# Patient Record
Sex: MALE | Race: WHITE | NOT HISPANIC OR LATINO | Employment: PART TIME | URBAN - METROPOLITAN AREA
[De-identification: names, ages, dates, MRNs, and addresses within clinical notes are randomized per-mention and may not be internally consistent; named-entity substitution may affect disease eponyms.]

---

## 2017-01-09 ENCOUNTER — GENERIC CONVERSION - ENCOUNTER (OUTPATIENT)
Dept: OTHER | Facility: OTHER | Age: 57
End: 2017-01-09

## 2017-01-20 ENCOUNTER — GENERIC CONVERSION - ENCOUNTER (OUTPATIENT)
Dept: OTHER | Facility: OTHER | Age: 57
End: 2017-01-20

## 2017-02-08 ENCOUNTER — ALLSCRIPTS OFFICE VISIT (OUTPATIENT)
Dept: OTHER | Facility: OTHER | Age: 57
End: 2017-02-08

## 2017-02-08 DIAGNOSIS — R91.8 OTHER NONSPECIFIC ABNORMAL FINDING OF LUNG FIELD: ICD-10-CM

## 2017-02-08 DIAGNOSIS — M54.50 LOW BACK PAIN: ICD-10-CM

## 2017-02-08 DIAGNOSIS — R05.9 COUGH: ICD-10-CM

## 2017-02-08 DIAGNOSIS — J06.9 ACUTE UPPER RESPIRATORY INFECTION: ICD-10-CM

## 2017-02-08 DIAGNOSIS — M54.32 SCIATICA OF LEFT SIDE: ICD-10-CM

## 2017-02-22 ENCOUNTER — GENERIC CONVERSION - ENCOUNTER (OUTPATIENT)
Dept: OTHER | Facility: OTHER | Age: 57
End: 2017-02-22

## 2017-02-28 ENCOUNTER — ALLSCRIPTS OFFICE VISIT (OUTPATIENT)
Dept: OTHER | Facility: OTHER | Age: 57
End: 2017-02-28

## 2017-03-01 ENCOUNTER — HOSPITAL ENCOUNTER (OUTPATIENT)
Dept: RADIOLOGY | Facility: MEDICAL CENTER | Age: 57
Discharge: HOME | End: 2017-03-01
Payer: COMMERCIAL

## 2017-03-01 ENCOUNTER — TRANSCRIBE ORDERS (OUTPATIENT)
Dept: ADMINISTRATIVE | Facility: HOSPITAL | Age: 57
End: 2017-03-01

## 2017-03-01 DIAGNOSIS — R05.9 COUGH: ICD-10-CM

## 2017-03-01 DIAGNOSIS — J06.9 ACUTE UPPER RESPIRATORY INFECTION: ICD-10-CM

## 2017-03-01 PROCEDURE — 71020 HB CHEST X-RAY 2VW FRONTAL&LATL: CPT

## 2017-03-02 ENCOUNTER — GENERIC CONVERSION - ENCOUNTER (OUTPATIENT)
Dept: OTHER | Facility: OTHER | Age: 57
End: 2017-03-02

## 2017-03-17 ENCOUNTER — HOSPITAL ENCOUNTER (OUTPATIENT)
Dept: RADIOLOGY | Facility: MEDICAL CENTER | Age: 57
Discharge: HOME | End: 2017-03-17
Payer: COMMERCIAL

## 2017-03-17 DIAGNOSIS — R91.8 OTHER NONSPECIFIC ABNORMAL FINDING OF LUNG FIELD: ICD-10-CM

## 2017-03-17 DIAGNOSIS — J06.9 ACUTE UPPER RESPIRATORY INFECTION: ICD-10-CM

## 2017-03-17 PROCEDURE — 71020 HB CHEST X-RAY 2VW FRONTAL&LATL: CPT

## 2017-03-22 ENCOUNTER — GENERIC CONVERSION - ENCOUNTER (OUTPATIENT)
Dept: OTHER | Facility: OTHER | Age: 57
End: 2017-03-22

## 2017-04-05 ENCOUNTER — GENERIC CONVERSION - ENCOUNTER (OUTPATIENT)
Dept: OTHER | Facility: OTHER | Age: 57
End: 2017-04-05

## 2017-05-30 ENCOUNTER — GENERIC CONVERSION - ENCOUNTER (OUTPATIENT)
Dept: OTHER | Facility: OTHER | Age: 57
End: 2017-05-30

## 2017-06-26 ENCOUNTER — GENERIC CONVERSION - ENCOUNTER (OUTPATIENT)
Dept: OTHER | Facility: OTHER | Age: 57
End: 2017-06-26

## 2017-07-26 ENCOUNTER — GENERIC CONVERSION - ENCOUNTER (OUTPATIENT)
Dept: OTHER | Facility: OTHER | Age: 57
End: 2017-07-26

## 2017-08-04 ENCOUNTER — GENERIC CONVERSION - ENCOUNTER (OUTPATIENT)
Dept: OTHER | Facility: OTHER | Age: 57
End: 2017-08-04

## 2017-10-02 ENCOUNTER — GENERIC CONVERSION - ENCOUNTER (OUTPATIENT)
Dept: OTHER | Facility: OTHER | Age: 57
End: 2017-10-02

## 2017-11-06 ENCOUNTER — GENERIC CONVERSION - ENCOUNTER (OUTPATIENT)
Dept: OTHER | Facility: OTHER | Age: 57
End: 2017-11-06

## 2017-12-11 ENCOUNTER — ALLSCRIPTS OFFICE VISIT (OUTPATIENT)
Dept: OTHER | Facility: OTHER | Age: 57
End: 2017-12-11

## 2017-12-11 DIAGNOSIS — E66.9 OBESITY: ICD-10-CM

## 2017-12-11 DIAGNOSIS — E78.5 HYPERLIPIDEMIA: ICD-10-CM

## 2017-12-11 DIAGNOSIS — R51.9 HEADACHE: ICD-10-CM

## 2017-12-11 DIAGNOSIS — N40.0 ENLARGED PROSTATE WITHOUT LOWER URINARY TRACT SYMPTOMS (LUTS): ICD-10-CM

## 2017-12-11 DIAGNOSIS — R53.83 OTHER FATIGUE: ICD-10-CM

## 2017-12-11 DIAGNOSIS — R42 DIZZINESS AND GIDDINESS: ICD-10-CM

## 2017-12-12 NOTE — PROGRESS NOTES
Assessment    1  Left-sided headache (784 0) (R51)   2  Fatigue (780 79) (R53 83)   3  AVM (arteriovenous malformation) brain (747 81) (Q28 2)   4  Lightheadedness (780 4) (R42)   5  Obesity (278 00) (E66 9)   6  Anxiety (300 00) (F41 9)    Plan  Anxiety, AVM (arteriovenous malformation) brain, Fatigue, Headache, Left-sidedheadache, Lightheadedness    · Follow-up PRN Evaluation and Treatment  Follow-up  Status: Complete  Done:87Pci3449  Fatigue, Headache, Left-sided headache, Lightheadedness, Obesity    · (1) ANTHONY SCREEN W/REFLEX TO TITER/PATTERN; Status:Active; Requestedfor:28Hxd5085;    · (1) CBC/PLT/DIFF; Status:Active; Requested for:43Npn8484;    · (1) COMPREHENSIVE METABOLIC PANEL; Status:Active; Requested for:11Dec2017;    · (1) RHEUMATOID FACTOR SCREEN; Status:Active; Requested for:11Dec2017;    · (1) SED RATE; Status:Active; Requested for:79Mie9827;    · (1) T4, FREE; Status:Active; Requested for:11Dec2017;    · (1) TSH; Status:Active; Requested for:63Xre4297;   Hyperlipidemia    · (1) LIPID PANEL, FASTING; Status:Active - Retrospective By Protocol Authorization; Requested for:98Hhw9312;     Discussion/Summary    Check labs r/o temporal arteritis  Rec  ENT consult for crackling right ear with opening jaw  F-up with neurosurgery for AVM  Fatigue may be due to obesity, check labs  Check labs for hyperlipidemia as he is obes  Lightheadedness and anxiety are stable  HA left sided area may be from stress/anxiety or ocular migraine  Call if any problems  The patient was counseled regarding  The treatment plan was reviewed with the patient/guardian  The patient/guardian understands and agrees with the treatment plan      Chief Complaint  Pt complains of a headache and vision changes of the R eye last night  History of Present Illness  HPI: Pt complains of a headache and vision changes of the R eye last night  Als some right ear clicking when opening jaw  No other complaints  HA left temporal area resolved  Hx of AVM on left posterior head area  Has had some fatigue and lightheadedness and does have stress in life and hx of anxiety and has not had to use Xanax as much  Review of Systems   Constitutional: no fever or chills, feels well, no tiredness, no recent weight loss or weight gain  ENT: as noted in HPI  Cardiovascular: no complaints of slow or fast heart rate, no chest pain, no palpitations, no leg claudication or lower extremity edema  Respiratory: no complaints of shortness of breath, no wheezing or cough, no dyspnea on exertion, no orthopnea or PND  Gastrointestinal: no complaints of abdominal pain, no constipation, no nausea or vomiting, no diarrhea or bloody stools  Genitourinary: no complaints of dysuria or incontinence, no hesitancy, no nocturia, no genital lesion, no inadequacy of penile erection  Musculoskeletal: no complaints of arthralgia, no myalgia, no joint swelling or stiffness, no limb pain or swelling  Integumentary: no complaints of skin rash or lesion, no itching or dry skin, no skin wounds  Neurological: as noted in HPI  Other Symptoms: anxiety stable, stressed  Active Problems  1  Acute low back pain (724 2) (M54 5)   2  Acute sinusitis (461 9) (J01 90)   3  Acute URI (465 9) (J06 9)   4  Anxiety (300 00) (F41 9)   5  AVM (arteriovenous malformation) brain (747 81) (Q28 2)   6  Backache (724 5) (M54 9)   7  Brain aneurysm (437 3) (I67 1)   8  Breast mass (611 72) (N63 0)   9  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)   10  Chest pain (786 50) (R07 9)   11  Colitis (558 9) (K52 9)   12  Constipation (564 00) (K59 00)   13  Costochondritis (733 6) (M94 0)   14  Cough (786 2) (R05)   15  Erectile dysfunction of non-organic origin (302 72) (F52 21)   16  Esophageal reflux (530 81) (K21 9)   17  Foot pain, left (729 5) (M79 672)   18  Hepatic steatosis (571 8) (K76 0)   19  Hydronephrosis (591) (N13 30)   20  Irritable bowel syndrome (564 1) (Q26 9)   21   Lateral epicondylitis of left elbow (726 32) (M77 12)   22  Left inguinal hernia (550 90) (K40 90)   23  Lung nodule (793 11) (R91 1)   24  Mass of breast, left (611 72) (N63 20)   25  Midepigastric pain (789 06) (R10 13)   26  Nephrolithiasis (592 0) (N20 0)   27  Obesity (278 00) (E66 9)   28  Organic impotence (607 84) (N52 9)   29  Other chronic pain (338 29) (G89 29)   30  Periumbilical hernia (133 6) (K42 9)   31  Pulmonary infiltrate in left lung on CXR (793 19) (R91 8)   32  Sciatica, left side (724 3) (M54 32)   33  Skin rash (782 1) (R21)   34  Strain of thoracic region (847 1) (S29 019A)   35  Tinnitus of both ears (388 30) (H93 13)   36  Umbilical hernia (724 2) (K42 9)   37  Umbilical hernia (761 5) (K42 9)    Past Medical History  1  History of Acute Sinus Inflammation (461 9)   2  History of Cough (786 2) (R05)   3  History of Fracture of nasal bone, sequela   4  History of Fracture of wrist, unspecified laterality, sequela (905 2) (S62 109S)   5  History of Herniated disc (722 2)   6  History of acute bronchitis (V12 69) (Z87 09)   7  History of headache (V13 89) (Z87 898)   8  History of headache (V13 89) (Z87 898)   9  History of influenza (V12 09) (Z87 09)   10  History of renal calculi (V13 01) (A28 219)    Family History  Mother    1  Family history of Stroke Syndrome (V17 1)  Father    2  Family history of Cancer   3  Family history of Diabetes Mellitus (V18 0)   4  Family history of Heart Disease (V17 49)   5  Family history of Hypertension (V17 49)    Social History   · Completed college   · Currently sexually active   · Denied: History of Drug use   · Lives with spouse   ·    · Never A Smoker   · Occupation   · Antiques   · One child   · Rarely consumes alcohol (V49 89) (Z78 9)    Surgical History    1  History of Hernia Repair   2  History of Kidney Surgery    Current Meds   1  ALPRAZolam 0 25 MG Oral Tablet; TAKE 1 TABLET Daily PRN anxiety no driving - take 30 minutes prior to MRI;  Therapy: 33CSJ5497 to (Evaluate:27Nov2017); Last Rx:20Nov2017 Ordered   2  Ibuprofen TABS; TAKE TABS AS NEEDED; Therapy: (Recorded:37Brj7772) to Recorded    Allergies  1  No Known Drug Allergies  2  Seasonal    Vitals   Recorded: 07JSH6324 97:18XT   Systolic 004   Diastolic 86   Height 5 ft 8 5 in   Weight 232 lb 8 oz   BMI Calculated 34 84   BSA Calculated 2 19       Physical Exam   Constitutional  General appearance: Abnormal  -- obesity, fatigued  Eyes  Conjunctiva and lids: No swelling, erythema, or discharge  Pupils and irises: Equal, round and reactive to light  Ears, Nose, Mouth, and Throat  External inspection of ears and nose: Normal    Otoscopic examination: Abnormal  -- right ear canal with small hair, No TMJ signs but does hear clicking and crackling with jaw opening  Nasal mucosa, septum, and turbinates: Normal without edema or erythema  Oropharynx: Normal with no erythema, edema, exudate or lesions  Pulmonary  Respiratory effort: No increased work of breathing or signs of respiratory distress  Auscultation of lungs: Clear to auscultation, equal breath sounds bilaterally, no wheezes, no rales, no rhonci  Cardiovascular  Palpation of heart: Normal PMI, no thrills  Auscultation of heart: Normal rate and rhythm, normal S1 and S2, without murmurs  Examination of extremities for edema and/or varicosities: Normal    Carotid pulses: Normal    Lymphatic  Palpation of lymph nodes in neck: No lymphadenopathy  Musculoskeletal  Gait and station: Normal    Digits and nails: Normal without clubbing or cyanosis  Inspection/palpation of joints, bones, and muscles: Normal    Skin  Skin and subcutaneous tissue: Normal without rashes or lesions  Neurologic  Cranial nerves: Cranial nerves 2-12 intact  Reflexes: 2+ and symmetric  Sensation: No sensory loss  Psychiatric  Orientation to person, place and time: Normal    Mood and affect: Abnormal  -- anxiety stable          Future Appointments    Date/Time Provider Specialty Site   01/22/2018 09:15 AM Tono Vo MD Urology [address]       Signatures   Electronically signed by : Tony Carballo DO; Dec 11 2017  4:24PM EST                       (Author)

## 2018-01-03 ENCOUNTER — GENERIC CONVERSION - ENCOUNTER (OUTPATIENT)
Dept: OTHER | Facility: OTHER | Age: 58
End: 2018-01-03

## 2018-01-09 NOTE — RESULT NOTES
Message   stool for ova and parasites wnl  Verified Results  (1) OVA AND PARASITES EXAM [id number] 08:53AM Hansel Henderson     Test Name Result Flag Reference   O&P CONC  EXAM      No ova, cysts, or parasites seen     One negative specimen does not rule out the possibility of a  parasitic infection     Performed at:  [address]  [id number]  : Luz Maria Polo MD, Phone:  [phone]

## 2018-01-10 NOTE — RESULT NOTES
Message   cxray wnl  No pneumonia  or infiltrate  Verified Results  * XR CHEST PA & LATERAL [id number] 03:08PM [facility] Order Number: [id number]     Test Name Result Flag Reference   XR CHEST PA & LATERAL (Report)     CHEST      INDICATION: Follow-up pneumonia  COMPARISON: March 1, 2017  VIEWS: Frontal and lateral projections     IMAGES: 2     FINDINGS:        Cardiomediastinal silhouette appears unremarkable  The lungs are clear  Questionable left lung base infiltrate, noted on prior study, no longer present  No pneumothorax or pleural effusion  Visualized osseous structures appear within normal limits for the patient's age  IMPRESSION:     Normal examination         Workstation performed: [id number]     Signed by:   Fred Phillips MD   3/22/17

## 2018-01-11 NOTE — RESULT NOTES
Message   There is a questionable nodule in the anterior upper lung zone seen only on the lateral view of cxray  I would recommend  CT scan of lungs for a vague nodularity in the upper substernal area  Otherwise, no active pulmonary disease  Verified Results  * XR CHEST PA & LATERAL [id number] 03:09PM Ping Henderson    Order Number: [id number]     Test Name Result Flag Reference   XR CHEST PA & LATERAL (Report)     CHEST - DUAL ENERGY     INDICATION: Mid to lower sternal pain for a couple of months  COMPARISON: 2/24/2012     VIEWS: PA (including soft tissue/bone algorithms) and lateral projections; 4 images     FINDINGS:        Cardiomediastinal silhouette appears unremarkable  Although possibly artifactual secondary to confluent shadows, there is vague nodularity in the upper substernal region near the level of the aortic arch and anterior to the trachea  This is seen only on the lateral view  The lungs are otherwise clear  No pleural effusions  Visualized osseous structures appear within normal limits for the patient's age  IMPRESSION:     Questionable nodule in the anterior upper lung zone seen only on the lateral view  I would recommend short interval follow-up chest films in 3-4 weeks to reevaluate this finding  If this persists, CT imaging may be considered  Otherwise, no active pulmonary disease         ##sigslh##sigslh         ##fuslh01##fuslh01          Workstation performed: [id number]     Signed by:   Rhiannon Robbins DO   3/12/16

## 2018-01-12 NOTE — RESULT NOTES
Message   Suspicion of developing infiltrate involving the lateral left lung base  Continued follow-up to confirm resolution      Recheck cxray in 2 weeks for resolution of this possible infiltrate  Take abx as directed and call if worse or not better  Verified Results  * XR CHEST PA & LATERAL [id number] 11:23AM Spencer Quezada Order Number: [id number]     Test Name Result Flag Reference   XR CHEST PA & LATERAL (Report)     CHEST     INDICATION: 59-year-old male, cough   COMPARISON: 3/11/2016 chest x-ray     VIEWS: Frontal and lateral projections; 2 images     FINDINGS:   Interval development of strand-like opacity lateral left lung base suspicious for developing infiltrate     The lungs are otherwise clear  No pleural effusions  The cardiomediastinal silhouette is unremarkable  Bony thorax unremarkable  IMPRESSION:   Suspicion of developing infiltrate involving the lateral left lung base   Continued follow-up to confirm resolution       ##sigslh##sigslh       Workstation performed: [id number]     Signed by:   Oswaldo Thompson MD   3/2/17

## 2018-01-13 NOTE — PROGRESS NOTES
Assessment    1  Acute low back pain (724 2) (M54 5)   2  Sciatica, left (724 3) (M54 32)    Plan  Acute low back pain, Sciatica, left    · PredniSONE 10 MG Oral Tablet; TAKE 6 TABLETS TODAY, THEN DECREASE BY 1  TABLET EACH DAY UNTIL GONE   · TraMADol HCl - 50 MG Oral Tablet; 1 Po QD prn pain with food    Discussion/Summary    Rest and drink fluids as directed  Start prednisone as directed tomorrow  Hx of herniated discs in low back, saw Dr [facility] in past for this  Consider xray of low back if not better  Depo Medrol 80 mg injection today, Jarrell campbell or [employer] prn  Call if not better  Tramadol prn severe pain, no driving  The patient was counseled regarding  Possible side effects of new medications were reviewed with the patient/guardian today  The treatment plan was reviewed with the patient/guardian  The patient/guardian understands and agrees with the treatment plan      Chief Complaint  PT is here today for back pain  Narcisa Putty it started on Monday  Says he lifts boxes at work, and its in the lower left side and radiates down into his left leg  History of Present Illness  HPI: PT is here today for back pain  Narcisa Putty it started on Monday  Says he lifts boxes at work, and its in the lower left side and radiates down into his left leg  No rectal bleeding, no abdominal pain  No urinary complaints  Review of Systems    Constitutional: no fever or chills, feels well, no tiredness, no recent weight loss or weight gain  ENT: no complaints of earache, no loss of hearing, no nosebleeds or nasal discharge, no sore throat or hoarseness  Cardiovascular: no complaints of slow or fast heart rate, no chest pain, no palpitations, no leg claudication or lower extremity edema  Respiratory: no complaints of shortness of breath, no wheezing or cough, no dyspnea on exertion, no orthopnea or PND     Gastrointestinal: no complaints of abdominal pain, no constipation, no nausea or vomiting, no diarrhea or bloody stools  Genitourinary: no complaints of dysuria or incontinence, no hesitancy, no nocturia, no genital lesion, no inadequacy of penile erection  Musculoskeletal: as noted in HPI  Integumentary: no complaints of skin rash or lesion, no itching or dry skin, no skin wounds  Neurological: as noted in HPI  Active Problems    1  Acute URI (465 9) (J06 9)   2  Anxiety (300 00) (F41 9)   3  Backache (724 5) (M54 9)   4  Erectile dysfunction of non-organic origin (302 72) (F52 21)   5  Esophageal reflux (530 81) (K21 9)   6  Headache (784 0) (R51)   7  Hydronephrosis (591) (N13 30)   8  Irritable bowel syndrome (564 1) (K58 9)   9  Nephrolithiasis (592 0) (N20 0)   10  Obesity (278 00) (E66 9)   11  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)   12  Other chronic pain (338 29) (G89 29)   13  Strain of thoracic region (847 1) (K16 582F)    Past Medical History    1  History of Acute Sinus Inflammation (461 9)   2  History of Cough (786 2) (R05)   3  History of acute bronchitis (V12 69) (Z87 09)   4  History of influenza (V12 09) (Z87 09)    Family History    1  Family history of Stroke Syndrome (V17 1)    2  Family history of Cancer   3  Family history of Diabetes Mellitus (V18 0)   4  Family history of Heart Disease (V17 49)   5  Family history of Hypertension (V17 49)    Social History    · Never A Smoker    Current Meds   1  ALPRAZolam 1 MG Oral Tablet; Take 1 tablet daily; Therapy: 64Iok4840 to (Alden De Leon)  Requested for: 01KAB7832; Last   Rx:04Mar2013 Ordered   2  Ibuprofen 800 MG Oral Tablet; Therapy: 80TVM3071 to (Brandin Leonarde Kettle)  Requested for: 25Apr2011 Ordered    Vitals   Recorded: 37TUJ2245 62:39UI   Systolic 777   Diastolic 90   Weight 113 lb 4 00 oz     Physical Exam    Constitutional   General appearance: No acute distress, well appearing and well nourished  Eyes   Conjunctiva and lids: No swelling, erythema, or discharge  Pupils and irises: Equal, round and reactive to light  Ears, Nose, Mouth, and Throat   External inspection of ears and nose: Normal     Otoscopic examination: Tympanic membrance translucent with normal light reflex  Canals patent without erythema  Nasal mucosa, septum, and turbinates: Normal without edema or erythema  Oropharynx: Normal with no erythema, edema, exudate or lesions  Pulmonary   Respiratory effort: No increased work of breathing or signs of respiratory distress  Auscultation of lungs: Clear to auscultation, equal breath sounds bilaterally, no wheezes, no rales, no rhonci  Cardiovascular   Palpation of heart: Normal PMI, no thrills  Auscultation of heart: Normal rate and rhythm, normal S1 and S2, without murmurs  Examination of extremities for edema and/or varicosities: Normal     Carotid pulses: Normal     Lymphatic   Palpation of lymph nodes in neck: No lymphadenopathy  Musculoskeletal   Gait and station: Normal     Digits and nails: Normal without clubbing or cyanosis  Inspection/palpation of joints, bones, and muscles: Abnormal   left low back pain with left radiculopathy  Skin   Skin and subcutaneous tissue: Normal without rashes or lesions  Neurologic   Cranial nerves: Cranial nerves 2-12 intact  Reflexes: 2+ and symmetric  Sensation: No sensory loss      Psychiatric   Orientation to person, place and time: Normal     Mood and affect: Normal          Signatures   Electronically signed by : Isatu Grayson DO; Jan 20 2016  2:06PM EST                       (Author)

## 2018-01-14 VITALS
WEIGHT: 239.25 LBS | SYSTOLIC BLOOD PRESSURE: 142 MMHG | TEMPERATURE: 98 F | DIASTOLIC BLOOD PRESSURE: 84 MMHG | HEIGHT: 69 IN | BODY MASS INDEX: 35.44 KG/M2

## 2018-01-14 VITALS
HEIGHT: 69 IN | TEMPERATURE: 98.3 F | BODY MASS INDEX: 34.21 KG/M2 | WEIGHT: 231 LBS | DIASTOLIC BLOOD PRESSURE: 82 MMHG | SYSTOLIC BLOOD PRESSURE: 128 MMHG

## 2018-01-15 NOTE — RESULT NOTES
Message   1  No lung nodule to correspond to the x-ray finding  2   Geographic hepatic steatosis  Lose weight and low fat diet encouraged to help with fatty liver  The CT scan overall was wnl in chest      Verified Results  * CT CHEST WO CONTRAST 21Mar2016 07:44PM Erika Morning Order Number: [id number]     Test Name Result Flag Reference   CT CHEST WO CONTRAST (Report)     CT CHEST WITHOUT IV CONTRAST     INDICATION: Evaluate for possible nodule on prior chest x-ray     COMPARISON: X-ray from 3/11/2016     TECHNIQUE: Low-dose CT examination of the chest was performed without intravenous contrast  Axial, sagittal and coronal reformatted images were submitted for interpretation  Coronal thick section MIP (maximal intensity projection) images were also    created  This examination, like all CT scans performed in the [facility], was performed utilizing techniques to minimize radiation dose exposure, including the use of iterative reconstruction and automated exposure control  FINDINGS:     LUNGS: Lungs are clear  There is no tracheal or endobronchial lesion  PLEURA: Unremarkable  HEART/GREAT VESSELS: Unremarkable for patient's age  MEDIASTINUM AND LIDA: Unremarkable  CHEST WALL AND LOWER NECK: Unremarkable  VISUALIZED STRUCTURES IN THE UPPER ABDOMEN: There is geographic hepatic steatosis  OSSEOUS STRUCTURES: No acute fracture  No destructive osseous lesion  IMPRESSION:     1  No lung nodule to correspond to the x-ray finding  2  Geographic hepatic steatosis         Workstation performed: [id number]     Signed by:   Devi Martins MD   3/22/16

## 2018-01-16 NOTE — RESULT NOTES
Message   US breast unremarkable  breast US unremarkable  Verified Results  US BREAST RIGHT LIMITED [id number] 09:48AM Ksenia Bryant     Test Name Result Flag Reference   US BREAST RIGHT LIMITED (Report)     Reason for exam: clinical finding  Mammo Diagnostic Bilateral W CAD: February 8, 2016 - Check In #:    [de-identified]   Bilateral CC and MLO view(s) were taken  Technologist: RT Arsenio(R)(M)   Retroareolar breast densities with a flame-shaped symmetric    bilaterally are consistent with bilateral gynecomastia  There is   a sharply circumscribed nodular density in the upper inner right   breast, and a sharply circumscribed nodular density is noted in    the lower inner left breast, both intermediate to low-density  Otherwise no suspicious mass and specifically no mammographic    evidence of mass in the region of palpable abnormality in the    left breast      Targeted right breast ultrasound at the site of sharply    circumscribed nodular density reveals a 4 mm hypoechoic nodule    within the skin at the 2 o'clock position 6 m from the nipple    consistent with a skin mole  Targeted left breast ultrasound reveals hypoechoic nodule within    the skin measuring 5 mm at the 8 o'clock position 11 cm from the    nipple consistent with a skin mole corresponding to the sharply    circumscribed nodular density as well as an oval hyperechoic    sharply circumscribed 16 mm mass in the shallow subcutaneous    tissues at the 8 o'clock position 8 cm from the nipple consistent   with lipoma corresponding to the palpable finding  Impression: Benign findings  US Breast Right Limited: February 8, 2016 - Check In #: [de-identified]   Technologist: RT Adrian (R)SHO     US Breast Left Limited: February 8, 2016 - Check In #: [de-identified]   Technologist: RT Adrian (R)SHO     ASSESSMENT: BiRad:2 - Benign (Overall)     Recommendation:   Clinical management of both breasts     Analyzed by CAD Transcription Location: [facility] 98: [id number]     Risk Value(s):   Myriad Table: 1 5%     MAMMO DIAGNOSTIC BILATERAL W CAD [id number] 09:20AM Hansel Henderson    Order Number: [id number]     Test Name Result Flag Reference   MAMMO DIAGNOSTIC BILATERAL W CAD (Report)     Reason for exam: clinical finding  Mammo Diagnostic Bilateral W CAD: February 8, 2016 - Check In #:    [de-identified]   Bilateral CC and MLO view(s) were taken  Technologist: Tran Noriega RT(R)(M)   Retroareolar breast densities with a flame-shaped symmetric    bilaterally are consistent with bilateral gynecomastia  There is   a sharply circumscribed nodular density in the upper inner right   breast, and a sharply circumscribed nodular density is noted in    the lower inner left breast, both intermediate to low-density  Otherwise no suspicious mass and specifically no mammographic    evidence of mass in the region of palpable abnormality in the    left breast      Targeted right breast ultrasound at the site of sharply    circumscribed nodular density reveals a 4 mm hypoechoic nodule    within the skin at the 2 o'clock position 6 m from the nipple    consistent with a skin mole  Targeted left breast ultrasound reveals hypoechoic nodule within    the skin measuring 5 mm at the 8 o'clock position 11 cm from the    nipple consistent with a skin mole corresponding to the sharply    circumscribed nodular density as well as an oval hyperechoic    sharply circumscribed 16 mm mass in the shallow subcutaneous    tissues at the 8 o'clock position 8 cm from the nipple consistent   with lipoma corresponding to the palpable finding  Impression: Benign findings       US Breast Right Limited: February 8, 2016 - Check In #: [de-identified]   Technologist: RT Usama (R)SHO     US Breast Left Limited: February 8, 2016 - Check In #: [de-identified]   Technologist: RT Usama (R), SHO     ASSESSMENT: BiRad:2 - Benign (Overall) Recommendation:   Clinical management of both breasts  Analyzed by CAD     Transcription Location: [address]   Signing Station: [id number]     Risk Value(s):   Myriad Table: 1 5%     US BREAST LEFT LIMITED [id number] 08:56AM Eustis Ada     Test Name Result Flag Reference   US BREAST LEFT LIMITED (Report)     Reason for exam: clinical finding  Mammo Diagnostic Bilateral W CAD: February 8, 2016 - Check In #:    [de-identified]   Bilateral CC and MLO view(s) were taken  Technologist: RT Jerman(R)(M)   Retroareolar breast densities with a flame-shaped symmetric    bilaterally are consistent with bilateral gynecomastia  There is   a sharply circumscribed nodular density in the upper inner right   breast, and a sharply circumscribed nodular density is noted in    the lower inner left breast, both intermediate to low-density  Otherwise no suspicious mass and specifically no mammographic    evidence of mass in the region of palpable abnormality in the    left breast      Targeted right breast ultrasound at the site of sharply    circumscribed nodular density reveals a 4 mm hypoechoic nodule    within the skin at the 2 o'clock position 6 m from the nipple    consistent with a skin mole  Targeted left breast ultrasound reveals hypoechoic nodule within    the skin measuring 5 mm at the 8 o'clock position 11 cm from the    nipple consistent with a skin mole corresponding to the sharply    circumscribed nodular density as well as an oval hyperechoic    sharply circumscribed 16 mm mass in the shallow subcutaneous    tissues at the 8 o'clock position 8 cm from the nipple consistent   with lipoma corresponding to the palpable finding  Impression: Benign findings       US Breast Right Limited: February 8, 2016 - Check In #: [de-identified]   Technologist: RT Mariaelena (R), SHO     US Breast Left Limited: February 8, 2016 - Check In #: [de-identified]   Technologist: RT Mariaelena (R), SHO     ASSESSMENT: BiRad:2 - Benign (Overall)     Recommendation:   Clinical management of both breasts  Analyzed by CAD     Transcription Location: [address]   Signing Station: [id number]     Risk Value(s):   Myriad Table: 1 5%       Plan  Breast mass    · US BREAST RIGHT COMPLETE; Status:Active;  Requested for:22Rqk2035;

## 2018-01-18 NOTE — RESULT NOTES
Message   stool culture and c diff toxin wnl  Verified Results  (1) C  DIFFICILE TOXIN BY PCR 03Jun2016 08:53AM Amor Osorio    Order Number: [id number]     Test Name Result Flag Reference   C  DIFFICILE TOXIN BY PCR   NEGATIVE for C difficle toxin by PCR  NEGATIVE for C difficle toxin by PCR      (1) STOOL CULTURE [id number] 08:53AM Amor Osorio     Test Name Result Flag Reference   CLINICAL REPORT (Report)     Test:        Stool culture  Specimen Source:  Rectum  Specimen Type:   Stool  Specimen Date:   6/3/2016 8:53 AM  Result Date:    6/5/2016 11:49 AM  Result Status:   Final result  Resulting Lab:   [facility] [address] [place] [address]            Tel: [phone]      CULTURE                                       ------------------                                   No Salmonella, Shigella or Campylobacter isolated      Shiga Toxin 1 NOT detected, Shiga Toxin 2 NOT detected

## 2018-01-22 ENCOUNTER — ALLSCRIPTS OFFICE VISIT (OUTPATIENT)
Dept: OTHER | Facility: OTHER | Age: 58
End: 2018-01-22

## 2018-01-22 LAB
BILIRUB UR QL STRIP: NEGATIVE
CLARITY UR: NORMAL
COLOR UR: YELLOW
GLUCOSE (HISTORICAL): NEGATIVE
HGB UR QL STRIP.AUTO: NORMAL
KETONES UR STRIP-MCNC: NEGATIVE MG/DL
LEUKOCYTE ESTERASE UR QL STRIP: NEGATIVE
NITRITE UR QL STRIP: NEGATIVE
PH UR STRIP.AUTO: 6 [PH]
PROT UR STRIP-MCNC: NEGATIVE MG/DL
SP GR UR STRIP.AUTO: 1.02
UROBILINOGEN UR QL STRIP.AUTO: 0.2

## 2018-01-23 VITALS
WEIGHT: 232.5 LBS | SYSTOLIC BLOOD PRESSURE: 138 MMHG | HEIGHT: 69 IN | BODY MASS INDEX: 34.44 KG/M2 | DIASTOLIC BLOOD PRESSURE: 86 MMHG

## 2018-01-23 NOTE — MISCELLANEOUS
Message   Recorded as Task   Date: 01/03/2018 03:26 PM, Created By: Jose Carter   Task Name: Miscellaneous   Assigned To: Jose Jason 457J,IVUJ   Regarding Patient: Michael Irving, Status: Active   CommentEartha [place] - 03 Jan 2018 3:26 PM     TASK CREATED  Caller: Self; [phone] (Home)  Pt requesting yrly lab order emailed to him  DowellKrystyna - 03 Jan 2018 3:55 PM     TASK EDITED  Alfred WELLINGTON WHAT ORDERS HE WOULD LIKE FOR HIS PT'S  NEXT OV, AND MAIL OUT LAB SLIPS  Active Problems    1  Acute low back pain (724 2) (M54 5)   2  Acute sinusitis (461 9) (J01 90)   3  Acute URI (465 9) (J06 9)   4  Anxiety (300 00) (F41 9)   5  AVM (arteriovenous malformation) brain (747 81) (Q28 2)   6  Backache (724 5) (M54 9)   7  Brain aneurysm (437 3) (I67 1)   8  Breast mass (611 72) (N63 0)   9  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)   10  Chest pain (786 50) (R07 9)   11  Colitis (558 9) (K52 9)   12  Constipation (564 00) (K59 00)   13  Costochondritis (733 6) (M94 0)   14  Cough (786 2) (R05)   15  Erectile dysfunction of non-organic origin (302 72) (F52 21)   16  Esophageal reflux (530 81) (K21 9)   17  Fatigue (780 79) (R53 83)   18  Foot pain, left (729 5) (M79 672)   19  Headache (784 0) (R51)   20  Hepatic steatosis (571 8) (K76 0)   21  Hydronephrosis (591) (N13 30)   22  Hyperlipidemia (272 4) (E78 5)   23  Irritable bowel syndrome (564 1) (K58 9)   24  Lateral epicondylitis of left elbow (726 32) (M77 12)   25  Left inguinal hernia (550 90) (K40 90)   26  Left-sided headache (784 0) (R51)   27  Lightheadedness (780 4) (R42)   28  Lung nodule (793 11) (R91 1)   29  Mass of breast, left (611 72) (N63 20)   30  Midepigastric pain (789 06) (R10 13)   31  Nephrolithiasis (592 0) (N20 0)   32  Obesity (278 00) (E66 9)   33  Organic impotence (607 84) (N52 9)   34  Other chronic pain (338 29) (G89 29)   35  Periumbilical hernia (261 4) (K42 9)   36  Pulmonary infiltrate in left lung on CXR (793 19) (R91 8)   37  Sciatica, left side (724 3) (M54 32)   38  Skin rash (782 1) (R21)   39  Strain of thoracic region (847 1) (S29 019A)   40  Tinnitus of both ears (388 30) (H93 13)   41  Umbilical hernia (711 3) (K42 9)   42  Umbilical hernia (255 6) (K42 9)    Current Meds   1  ALPRAZolam 0 25 MG Oral Tablet; TAKE 1 TABLET Daily PRN anxiety no driving - take   30 minutes prior to MRI; Therapy: 41DFL5567 to (Evaluate:27Nov2017); Last Rx:20Nov2017 Ordered   2  Ibuprofen TABS; TAKE TABS AS NEEDED; Therapy: (Recorded:50Aic9400) to Recorded    Allergies    1  No Known Drug Allergies    2   Seasonal    Signatures   Electronically signed by : Yuliana Wilde, ; [place]  3 2018  3:55PM EST                       (Author)

## 2018-01-23 NOTE — MISCELLANEOUS
Assessment   1  Skin rash (782 1) (R21)1   2  Colitis (558 9) (K52 9)1   3  AVM (arteriovenous malformation) brain (747 81) (Q28 2)1   4  Headache (784 0) (R51)1   5  Nephrolithiasis (592 0) (N20 0)1   6  Left inguinal hernia (550 90) (K40 90)1   7  Periumbilical hernia (289 4) (K42 9)1      1 Amended By: Rena Bautista; Jun 01 2016 10:31 AM EST    Plan  AVM (arteriovenous malformation) brain, Colitis, Headache, Left inguinal hernia,  Nephrolithiasis, Periumbilical hernia, Skin rash    · Follow-up visit in 3 months Evaluation and Treatment  Follow-up  Status: Hold For -  Scheduling  Requested for: 29QIF10770   Ordered; For: AVM (arteriovenous malformation) brain, Colitis, Headache, Left inguinal   hernia, Nephrolithiasis, Periumbilical hernia, Skin rash;  Ordered By: Scott Horanlcampos  Performed:   Due: 15RGT94752   Skin rash    · Start: Mometasone Furoate 0 1 % External Cream; APPLY SPARINGLY TO AFFECTED  AREA(S) ONCE DAILY1   Rx By: Rena Bautista; Dispense: 0 Days ; #:1 X 45 GM Tube; Refill: 0;For: Skin rash;   REGINO = N;1 1,2  Verified Transmission to [facility] #[id number] 1,2  Last Updated By: [employer]; 6/1/2016 10:34:35 AM2      1 Amended By: Rena Bautista; Jun 01 2016 10:22 AM EST   2 Amended By: Rena Bautista; Jun 01 2016 11:10 AM EST    Discussion/Summary  Discussion Summary:   Pt  is s/p hospital and here for ALBINO  He was diagnosed with 2  AVM brain, get f-up with Dr Soto Deluna 1  Neurosurgery 2  and get 2nd opinion at [facility] and and also finish abx as directed for colitis  Rec  f-up with colorectal surgery as directed for hx of 1  recent 2  colitis  BP stable  1  He lost 10 pounds while in hospital for 5 days  It was found pt  has b/l nephrolithiasis and also a periumbilical hernia and left inguinal hernia that need to be followed  Call if any rectal bleeding or change in neuro status  His headaches continue to improve as well as his ptosis  Call if any problems   His discharge summary was reviewed and was given copies to take with him to [facility] for 2nd opinion re AVM in brain left parietal area of brain  F-up with urologist as directed for nephrolithiasis also  2    Counseling Documentation With Imm: The  patient1  was counseled regarding1         1 Amended By: Donald Sifuentes; Jun 01 2016 10:27 AM EST   2 Amended By: Donald Sifuentes; Jun 01 2016 11:14 AM EST    Chief Complaint  Chief Complaint Free Text Note Form: PT is here today for a ALBINO  States that he is feeling better, does have some pain/sensitivity on his left eye  1    Chief Complaint Chronic Condition [facility]: Patient is here today for follow up of chronic conditions described in HPI  2        1 Amended By: Susan Nguyen; Jun 01 2016 10:01 AM EST   2 Amended By: Donald Sifuentes; Jun 01 2016 10:08 AM EST    History of Present Illness  TCM Communication [facility]: The patient is being contacted for follow-up after hospitalization  Hospital records were reviewed  He was hospitalized at [facility]  The date of admission: 05/27/2016, date of discharge: 05/31/2016  Diagnosis: headache  He was discharged to home  Medications reviewed and updated today  He scheduled a follow up appointment  Symptoms: headache  The patient is currently experiencing symptoms  also complaining of abdominal issues Counseling was provided to the patient  Topics counseled included importance of compliance with treatment  Communication performed and completed by Alyssa Carty   HPI: PT is here today for a ALBINO  States that he is feeling better, does have some pain/sensitivity on his left eye  Dx with colitis and is taking abx as directed  Pt  lost 10 pounds  Pt  was also told to f-up with Neurosurgery for possible AVM brain  He will be seeing Dr Narinder Monique and also Pen for 2nd opinion   Has a rash left trunk area and used hydrocortisone cream 1        1 Amended By: Donald Sifuentes; Jun 01 2016 10:09 AM EST    Review of Systems  Complete-Male: Constitutional:1  No fever or chills, feels well, no tiredness, no recent weight gain or weight loss1   Eyes:1  No complaints of eye pain, no red eyes, no discharge from eyes, no itchy eyes1   ENT:1  no complaints of earache, no hearing loss, no nosebleeds, no nasal discharge, no sore throat, no hoarseness1   Cardiovascular: 1  No complaints of slow heart rate, no fast heart rate, no chest pain, no palpitations, no leg claudication, no lower extremity1   Respiratory:1  No complaints of shortness of breath, no wheezing, no cough, no SOB on exertion, no orthopnea or PND1   Gastrointestinal:1                 1,2 as noted in HPI2   Genitourinary:1                 1,2 as noted in HPI2   Musculoskeletal:1  No complaints of arthralgia, no myalgias, no joint swelling or stiffness, no limb pain or swelling1   Integumentary:1  No complaints of skin rash or skin lesions, no itching, no skin wound, no dry skin1   Neurological:1                       1,2 as noted in HPI2   Psychiatric:1  Is not suicidal, no sleep disturbances, no anxiety or depression, no change in personality, no emotional problems1   Endocrine:1  No complaints of proptosis, no hot flashes, no muscle weakness, no erectile dysfunction, no deepening of the voice, no feelings of weakness1   Hematologic/Lymphatic:1  No complaints of swollen glands, no swollen glands in the neck, does not bleed easily, no easy bruising1         1 Amended By: Sylvie Brown; Jun 01 2016 10:09 AM EST   2 Amended By: Sylvie Brown; Jun 01 2016 11:10 AM EST    Active Problems   1  Acute low back pain (724 2) (M54 5)  2  Acute sinusitis (461 9) (J01 90)  3  Acute URI (465 9) (J06 9)  4  Anxiety (300 00) (F41 9)  5  Backache (724 5) (M54 9)  6  Breast mass (611 72) (N63)  7  Chest pain (786 50) (R07 9)  8  Costochondritis (733 6) (M94 0)  9  Cough (786 2) (R05)  10  Erectile dysfunction of non-organic origin (302 72) (F52 21)  11   Esophageal reflux (530 81) (K21 9)  12  Headache (784 0) (R51)  13  Hepatic steatosis (571 8) (K76 0)  14  Hydronephrosis (591) (N13 30)  15  Irritable bowel syndrome (564 1) (K58 9)  16  Lateral epicondylitis of left elbow (726 32) (M77 12)  17  Lung nodule (793 11) (R91 1)  18  Mass of breast, left (611 72) (N63)  19  Midepigastric pain (789 06) (R10 13)  20  Nephrolithiasis (592 0) (N20 0)  21  Obesity (278 00) (E66 9)  22  Organic impotence (607 84) (N52 9)  23  Other chronic pain (338 29) (G89 29)  24  Sciatica, left (724 3) (M54 32)  25  Strain of thoracic region (847 1) (S29 019A)    Past Medical History   1  History of Acute Sinus Inflammation (461 9)  2  History of Cough (786 2) (R05)  3  History of acute bronchitis (V12 69) (Z87 09)  4  History of influenza (V12 09) (Z87 09)    Family History  Mother   1  Family history of Stroke Syndrome (V17 1)  Father   2  Family history of Cancer  3  Family history of Diabetes Mellitus (V18 0)  4  Family history of Heart Disease (V17 49)  5  Family history of Hypertension (V17 49)    Social History    · Never A Smoker    Current Meds  1  ALPRAZolam 1 MG Oral Tablet; Take 1 tablet daily; Therapy: 03Ovh5174 to (Manny Cheng)  Requested for: 15IRZ8041; Last   Rx:04Mar2013 Ordered  2  Azithromycin 500 MG Oral Tablet; TAKE 1 TABLET DAILY UNTIL FINISHED; Therapy: 08XEY1792 to (Jeffrey Soliz)  Requested for: 23ZGF8617; Last   Rx:77Sld9210 Ordered  3  Fluticasone Propionate 50 MCG/ACT Nasal Suspension; USE 2 SPRAYS IN EACH   NOSTRIL DAILY; Therapy: 49DVQ8611 to (Evaluate:64Wva8720)  Requested for: 20JJR4494; Last   Rx:42Lqr3412 Ordered  4  Ibuprofen 800 MG Oral Tablet; Therapy: 39BSA0192 to (Last [place] Marcial)  Requested for: 25Apr2011 Ordered  5  Lidocaine 5 % External Patch; APPLY 1 PATCH TO THE AFFECTED AREA AND LEAVE IN   PLACE FOR 12 HOURS, THEN REMOVE AND LEAVE OFF FOR 12 HOURS;    Therapy: 78JYQ8659 to (Evaluate:20Apr2016)  Requested for: 78IFN6705; Last   Rx:06Apr2016 Ordered  6  Meloxicam 7 5 MG Oral Tablet; TAKE 1 TABLET BY MOUTH EVERY DAY AS NEEDED FOR   PAIN;   Therapy: 38CBA6561 to (Last Rx:11Mar2016)  Requested for: 53QEK4469 Ordered  7  Oxycodone-Acetaminophen 5-325 MG Oral Tablet; TAKE 1 TABLET Daily PRN for severe   pain; Therapy: 78MEJ1913 to (Evaluate:13Apr2016); Last Rx:06Apr2016 Ordered  8  PredniSONE 10 MG Oral Tablet; TAKE 6 TABLETS TODAY, THEN DECREASE BY 1   TABLET EACH DAY UNTIL GONE;   Therapy: 88JPB3890 to (Last Rx:18Sqb9319)  Requested for: 02GCM8764 Ordered  9  TraMADol HCl - 50 MG Oral Tablet; 1 Po QD prn pain with food; Therapy: 00NME8611 to (Evaluate:93Vww1315); Last Rx:20Jan2016 Ordered    Vitals  Signs [Data Includes: Current Encounter]   Recorded: [id number] 32:97CS    Systolic: 937 1    Diastolic: 80 1    Weight: 227 lb 6 08 oz 1      1 Amended By: Divina Moreno; Jun 01 2016 10:22 AM EST    Physical Exam    Constitutional1    General appearance: No acute distress, well appearing and well nourished  1    Eyes1    Conjunctiva and lids: No swelling, erythema, or discharge  1    Pupils and irises: Equal, round and reactive to light  1    Ears, Nose, Mouth, and Throat1    External inspection of ears and nose: Normal 1    Otoscopic examination: Tympanic membrance translucent with normal light reflex  Canals patent without erythema  1    Nasal mucosa, septum, and turbinates: Normal without edema or erythema  1    Oropharynx: Normal with no erythema, edema, exudate or lesions  1    Pulmonary1    Respiratory effort: No increased work of breathing or signs of respiratory distress  1    Auscultation of lungs: Clear to auscultation, equal breath sounds bilaterally, no wheezes, no rales, no rhonci  1    Cardiovascular1    Palpation of heart: Normal PMI, no thrills  1    Auscultation of heart: Normal rate and rhythm, normal S1 and S2, without murmurs  1    Examination of extremities for edema and/or varicosities: Normal 1    Carotid pulses: Normal 1    Abdomen1 Abdomen: Non-tender, no masses  1    Liver and spleen: No hepatomegaly or splenomegaly  1    Lymphatic1    Palpation of lymph nodes in neck: No lymphadenopathy  1    Musculoskeletal1    Gait and station: Normal 1    Digits and nails: Normal without clubbing or cyanosis  1    Inspection/palpation of joints, bones, and muscles: Normal 1    Skin1            1,2    Skin and subcutaneous tissue: Abnormal  2  Left trunk generalized rash2   Neurologic1    Cranial nerves: Cranial nerves 2-12 intact  1    Reflexes: 2+ and symmetric  1    Sensation: No sensory loss  1    Psychiatric1    Orientation to person, place and time: Normal 1    Mood and affect: Normal 1          1 Amended By: Luis Antonio Ramirez; Jun 01 2016 10:27 AM EST   2 Amended By: Luis Antonio Ramirez; Jun 01 2016 11:11 AM EST    Signatures   Electronically signed by : Wilian Caballero, ; Jun 1 2016  8:41AM EST                       (Author)    Electronically signed by : Nevaeh Stringer DO; Jun 1 2016  9:48AM EST                       (Author)    Electronically signed by : Nevaeh Stringer DO; Jun 1 2016 11:15AM EST                       (Author)

## 2018-10-24 DIAGNOSIS — F41.9 ANXIETY: Primary | ICD-10-CM

## 2018-10-24 RX ORDER — ALPRAZOLAM 0.25 MG/1
0.25 TABLET ORAL DAILY PRN
Qty: 30 TABLET | Refills: 0 | Status: SHIPPED | OUTPATIENT
Start: 2018-10-24 | End: 2020-04-03 | Stop reason: SDUPTHER

## 2018-10-24 RX ORDER — ALPRAZOLAM 0.25 MG/1
TABLET ORAL DAILY
COMMUNITY
Start: 2017-11-20 | End: 2018-10-24 | Stop reason: SDUPTHER

## 2018-10-24 NOTE — TELEPHONE ENCOUNTER
Notify patient just refilled Xanax, pt  Is due for General PE and med check if not done in last year   Schedule general PE

## 2018-10-24 NOTE — TELEPHONE ENCOUNTER
Pt called in stating that he just got a phone call that he has an appointment for tomorrow to put his new crown in his mouth  Diogo Cooper would like to know if you can please prescribe him xanax for tomorrow's appt?   To [facility]   CB# [phone]

## 2018-11-20 ENCOUNTER — OFFICE VISIT (OUTPATIENT)
Dept: FAMILY MEDICINE CLINIC | Facility: CLINIC | Age: 58
End: 2018-11-20
Payer: COMMERCIAL

## 2018-11-20 VITALS
WEIGHT: 236.2 LBS | DIASTOLIC BLOOD PRESSURE: 84 MMHG | HEIGHT: 68 IN | BODY MASS INDEX: 35.8 KG/M2 | SYSTOLIC BLOOD PRESSURE: 136 MMHG

## 2018-11-20 DIAGNOSIS — Z13.220 SCREENING FOR HYPERLIPIDEMIA: ICD-10-CM

## 2018-11-20 DIAGNOSIS — J06.9 UPPER RESPIRATORY TRACT INFECTION, UNSPECIFIED TYPE: ICD-10-CM

## 2018-11-20 DIAGNOSIS — F41.9 ANXIETY: ICD-10-CM

## 2018-11-20 DIAGNOSIS — R05.9 COUGH: ICD-10-CM

## 2018-11-20 DIAGNOSIS — R06.83 SNORING: ICD-10-CM

## 2018-11-20 DIAGNOSIS — E66.9 OBESITY (BMI 30-39.9): ICD-10-CM

## 2018-11-20 DIAGNOSIS — Z11.59 NEED FOR HEPATITIS C SCREENING TEST: Primary | ICD-10-CM

## 2018-11-20 PROCEDURE — 3008F BODY MASS INDEX DOCD: CPT | Performed by: FAMILY MEDICINE

## 2018-11-20 PROCEDURE — 1036F TOBACCO NON-USER: CPT | Performed by: FAMILY MEDICINE

## 2018-11-20 PROCEDURE — 99214 OFFICE O/P EST MOD 30 MIN: CPT | Performed by: FAMILY MEDICINE

## 2018-11-20 RX ORDER — AZITHROMYCIN 250 MG/1
TABLET, FILM COATED ORAL
Qty: 6 TABLET | Refills: 0 | Status: SHIPPED | OUTPATIENT
Start: 2018-11-20 | End: 2018-11-25

## 2018-11-20 RX ORDER — IBUPROFEN 800 MG/1
800 TABLET ORAL EVERY 6 HOURS PRN
Refills: 0 | COMMUNITY
Start: 2018-11-01 | End: 2020-07-10 | Stop reason: HOSPADM

## 2018-11-20 RX ORDER — TRAMADOL HYDROCHLORIDE 50 MG/1
TABLET ORAL
Refills: 0 | COMMUNITY
Start: 2018-10-05 | End: 2018-12-22 | Stop reason: ALTCHOICE

## 2018-11-20 NOTE — PROGRESS NOTES
Assessment/Plan:  Chief Complaint   Patient presents with    Follow-up    Anxiety    Cold Like Symptoms     symptoms started about a week ago  Tried sudafed and mucinex   Cough     yellow - brown mucus     Patient Instructions   Start abx and call if worse  Anxiety stable  Lose weight, check labs in 3 months with General PE  Pt  Snores and needs sleep study  At times he has a sudden urge to expand airway, it can happen at least once a month  Monitor BP at home and call if BP's are over 140/90  Dimetapp DM cold and cough prn cough  F-up with urology for PSA and prostate exams  Family hx of prostate cancer in father  m        No problem-specific Assessment & Plan notes found for this encounter  Diagnoses and all orders for this visit:    Need for hepatitis C screening test  -     Hepatitis C antibody; Future    Obesity (BMI 30-39 9)    Upper respiratory tract infection, unspecified type    Cough  -     CBC and differential; Future  -     azithromycin (ZITHROMAX) 250 mg tablet; Take 2 tablets today then 1 tablet daily x 4 days    Anxiety  -     Comprehensive metabolic panel; Future  -     CBC and differential; Future    Snoring    Screening for hyperlipidemia  -     Comprehensive metabolic panel; Future  -     Lipid Panel with Direct LDL reflex; Future          Subjective:      Patient ID: Gelacio White is a 60 y o  male  Follow-up   Anxiety   Cold Like Symptoms (symptoms started about a week ago  Tried sudafed and mucinex )  Cough (yellow - brown mucus)    Could not have a root canal and had tooth surgically extracted  Wife hears pt  Snoring and needs to lose weight  The following portions of the patient's history were reviewed and updated as appropriate: allergies, current medications, past family history, past medical history, past social history, past surgical history and problem list     Review of Systems   Constitutional: Negative  HENT: Positive for nosebleeds and postnasal drip  Eyes: Negative  Respiratory: Positive for cough  Cardiovascular: Negative  Gastrointestinal: Negative  Endocrine: Negative  Genitourinary: Negative  Musculoskeletal: Negative  Skin: Negative  Allergic/Immunologic: Negative  Neurological: Negative  Hematological: Negative  Psychiatric/Behavioral:        Anxiety stable         Objective:      /84   Ht 5' 8" (1 727 m)   Wt 107 kg (236 lb 3 2 oz)   BMI 35 91 kg/m²          Physical Exam   Constitutional: He is oriented to person, place, and time  He appears well-developed and well-nourished  HENT:   Head: Normocephalic and atraumatic  Right Ear: External ear normal    Left Ear: External ear normal    Nasal congestion, pnd   Eyes: Pupils are equal, round, and reactive to light  Conjunctivae and EOM are normal    Neck: Normal range of motion  Neck supple  Cardiovascular: Normal rate, regular rhythm, normal heart sounds and intact distal pulses  Pulmonary/Chest: Effort normal and breath sounds normal    Cough     Musculoskeletal: Normal range of motion  Neurological: He is alert and oriented to person, place, and time  He has normal reflexes  Skin: Skin is warm and dry  Psychiatric: He has a normal mood and affect   His behavior is normal

## 2018-11-20 NOTE — PATIENT INSTRUCTIONS
Start abx and call if worse  Anxiety stable  Lose weight, check labs in 3 months with General PE  Pt  Snores and needs sleep study  At times he has a sudden urge to expand airway, it can happen at least once a month  Monitor BP at home and call if BP's are over 140/90  Dimetapp DM cold and cough prn cough  F-up with urology for PSA and prostate exams  Family hx of prostate cancer in father   m

## 2018-12-12 ENCOUNTER — OFFICE VISIT (OUTPATIENT)
Dept: UROLOGY | Facility: MEDICAL CENTER | Age: 58
End: 2018-12-12
Payer: COMMERCIAL

## 2018-12-12 ENCOUNTER — HOSPITAL ENCOUNTER (INPATIENT)
Facility: HOSPITAL | Age: 58
LOS: 1 days | Discharge: HOME | DRG: 694 | End: 2018-12-13
Attending: UROLOGY | Admitting: UROLOGY
Payer: COMMERCIAL

## 2018-12-12 ENCOUNTER — TELEPHONE (OUTPATIENT)
Dept: UROLOGY | Facility: MEDICAL CENTER | Age: 58
End: 2018-12-12

## 2018-12-12 ENCOUNTER — ANESTHESIA EVENT (INPATIENT)
Dept: PERIOP | Facility: HOSPITAL | Age: 58
DRG: 694 | End: 2018-12-12
Payer: COMMERCIAL

## 2018-12-12 VITALS
HEART RATE: 63 BPM | DIASTOLIC BLOOD PRESSURE: 80 MMHG | WEIGHT: 236 LBS | HEIGHT: 68 IN | BODY MASS INDEX: 35.77 KG/M2 | TEMPERATURE: 97.9 F | SYSTOLIC BLOOD PRESSURE: 140 MMHG

## 2018-12-12 DIAGNOSIS — N20.1 RIGHT URETERAL STONE: Primary | ICD-10-CM

## 2018-12-12 LAB
ANION GAP SERPL CALCULATED.3IONS-SCNC: 11 MMOL/L (ref 4–13)
BASOPHILS # BLD AUTO: 0.04 THOUSANDS/ΜL (ref 0–0.1)
BASOPHILS NFR BLD AUTO: 0 % (ref 0–1)
BUN SERPL-MCNC: 22 MG/DL (ref 5–25)
CALCIUM SERPL-MCNC: 8.7 MG/DL (ref 8.3–10.1)
CHLORIDE SERPL-SCNC: 104 MMOL/L (ref 100–108)
CO2 SERPL-SCNC: 24 MMOL/L (ref 21–32)
CREAT SERPL-MCNC: 1.72 MG/DL (ref 0.6–1.3)
EOSINOPHIL # BLD AUTO: 0.06 THOUSAND/ΜL (ref 0–0.61)
EOSINOPHIL NFR BLD AUTO: 1 % (ref 0–6)
ERYTHROCYTE [DISTWIDTH] IN BLOOD BY AUTOMATED COUNT: 13.2 % (ref 11.6–15.1)
GFR SERPL CREATININE-BSD FRML MDRD: 43 ML/MIN/1.73SQ M
GLUCOSE SERPL-MCNC: 93 MG/DL (ref 65–140)
HCT VFR BLD AUTO: 42.7 % (ref 36.5–49.3)
HGB BLD-MCNC: 14.3 G/DL (ref 12–17)
IMM GRANULOCYTES # BLD AUTO: 0.05 THOUSAND/UL (ref 0–0.2)
IMM GRANULOCYTES NFR BLD AUTO: 0 % (ref 0–2)
LYMPHOCYTES # BLD AUTO: 1.46 THOUSANDS/ΜL (ref 0.6–4.47)
LYMPHOCYTES NFR BLD AUTO: 12 % (ref 14–44)
MCH RBC QN AUTO: 30.8 PG (ref 26.8–34.3)
MCHC RBC AUTO-ENTMCNC: 33.5 G/DL (ref 31.4–37.4)
MCV RBC AUTO: 92 FL (ref 82–98)
MONOCYTES # BLD AUTO: 1.24 THOUSAND/ΜL (ref 0.17–1.22)
MONOCYTES NFR BLD AUTO: 10 % (ref 4–12)
NEUTROPHILS # BLD AUTO: 9.85 THOUSANDS/ΜL (ref 1.85–7.62)
NEUTS SEG NFR BLD AUTO: 77 % (ref 43–75)
NRBC BLD AUTO-RTO: 0 /100 WBCS
PLATELET # BLD AUTO: 259 THOUSANDS/UL (ref 149–390)
PMV BLD AUTO: 9.1 FL (ref 8.9–12.7)
POTASSIUM SERPL-SCNC: 4 MMOL/L (ref 3.5–5.3)
RBC # BLD AUTO: 4.64 MILLION/UL (ref 3.88–5.62)
SODIUM SERPL-SCNC: 139 MMOL/L (ref 136–145)
WBC # BLD AUTO: 12.7 THOUSAND/UL (ref 4.31–10.16)

## 2018-12-12 PROCEDURE — 96372 THER/PROPH/DIAG INJ SC/IM: CPT | Performed by: UROLOGY

## 2018-12-12 PROCEDURE — 1111F DSCHRG MED/CURRENT MED MERGE: CPT | Performed by: UROLOGY

## 2018-12-12 PROCEDURE — 81003 URINALYSIS AUTO W/O SCOPE: CPT | Performed by: UROLOGY

## 2018-12-12 PROCEDURE — 99215 OFFICE O/P EST HI 40 MIN: CPT | Performed by: UROLOGY

## 2018-12-12 PROCEDURE — 80048 BASIC METABOLIC PNL TOTAL CA: CPT | Performed by: NURSE PRACTITIONER

## 2018-12-12 PROCEDURE — 85025 COMPLETE CBC W/AUTO DIFF WBC: CPT | Performed by: NURSE PRACTITIONER

## 2018-12-12 PROCEDURE — 85610 PROTHROMBIN TIME: CPT | Performed by: NURSE PRACTITIONER

## 2018-12-12 PROCEDURE — 85730 THROMBOPLASTIN TIME PARTIAL: CPT | Performed by: NURSE PRACTITIONER

## 2018-12-12 RX ORDER — HEPARIN SODIUM 5000 [USP'U]/ML
5000 INJECTION, SOLUTION INTRAVENOUS; SUBCUTANEOUS EVERY 8 HOURS SCHEDULED
Status: DISCONTINUED | OUTPATIENT
Start: 2018-12-12 | End: 2018-12-13 | Stop reason: HOSPADM

## 2018-12-12 RX ORDER — FENTANYL CITRATE 50 UG/ML
100 INJECTION, SOLUTION INTRAMUSCULAR; INTRAVENOUS EVERY 2 HOUR PRN
Status: DISCONTINUED | OUTPATIENT
Start: 2018-12-12 | End: 2018-12-13 | Stop reason: HOSPADM

## 2018-12-12 RX ORDER — CEFAZOLIN SODIUM 2 G/50ML
2000 SOLUTION INTRAVENOUS EVERY 8 HOURS
Status: DISCONTINUED | OUTPATIENT
Start: 2018-12-12 | End: 2018-12-13

## 2018-12-12 RX ORDER — KETOROLAC TROMETHAMINE 30 MG/ML
30 INJECTION, SOLUTION INTRAMUSCULAR; INTRAVENOUS ONCE
Status: DISCONTINUED | OUTPATIENT
Start: 2018-12-12 | End: 2018-12-12 | Stop reason: HOSPADM

## 2018-12-12 RX ORDER — ONDANSETRON 2 MG/ML
4 INJECTION INTRAMUSCULAR; INTRAVENOUS EVERY 6 HOURS PRN
Status: DISCONTINUED | OUTPATIENT
Start: 2018-12-12 | End: 2018-12-13 | Stop reason: HOSPADM

## 2018-12-12 RX ORDER — DOCUSATE SODIUM 100 MG/1
100 CAPSULE, LIQUID FILLED ORAL 2 TIMES DAILY
Status: DISCONTINUED | OUTPATIENT
Start: 2018-12-12 | End: 2018-12-13 | Stop reason: HOSPADM

## 2018-12-12 RX ORDER — ACETAMINOPHEN 325 MG/1
650 TABLET ORAL EVERY 4 HOURS PRN
Status: DISCONTINUED | OUTPATIENT
Start: 2018-12-12 | End: 2018-12-13 | Stop reason: HOSPADM

## 2018-12-12 RX ORDER — KETOROLAC TROMETHAMINE 30 MG/ML
15 INJECTION, SOLUTION INTRAMUSCULAR; INTRAVENOUS EVERY 6 HOURS PRN
Status: DISCONTINUED | OUTPATIENT
Start: 2018-12-12 | End: 2018-12-12

## 2018-12-12 RX ORDER — SODIUM CHLORIDE 9 MG/ML
125 INJECTION, SOLUTION INTRAVENOUS CONTINUOUS
Status: DISCONTINUED | OUTPATIENT
Start: 2018-12-12 | End: 2018-12-13 | Stop reason: HOSPADM

## 2018-12-12 RX ORDER — HYDROMORPHONE HCL/PF 1 MG/ML
0.5 SYRINGE (ML) INJECTION EVERY 2 HOUR PRN
Status: DISCONTINUED | OUTPATIENT
Start: 2018-12-12 | End: 2018-12-13 | Stop reason: HOSPADM

## 2018-12-12 RX ADMIN — CEFAZOLIN SODIUM 2000 MG: 2 SOLUTION INTRAVENOUS at 14:06

## 2018-12-12 RX ADMIN — CEFAZOLIN SODIUM 2000 MG: 2 SOLUTION INTRAVENOUS at 21:10

## 2018-12-12 RX ADMIN — ACETAMINOPHEN 650 MG: 325 TABLET, FILM COATED ORAL at 15:56

## 2018-12-12 RX ADMIN — ACETAMINOPHEN 650 MG: 325 TABLET, FILM COATED ORAL at 21:05

## 2018-12-12 RX ADMIN — KETOROLAC TROMETHAMINE 30 MG: 30 INJECTION, SOLUTION INTRAMUSCULAR; INTRAVENOUS at 11:45

## 2018-12-12 RX ADMIN — DOCUSATE SODIUM 100 MG: 100 CAPSULE, LIQUID FILLED ORAL at 14:12

## 2018-12-12 RX ADMIN — HEPARIN SODIUM 5000 UNITS: 5000 INJECTION INTRAVENOUS; SUBCUTANEOUS at 14:12

## 2018-12-12 RX ADMIN — HEPARIN SODIUM 5000 UNITS: 5000 INJECTION INTRAVENOUS; SUBCUTANEOUS at 21:10

## 2018-12-12 RX ADMIN — SODIUM CHLORIDE 125 ML/HR: 0.9 INJECTION, SOLUTION INTRAVENOUS at 14:05

## 2018-12-12 NOTE — PROGRESS NOTES
Assessment/Plan:    Right ureteral stone  The patient will be admitted for pain management and endoscopic stone removal   The case was reviewed with Dr Jackie Thomas  Diagnoses and all orders for this visit:    Right ureteral stone          Subjective:      Patient ID: Sandra Winkler is a 60 y o  male  HPI  Right renal colic:  The patient was seen in the emergency room at [facility] last night with a 4-5 mm stone crossing the iliac vessels on the right side with hydronephrosis  Additional punctate stones were noted bilaterally  MePt presented to office today  icated for pain and sent home  Pain recurred and he went to [facility] since it was closer to ome  They gave him Tylenol  Overnight, the pain has migrated distally and is now in the groin and occasionally radiating into the testis  The patient has had some chills, but is currently afebrile  Urinalysis last night was not infected  Pain began yesterday and progressed  This is his worst attack ever  Pt has had stones in the past, most recently in spring 2017  That one was removed ureteroscopically  Pt given ketorolac 30 mg  IM at 1050  The following portions of the patient's history were reviewed and updated as appropriate: allergies, current medications, past family history, past medical history, past social history, past surgical history and problem list     Review of Systems   Constitutional: Negative for activity change and fatigue  Respiratory: Negative for shortness of breath and wheezing  Cardiovascular: Negative for chest pain  Gastrointestinal: Negative for abdominal pain  Genitourinary: Negative for difficulty urinating, dysuria, frequency, hematuria and urgency  Musculoskeletal: Negative for back pain and gait problem  Skin: Negative  Allergic/Immunologic: Negative  Neurological: Negative  Psychiatric/Behavioral: Negative            Objective:      /80 (BP Location: Left arm, Patient Position: Sitting, Cuff Size: Standard)   Pulse 63   Temp 97 9 °F (36 6 °C)   Ht 5' 8" (1 727 m)   Wt 107 kg (236 lb)   BMI 35 88 kg/m²          Physical Exam   Constitutional: He is oriented to person, place, and time  He appears well-developed and well-nourished  HENT:   Head: Normocephalic and atraumatic  Eyes: EOM are normal    Neck: Normal range of motion  Neck supple  Cardiovascular: Normal rate, regular rhythm and normal heart sounds  Pulmonary/Chest: Effort normal and breath sounds normal    Abdominal: Soft  There is no tenderness  Moderate right flank pain and tenderness  Genitourinary:   Genitourinary Comments:      Musculoskeletal: Normal range of motion  Neurological: He is alert and oriented to person, place, and time  Skin: Skin is warm and dry  Psychiatric: He has a normal mood and affect   His behavior is normal  Judgment and thought content normal

## 2018-12-12 NOTE — LETTER
December 12, 2018     ane Level, [address] [place] [address]    Patient: Gwendolyn Clark   YOB: 1960   Date of Visit: 12/12/2018       Dear Dr Shelly Aviles: Thank you for referring Gwendolyn Clark to me for evaluation  Below are my notes for this consultation  If you have questions, please do not hesitate to call me  I look forward to following your patient along with you  Sincerely,        Michele Jaime MD        CC: No Recipients  Michele Jaime MD  12/12/2018 11:36 AM  Sign at close encounter  Assessment/Plan:    Right ureteral stone  The patient will be admitted for pain management and endoscopic stone removal   The case was reviewed with Dr Portia Lima  Diagnoses and all orders for this visit:    Right ureteral stone          Subjective:      Patient ID: Gwendolyn Clark is a 60 y o  male  HPI  Right renal colic:  The patient was seen in the emergency room at [facility] last night with a 4-5 mm stone crossing the iliac vessels on the right side with hydronephrosis  Additional punctate stones were noted bilaterally  MePt presented to office today  icated for pain and sent home  Pain recurred and he went to [facility] since it was closer to ome  They gave him Tylenol  Overnight, the pain has migrated distally and is now in the groin and occasionally radiating into the testis  The patient has had some chills, but is currently afebrile  Urinalysis last night was not infected  Pain began yesterday and progressed  This is his worst attack ever  Pt has had stones in the past, most recently in spring 2017  That one was removed ureteroscopically  Pt given ketorolac 30 mg  IM at 1050        The following portions of the patient's history were reviewed and updated as appropriate: allergies, current medications, past family history, past medical history, past social history, past surgical history and problem list     Review of Systems   Constitutional: Negative for activity change and fatigue  Respiratory: Negative for shortness of breath and wheezing  Cardiovascular: Negative for chest pain  Gastrointestinal: Negative for abdominal pain  Genitourinary: Negative for difficulty urinating, dysuria, frequency, hematuria and urgency  Musculoskeletal: Negative for back pain and gait problem  Skin: Negative  Allergic/Immunologic: Negative  Neurological: Negative  Psychiatric/Behavioral: Negative  Objective:      /80 (BP Location: Left arm, Patient Position: Sitting, Cuff Size: Standard)   Pulse 63   Temp 97 9 °F (36 6 °C)   Ht 5' 8" (1 727 m)   Wt 107 kg (236 lb)   BMI 35 88 kg/m²           Physical Exam   Constitutional: He is oriented to person, place, and time  He appears well-developed and well-nourished  HENT:   Head: Normocephalic and atraumatic  Eyes: EOM are normal    Neck: Normal range of motion  Neck supple  Cardiovascular: Normal rate, regular rhythm and normal heart sounds  Pulmonary/Chest: Effort normal and breath sounds normal    Abdominal: Soft  There is no tenderness  Moderate right flank pain and tenderness  Genitourinary:   Genitourinary Comments:      Musculoskeletal: Normal range of motion  Neurological: He is alert and oriented to person, place, and time  Skin: Skin is warm and dry  Psychiatric: He has a normal mood and affect   His behavior is normal  Judgment and thought content normal

## 2018-12-12 NOTE — LETTER
December 12, 2018     Diane Brown, [address]    Patient: Beryle Lands   YOB: 1960   Date of Visit: 12/12/2018       Dear Dr Pete Mccauley: Thank you for referring Beryle Lands to me for evaluation  Below are my notes for this consultation  If you have questions, please do not hesitate to call me  I look forward to following your patient along with you  Sincerely,        Mohsen Charles MD        CC: No Recipients  Mohsen Charles MD  12/12/2018 11:36 AM  Sign at close encounter  Assessment/Plan:    Right ureteral stone  The patient will be admitted for pain management and endoscopic stone removal   The case was reviewed with Dr Asad Cristina  Diagnoses and all orders for this visit:    Right ureteral stone          Subjective:      Patient ID: Beryle Lands is a 60 y o  male  HPI  Right renal colic:  The patient was seen in the emergency room at [facility] last night with a 4-5 mm stone crossing the iliac vessels on the right side with hydronephrosis  Additional punctate stones were noted bilaterally  MePt presented to office today  icated for pain and sent home  Pain recurred and he went to [facility] since it was closer to ome  They gave him Tylenol  Overnight, the pain has migrated distally and is now in the groin and occasionally radiating into the testis  The patient has had some chills, but is currently afebrile  Urinalysis last night was not infected  Pain began yesterday and progressed  This is his worst attack ever  Pt has had stones in the past, most recently in spring 2017  That one was removed ureteroscopically  Pt given ketorolac 30 mg  IM at 1050        The following portions of the patient's history were reviewed and updated as appropriate: allergies, current medications, past family history, past medical history, past social history, past surgical history and problem list     Review of Systems   Constitutional: Negative for activity change and fatigue  Respiratory: Negative for shortness of breath and wheezing  Cardiovascular: Negative for chest pain  Gastrointestinal: Negative for abdominal pain  Genitourinary: Negative for difficulty urinating, dysuria, frequency, hematuria and urgency  Musculoskeletal: Negative for back pain and gait problem  Skin: Negative  Allergic/Immunologic: Negative  Neurological: Negative  Psychiatric/Behavioral: Negative  Objective:      /80 (BP Location: Left arm, Patient Position: Sitting, Cuff Size: Standard)   Pulse 63   Temp 97 9 °F (36 6 °C)   Ht 5' 8" (1 727 m)   Wt 107 kg (236 lb)   BMI 35 88 kg/m²           Physical Exam   Constitutional: He is oriented to person, place, and time  He appears well-developed and well-nourished  HENT:   Head: Normocephalic and atraumatic  Eyes: EOM are normal    Neck: Normal range of motion  Neck supple  Cardiovascular: Normal rate, regular rhythm and normal heart sounds  Pulmonary/Chest: Effort normal and breath sounds normal    Abdominal: Soft  There is no tenderness  Moderate right flank pain and tenderness  Genitourinary:   Genitourinary Comments:      Musculoskeletal: Normal range of motion  Neurological: He is alert and oriented to person, place, and time  Skin: Skin is warm and dry  Psychiatric: He has a normal mood and affect   His behavior is normal  Judgment and thought content normal

## 2018-12-12 NOTE — ASSESSMENT & PLAN NOTE
The patient will be admitted for pain management and endoscopic stone removal   The case was reviewed with Dr Tiffanie Otoole

## 2018-12-12 NOTE — TELEPHONE ENCOUNTER
Pt has right ureteral calculus  CT in Care Everywhere  Pain medication not as effective  Pt instructed to come into office today  Appt 10:45 but will be sen as soon as he arrives

## 2018-12-12 NOTE — H&P
Assessment/Plan:    Right ureteral stone  The patient will be admitted for pain management and endoscopic stone removal        Diagnoses and all orders for this visit:    Right ureteral stone          Subjective:      Patient ID: Amalia Maguire is a 60 y o  male  HPI  Right renal colic:  The patient was seen in the emergency room at [facility] last night with a 4-5 mm stone crossing the iliac vessels on the right side with hydronephrosis  Additional punctate stones were noted bilaterally  MePt presented to office today  icated for pain and sent home  Pain recurred and he went to [facility] since it was closer to ome  They gave him Tylenol  Overnight, the pain has migrated distally and is now in the groin and occasionally radiating into the testis  The patient has had some chills, but is currently afebrile  Urinalysis last night was not infected  Pain began yesterday and progressed  This is his worst attack ever  Pt has had stones in the past, most recently in spring 2017  That one was removed ureteroscopically  Pt given ketorolac 30 mg  IM at 1050  The following portions of the patient's history were reviewed and updated as appropriate: allergies, current medications, past family history, past medical history, past social history, past surgical history and problem list     Review of Systems   Constitutional: Negative for activity change and fatigue  Respiratory: Negative for shortness of breath and wheezing  Cardiovascular: Negative for chest pain  Gastrointestinal: Negative for abdominal pain  Genitourinary: Negative for difficulty urinating, dysuria, frequency, hematuria and urgency  Musculoskeletal: Negative for back pain and gait problem  Skin: Negative  Allergic/Immunologic: Negative  Neurological: Negative  Psychiatric/Behavioral: Negative            Objective:      /80 (BP Location: Left arm, Patient Position: Sitting, Cuff Size: Standard)   Pulse 63   Temp 97 9 °F (36 6 °C)   Ht 5' 8" (1 727 m)   Wt 107 kg (236 lb)   BMI 35 88 kg/m²           Physical Exam   Constitutional: He is oriented to person, place, and time  He appears well-developed and well-nourished  HENT:   Head: Normocephalic and atraumatic  Eyes: EOM are normal    Neck: Normal range of motion  Neck supple  Cardiovascular: Normal rate, regular rhythm and normal heart sounds  Pulmonary/Chest: Effort normal and breath sounds normal    Abdominal: Soft  There is no tenderness  Moderate right flank pain and tenderness  Genitourinary:   Genitourinary Comments:      Musculoskeletal: Normal range of motion  Neurological: He is alert and oriented to person, place, and time  Skin: Skin is warm and dry  Psychiatric: He has a normal mood and affect   His behavior is normal  Judgment and thought content normal

## 2018-12-13 ENCOUNTER — APPOINTMENT (INPATIENT)
Dept: RADIOLOGY | Facility: HOSPITAL | Age: 58
DRG: 694 | End: 2018-12-13
Payer: COMMERCIAL

## 2018-12-13 ENCOUNTER — TELEPHONE (OUTPATIENT)
Dept: UROLOGY | Facility: CLINIC | Age: 58
End: 2018-12-13

## 2018-12-13 ENCOUNTER — ANESTHESIA (INPATIENT)
Dept: PERIOP | Facility: HOSPITAL | Age: 58
DRG: 694 | End: 2018-12-13
Payer: COMMERCIAL

## 2018-12-13 VITALS
SYSTOLIC BLOOD PRESSURE: 133 MMHG | OXYGEN SATURATION: 95 % | TEMPERATURE: 97.4 F | RESPIRATION RATE: 20 BRPM | HEART RATE: 92 BPM | DIASTOLIC BLOOD PRESSURE: 87 MMHG

## 2018-12-13 LAB
ANION GAP SERPL CALCULATED.3IONS-SCNC: 10 MMOL/L (ref 4–13)
APTT PPP: 30 SECONDS (ref 26–38)
BASOPHILS # BLD AUTO: 0.04 THOUSANDS/ΜL (ref 0–0.1)
BASOPHILS NFR BLD AUTO: 0 % (ref 0–1)
BUN SERPL-MCNC: 16 MG/DL (ref 5–25)
CALCIUM SERPL-MCNC: 8.5 MG/DL (ref 8.3–10.1)
CHLORIDE SERPL-SCNC: 107 MMOL/L (ref 100–108)
CO2 SERPL-SCNC: 25 MMOL/L (ref 21–32)
CREAT SERPL-MCNC: 1.38 MG/DL (ref 0.6–1.3)
EOSINOPHIL # BLD AUTO: 0.17 THOUSAND/ΜL (ref 0–0.61)
EOSINOPHIL NFR BLD AUTO: 1 % (ref 0–6)
ERYTHROCYTE [DISTWIDTH] IN BLOOD BY AUTOMATED COUNT: 13.3 % (ref 11.6–15.1)
GFR SERPL CREATININE-BSD FRML MDRD: 56 ML/MIN/1.73SQ M
GLUCOSE SERPL-MCNC: 110 MG/DL (ref 65–140)
HCT VFR BLD AUTO: 41.8 % (ref 36.5–49.3)
HGB BLD-MCNC: 13.7 G/DL (ref 12–17)
IMM GRANULOCYTES # BLD AUTO: 0.06 THOUSAND/UL (ref 0–0.2)
IMM GRANULOCYTES NFR BLD AUTO: 0 % (ref 0–2)
INR PPP: 1.15 (ref 0.86–1.17)
LYMPHOCYTES # BLD AUTO: 1.5 THOUSANDS/ΜL (ref 0.6–4.47)
LYMPHOCYTES NFR BLD AUTO: 11 % (ref 14–44)
MCH RBC QN AUTO: 30.3 PG (ref 26.8–34.3)
MCHC RBC AUTO-ENTMCNC: 32.8 G/DL (ref 31.4–37.4)
MCV RBC AUTO: 93 FL (ref 82–98)
MONOCYTES # BLD AUTO: 1.52 THOUSAND/ΜL (ref 0.17–1.22)
MONOCYTES NFR BLD AUTO: 11 % (ref 4–12)
NEUTROPHILS # BLD AUTO: 10.73 THOUSANDS/ΜL (ref 1.85–7.62)
NEUTS SEG NFR BLD AUTO: 77 % (ref 43–75)
NRBC BLD AUTO-RTO: 0 /100 WBCS
PLATELET # BLD AUTO: 238 THOUSANDS/UL (ref 149–390)
PMV BLD AUTO: 9.4 FL (ref 8.9–12.7)
POTASSIUM SERPL-SCNC: 3.6 MMOL/L (ref 3.5–5.3)
PROTHROMBIN TIME: 14.8 SECONDS (ref 11.8–14.2)
RBC # BLD AUTO: 4.52 MILLION/UL (ref 3.88–5.62)
SODIUM SERPL-SCNC: 142 MMOL/L (ref 136–145)
WBC # BLD AUTO: 14.02 THOUSAND/UL (ref 4.31–10.16)

## 2018-12-13 PROCEDURE — 52332 CYSTOSCOPY AND TREATMENT: CPT | Performed by: UROLOGY

## 2018-12-13 PROCEDURE — BT1D1ZZ FLUOROSCOPY OF RIGHT KIDNEY, URETER AND BLADDER USING LOW OSMOLAR CONTRAST: ICD-10-PCS | Performed by: UROLOGY

## 2018-12-13 PROCEDURE — 0T768DZ DILATION OF RIGHT URETER WITH INTRALUMINAL DEVICE, VIA NATURAL OR ARTIFICIAL OPENING ENDOSCOPIC: ICD-10-PCS | Performed by: UROLOGY

## 2018-12-13 PROCEDURE — C2625 STENT, NON-COR, TEM W/DEL SY: HCPCS | Performed by: UROLOGY

## 2018-12-13 PROCEDURE — 52351 CYSTOURETERO & OR PYELOSCOPE: CPT | Performed by: UROLOGY

## 2018-12-13 PROCEDURE — C1769 GUIDE WIRE: HCPCS | Performed by: UROLOGY

## 2018-12-13 PROCEDURE — C1758 CATHETER, URETERAL: HCPCS | Performed by: UROLOGY

## 2018-12-13 PROCEDURE — 80048 BASIC METABOLIC PNL TOTAL CA: CPT | Performed by: NURSE PRACTITIONER

## 2018-12-13 PROCEDURE — 74450 X-RAY URETHRA/BLADDER: CPT

## 2018-12-13 PROCEDURE — 85025 COMPLETE CBC W/AUTO DIFF WBC: CPT | Performed by: NURSE PRACTITIONER

## 2018-12-13 DEVICE — KWART RETRO-INJECT URETERAL STENT SET
Type: IMPLANTABLE DEVICE | Site: URETER | Status: NON-FUNCTIONAL
Brand: KWART

## 2018-12-13 RX ORDER — MAGNESIUM HYDROXIDE 1200 MG/15ML
LIQUID ORAL AS NEEDED
Status: DISCONTINUED | OUTPATIENT
Start: 2018-12-13 | End: 2018-12-13 | Stop reason: HOSPADM

## 2018-12-13 RX ORDER — OXYBUTYNIN CHLORIDE 10 MG/1
10 TABLET, EXTENDED RELEASE ORAL
Qty: 10 TABLET | Refills: 0 | Status: SHIPPED | OUTPATIENT
Start: 2018-12-13 | End: 2018-12-22 | Stop reason: ALTCHOICE

## 2018-12-13 RX ORDER — ONDANSETRON 2 MG/ML
4 INJECTION INTRAMUSCULAR; INTRAVENOUS EVERY 6 HOURS PRN
Status: DISCONTINUED | OUTPATIENT
Start: 2018-12-13 | End: 2018-12-13 | Stop reason: HOSPADM

## 2018-12-13 RX ORDER — OXYBUTYNIN CHLORIDE 5 MG/1
5 TABLET ORAL 4 TIMES DAILY PRN
Status: DISCONTINUED | OUTPATIENT
Start: 2018-12-13 | End: 2018-12-13 | Stop reason: HOSPADM

## 2018-12-13 RX ORDER — CEFAZOLIN SODIUM 2 G/50ML
2000 SOLUTION INTRAVENOUS ONCE
Status: DISCONTINUED | OUTPATIENT
Start: 2018-12-13 | End: 2018-12-13 | Stop reason: HOSPADM

## 2018-12-13 RX ORDER — SCOLOPAMINE TRANSDERMAL SYSTEM 1 MG/1
1 PATCH, EXTENDED RELEASE TRANSDERMAL
Status: DISCONTINUED | OUTPATIENT
Start: 2018-12-13 | End: 2018-12-13 | Stop reason: HOSPADM

## 2018-12-13 RX ORDER — PROPOFOL 10 MG/ML
INJECTION, EMULSION INTRAVENOUS CONTINUOUS PRN
Status: DISCONTINUED | OUTPATIENT
Start: 2018-12-13 | End: 2018-12-13 | Stop reason: SURG

## 2018-12-13 RX ORDER — CEFAZOLIN SODIUM 1 G/3ML
INJECTION, POWDER, FOR SOLUTION INTRAMUSCULAR; INTRAVENOUS AS NEEDED
Status: DISCONTINUED | OUTPATIENT
Start: 2018-12-13 | End: 2018-12-13 | Stop reason: SURG

## 2018-12-13 RX ORDER — PROPOFOL 10 MG/ML
INJECTION, EMULSION INTRAVENOUS AS NEEDED
Status: DISCONTINUED | OUTPATIENT
Start: 2018-12-13 | End: 2018-12-13 | Stop reason: SURG

## 2018-12-13 RX ORDER — MIDAZOLAM HYDROCHLORIDE 1 MG/ML
INJECTION INTRAMUSCULAR; INTRAVENOUS AS NEEDED
Status: DISCONTINUED | OUTPATIENT
Start: 2018-12-13 | End: 2018-12-13 | Stop reason: SURG

## 2018-12-13 RX ORDER — IBUPROFEN 400 MG/1
400 TABLET ORAL EVERY 6 HOURS PRN
Status: DISCONTINUED | OUTPATIENT
Start: 2018-12-13 | End: 2018-12-13 | Stop reason: HOSPADM

## 2018-12-13 RX ORDER — CEFAZOLIN SODIUM 2 G/50ML
2000 SOLUTION INTRAVENOUS EVERY 8 HOURS
Status: DISCONTINUED | OUTPATIENT
Start: 2018-12-13 | End: 2018-12-13

## 2018-12-13 RX ORDER — SODIUM CHLORIDE 9 MG/ML
125 INJECTION, SOLUTION INTRAVENOUS CONTINUOUS
Status: DISCONTINUED | OUTPATIENT
Start: 2018-12-13 | End: 2018-12-13 | Stop reason: HOSPADM

## 2018-12-13 RX ORDER — CIPROFLOXACIN 250 MG/1
500 TABLET, FILM COATED ORAL EVERY 24 HOURS
Qty: 10 TABLET | Refills: 0 | Status: SHIPPED | OUTPATIENT
Start: 2018-12-13 | End: 2018-12-18

## 2018-12-13 RX ORDER — PROMETHAZINE HYDROCHLORIDE 25 MG/ML
12.5 INJECTION, SOLUTION INTRAMUSCULAR; INTRAVENOUS EVERY 4 HOURS PRN
Status: DISCONTINUED | OUTPATIENT
Start: 2018-12-13 | End: 2018-12-13 | Stop reason: HOSPADM

## 2018-12-13 RX ORDER — ONDANSETRON 2 MG/ML
INJECTION INTRAMUSCULAR; INTRAVENOUS AS NEEDED
Status: DISCONTINUED | OUTPATIENT
Start: 2018-12-13 | End: 2018-12-13 | Stop reason: SURG

## 2018-12-13 RX ADMIN — PROPOFOL 200 MG: 10 INJECTION, EMULSION INTRAVENOUS at 09:44

## 2018-12-13 RX ADMIN — PROPOFOL 100 MG: 10 INJECTION, EMULSION INTRAVENOUS at 09:48

## 2018-12-13 RX ADMIN — OXYBUTYNIN CHLORIDE 5 MG: 5 TABLET ORAL at 16:57

## 2018-12-13 RX ADMIN — SODIUM CHLORIDE: 0.9 INJECTION, SOLUTION INTRAVENOUS at 10:12

## 2018-12-13 RX ADMIN — CEFAZOLIN SODIUM 2000 MG: 2 SOLUTION INTRAVENOUS at 05:01

## 2018-12-13 RX ADMIN — ACETAMINOPHEN 650 MG: 325 TABLET, FILM COATED ORAL at 16:57

## 2018-12-13 RX ADMIN — HEPARIN SODIUM 5000 UNITS: 5000 INJECTION INTRAVENOUS; SUBCUTANEOUS at 05:07

## 2018-12-13 RX ADMIN — OXYBUTYNIN CHLORIDE 5 MG: 5 TABLET ORAL at 13:52

## 2018-12-13 RX ADMIN — IBUPROFEN 400 MG: 400 TABLET ORAL at 12:09

## 2018-12-13 RX ADMIN — MIDAZOLAM 2 MG: 1 INJECTION INTRAMUSCULAR; INTRAVENOUS at 09:29

## 2018-12-13 RX ADMIN — ACETAMINOPHEN 650 MG: 325 TABLET, FILM COATED ORAL at 12:10

## 2018-12-13 RX ADMIN — ONDANSETRON 4 MG: 2 INJECTION INTRAMUSCULAR; INTRAVENOUS at 09:52

## 2018-12-13 RX ADMIN — PROPOFOL 200 MCG/KG/MIN: 10 INJECTION, EMULSION INTRAVENOUS at 09:48

## 2018-12-13 RX ADMIN — CEFAZOLIN 2000 MG: 1 INJECTION, POWDER, FOR SOLUTION INTRAVENOUS at 09:29

## 2018-12-13 RX ADMIN — DEXAMETHASONE SODIUM PHOSPHATE 10 MG: 10 INJECTION INTRAMUSCULAR; INTRAVENOUS at 09:52

## 2018-12-13 NOTE — TELEPHONE ENCOUNTER
Patient underwent ureteroscopy for which his wife presented to the hospital with him  She missed work and will need a work note  I have asked her to stop by the office to pick this up  Please provide for the patient's wife

## 2018-12-13 NOTE — ANESTHESIA PREPROCEDURE EVALUATION
Review of Systems/Medical History      History of anesthetic complications PONV    Cardiovascular   Pulmonary  Negative pulmonary ROS        GI/Hepatic       Kidney stones,        Endo/Other  Negative endo/other ROS      GYN       Hematology  Negative hematology ROS      Musculoskeletal  Back pain , lumbar pain,        Neurology      Comment: Cerebral AVM s/p surgery Psychology   Negative psychology ROS              Physical Exam    Airway    Mallampati score: II  TM Distance: >3 FB  Neck ROM: full     Dental       Cardiovascular  Rhythm: regular, Rate: normal, Cardiovascular exam normal    Pulmonary  Pulmonary exam normal     Other Findings        Anesthesia Plan  ASA Score- 2     Anesthesia Type- IV sedation with anesthesia and general with ASA Monitors  Additional Monitors:   Airway Plan:     Comment: Patient prefers no opioids due to severe PONV  Scopolamine patch ordered  Plan Factors-    Induction- intravenous  Postoperative Plan-     Informed Consent- Anesthetic plan and risks discussed with patient

## 2018-12-13 NOTE — ANESTHESIA POSTPROCEDURE EVALUATION
Post-Op Assessment Note      CV Status:  Stable    Mental Status:  Alert and awake    Hydration Status:  Euvolemic    PONV Controlled:  Controlled    Airway Patency:  Patent    Post Op Vitals Reviewed: Yes          Staff: Anesthesiologist           /75 (12/13/18 1052)    Temp      Pulse 80 (12/13/18 1052)   Resp 16 (12/13/18 1052)    SpO2 100 % (12/13/18 1052)

## 2018-12-14 ENCOUNTER — TELEPHONE (OUTPATIENT)
Dept: UROLOGY | Facility: MEDICAL CENTER | Age: 58
End: 2018-12-14

## 2018-12-14 ENCOUNTER — OFFICE VISIT (OUTPATIENT)
Dept: UROLOGY | Facility: MEDICAL CENTER | Age: 58
End: 2018-12-14
Payer: COMMERCIAL

## 2018-12-14 ENCOUNTER — TRANSITIONAL CARE MANAGEMENT (OUTPATIENT)
Dept: FAMILY MEDICINE CLINIC | Facility: CLINIC | Age: 58
End: 2018-12-14

## 2018-12-14 DIAGNOSIS — N20.0 NEPHROLITHIASIS: Primary | ICD-10-CM

## 2018-12-14 LAB
SL AMB  POCT GLUCOSE, UA: ABNORMAL
SL AMB LEUKOCYTE ESTERASE,UA: ABNORMAL
SL AMB POCT BILIRUBIN,UA: ABNORMAL
SL AMB POCT BLOOD,UA: ABNORMAL
SL AMB POCT CLARITY,UA: ABNORMAL
SL AMB POCT COLOR,UA: ABNORMAL
SL AMB POCT KETONES,UA: ABNORMAL
SL AMB POCT NITRITE,UA: ABNORMAL
SL AMB POCT PH,UA: 6
SL AMB POCT SPECIFIC GRAVITY,UA: 1.02
SL AMB POCT URINE PROTEIN: ABNORMAL
SL AMB POCT UROBILINOGEN: 0.2

## 2018-12-14 PROCEDURE — 81003 URINALYSIS AUTO W/O SCOPE: CPT | Performed by: UROLOGY

## 2018-12-14 PROCEDURE — 99024 POSTOP FOLLOW-UP VISIT: CPT | Performed by: UROLOGY

## 2018-12-14 NOTE — PROGRESS NOTES
Assessment/Plan:      Diagnoses and all orders for this visit:    Nephrolithiasis  -     POCT urine dip auto non-scope          Subjective:     Patient ID: Lazara Chiang is a 60 y o  male  HPI  Right ureteral stone: The patient had a right ureteroscopic stone removal with stent placement yesterday  He returns today complaining of some hematuria and also complaining about the care he received in the hospital   He found the experience unsatisfactory  In particular, he claims that he never saw any by the from our practice  The patient was contacted by [facility] this morning to survey has patient experience, and he explained his complaints to them  The stent will be removed as scheduled next week         Review of Systems      Objective:     Physical Exam

## 2018-12-14 NOTE — TELEPHONE ENCOUNTER
Patient would like a return call regarding some issues he is having post-surgery that he is not sure are normal   He also has concerns about the care he received, and wants Dr Ben Wheeler to remove his stent  He asked to speak to Dr Ben Wheeler himself  Please call him at [phone]

## 2018-12-17 ENCOUNTER — PROCEDURE VISIT (OUTPATIENT)
Dept: UROLOGY | Facility: MEDICAL CENTER | Age: 58
End: 2018-12-17
Payer: COMMERCIAL

## 2018-12-17 VITALS
DIASTOLIC BLOOD PRESSURE: 80 MMHG | HEIGHT: 68 IN | BODY MASS INDEX: 35.77 KG/M2 | HEART RATE: 90 BPM | WEIGHT: 236 LBS | SYSTOLIC BLOOD PRESSURE: 150 MMHG

## 2018-12-17 DIAGNOSIS — N20.1 RIGHT URETERAL STONE: Primary | ICD-10-CM

## 2018-12-17 PROCEDURE — 52310 CYSTOSCOPY AND TREATMENT: CPT | Performed by: UROLOGY

## 2018-12-17 NOTE — PROGRESS NOTES
Cystoscopy  Date/Time: 12/17/2018 3:47 PM  Performed by: Barbara Irene  Authorized by: Barbara Irene     Procedure details: simple removal of a foreign body, stone, or stent    Patient tolerance: Patient tolerated the procedure well with no immediate complications    Additional Procedure Details: The patient was brought to the procedure room and properly identified  He was left supine on the procedure table and prepped and draped the usual sterile fashion  2% xylocaine jelly was administered to urethra and left to dwell for approximately 10 min  Flexible cystoscopy was then performed  The urethra was unremarkable  The prostate was mildly obstructing  The bladder was unremarkable  The right ureteral stent was identified  with stent string attached  The stent string was grasped with a flexible grasper and the stent removed intact  The procedure was well tolerated  Discharge instructions were given  We will obtain a renal ultrasound in approximately 6 weeks  Patient will then return for follow-up

## 2018-12-17 NOTE — PATIENT INSTRUCTIONS

## 2018-12-17 NOTE — LETTER
December 17, 2018     Isatu Grayson, [address]    Patient: Ulysses Baird   YOB: 1960   Date of Visit: 12/17/2018       Dear Dr Mckee Cords: Thank you for referring Ulysses Baird to me for evaluation  Below are my notes for this consultation  If you have questions, please do not hesitate to call me  I look forward to following your patient along with you  Sincerely,        Yissel Evans MD        CC: No Recipients  Yissel Evans MD  12/17/2018  3:51 PM  Sign at close encounter  Cystoscopy  Date/Time: 12/17/2018 3:47 PM  Performed by: John Corona  Authorized by: John Corona     Procedure details: simple removal of a foreign body, stone, or stent    Patient tolerance: Patient tolerated the procedure well with no immediate complications    Additional Procedure Details: The patient was brought to the procedure room and properly identified  He was left supine on the procedure table and prepped and draped the usual sterile fashion  2% xylocaine jelly was administered to urethra and left to dwell for approximately 10 min  Flexible cystoscopy was then performed  The urethra was unremarkable  The prostate was mildly obstructing  The bladder was unremarkable  The right ureteral stent was identified  with stent string attached  The stent string was grasped with a flexible grasper and the stent removed intact  The procedure was well tolerated  Discharge instructions were given  We will obtain a renal ultrasound in approximately 6 weeks  Patient will then return for follow-up

## 2018-12-20 ENCOUNTER — TELEPHONE (OUTPATIENT)
Dept: UROLOGY | Facility: AMBULATORY SURGERY CENTER | Age: 58
End: 2018-12-20

## 2018-12-20 NOTE — TELEPHONE ENCOUNTER
Patient said he had a stent removed recently and he is in a lot of pain  Please call him back   Thank you

## 2018-12-22 ENCOUNTER — HOSPITAL ENCOUNTER (EMERGENCY)
Facility: HOSPITAL | Age: 58
Discharge: HOME | End: 2018-12-22
Attending: EMERGENCY MEDICINE | Admitting: EMERGENCY MEDICINE
Payer: COMMERCIAL

## 2018-12-22 ENCOUNTER — APPOINTMENT (EMERGENCY)
Dept: CT IMAGING | Facility: HOSPITAL | Age: 58
End: 2018-12-22
Payer: COMMERCIAL

## 2018-12-22 VITALS
SYSTOLIC BLOOD PRESSURE: 145 MMHG | BODY MASS INDEX: 34.97 KG/M2 | DIASTOLIC BLOOD PRESSURE: 77 MMHG | WEIGHT: 230 LBS | TEMPERATURE: 97.8 F | OXYGEN SATURATION: 99 % | RESPIRATION RATE: 18 BRPM | HEART RATE: 60 BPM

## 2018-12-22 DIAGNOSIS — R10.9 RIGHT FLANK PAIN: ICD-10-CM

## 2018-12-22 DIAGNOSIS — N13.30 HYDRONEPHROSIS: Primary | ICD-10-CM

## 2018-12-22 LAB
ANION GAP SERPL CALCULATED.3IONS-SCNC: 9 MMOL/L (ref 4–13)
BACTERIA UR QL AUTO: ABNORMAL /HPF
BASOPHILS # BLD AUTO: 0.06 THOUSANDS/ΜL (ref 0–0.1)
BASOPHILS NFR BLD AUTO: 1 % (ref 0–1)
BILIRUB UR QL STRIP: ABNORMAL
BUN SERPL-MCNC: 24 MG/DL (ref 5–25)
CALCIUM SERPL-MCNC: 9.3 MG/DL (ref 8.3–10.1)
CHLORIDE SERPL-SCNC: 106 MMOL/L (ref 100–108)
CLARITY UR: ABNORMAL
CO2 SERPL-SCNC: 26 MMOL/L (ref 21–32)
COLOR UR: YELLOW
CREAT SERPL-MCNC: 1.14 MG/DL (ref 0.6–1.3)
EOSINOPHIL # BLD AUTO: 0.26 THOUSAND/ΜL (ref 0–0.61)
EOSINOPHIL NFR BLD AUTO: 2 % (ref 0–6)
ERYTHROCYTE [DISTWIDTH] IN BLOOD BY AUTOMATED COUNT: 13.2 % (ref 11.6–15.1)
GFR SERPL CREATININE-BSD FRML MDRD: 70 ML/MIN/1.73SQ M
GLUCOSE SERPL-MCNC: 99 MG/DL (ref 65–140)
GLUCOSE UR STRIP-MCNC: NEGATIVE MG/DL
HCT VFR BLD AUTO: 43.3 % (ref 36.5–49.3)
HGB BLD-MCNC: 14.5 G/DL (ref 12–17)
HGB UR QL STRIP.AUTO: ABNORMAL
IMM GRANULOCYTES # BLD AUTO: 0.09 THOUSAND/UL (ref 0–0.2)
IMM GRANULOCYTES NFR BLD AUTO: 1 % (ref 0–2)
KETONES UR STRIP-MCNC: ABNORMAL MG/DL
LEUKOCYTE ESTERASE UR QL STRIP: ABNORMAL
LYMPHOCYTES # BLD AUTO: 2.1 THOUSANDS/ΜL (ref 0.6–4.47)
LYMPHOCYTES NFR BLD AUTO: 18 % (ref 14–44)
MCH RBC QN AUTO: 30.9 PG (ref 26.8–34.3)
MCHC RBC AUTO-ENTMCNC: 33.5 G/DL (ref 31.4–37.4)
MCV RBC AUTO: 92 FL (ref 82–98)
MONOCYTES # BLD AUTO: 0.99 THOUSAND/ΜL (ref 0.17–1.22)
MONOCYTES NFR BLD AUTO: 9 % (ref 4–12)
NEUTROPHILS # BLD AUTO: 7.91 THOUSANDS/ΜL (ref 1.85–7.62)
NEUTS SEG NFR BLD AUTO: 69 % (ref 43–75)
NITRITE UR QL STRIP: NEGATIVE
NON-SQ EPI CELLS URNS QL MICRO: ABNORMAL /HPF
NRBC BLD AUTO-RTO: 0 /100 WBCS
PH UR STRIP.AUTO: 5.5 [PH] (ref 4.5–8)
PLATELET # BLD AUTO: 301 THOUSANDS/UL (ref 149–390)
PMV BLD AUTO: 8.5 FL (ref 8.9–12.7)
POTASSIUM SERPL-SCNC: 4.1 MMOL/L (ref 3.5–5.3)
PROT UR STRIP-MCNC: ABNORMAL MG/DL
RBC # BLD AUTO: 4.69 MILLION/UL (ref 3.88–5.62)
RBC #/AREA URNS AUTO: ABNORMAL /HPF
SODIUM SERPL-SCNC: 141 MMOL/L (ref 136–145)
SP GR UR STRIP.AUTO: 1.02 (ref 1–1.03)
UROBILINOGEN UR QL STRIP.AUTO: 0.2 E.U./DL
WBC # BLD AUTO: 11.41 THOUSAND/UL (ref 4.31–10.16)
WBC #/AREA URNS AUTO: ABNORMAL /HPF

## 2018-12-22 PROCEDURE — 80048 BASIC METABOLIC PNL TOTAL CA: CPT | Performed by: PHYSICIAN ASSISTANT

## 2018-12-22 PROCEDURE — 99284 EMERGENCY DEPT VISIT MOD MDM: CPT

## 2018-12-22 PROCEDURE — 85025 COMPLETE CBC W/AUTO DIFF WBC: CPT | Performed by: PHYSICIAN ASSISTANT

## 2018-12-22 PROCEDURE — 96374 THER/PROPH/DIAG INJ IV PUSH: CPT

## 2018-12-22 PROCEDURE — 74176 CT ABD & PELVIS W/O CONTRAST: CPT

## 2018-12-22 PROCEDURE — 96361 HYDRATE IV INFUSION ADD-ON: CPT

## 2018-12-22 PROCEDURE — 36415 COLL VENOUS BLD VENIPUNCTURE: CPT | Performed by: PHYSICIAN ASSISTANT

## 2018-12-22 PROCEDURE — 81001 URINALYSIS AUTO W/SCOPE: CPT

## 2018-12-22 RX ORDER — KETOROLAC TROMETHAMINE 30 MG/ML
15 INJECTION, SOLUTION INTRAMUSCULAR; INTRAVENOUS ONCE
Status: COMPLETED | OUTPATIENT
Start: 2018-12-22 | End: 2018-12-22

## 2018-12-22 RX ORDER — NAPROXEN 500 MG/1
500 TABLET ORAL 2 TIMES DAILY WITH MEALS
Qty: 30 TABLET | Refills: 0 | Status: SHIPPED | OUTPATIENT
Start: 2018-12-22 | End: 2019-06-24

## 2018-12-22 RX ADMIN — SODIUM CHLORIDE 1000 ML: 0.9 INJECTION, SOLUTION INTRAVENOUS at 14:27

## 2018-12-22 RX ADMIN — KETOROLAC TROMETHAMINE 15 MG: 30 INJECTION, SOLUTION INTRAMUSCULAR at 14:33

## 2018-12-22 NOTE — ED PROVIDER NOTES
History  Chief Complaint   Patient presents with    Flank Pain     Pt states that he was here recently and had surgery for kidney stones he thinks on 12/13/18 and had the stent removed the following Monday  Since then he has been having ongoing right flank pain, blood in his urine, and he reports that he passed two m ore kidney stones  58y  o male with PMH of back pain, BPH, herniated discs, hemorrhoids, hydronephrosis and kidney stones presents to the ER for right flank pain since Monday  Patient states one week ago he was seen for a kidney stone  He had a stent placed at that time  The stent was removed this Monday and he began with pain again along with hematuria  He has been taking Motrin for pain with some relief  He describes his pain as spasming and non-radiating  He rates his pain 8/10 and states it is constant  Patient states on Thursday, he began passing stones and passed another large stone today  He called Urology and was instructed to come to the ER  Patient is seen by Dr Jam Bailey from Urology  He denies fever, chills, chest pain, dyspnea, N/V/D, abdominal pain, weakness or paresthesias  History provided by:  Patient   used: No        Prior to Admission Medications   Prescriptions Last Dose Informant Patient Reported? Taking?    ALPRAZolam (XANAX) 0 25 mg tablet   No No   Sig: Take 1 tablet (0 25 mg total) by mouth daily as needed for anxiety   ibuprofen (MOTRIN) 800 mg tablet   Yes No   Sig: Take 800 mg by mouth every 6 (six) hours as needed      Facility-Administered Medications: None       Past Medical History:   Diagnosis Date    Back pain     BPH without urinary obstruction     last assessed 01/16/18    Disc disorder     Herniated    Hemorrhoids     Hydronephrosis     Kidney disease     Renal calculi     Renal colic     Ureter colic        Past Surgical History:   Procedure Laterality Date    BRAIN SURGERY      COLONOSCOPY      CYSTOSCOPY      w/removal of object, last assessed 01/16/18    CYSTOSCOPY      w/removal of ureteral calculus last assessed 01/16/18    [facility]      last assessed 01/16/18    CYSTOSCOPY W/ URETERAL STENT PLACEMENT      last assessed 01/16/18   [address]    HERNIA REPAIR      KIDNEY SURGERY      removal of kidney stone in 2013 at [address] W/LITHOTRIPSY &INDWELL STENT INSRT Right 12/13/2018    Procedure: CYSTOSCOPY URETEROSCOPY  RETROGRADE PYELOGRAM AND INSERTION RIGHT STENT URETERAL;  Surgeon: Jacinto Prakash MD;  Location: [facility];  Service: Urology    TOOTH EXTRACTION         Family History   Problem Relation Age of Onset    Stroke Mother     Cancer Father     Diabetes Father     Prostate cancer Father     Heart disease Father     Hypertension Father      I have reviewed and agree with the history as documented  Social History   Substance Use Topics    Smoking status: Never Smoker    Smokeless tobacco: Never Used    Alcohol use Yes      Comment: rare        Review of Systems   Constitutional: Negative for chills and fever  Eyes: Negative for redness  Respiratory: Negative for shortness of breath  Cardiovascular: Negative for chest pain  Gastrointestinal: Negative for abdominal pain, diarrhea, nausea and vomiting  Genitourinary: Positive for dysuria, flank pain and hematuria  Negative for frequency and urgency  Musculoskeletal: Negative for neck stiffness  Skin: Negative for rash  Allergic/Immunologic: Negative for food allergies  Neurological: Negative for weakness and numbness  Physical Exam  Physical Exam   Constitutional:  Non-toxic appearance  No distress  HENT:   Head: Normocephalic and atraumatic  Right Ear: Tympanic membrane, external ear and ear canal normal  No drainage, swelling or tenderness  No foreign bodies  Tympanic membrane is not erythematous  No hemotympanum     Left Ear: Tympanic membrane, external ear and ear canal normal  No drainage, swelling or tenderness  No foreign bodies  Tympanic membrane is not erythematous  No hemotympanum  Nose: Nose normal    Mouth/Throat: Uvula is midline, oropharynx is clear and moist and mucous membranes are normal  No uvula swelling  No posterior oropharyngeal edema, posterior oropharyngeal erythema or tonsillar abscesses  No tonsillar exudate  Neck: Normal range of motion and phonation normal  Neck supple  No tracheal deviation present  Cardiovascular: Normal rate, regular rhythm, S1 normal, S2 normal and normal heart sounds  Exam reveals no gallop and no friction rub  No murmur heard  Pulmonary/Chest: Effort normal and breath sounds normal  No respiratory distress  He has no decreased breath sounds  He has no wheezes  He has no rhonchi  He has no rales  He exhibits no tenderness  Abdominal: Soft  Bowel sounds are normal  He exhibits no distension  There is no tenderness  There is no rebound, no guarding and no CVA tenderness  Neurological: He is alert  GCS eye subscore is 4  GCS verbal subscore is 5  GCS motor subscore is 6  Skin: Skin is warm and dry  No rash noted  Psychiatric: He has a normal mood and affect  Nursing note and vitals reviewed        Vital Signs  ED Triage Vitals [12/22/18 1240]   Temperature Pulse Respirations Blood Pressure SpO2   97 8 °F (36 6 °C) 78 18 153/90 98 %      Temp Source Heart Rate Source Patient Position - Orthostatic VS BP Location FiO2 (%)   Temporal Monitor Sitting Right arm --      Pain Score       8           Vitals:    12/22/18 1240 12/22/18 1507   BP: 153/90 145/77   Pulse: 78 60   Patient Position - Orthostatic VS: Sitting Lying       Visual Acuity      ED Medications  Medications   sodium chloride 0 9 % bolus 1,000 mL (0 mL Intravenous Stopped 12/22/18 1547)   ketorolac (TORADOL) injection 15 mg (15 mg Intravenous Given 12/22/18 1433)       Diagnostic Studies  Results Reviewed     Procedure Component Value Units Date/Time    Basic metabolic panel [[id number]] Collected:  12/22/18 1427    Lab Status:  Final result Specimen:  Blood from Arm, Left Updated:  12/22/18 1446     Sodium 141 mmol/L      Potassium 4 1 mmol/L      Chloride 106 mmol/L      CO2 26 mmol/L      ANION GAP 9 mmol/L      BUN 24 mg/dL      Creatinine 1 14 mg/dL      Glucose 99 mg/dL      Calcium 9 3 mg/dL      eGFR 70 ml/min/1 73sq m     Narrative:         National Kidney Disease Education Program recommendations are as follows:  GFR calculation is accurate only with a steady state creatinine  Chronic Kidney disease less than 60 ml/min/1 73 sq  meters  Kidney failure less than 15 ml/min/1 73 sq  meters      CBC and differential [[id number]]  (Abnormal) Collected:  12/22/18 1427    Lab Status:  Final result Specimen:  Blood from Arm, Left Updated:  12/22/18 1438     WBC 11 41 (H) Thousand/uL      RBC 4 69 Million/uL      Hemoglobin 14 5 g/dL      Hematocrit 43 3 %      MCV 92 fL      MCH 30 9 pg      MCHC 33 5 g/dL      RDW 13 2 %      MPV 8 5 (L) fL      Platelets 347 Thousands/uL      nRBC 0 /100 WBCs      Neutrophils Relative 69 %      Immat GRANS % 1 %      Lymphocytes Relative 18 %      Monocytes Relative 9 %      Eosinophils Relative 2 %      Basophils Relative 1 %      Neutrophils Absolute 7 91 (H) Thousands/µL      Immature Grans Absolute 0 09 Thousand/uL      Lymphocytes Absolute 2 10 Thousands/µL      Monocytes Absolute 0 99 Thousand/µL      Eosinophils Absolute 0 26 Thousand/µL      Basophils Absolute 0 06 Thousands/µL     Urine Microscopic [[id number]]  (Abnormal) Collected:  12/22/18 1342    Lab Status:  Final result Specimen:  Urine from Urine, Clean Catch Updated:  12/22/18 1347     RBC, UA Innumerable (A) /hpf      WBC, UA 4-10 (A) /hpf      Epithelial Cells None Seen /hpf      Bacteria, UA Occasional /hpf     ED Urine Macroscopic [[id number]]  (Abnormal) Collected:  12/22/18 1342    Lab Status:  Final result Specimen:  Urine Updated: 12/22/18 1327     Color, UA Yellow     Clarity, UA Cloudy     pH, UA 5 5     Leukocytes, UA Trace (A)     Nitrite, UA Negative     Protein,  (2+) (A) mg/dl      Glucose, UA Negative mg/dl      Ketones, UA Trace (A) mg/dl      Urobilinogen, UA 0 2 E U /dl      Bilirubin, UA Interference- unable to analyze (A)     Blood, UA Large (A)     Specific [place], UA 1 025    Narrative:       CLINITEK RESULT                 CT renal stone study abdomen pelvis without contrast   Final Result by Spenser Adhikari MD (12/22 1444)      Mild right hydroureteronephrosis to the level of the right UVJ without an obstructive calculus likely on the basis of a recently passed calculus  A bladder/urethral calculus is also not identified  Follow-up with dedicated postcontrast CT scan of the    kidneys ureters and bladder if the patient has persistent pain  The study was marked in EPIC for significant notification  Workstation performed: [id number]                    Procedures  Procedures       Phone Contacts  ED Phone Contact    ED Course  ED Course as of Dec 22 2038   Sat Dec 22, 2018   1509 Paging Urology    1542 Zofran and narcotics or toradol  MDM  Number of Diagnoses or Management Options  Hydronephrosis: new and requires workup  Right flank pain: new and requires workup  Diagnosis management comments: DDX consists of but not limited to: kidney stone, UTI    Will check CBC, BMP, CT stone study and urine  Will give Toradol for pain  [facility]     [phone] Spoke with Dr Delbert Cruz from Urology  Patient can be discharged home and follow up with Urology as an outpatient  Patient can take anti-inflammatories or Dr Delbert Cruz said patient can be given Zofran and narcotics for pain  Patient does not want narcotics due to side effects he experiences when taking these medications  Patient agreeable to plan  Patient states he is more comfortable at this time      At discharge, I instructed the patient to:  -follow up with pcp  -take Naproxen as prescribed for pain and inflammation  -rest and drink plenty of fluids  -follow up with Urology  -return to the ER if symptoms worsened or new symptoms arose  Patient agreed to this plan and was stable at time of discharge  Amount and/or Complexity of Data Reviewed  Clinical lab tests: ordered and reviewed  Tests in the radiology section of CPT®: ordered and reviewed  Review and summarize past medical records: yes  Discuss the patient with other providers: yes (Spoke with Urology)    Patient Progress  Patient progress: stable    CritCare Time    Disposition  Final diagnoses:   Hydronephrosis   Right flank pain     Time reflects when diagnosis was documented in both MDM as applicable and the Disposition within this note     Time User Action Codes Description Comment    12/22/2018  3:50 PM Frances Inoue Add [N13 30] Hydronephrosis     12/22/2018  3:50 PM Frances Inoue Add [R10 9] Right flank pain       ED Disposition     ED Disposition Condition Comment    Discharge  Alethea Devine discharge to home/self care  Condition at discharge: Stable        Follow-up Information     Follow up With Specialties Details Why Contact Info Additional Phil Robertson DO Family Medicine Schedule an appointment as soon as possible for a visit As needed [address]    [address]   [phone]       [facility] Schedule an appointment as soon as possible for a visit in 1 day  [place] [phone]  8  [facility] [place], [facility], [place], [address]          Discharge Medication List as of 12/22/2018  4:12 PM      START taking these medications    Details   naproxen (NAPROSYN) 500 mg tablet Take 1 tablet (500 mg total) by mouth 2 (two) times a day with meals, Starting Sat 12/22/2018, Print         CONTINUE these medications which have NOT CHANGED    Details   ALPRAZolam (XANAX) 0 25 mg tablet Take 1 tablet (0 25 mg total) by mouth daily as needed for anxiety, Starting Wed 10/24/2018, Normal      ibuprofen (MOTRIN) 800 mg tablet Take 800 mg by mouth every 6 (six) hours as needed, Starting Thu 11/1/2018, Historical Med           No discharge procedures on file      ED Provider  Electronically Signed by           aYsmine Albarado PA-C  12/22/18 2049

## 2018-12-22 NOTE — DISCHARGE INSTRUCTIONS
Flank Pain   WHAT YOU NEED TO KNOW:   Flank pain is felt in the area below your ribcage and above your hip bones, often in the lower back  Your pain may be dull or so severe that you cannot get comfortable  The pain may stay in one area or radiate to another area  It may worsen and lighten in waves  Flank pain is often a sign of problems with your urinary tract, such as a kidney stone or infection  DISCHARGE INSTRUCTIONS:   Return to the emergency department if:   · You have a fever  · Your heart is fluttering or jumping  · You see blood in your urine  · Your pain radiates into your lower abdomen and genital area  · You have intense pain in your low back next to your spine  · You are much more tired than usual and have no desire to eat  · You have a headache and your muscles jerk  Contact your healthcare provider if:   · You have an upset stomach and are vomiting  · You have to urinate more often, and with urgency  · Your pain worsens or does not improve, and you cannot get comfortable  · You pass a stone when you urinate  · You have questions or concerns about your condition or care  Medicines: The following medicines may be ordered for you:  · Pain medicine  may help decrease or relieve your pain  Do not wait until the pain is severe before you take your medicine  · Antibiotics  may help treat a urinary tract infection caused by bacteria  · Take your medicine as directed  Contact your healthcare provider if you think your medicine is not helping or if you have side effects  Tell him of her if you are allergic to any medicine  Keep a list of the medicines, vitamins, and herbs you take  Include the amounts, and when and why you take them  Bring the list or the pill bottles to follow-up visits  Carry your medicine list with you in case of an emergency    Follow up with your healthcare provider in 1 to 2 days or as directed:  Write down your questions so you remember to ask them during your visits  © 2017 [address] [employer] Information is for End User's use only and may not be sold, redistributed or otherwise used for commercial purposes  All illustrations and images included in CareNotes® are the copyrighted property of [employer]  or [employer]  The above information is an  only  It is not intended as medical advice for individual conditions or treatments  Talk to your doctor, nurse or pharmacist before following any medical regimen to see if it is safe and effective for you  Hydronephrosis   WHAT YOU NEED TO KNOW:   Hydronephrosis is swelling in one or both kidneys caused by urine buildup  Urine normally flows from the kidneys to the bladder through tubes called ureters  A blockage in the ureters can prevent urine from flowing properly  Urine flow may also be prevented or slowed if your kidneys do not work correctly  Urine flows back into your urinary tract  Pressure builds up in the kidney and causes swelling  DISCHARGE INSTRUCTIONS:   Follow up with your healthcare provider or specialist as directed: You may need to be referred to a gynecologist, oncologist, or urologist for more tests and treatment  Write down your questions so you remember to ask them during your visits  Contact your healthcare provider if:   · Your abdomen feels full  · You have a change in how much or how often you urinate  · You urinate more times at night and in larger amounts than during the day  · You have mild lower back pain or pain on one side when you urinate  · You have questions or concerns about your condition or care  Return to the emergency department if:   · You have severe, stabbing back pain  · You have blood in your urine  · You cannot urinate, or you urinate very little    © 2017 [address] [employer] Information is for End User's use only and may not be sold, redistributed or otherwise used for commercial purposes  All illustrations and images included in CareNotes® are the copyrighted property of [employer] , Inc  or [employer]  The above information is an  only  It is not intended as medical advice for individual conditions or treatments  Talk to your doctor, nurse or pharmacist before following any medical regimen to see if it is safe and effective for you  DISCHARGE INSTRUCTIONS:    FOLLOW UP WITH YOUR PRIMARY CARE PROVIDER OR THE [address] [place] [address]  MAKE AN APPOINTMENT TO BE SEEN  TAKE NAPROXEN AS PRESCRIBED FOR PAIN AND INFLAMMATION  IF RASH, SHORTNESS OF BREATH OR TROUBLE SWALLOWING, STOP TAKING THE MEDICATION AND BE SEEN  REST AND DRINK PLENTY OF FLUIDS  FOLLOW UP WITH UROLOGY  IF SYMPTOMS WORSEN OR NEW SYMPTOMS ARISE, RETURN TO THE ER TO BE SEEN

## 2018-12-24 ENCOUNTER — TELEPHONE (OUTPATIENT)
Dept: UROLOGY | Facility: MEDICAL CENTER | Age: 58
End: 2018-12-24

## 2018-12-24 NOTE — TELEPHONE ENCOUNTER
Spoke with the patient; Dr Rachel Ott has reviewed the patient's CT and agrees that there are no visible stones present  However, the patient has some swelling in his kidney and ureter that are most likely the cause of his pain  This will reduce in time and can be managed with ibuprofen  As for the hematuria, the patient is still healing, it is normal to see blood  He needs to increase his fluids to help flush out his bladder

## 2018-12-24 NOTE — TELEPHONE ENCOUNTER
Sending blood sugars to your provider at [facility]:  We want to help you with your diabetes management, which often requires frequent adjustments to your therapy. For your convenience, we have several ways to send your blood sugars to your doctor for review.    - Send message directly to your doctor through My Chart.  Please ask the rooming staff if you would like to sign up for My Chart.  This is a fast and confidential way to send your information and communicate directly with your provider.   - Record readings and fax to [phone].  We have a template for you to use for your convenience.  - If you have a [employer]tronic pump, upload to [employer] and notify your provider of your username and password.   - Stop by the clinic with your meter for download.   - My Chart or call Meghan Smith, Diabetes Educator at [phone]  - Call the clinic and speak to one of the endocrine nurses to relay information on the telephone.  Elizabeth Baker, or Chyna at [phone].   -    Please call the on-call Endocrinologist at the [facility] for after       hours/weekend needs at [phone] Option #4.    Please note that you do not need to FAST if you are just having an A1C drawn. Please remember to ALWAYS bring your glucose meter with to your appointment. This data is very important for the management of your care.    Thank you!  Your [facility]           Pt called into office this morning and stated that he has passed 2 more kidney stones and was seen in the ER on 12/22/18  They did a CT and did not see kidney stones  Pt states that he is still having R flank pain that radiates into the groin and penis  Pt is passing blood clots  Denies fever or chills  Will forward to Dr Jam Bailey to address today

## 2019-01-18 ENCOUNTER — OFFICE VISIT (OUTPATIENT)
Dept: SLEEP CENTER | Facility: CLINIC | Age: 59
End: 2019-01-18
Payer: COMMERCIAL

## 2019-01-18 VITALS
HEART RATE: 84 BPM | OXYGEN SATURATION: 95 % | DIASTOLIC BLOOD PRESSURE: 80 MMHG | HEIGHT: 69 IN | SYSTOLIC BLOOD PRESSURE: 138 MMHG | BODY MASS INDEX: 36.11 KG/M2 | WEIGHT: 243.8 LBS

## 2019-01-18 DIAGNOSIS — Q28.2 AVM (ARTERIOVENOUS MALFORMATION) BRAIN: ICD-10-CM

## 2019-01-18 DIAGNOSIS — R06.83 SNORING: ICD-10-CM

## 2019-01-18 DIAGNOSIS — E66.9 OBESITY (BMI 30-39.9): ICD-10-CM

## 2019-01-18 DIAGNOSIS — G47.33 OSA (OBSTRUCTIVE SLEEP APNEA): Primary | ICD-10-CM

## 2019-01-18 PROCEDURE — 99214 OFFICE O/P EST MOD 30 MIN: CPT | Performed by: PSYCHIATRY & NEUROLOGY

## 2019-01-18 NOTE — PROGRESS NOTES
Review of Systems      Genitourinary none   Cardiology none   Gastrointestinal none   Neurology none   Constitutional none   Integumentary none   Psychiatry none   Musculoskeletal muscle aches, back pain, sciatica and leg cramps   Pulmonary snoring   ENT ringing in ears   Endocrine none   Hematological none

## 2019-01-18 NOTE — PATIENT INSTRUCTIONS
1  Schedule polysomnogram on a diagnostic basis  2  Return for review of test results  3  Consider treatment using nasal CPAP necessary  4  Weight loss as possible  5   Exercise regimen as a portion of weight loss program

## 2019-01-18 NOTE — PROGRESS NOTES
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     Subjective:     HPI: Luis Bernabe is a 60y o  year old male  He was previously evaluated for snoring  Polysomnography demonstrated no evidence of obstructive sleep apnea      Employment:  He is employed by an [employer]    Sleep Schedule:       Bedtime:  Typically 10:00 p m  To 10:30 p m  Latency:  20-30 min on average      Wakeup time:  Generally 7:00 a m  To 7:30 a m  Awakenings:       Frequency:  Usually once a night      Causes:  Typically to use the bathroom, sometimes spontaneous      Duration:  Approximately 15-20 minutes, sometimes longer especially if awakened in the early morning     Daytime Sleepiness / Inappropriate Sleep:       Most severe:  Late afternoon or early evening       Naps :  Very rare      Time:  Late afternoon or early evening      Duration:  Approximately 1-2 hr       Inappropriate drowsiness / sleep:  Sitting watching television in the late afternoon or early evening    Snoring:  Described as loud and somewhat disturbing by wife    Apnea:  Patient has occasionally awakened feeling as though he is snorting    Change in Weight:  Approximately 50 lb increase over the past 3-4 years    Restless Leg Syndrome:  No clinical symptoms consistent with this diagnosis     Other Complaints:  History of occipital AVM of the left hemisphere, treated with coagulation and gamma knife therapy, obesity, low back pain     Social History:      Caffeine:  Occasional chocolate      Tobacco:   reports that he has never smoked  He has never used smokeless tobacco        Alcohol:   reports that he drinks alcohol  Drugs:   reports that he does not use drugs         The review of systems and following portions of the patient's history were reviewed and updated as appropriate: allergies, current medications, past family history, past medical history, past social history, past surgical history and problem list     Review of Systems      Genitourinary none   Cardiology none   Gastrointestinal none   Neurology none   Constitutional none   Integumentary none   Psychiatry none   Musculoskeletal muscle aches, back pain, sciatica and leg cramps   Pulmonary snoring   ENT ringing in ears   Endocrine none   Hematological none       Objective:       Vitals:    01/18/19 0900   BP: 138/80   Pulse: 84   SpO2: 95%   Weight: 111 kg (243 lb 12 8 oz)   Height: 5' 9" (1 753 m)     Body mass index is 36 kg/m²  Neck Circumference: 42 (cm)  Mobile Sleepiness Scale: Total score: 6      Current Outpatient Prescriptions:     ALPRAZolam (XANAX) 0 25 mg tablet, Take 1 tablet (0 25 mg total) by mouth daily as needed for anxiety, Disp: 30 tablet, Rfl: 0    ibuprofen (MOTRIN) 800 mg tablet, Take 800 mg by mouth every 6 (six) hours as needed, Disp: , Rfl: 0    naproxen (NAPROSYN) 500 mg tablet, Take 1 tablet (500 mg total) by mouth 2 (two) times a day with meals, Disp: 30 tablet, Rfl: 0    Physical Exam  General Appearance:   Alert, cooperative, no distress, appears stated age, obese     Head:   Normocephalic, without obvious abnormality, atraumatic     Eyes:   PERRL, conjunctiva/corneas clear, EOM's intact          Nose:  Nares normal, septum midline, mucosa normal, no drainage or sinus tenderness           Throat:  Lips, teeth and gums normal; tongue normal size and  shape and midline in position; mucosa bilaterally redundant, uvula fairly thick and long dipping below the base of the tongue, tonsils not visualized, Mallampati class 4       Neck:  Supple, symmetrical, trachea midline, no adenopathy;  Thyroid: No enlargement, tenderness or nodules; no carotid bruit or JVD     Lungs:      Clear to auscultation bilaterally, respirations unlabored     Heart:   Regular rate and rhythm, S1 and S2 normal, no murmur, rub or gallop       Extremities:  Extremities normal, atraumatic, no cyanosis or edema     Pulses:  2+ and symmetric all extremities     Skin:  Skin color, texture, turgor normal, no rashes or lesions       Neurologic:  CNII-XII intact  Normal strength, sensation throughout, somewhat unsteady in [place]     Sleep Study Results:  None available at this time, previous history of negative polysomnogram as noted above      ASSESSMENT / PLAN     1  ANGEILKA (obstructive sleep apnea)  CPAP Study    Diagnostic Sleep Study   2  Obesity (BMI 30-39  9)  Ambulatory referral to Sleep Medicine   3  Snoring  Ambulatory referral to Sleep Medicine   4  AVM (arteriovenous malformation) brain           Counseling / Coordination of Care  Total clinic time spent today 60 minutes  Greater than 50% of total time was spent with the patient and / or family counseling and / or coordination of care  A description of the counseling / coordination of care:     instructions for management, risk factor reductions, prognosis, patient and family education, impressions and risks and benefits of treatment options    The following instructions have been given to the patient today:    Patient Instructions   1  Schedule polysomnogram on a diagnostic basis  2  Return for review of test results  3  Consider treatment using nasal CPAP necessary  4  Weight loss as possible  5   Exercise regimen as a portion of weight loss program           Fred Ferrer

## 2019-01-22 DIAGNOSIS — N40.0 ENLARGED PROSTATE WITHOUT LOWER URINARY TRACT SYMPTOMS (LUTS): ICD-10-CM

## 2019-01-24 ENCOUNTER — APPOINTMENT (OUTPATIENT)
Dept: LAB | Facility: MEDICAL CENTER | Age: 59
End: 2019-01-24
Attending: UROLOGY
Payer: COMMERCIAL

## 2019-01-24 DIAGNOSIS — N40.0 ENLARGED PROSTATE WITHOUT LOWER URINARY TRACT SYMPTOMS (LUTS): ICD-10-CM

## 2019-01-24 LAB — PSA SERPL-MCNC: 1.7 NG/ML (ref 0–4)

## 2019-01-24 PROCEDURE — 84153 ASSAY OF PSA TOTAL: CPT

## 2019-01-24 PROCEDURE — 36415 COLL VENOUS BLD VENIPUNCTURE: CPT

## 2019-01-31 ENCOUNTER — HOSPITAL ENCOUNTER (OUTPATIENT)
Dept: ULTRASOUND IMAGING | Facility: HOSPITAL | Age: 59
Discharge: HOME | End: 2019-01-31
Attending: UROLOGY
Payer: COMMERCIAL

## 2019-01-31 DIAGNOSIS — N20.1 RIGHT URETERAL STONE: ICD-10-CM

## 2019-01-31 PROCEDURE — 76770 US EXAM ABDO BACK WALL COMP: CPT

## 2019-02-08 ENCOUNTER — TELEPHONE (OUTPATIENT)
Dept: UROLOGY | Facility: MEDICAL CENTER | Age: 59
End: 2019-02-08

## 2019-02-08 NOTE — TELEPHONE ENCOUNTER
----- Message from Javon Iglesias MD sent at 2/8/2019  3:57 PM EST -----  Please call the patient regarding his abnormal result  Ultrasound shows no evidence of obstruction  No stones are seen in the kidney  The radiologist does see a small cyst in the left kidney and have recommended a follow-up ultrasound in 6 months  He can discuss this at his follow-up visit in March

## 2019-02-27 ENCOUNTER — TELEPHONE (OUTPATIENT)
Dept: SLEEP CENTER | Facility: CLINIC | Age: 59
End: 2019-02-27

## 2019-02-28 LAB — HCV AB SER-ACNC: NEGATIVE

## 2019-03-21 RX ORDER — FAMOTIDINE 10 MG
10 TABLET ORAL
COMMUNITY

## 2019-03-21 RX ORDER — TRAMADOL HYDROCHLORIDE 50 MG/1
50 TABLET ORAL EVERY 6 HOURS PRN
COMMUNITY
Start: 2018-10-05 | End: 2019-03-25 | Stop reason: ALTCHOICE

## 2019-03-21 RX ORDER — METHOCARBAMOL 500 MG/1
500 TABLET, FILM COATED ORAL 4 TIMES DAILY PRN
COMMUNITY
End: 2019-04-29 | Stop reason: SDUPTHER

## 2019-03-21 RX ORDER — METHOCARBAMOL 750 MG/1
TABLET, FILM COATED ORAL
COMMUNITY
End: 2019-03-25 | Stop reason: ALTCHOICE

## 2019-03-21 RX ORDER — GABAPENTIN 300 MG/1
CAPSULE ORAL
COMMUNITY
End: 2020-05-20

## 2019-03-21 RX ORDER — NABUMETONE 750 MG/1
TABLET, FILM COATED ORAL
COMMUNITY
End: 2019-03-25

## 2019-03-25 ENCOUNTER — OFFICE VISIT (OUTPATIENT)
Dept: UROLOGY | Facility: MEDICAL CENTER | Age: 59
End: 2019-03-25
Payer: COMMERCIAL

## 2019-03-25 VITALS
DIASTOLIC BLOOD PRESSURE: 80 MMHG | WEIGHT: 235 LBS | HEART RATE: 93 BPM | SYSTOLIC BLOOD PRESSURE: 122 MMHG | BODY MASS INDEX: 34.8 KG/M2 | HEIGHT: 69 IN

## 2019-03-25 DIAGNOSIS — N20.0 NEPHROLITHIASIS: ICD-10-CM

## 2019-03-25 DIAGNOSIS — N40.0 ENLARGED PROSTATE WITHOUT LOWER URINARY TRACT SYMPTOMS (LUTS): Primary | ICD-10-CM

## 2019-03-25 LAB
SL AMB  POCT GLUCOSE, UA: NORMAL
SL AMB LEUKOCYTE ESTERASE,UA: NORMAL
SL AMB POCT BILIRUBIN,UA: NORMAL
SL AMB POCT BLOOD,UA: NORMAL
SL AMB POCT CLARITY,UA: CLEAR
SL AMB POCT COLOR,UA: YELLOW
SL AMB POCT KETONES,UA: NORMAL
SL AMB POCT NITRITE,UA: NORMAL
SL AMB POCT PH,UA: 6
SL AMB POCT SPECIFIC GRAVITY,UA: 1.02
SL AMB POCT URINE PROTEIN: NORMAL
SL AMB POCT UROBILINOGEN: 0.2

## 2019-03-25 PROCEDURE — 99214 OFFICE O/P EST MOD 30 MIN: CPT | Performed by: UROLOGY

## 2019-03-25 PROCEDURE — 81003 URINALYSIS AUTO W/O SCOPE: CPT | Performed by: UROLOGY

## 2019-03-25 NOTE — PROGRESS NOTES
Assessment/Plan:    Enlarged prostate without lower urinary tract symptoms (luts)  Mildly symptomatic  Return in 1 year  Nephrolithiasis  Currently inactive  Diagnoses and all orders for this visit:    Enlarged prostate without lower urinary tract symptoms (luts)  -     PSA, Total Screen; Future    Nephrolithiasis  -     POCT urine dip auto non-scope          Subjective:      Patient ID: Luiza Elizalde is a 60 y o  male  HPI  BPH:  He notes nocturia x 1  He denies other significant urinary symptoms  He denies gross hematuria, urinary tract infections or incontinence  He is taking neither medications nor supplements for his symptoms  PSA:  1 7 on January 24, 2019  AUA SYMPTOM SCORE      Most Recent Value   AUA SYMPTOM SCORE   How often have you had a sensation of not emptying your bladder completely after you finished urinating? 0   How often have you had to urinate again less than two hours after you finished urinating? 1   How often have you found you stopped and started again several times when you urinate? 1   How often have you found it difficult to postpone urination? 1   How often have you had a weak urinary stream?  0   How often have you had to push or strain to begin urination? 1   How many times did you most typically get up to urinate from the time you went to bed at night until the time you got up in the morning? 1   Quality of Life: If you were to spend the rest of your life with your urinary condition just the way it is now, how would you feel about that?  1   AUA SYMPTOM SCORE  5        Renal calculus disease:  The patient had endoscopic removal of a right ureteral stone in December 2018  After ureteroscopy, pt passed a few small fragments with colic  A follow-up ultrasound in January 2019 showed no hydronephrosis  A septated left lower pole cyst was identified        The following portions of the patient's history were reviewed and updated as appropriate: allergies, current medications, past family history, past medical history, past social history, past surgical history and problem list     Review of Systems   Constitutional: Negative for activity change and fatigue  Respiratory: Negative for shortness of breath and wheezing  Cardiovascular: Negative for chest pain  Gastrointestinal: Negative for abdominal pain  Genitourinary: Negative for difficulty urinating, dysuria, frequency, hematuria and urgency  Musculoskeletal: Positive for back pain  Negative for gait problem  Episodic back pain  Skin: Negative  Allergic/Immunologic: Negative  Neurological: Negative  Psychiatric/Behavioral: Negative  Objective:      /80   Pulse 93   Ht 5' 9" (1 753 m)   Wt 107 kg (235 lb)   BMI 34 70 kg/m²          Physical Exam   Constitutional: He is oriented to person, place, and time  He appears well-developed and well-nourished  HENT:   Head: Normocephalic and atraumatic  Eyes: EOM are normal    Neck: Normal range of motion  Neck supple  Pulmonary/Chest: Effort normal    Genitourinary: Rectum normal    Genitourinary Comments: The prostate is 40 grams, firm, smooth and non-tender   Musculoskeletal: Normal range of motion  Neurological: He is alert and oriented to person, place, and time  Skin: Skin is warm and dry  Psychiatric: He has a normal mood and affect   His behavior is normal  Judgment and thought content normal

## 2019-04-29 ENCOUNTER — OFFICE VISIT (OUTPATIENT)
Dept: FAMILY MEDICINE CLINIC | Facility: CLINIC | Age: 59
End: 2019-04-29
Payer: COMMERCIAL

## 2019-04-29 VITALS
HEIGHT: 69 IN | WEIGHT: 237.2 LBS | SYSTOLIC BLOOD PRESSURE: 130 MMHG | DIASTOLIC BLOOD PRESSURE: 84 MMHG | BODY MASS INDEX: 35.13 KG/M2

## 2019-04-29 DIAGNOSIS — N40.0 ENLARGED PROSTATE WITHOUT LOWER URINARY TRACT SYMPTOMS (LUTS): ICD-10-CM

## 2019-04-29 DIAGNOSIS — M62.838 MUSCLE SPASM: ICD-10-CM

## 2019-04-29 DIAGNOSIS — E78.5 HYPERLIPIDEMIA, UNSPECIFIED HYPERLIPIDEMIA TYPE: ICD-10-CM

## 2019-04-29 DIAGNOSIS — Q28.2 AVM (ARTERIOVENOUS MALFORMATION) BRAIN: ICD-10-CM

## 2019-04-29 DIAGNOSIS — Z00.00 HEALTH CARE MAINTENANCE: Primary | ICD-10-CM

## 2019-04-29 DIAGNOSIS — R06.83 SNORING: ICD-10-CM

## 2019-04-29 DIAGNOSIS — N20.0 NEPHROLITHIASIS: ICD-10-CM

## 2019-04-29 DIAGNOSIS — E66.9 OBESITY (BMI 30-39.9): ICD-10-CM

## 2019-04-29 PROCEDURE — 99396 PREV VISIT EST AGE 40-64: CPT | Performed by: FAMILY MEDICINE

## 2019-04-29 RX ORDER — METHOCARBAMOL 500 MG/1
500 TABLET, FILM COATED ORAL DAILY PRN
Qty: 30 TABLET | Refills: 0 | Status: SHIPPED | OUTPATIENT
Start: 2019-04-29 | End: 2020-05-20

## 2019-04-29 RX ORDER — ONDANSETRON 4 MG/1
TABLET, ORALLY DISINTEGRATING ORAL
COMMUNITY
End: 2019-04-29 | Stop reason: SDUPTHER

## 2019-04-29 RX ORDER — DICYCLOMINE HCL 20 MG
TABLET ORAL
COMMUNITY
End: 2019-06-24

## 2019-04-29 RX ORDER — PREDNISONE 20 MG/1
TABLET ORAL
COMMUNITY
End: 2019-06-24

## 2019-04-29 RX ORDER — CELECOXIB 200 MG/1
CAPSULE ORAL
COMMUNITY
End: 2019-06-24

## 2019-04-29 RX ORDER — NABUMETONE 750 MG/1
TABLET, FILM COATED ORAL
COMMUNITY
End: 2019-06-24

## 2019-04-29 RX ORDER — LORAZEPAM 1 MG/1
TABLET ORAL
COMMUNITY
End: 2019-06-24

## 2019-04-29 RX ORDER — MECLIZINE HYDROCHLORIDE 25 MG/1
TABLET ORAL
COMMUNITY
End: 2019-06-24

## 2019-04-29 RX ORDER — DICLOFENAC POTASSIUM 50 MG/1
TABLET, FILM COATED ORAL
COMMUNITY
End: 2019-06-24

## 2019-04-29 RX ORDER — BUTALBITAL/ASPIRIN/CAFFEINE 50-325-40
CAPSULE ORAL
COMMUNITY
End: 2019-06-24 | Stop reason: ALTCHOICE

## 2019-04-29 RX ORDER — OXYBUTYNIN CHLORIDE 5 MG/1
TABLET ORAL
COMMUNITY
End: 2019-06-24

## 2019-04-29 RX ORDER — ONDANSETRON 4 MG/1
TABLET, FILM COATED ORAL
COMMUNITY
End: 2019-06-24

## 2019-04-29 RX ORDER — OXYCODONE AND ACETAMINOPHEN 10; 325 MG/1; MG/1
TABLET ORAL
COMMUNITY
End: 2019-06-24

## 2019-06-24 ENCOUNTER — OFFICE VISIT (OUTPATIENT)
Dept: FAMILY MEDICINE CLINIC | Facility: CLINIC | Age: 59
End: 2019-06-24
Payer: COMMERCIAL

## 2019-06-24 VITALS
BODY MASS INDEX: 34.36 KG/M2 | WEIGHT: 232 LBS | HEART RATE: 83 BPM | DIASTOLIC BLOOD PRESSURE: 76 MMHG | HEIGHT: 69 IN | TEMPERATURE: 97.8 F | OXYGEN SATURATION: 95 % | SYSTOLIC BLOOD PRESSURE: 130 MMHG

## 2019-06-24 DIAGNOSIS — K42.9 PERIUMBILICAL HERNIA: ICD-10-CM

## 2019-06-24 DIAGNOSIS — E78.5 HYPERLIPIDEMIA, UNSPECIFIED HYPERLIPIDEMIA TYPE: ICD-10-CM

## 2019-06-24 DIAGNOSIS — F41.9 ANXIETY: ICD-10-CM

## 2019-06-24 DIAGNOSIS — M51.36 DDD (DEGENERATIVE DISC DISEASE), LUMBAR: ICD-10-CM

## 2019-06-24 DIAGNOSIS — N20.0 NEPHROLITHIASIS: ICD-10-CM

## 2019-06-24 DIAGNOSIS — Z01.818 PRE-OP EXAM: Primary | ICD-10-CM

## 2019-06-24 DIAGNOSIS — J30.9 ALLERGIC RHINITIS, UNSPECIFIED SEASONALITY, UNSPECIFIED TRIGGER: ICD-10-CM

## 2019-06-24 DIAGNOSIS — R94.31 ABNORMAL EKG: ICD-10-CM

## 2019-06-24 DIAGNOSIS — E66.9 OBESITY (BMI 30-39.9): ICD-10-CM

## 2019-06-24 DIAGNOSIS — Q28.2 AVM (ARTERIOVENOUS MALFORMATION) BRAIN: ICD-10-CM

## 2019-06-24 DIAGNOSIS — K21.9 GASTROESOPHAGEAL REFLUX DISEASE, ESOPHAGITIS PRESENCE NOT SPECIFIED: ICD-10-CM

## 2019-06-24 PROBLEM — Z86.010 HISTORY OF COLONIC POLYPS: Status: ACTIVE | Noted: 2018-10-23

## 2019-06-24 PROBLEM — R51.9 LEFT-SIDED HEADACHE: Status: ACTIVE | Noted: 2017-12-11

## 2019-06-24 PROBLEM — Z86.0100 HISTORY OF COLONIC POLYPS: Status: ACTIVE | Noted: 2018-10-23

## 2019-06-24 PROBLEM — G56.20 ULNAR NERVE ABNORMALITY: Status: ACTIVE | Noted: 2019-06-24

## 2019-06-24 PROBLEM — M54.42 BILATERAL LOW BACK PAIN WITH LEFT-SIDED SCIATICA: Status: ACTIVE | Noted: 2017-10-16

## 2019-06-24 PROBLEM — N40.0 BPH WITHOUT URINARY OBSTRUCTION: Status: ACTIVE | Noted: 2018-01-03

## 2019-06-24 PROBLEM — I10 UNCONTROLLED HYPERTENSION: Status: RESOLVED | Noted: 2018-02-26 | Resolved: 2019-06-24

## 2019-06-24 PROBLEM — S33.5XXA LUMBAR SPRAIN: Status: ACTIVE | Noted: 2019-06-24

## 2019-06-24 PROBLEM — I10 UNCONTROLLED HYPERTENSION: Status: ACTIVE | Noted: 2018-02-26

## 2019-06-24 PROBLEM — M54.16 RADICULOPATHY, LUMBAR REGION: Status: ACTIVE | Noted: 2018-02-14

## 2019-06-24 PROBLEM — M51.369 DDD (DEGENERATIVE DISC DISEASE), LUMBAR: Status: ACTIVE | Noted: 2017-10-16

## 2019-06-24 LAB
BILIRUB UR QL STRIP: NEGATIVE
CLARITY UR: CLEAR
COLOR UR: YELLOW
GLUCOSE UR STRIP-MCNC: NEGATIVE MG/DL
HGB UR QL STRIP.AUTO: NEGATIVE
KETONES UR STRIP-MCNC: NEGATIVE MG/DL
LEUKOCYTE ESTERASE UR QL STRIP: NEGATIVE
NITRITE UR QL STRIP: NEGATIVE
PH UR STRIP.AUTO: 6 [PH]
PROT UR STRIP-MCNC: NEGATIVE MG/DL
SP GR UR STRIP.AUTO: 1.02 (ref 1–1.03)
UROBILINOGEN UR QL STRIP.AUTO: 0.2 E.U./DL

## 2019-06-24 PROCEDURE — 93000 ELECTROCARDIOGRAM COMPLETE: CPT | Performed by: NURSE PRACTITIONER

## 2019-06-24 PROCEDURE — 99242 OFF/OP CONSLTJ NEW/EST SF 20: CPT | Performed by: NURSE PRACTITIONER

## 2019-06-24 PROCEDURE — 81003 URINALYSIS AUTO W/O SCOPE: CPT | Performed by: NURSE PRACTITIONER

## 2019-06-25 ENCOUNTER — TELEPHONE (OUTPATIENT)
Dept: FAMILY MEDICINE CLINIC | Facility: CLINIC | Age: 59
End: 2019-06-25

## 2019-06-27 ENCOUNTER — HOSPITAL ENCOUNTER (OUTPATIENT)
Dept: NON INVASIVE DIAGNOSTICS | Facility: CLINIC | Age: 59
Discharge: HOME | End: 2019-06-27
Payer: COMMERCIAL

## 2019-06-27 DIAGNOSIS — Z01.818 PRE-OP EXAM: ICD-10-CM

## 2019-06-27 PROCEDURE — 93306 TTE W/DOPPLER COMPLETE: CPT

## 2019-06-27 PROCEDURE — 93306 TTE W/DOPPLER COMPLETE: CPT | Performed by: INTERNAL MEDICINE

## 2019-07-03 ENCOUNTER — TELEPHONE (OUTPATIENT)
Dept: FAMILY MEDICINE CLINIC | Facility: CLINIC | Age: 59
End: 2019-07-03

## 2019-07-03 NOTE — TELEPHONE ENCOUNTER
Patient's brother, Dr Rashad Joya, would like to speak to Dr Nidhi Jansen    He is not on the emergency list     [phone]

## 2019-07-10 ENCOUNTER — TELEPHONE (OUTPATIENT)
Dept: FAMILY MEDICINE CLINIC | Facility: CLINIC | Age: 59
End: 2019-07-10

## 2019-07-10 NOTE — TELEPHONE ENCOUNTER
Spoke with the patient  I will mail him a communication consent form and he will think about updating the information and sending it back

## 2019-07-10 NOTE — TELEPHONE ENCOUNTER
LMOM for patient to call the office  His brother is asking to speak with Dr Neyda Villalobos regarding medical care  We have notified his brother that we are unable to apeak with him unless we have patient consent

## 2019-07-25 ENCOUNTER — TELEPHONE (OUTPATIENT)
Dept: FAMILY MEDICINE CLINIC | Facility: CLINIC | Age: 59
End: 2019-07-25

## 2019-07-25 DIAGNOSIS — M54.9 BACK PAIN, UNSPECIFIED BACK LOCATION, UNSPECIFIED BACK PAIN LATERALITY, UNSPECIFIED CHRONICITY: Primary | ICD-10-CM

## 2019-07-25 RX ORDER — TRAMADOL HYDROCHLORIDE 50 MG/1
50 TABLET ORAL DAILY PRN
Qty: 30 TABLET | Refills: 0 | Status: SHIPPED | OUTPATIENT
Start: 2019-07-25 | End: 2020-05-04 | Stop reason: SDUPTHER

## 2019-07-25 NOTE — TELEPHONE ENCOUNTER
Anabel Willis called the office pt is currently at the [facility]  on [address] his Tramadol needs a prior Auth  I informed pt we can start it it is up to his insurance and I can request they rush it but it is up to the insurance pt was informed I could not guarantee that it would be done tonight and or when it would be done   Pt stated if he can not get it tonight it will be a problem I told pt we have to wait on his insurance if anything he can inquire how much it will be out of pocket if he does not to wish to wait on  the prior auth  Pt informed our office would begin a prior Auth

## 2019-07-25 NOTE — TELEPHONE ENCOUNTER
Patient called and stated he has a increased back pain because he had a procedure done on Monday and he had to lay on his back for a long period of time   Asking if you could prescribe him tramadol 50mg    # [phone]

## 2019-07-25 NOTE — TELEPHONE ENCOUNTER
Ok for Tramadol 50 mg 1 po qd prn severe pain disp #10 no refill Do not use with any other pain meds  Call if any problems  Take with food

## 2019-07-25 NOTE — TELEPHONE ENCOUNTER
Pt informed Asha Jaimes agreed to [facility] waiting for rx to be signed off

## 2019-08-01 NOTE — TELEPHONE ENCOUNTER
I called [employer], prior authorizations can take up to 15 days for a decision  Patient aware

## 2019-12-18 ENCOUNTER — OFFICE VISIT (OUTPATIENT)
Dept: FAMILY MEDICINE CLINIC | Facility: CLINIC | Age: 59
End: 2019-12-18
Payer: COMMERCIAL

## 2019-12-18 VITALS
DIASTOLIC BLOOD PRESSURE: 88 MMHG | SYSTOLIC BLOOD PRESSURE: 132 MMHG | RESPIRATION RATE: 18 BRPM | HEIGHT: 69 IN | WEIGHT: 240.2 LBS | HEART RATE: 78 BPM | TEMPERATURE: 97.8 F | BODY MASS INDEX: 35.58 KG/M2 | OXYGEN SATURATION: 95 %

## 2019-12-18 DIAGNOSIS — R07.9 CHEST PAIN, UNSPECIFIED TYPE: ICD-10-CM

## 2019-12-18 DIAGNOSIS — E66.9 OBESITY (BMI 30-39.9): ICD-10-CM

## 2019-12-18 DIAGNOSIS — R10.12 LEFT UPPER QUADRANT PAIN: Primary | ICD-10-CM

## 2019-12-18 DIAGNOSIS — K21.9 GASTROESOPHAGEAL REFLUX DISEASE, ESOPHAGITIS PRESENCE NOT SPECIFIED: ICD-10-CM

## 2019-12-18 DIAGNOSIS — K21.9 GASTROESOPHAGEAL REFLUX DISEASE, ESOPHAGITIS PRESENCE NOT SPECIFIED: Primary | ICD-10-CM

## 2019-12-18 DIAGNOSIS — R10.12 ABDOMINAL PAIN, LUQ: ICD-10-CM

## 2019-12-18 PROCEDURE — 99214 OFFICE O/P EST MOD 30 MIN: CPT | Performed by: FAMILY MEDICINE

## 2019-12-18 PROCEDURE — 3008F BODY MASS INDEX DOCD: CPT | Performed by: FAMILY MEDICINE

## 2019-12-18 PROCEDURE — 1036F TOBACCO NON-USER: CPT | Performed by: FAMILY MEDICINE

## 2019-12-18 NOTE — PATIENT INSTRUCTIONS
Here for ER f-up and will need to see [facility] for left upper quadrant abdominal pain and gerd issues and his symptoms in left lower chest near axillary line will rec consult Cardiologist as directed for left lower chest pain at HCG  BP today stable  Take Prevacid prn gerd  Call if any problems  ER records reviewed  CT scan negative for PE and cxray was wnl

## 2019-12-18 NOTE — PROGRESS NOTES
Assessment/Plan:  Chief Complaint   Patient presents with    Follow-up     Pt presents for an ER f/u  Patient Instructions   Here for ER f-up and will need to see [facility] for left upper quadrant abdominal pain and gerd issues and his symptoms in left lower chest near axillary line will rec consult Cardiologist as directed for left lower chest pain at HCG  BP today stable  Take Prevacid prn gerd  Call if any problems  ER records reviewed  CT scan negative for PE and cxray was wnl  No problem-specific Assessment & Plan notes found for this encounter  Diagnoses and all orders for this visit:    Gastroesophageal reflux disease, esophagitis presence not specified    Chest pain, unspecified type    Abdominal pain, LUQ    Obesity (BMI 30-39  9)          Subjective:      Patient ID: Sanket Palacios is a 60 y o  male  Follow-up (Pt presents for an ER f/u  ) here for left lower chest and left sided abdominal pain  He took Prevacid and id did not help and yesterday it was worse and went to ER and       The following portions of the patient's history were reviewed and updated as appropriate: allergies, current medications, past family history, past medical history, past social history, past surgical history and problem list     Review of Systems   Constitutional: Negative  HENT: Negative  Eyes: Negative  Respiratory: Negative  Cardiovascular: Positive for chest pain  Gastrointestinal:        Gassy and burping, left upper quadrant pain   Endocrine: Negative  Genitourinary: Negative  Musculoskeletal: Negative  Skin: Negative  Allergic/Immunologic: Negative  Neurological: Negative  Hematological: Negative  Psychiatric/Behavioral: Negative            Objective:      /88   Pulse 78   Temp 97 8 °F (36 6 °C) (Temporal)   Resp 18   Ht 5' 8 9" (1 75 m)   Wt 109 kg (240 lb 3 2 oz)   SpO2 95%   BMI 35 58 kg/m²          Physical Exam   Constitutional: He is oriented to person, place, and time  He appears well-developed and well-nourished  HENT:   Head: Normocephalic and atraumatic  Right Ear: External ear normal    Left Ear: External ear normal    Nose: Nose normal    Mouth/Throat: Oropharynx is clear and moist    Eyes: Pupils are equal, round, and reactive to light  Conjunctivae and EOM are normal    Neck: Normal range of motion  Neck supple  Cardiovascular: Normal rate, regular rhythm, normal heart sounds and intact distal pulses  Pulmonary/Chest: Effort normal and breath sounds normal    Abdominal: Soft  Bowel sounds are normal    Musculoskeletal: Normal range of motion  Neurological: He is alert and oriented to person, place, and time  He has normal reflexes  Skin: Skin is warm and dry  Psychiatric: He has a normal mood and affect   His behavior is normal

## 2019-12-31 ENCOUNTER — HOSPITAL ENCOUNTER (EMERGENCY)
Facility: HOSPITAL | Age: 59
Discharge: HOME | End: 2019-12-31
Attending: EMERGENCY MEDICINE
Payer: COMMERCIAL

## 2019-12-31 VITALS
TEMPERATURE: 97.5 F | RESPIRATION RATE: 18 BRPM | WEIGHT: 250 LBS | OXYGEN SATURATION: 97 % | DIASTOLIC BLOOD PRESSURE: 92 MMHG | BODY MASS INDEX: 37.03 KG/M2 | SYSTOLIC BLOOD PRESSURE: 160 MMHG | HEART RATE: 69 BPM

## 2019-12-31 DIAGNOSIS — M54.32 SCIATICA OF LEFT SIDE: Primary | ICD-10-CM

## 2019-12-31 PROCEDURE — 99283 EMERGENCY DEPT VISIT LOW MDM: CPT

## 2019-12-31 PROCEDURE — 99283 EMERGENCY DEPT VISIT LOW MDM: CPT | Performed by: EMERGENCY MEDICINE

## 2019-12-31 PROCEDURE — 96372 THER/PROPH/DIAG INJ SC/IM: CPT

## 2019-12-31 RX ORDER — METHOCARBAMOL 750 MG/1
1500 TABLET, FILM COATED ORAL 3 TIMES DAILY PRN
Qty: 30 TABLET | Refills: 0 | Status: SHIPPED | OUTPATIENT
Start: 2019-12-31 | End: 2020-05-20

## 2019-12-31 RX ORDER — METHOCARBAMOL 750 MG/1
1500 TABLET, FILM COATED ORAL 3 TIMES DAILY PRN
Qty: 30 TABLET | Refills: 0 | Status: SHIPPED | OUTPATIENT
Start: 2019-12-31 | End: 2019-12-31 | Stop reason: SDUPTHER

## 2019-12-31 RX ORDER — KETOROLAC TROMETHAMINE 30 MG/ML
15 INJECTION, SOLUTION INTRAMUSCULAR; INTRAVENOUS ONCE
Status: COMPLETED | OUTPATIENT
Start: 2019-12-31 | End: 2019-12-31

## 2019-12-31 RX ADMIN — KETOROLAC TROMETHAMINE 15 MG: 30 INJECTION, SOLUTION INTRAMUSCULAR at 06:50

## 2019-12-31 NOTE — ED PROVIDER NOTES
History  Chief Complaint   Patient presents with    Back Pain     pt c/o lower back pain, left greater than right with radiation into legs  pt reports hx of herniated disk  pt denies loss of bowel or bladder control  c/o lower back pain, left greater than right, since yesterday  He has a history of sciatica/herniated discs, diagnosed by MRI a few months ago  He states in the past that Motrin and Robaxin has helped his pain  He does not want any narcotics  No fevers, no nausea/vomiting, no urinary symptoms  No trauma  He is unsure if he could have exacerbated this back pain by lifting boxes recently  Prior to Admission Medications   Prescriptions Last Dose Informant Patient Reported? Taking?    ALPRAZolam (XANAX) 0 25 mg tablet   No Yes   Sig: Take 1 tablet (0 25 mg total) by mouth daily as needed for anxiety   famotidine (PEPCID) 10 mg tablet   Yes Yes   Sig: Take 10 mg by mouth   gabapentin (NEURONTIN) 300 mg capsule   Yes Yes   Sig: gabapentin 300 mg capsule   1 CAP DAYS 1-3, 1 CAP 2 X A DAY DAYS 4-6, THEN 1 CAP 3 TIMES A DAY (CONTACT OFFICE BEFORE STOPPING)   ibuprofen (MOTRIN) 800 mg tablet   Yes Yes   Sig: Take 800 mg by mouth every 6 (six) hours as needed   methocarbamol (ROBAXIN) 500 mg tablet Not Taking at Unknown time  No No   Sig: Take 1 tablet (500 mg total) by mouth daily as needed for muscle spasms   Patient not taking: Reported on 12/31/2019   traMADol (ULTRAM) 50 mg tablet Not Taking at Unknown time  No No   Sig: Take 1 tablet (50 mg total) by mouth daily as needed for severe pain   Patient not taking: Reported on 12/31/2019      Facility-Administered Medications: None       Past Medical History:   Diagnosis Date    Back pain     BPH without urinary obstruction     last assessed 01/16/18    Disc disorder     Herniated    Hemorrhoids     Hydronephrosis     Kidney disease     Renal calculi     Renal colic     Ureter colic        Past Surgical History:   Procedure Laterality Date    BRAIN SURGERY      COLONOSCOPY      CYSTOSCOPY      w/removal of object, last assessed 01/16/18    CYSTOSCOPY      w/removal of ureteral calculus last assessed 01/16/18    CYSTOSCOPY KIDNEY W/ URETERAL GUIDE WIRE      last assessed 01/16/18    CYSTOSCOPY W/ URETERAL STENT PLACEMENT      last assessed 01/16/18   [address]    HERNIA REPAIR      KIDNEY SURGERY      removal of kidney stone in 2013 at [address] W/LITHOTRIPSY &INDWELL STENT INSRT Right 12/13/2018    Procedure: CYSTOSCOPY URETEROSCOPY  RETROGRADE PYELOGRAM AND INSERTION RIGHT STENT URETERAL;  Surgeon: Rozina Gutierrez MD;  Location: [facility];  Service: Urology    TOOTH EXTRACTION         Family History   Problem Relation Age of Onset    Stroke Mother     Cancer Father     Diabetes Father     Prostate cancer Father     Heart disease Father     Hypertension Father      I have reviewed and agree with the history as documented  Social History     Tobacco Use    Smoking status: Never Smoker    Smokeless tobacco: Never Used   Substance Use Topics    Alcohol use: Yes     Comment: rare    Drug use: No        Review of Systems   Constitutional: Negative for appetite change, fatigue and fever  HENT: Negative for rhinorrhea and sore throat  Respiratory: Negative for cough, shortness of breath and wheezing  Cardiovascular: Negative for chest pain and leg swelling  Gastrointestinal: Negative for abdominal pain, diarrhea and vomiting  Genitourinary: Negative for dysuria and flank pain  Musculoskeletal: Positive for back pain  Negative for neck pain  Skin: Negative for rash  Neurological: Negative for syncope and headaches  Psychiatric/Behavioral:        Mood normal       Physical Exam  Physical Exam   Constitutional: He is oriented to person, place, and time  He appears well-developed and well-nourished  HENT:   Head: Normocephalic and atraumatic     Neck: Normal range of motion  Neck supple  Cardiovascular: Normal rate and regular rhythm  Pulmonary/Chest: Effort normal and breath sounds normal    Abdominal: Soft  There is no tenderness  Musculoskeletal:   L lower lumbar tenderness, no spinal tenderness, +st  Leg raising test on the L, +n/v intact   Neurological: He is alert and oriented to person, place, and time  Skin: Skin is warm and dry  Nursing note and vitals reviewed  Vital Signs  ED Triage Vitals [12/31/19 0633]   Temperature Pulse Respirations Blood Pressure SpO2   97 5 °F (36 4 °C) 69 18 160/92 97 %      Temp Source Heart Rate Source Patient Position - Orthostatic VS BP Location FiO2 (%)   Oral Monitor Sitting Right arm --      Pain Score       --           Vitals:    12/31/19 0633   BP: 160/92   Pulse: 69   Patient Position - Orthostatic VS: Sitting         Visual Acuity      ED Medications  Medications   ketorolac (TORADOL) injection 15 mg (15 mg Intramuscular Given 12/31/19 0650)       Diagnostic Studies  Results Reviewed     None                 No orders to display              Procedures  Procedures         ED Course                               MDM      Disposition  Final diagnoses:   Sciatica of left side     Time reflects when diagnosis was documented in both MDM as applicable and the Disposition within this note     Time User Action Codes Description Comment    12/31/2019  6:43 AM Vimal Tobin Add [M54 32] Sciatica of left side       ED Disposition     ED Disposition Condition Date/Time Comment    Discharge Stable Tue Dec 31, 2019  6:43 AM Enedina Cowden discharge to home/self care  Follow-up Information     Follow up With Specialties Details Why Contact Info Additional Phil Robertson DO Family Medicine   [address]    [facility] [place]  [phone]       [facility]   [phone]          Discharge Medication List as of 12/31/2019  6:48 AM      START taking these medications    Details   !! methocarbamol (ROBAXIN) 750 mg tablet Take 2 tablets (1,500 mg total) by mouth 3 (three) times a day as needed for muscle spasms for up to 7 days, Starting Tue 12/31/2019, Until Tue 1/7/2020, Print       !! - Potential duplicate medications found  Please discuss with provider  CONTINUE these medications which have NOT CHANGED    Details   ALPRAZolam (XANAX) 0 25 mg tablet Take 1 tablet (0 25 mg total) by mouth daily as needed for anxiety, Starting Wed 10/24/2018, Normal      famotidine (PEPCID) 10 mg tablet Take 10 mg by mouth, Historical Med      gabapentin (NEURONTIN) 300 mg capsule gabapentin 300 mg capsule   1 CAP DAYS 1-3, 1 CAP 2 X A DAY DAYS 4-6, THEN 1 CAP 3 TIMES A DAY (CONTACT OFFICE BEFORE STOPPING), Historical Med      ibuprofen (MOTRIN) 800 mg tablet Take 800 mg by mouth every 6 (six) hours as needed, Starting Thu 11/1/2018, Historical Med      !! methocarbamol (ROBAXIN) 500 mg tablet Take 1 tablet (500 mg total) by mouth daily as needed for muscle spasms, Starting Mon 4/29/2019, Normal      traMADol (ULTRAM) 50 mg tablet Take 1 tablet (50 mg total) by mouth daily as needed for severe pain, Starting Thu 7/25/2019, Print       !! - Potential duplicate medications found  Please discuss with provider  No discharge procedures on file      ED Provider  Electronically Signed by           Margaret Pearce MD  12/31/19 1671

## 2020-01-06 ENCOUNTER — OFFICE VISIT (OUTPATIENT)
Dept: FAMILY MEDICINE CLINIC | Facility: CLINIC | Age: 60
End: 2020-01-06
Payer: COMMERCIAL

## 2020-01-06 VITALS
HEIGHT: 69 IN | RESPIRATION RATE: 17 BRPM | HEART RATE: 81 BPM | DIASTOLIC BLOOD PRESSURE: 88 MMHG | OXYGEN SATURATION: 96 % | BODY MASS INDEX: 36.82 KG/M2 | TEMPERATURE: 97.4 F | WEIGHT: 248.6 LBS | SYSTOLIC BLOOD PRESSURE: 146 MMHG

## 2020-01-06 DIAGNOSIS — M54.42 LEFT-SIDED LOW BACK PAIN WITH LEFT-SIDED SCIATICA, UNSPECIFIED CHRONICITY: Primary | ICD-10-CM

## 2020-01-06 DIAGNOSIS — M62.838 MUSCLE SPASM: ICD-10-CM

## 2020-01-06 DIAGNOSIS — R03.0 ELEVATED BP WITHOUT DIAGNOSIS OF HYPERTENSION: ICD-10-CM

## 2020-01-06 PROCEDURE — 99214 OFFICE O/P EST MOD 30 MIN: CPT | Performed by: FAMILY MEDICINE

## 2020-01-06 PROCEDURE — 1036F TOBACCO NON-USER: CPT | Performed by: FAMILY MEDICINE

## 2020-01-06 PROCEDURE — 3008F BODY MASS INDEX DOCD: CPT | Performed by: FAMILY MEDICINE

## 2020-01-06 RX ORDER — PREDNISONE 10 MG/1
TABLET ORAL
Qty: 21 TABLET | Refills: 0 | Status: SHIPPED | OUTPATIENT
Start: 2020-01-06 | End: 2020-05-20

## 2020-01-06 NOTE — PATIENT INSTRUCTIONS
Left low back and left sciatica, better today and also will need to use prednisone and mm relaxant as directed  May use Jarrell campbell prn muscle spasm  Need to lose weight as directed  Monitor BP at home and call if any problems

## 2020-01-06 NOTE — PROGRESS NOTES
Assessment/Plan:  Chief Complaint   Patient presents with    Follow-up     Pt presents for a f/u from ER due to L/sciatica pain  Patient Instructions   Left low back and left sciatica, better today and also will need to use prednisone and mm relaxant as directed  May use Jarrell campbell prn muscle spasm  Need to lose weight as directed  Monitor BP at home and call if any problems  No problem-specific Assessment & Plan notes found for this encounter  Diagnoses and all orders for this visit:    Left-sided low back pain with left-sided sciatica, unspecified chronicity    Muscle spasm    Elevated BP without diagnosis of hypertension          Subjective:      Patient ID: Enedina Cowden is a 60 y o  male  Follow-up (Pt presents for a f/u from ER due to L/sciatica pain ) Toradol injection given in ER at [facility]  It started getting better today  Has had PT in past for this  The following portions of the patient's history were reviewed and updated as appropriate: allergies, current medications, past family history, past medical history, past social history, past surgical history and problem list     Review of Systems   Constitutional: Negative  HENT: Negative  Eyes: Negative  Respiratory: Negative  Cardiovascular: Negative  Gastrointestinal: Negative  Endocrine: Negative  Genitourinary: Negative  Musculoskeletal:        Low back and left sciatica   Skin: Negative  Allergic/Immunologic: Negative  Neurological: Negative  Hematological: Negative  Psychiatric/Behavioral: Negative  Objective:      /88 (BP Location: Left arm, Patient Position: Sitting, Cuff Size: Large)   Pulse 81   Temp (!) 97 4 °F (36 3 °C) (Temporal)   Resp 17   Ht 5' 8 9" (1 75 m)   Wt 113 kg (248 lb 9 6 oz)   SpO2 96%   BMI 36 82 kg/m²          Physical Exam   Constitutional: He is oriented to person, place, and time  He appears well-developed and well-nourished     HENT:   Head: Normocephalic and atraumatic  Right Ear: External ear normal    Left Ear: External ear normal    Nose: Nose normal    Mouth/Throat: Oropharynx is clear and moist    Eyes: Pupils are equal, round, and reactive to light  Conjunctivae and EOM are normal    Neck: Normal range of motion  Neck supple  Cardiovascular: Normal rate, regular rhythm, normal heart sounds and intact distal pulses  Pulmonary/Chest: Effort normal and breath sounds normal    Musculoskeletal: Normal range of motion  Low back pain and left sciatica   Neurological: He is alert and oriented to person, place, and time  He has normal reflexes  Skin: Skin is warm and dry  Psychiatric: He has a normal mood and affect   His behavior is normal

## 2020-03-31 ENCOUNTER — TELEMEDICINE (OUTPATIENT)
Dept: FAMILY MEDICINE CLINIC | Facility: CLINIC | Age: 60
End: 2020-03-31
Payer: COMMERCIAL

## 2020-03-31 DIAGNOSIS — R05.9 COUGH: ICD-10-CM

## 2020-03-31 DIAGNOSIS — J32.9 SINUSITIS, UNSPECIFIED CHRONICITY, UNSPECIFIED LOCATION: Primary | ICD-10-CM

## 2020-03-31 DIAGNOSIS — J30.9 ALLERGIC RHINITIS, UNSPECIFIED SEASONALITY, UNSPECIFIED TRIGGER: ICD-10-CM

## 2020-03-31 PROCEDURE — 99213 OFFICE O/P EST LOW 20 MIN: CPT | Performed by: FAMILY MEDICINE

## 2020-03-31 RX ORDER — AZITHROMYCIN 250 MG/1
TABLET, FILM COATED ORAL
Qty: 6 TABLET | Refills: 0 | Status: SHIPPED | OUTPATIENT
Start: 2020-03-31 | End: 2020-04-05

## 2020-03-31 RX ORDER — PREDNISONE 10 MG/1
TABLET ORAL
Qty: 21 TABLET | Refills: 0 | Status: SHIPPED | OUTPATIENT
Start: 2020-03-31 | End: 2020-05-20

## 2020-03-31 NOTE — PATIENT INSTRUCTIONS
Has clear to yellow cough with yellow production, tried OTC med such as mucinex and sudafed and advil cold and sinus and staying well hydrated and saline spray  Take Zyrtec 10 mg daily prn allergies and use abx and prednisone  Call if any problems  May use Fluticasone prn seasonal allergic rhinitis and sinusitis

## 2020-03-31 NOTE — PROGRESS NOTES
Virtual Regular Visit    Problem List Items Addressed This Visit        Respiratory    Allergic rhinitis    Relevant Medications    predniSONE 10 mg tablet      Other Visit Diagnoses     Sinusitis, unspecified chronicity, unspecified location    -  Primary    Relevant Medications    azithromycin (ZITHROMAX) 250 mg tablet    predniSONE 10 mg tablet    Cough                   Reason for visit is for seasonal allergies and cough and possible sinusitis and otherwise doing well  Encounter provider Sixto Richardson DO    Provider located at [address] [place] [id number]      Recent Visits  No visits were found meeting these conditions  Showing recent visits within past 7 days and meeting all other requirements     Today's Visits  Date Type Provider Dept   03/31/20 Telemedicine Sixto Richardson DO [facility] Total [facility] today's visits and meeting all other requirements     Future Appointments  Date Type Provider Dept   03/31/20 Telemedicine Sixto Richardson DO Pg Total [id number]   Showing future appointments within next 150 days and meeting all other requirements        The patient was identified by name and date of birth  Ángel Steve was informed that this is a telemedicine visit and that the visit is being conducted through [facility] and patient was informed that this is not a secure, HIPAA-complaint platform  he agrees to proceed     My office door was closed  No one else was in the room  He acknowledged consent and understanding of privacy and security of the video platform  The patient has agreed to participate and understands they can discontinue the visit at any time  Patient is aware this is a billable service  Subjective  Ángel Steve is a 63 y o  male here for sinusitis and cough and seasonal allergiesl  He has some seasonal allergies and pnd and cough  Possible sinusitis and pain around forehead   Has had some sinus pressure         Past Medical History:   Diagnosis Date    Back pain     BPH without urinary obstruction     last assessed 01/16/18    Disc disorder     Herniated    Hemorrhoids     Hydronephrosis     Kidney disease     Renal calculi     Renal colic     Ureter colic        Past Surgical History:   Procedure Laterality Date    BRAIN SURGERY      COLONOSCOPY      CYSTOSCOPY      w/removal of object, last assessed 01/16/18    CYSTOSCOPY      w/removal of ureteral calculus last assessed 01/16/18    CYSTOSCOPY KIDNEY W/ URETERAL GUIDE WIRE      last assessed 01/16/18    CYSTOSCOPY W/ URETERAL STENT PLACEMENT      last assessed 01/16/18   [address]    HERNIA REPAIR      KIDNEY SURGERY      removal of kidney stone in 2013 at [address] W/LITHOTRIPSY &INDWELL STENT INSRT Right 12/13/2018    Procedure: CYSTOSCOPY URETEROSCOPY  RETROGRADE PYELOGRAM AND INSERTION RIGHT STENT URETERAL;  Surgeon: Indra Arreola MD;  Location: [facility];  Service: Urology    TOOTH EXTRACTION         Current Outpatient Medications   Medication Sig Dispense Refill    ALPRAZolam (XANAX) 0 25 mg tablet Take 1 tablet (0 25 mg total) by mouth daily as needed for anxiety 30 tablet 0    azithromycin (ZITHROMAX) 250 mg tablet Take 2 tablets today then 1 tablet daily x 4 days 6 tablet 0    famotidine (PEPCID) 10 mg tablet Take 10 mg by mouth      gabapentin (NEURONTIN) 300 mg capsule gabapentin 300 mg capsule   1 CAP DAYS 1-3, 1 CAP 2 X A DAY DAYS 4-6, THEN 1 CAP 3 TIMES A DAY (CONTACT OFFICE BEFORE STOPPING)      ibuprofen (MOTRIN) 800 mg tablet Take 800 mg by mouth every 6 (six) hours as needed  0    methocarbamol (ROBAXIN) 500 mg tablet Take 1 tablet (500 mg total) by mouth daily as needed for muscle spasms (Patient not taking: Reported on 12/31/2019) 30 tablet 0    methocarbamol (ROBAXIN) 750 mg tablet Take 2 tablets (1,500 mg total) by mouth 3 (three) times a day as needed for muscle spasms for up to 7 days 30 tablet 0    predniSONE 10 mg tablet Take 60 mg po day#1, 50 mg po day#2, 40 mg po day#3, 30 mg po day#4, 20 mg po day#5m and 10 mg po day#6 21 tablet 0    predniSONE 10 mg tablet Take 60 mg po day#1, 50 mg po day#2, 40 mg po day#3, 30 mg po day#4, 20 mg po day#5m and 10 mg po day#6 21 tablet 0    traMADol (ULTRAM) 50 mg tablet Take 1 tablet (50 mg total) by mouth daily as needed for severe pain (Patient not taking: Reported on 12/31/2019) 30 tablet 0     No current facility-administered medications for this visit  Allergies   Allergen Reactions    Morphine Other (See Comments)    Other      Pt states that any narcotic makes him feel sick    Oxycodone-Acetaminophen Nausea Only, Vomiting and GI Intolerance    Seasonal Ic  [Cholestatin]      Other reaction(s): Nasal Congestion, Sinus Pain       Review of Systems   No cp or sob, or ha and denies any cough or sob or fever  Physical Exam   Sinusitis, cough and pnd and nasal congestion, sinus tenderness  I spent 20 minutes with the patient during this visit  Patient Instructions   Has clear to yellow cough with yellow production, tried OTC med such as mucinex and sudafed and advil cold and sinus and staying well hydrated and saline spray  Take Zyrtec 10 mg daily prn allergies and use abx and prednisone  Call if any problems  May use Fluticasone prn seasonal allergic rhinitis and sinusitis

## 2020-04-03 DIAGNOSIS — F41.9 ANXIETY: ICD-10-CM

## 2020-04-03 RX ORDER — ALPRAZOLAM 0.25 MG/1
0.25 TABLET ORAL DAILY PRN
Qty: 30 TABLET | Refills: 0 | Status: SHIPPED | OUTPATIENT
Start: 2020-04-03 | End: 2020-05-20

## 2020-04-14 ENCOUNTER — TELEPHONE (OUTPATIENT)
Dept: UROLOGY | Facility: MEDICAL CENTER | Age: 60
End: 2020-04-14

## 2020-04-14 DIAGNOSIS — N40.0 ENLARGED PROSTATE WITHOUT LOWER URINARY TRACT SYMPTOMS (LUTS): Primary | ICD-10-CM

## 2020-05-04 ENCOUNTER — TELEPHONE (OUTPATIENT)
Dept: FAMILY MEDICINE CLINIC | Facility: CLINIC | Age: 60
End: 2020-05-04

## 2020-05-04 DIAGNOSIS — M54.9 BACK PAIN, UNSPECIFIED BACK LOCATION, UNSPECIFIED BACK PAIN LATERALITY, UNSPECIFIED CHRONICITY: ICD-10-CM

## 2020-05-04 RX ORDER — TRAMADOL HYDROCHLORIDE 50 MG/1
50 TABLET ORAL DAILY PRN
Qty: 30 TABLET | Refills: 0 | Status: SHIPPED | OUTPATIENT
Start: 2020-05-04 | End: 2020-05-20

## 2020-05-04 RX ORDER — TRAMADOL HYDROCHLORIDE 50 MG/1
50 TABLET ORAL DAILY PRN
Qty: 30 TABLET | Refills: 0 | Status: SHIPPED | OUTPATIENT
Start: 2020-05-04 | End: 2020-05-04 | Stop reason: SDUPTHER

## 2020-05-20 ENCOUNTER — OFFICE VISIT (OUTPATIENT)
Dept: FAMILY MEDICINE CLINIC | Facility: CLINIC | Age: 60
End: 2020-05-20
Payer: COMMERCIAL

## 2020-05-20 VITALS
BODY MASS INDEX: 35.58 KG/M2 | SYSTOLIC BLOOD PRESSURE: 154 MMHG | RESPIRATION RATE: 18 BRPM | OXYGEN SATURATION: 97 % | DIASTOLIC BLOOD PRESSURE: 94 MMHG | HEIGHT: 69 IN | WEIGHT: 240.2 LBS | HEART RATE: 78 BPM | TEMPERATURE: 97.9 F

## 2020-05-20 DIAGNOSIS — R51.9 NONINTRACTABLE HEADACHE, UNSPECIFIED CHRONICITY PATTERN, UNSPECIFIED HEADACHE TYPE: ICD-10-CM

## 2020-05-20 DIAGNOSIS — Q28.2 AVM (ARTERIOVENOUS MALFORMATION) BRAIN: ICD-10-CM

## 2020-05-20 DIAGNOSIS — H43.393 VITREOUS FLOATERS OF BOTH EYES: ICD-10-CM

## 2020-05-20 DIAGNOSIS — I10 ESSENTIAL HYPERTENSION: Primary | ICD-10-CM

## 2020-05-20 DIAGNOSIS — Z85.850 HISTORY OF THYROID CANCER: Primary | ICD-10-CM

## 2020-05-20 DIAGNOSIS — R42 LIGHTHEADEDNESS: ICD-10-CM

## 2020-05-20 DIAGNOSIS — R42 DIZZINESS: ICD-10-CM

## 2020-05-20 DIAGNOSIS — E66.9 OBESITY (BMI 30-39.9): ICD-10-CM

## 2020-05-20 PROCEDURE — 1036F TOBACCO NON-USER: CPT | Performed by: FAMILY MEDICINE

## 2020-05-20 PROCEDURE — 3008F BODY MASS INDEX DOCD: CPT | Performed by: FAMILY MEDICINE

## 2020-05-20 PROCEDURE — 3080F DIAST BP >= 90 MM HG: CPT | Performed by: FAMILY MEDICINE

## 2020-05-20 PROCEDURE — 3077F SYST BP >= 140 MM HG: CPT | Performed by: FAMILY MEDICINE

## 2020-05-20 PROCEDURE — 99214 OFFICE O/P EST MOD 30 MIN: CPT | Performed by: FAMILY MEDICINE

## 2020-05-20 RX ORDER — CANDESARTAN CILEXETIL AND HYDROCHLOROTHIAZIDE 16; 12.5 MG/1; MG/1
1 TABLET ORAL DAILY
Qty: 30 TABLET | Refills: 5 | Status: SHIPPED | OUTPATIENT
Start: 2020-05-20 | End: 2020-06-08

## 2020-05-27 ENCOUNTER — TELEMEDICINE (OUTPATIENT)
Dept: FAMILY MEDICINE CLINIC | Facility: CLINIC | Age: 60
End: 2020-05-27
Payer: COMMERCIAL

## 2020-05-27 ENCOUNTER — HOSPITAL ENCOUNTER (OUTPATIENT)
Dept: NON INVASIVE DIAGNOSTICS | Facility: HOSPITAL | Age: 60
Discharge: HOME | End: 2020-05-27
Payer: COMMERCIAL

## 2020-05-27 ENCOUNTER — HOSPITAL ENCOUNTER (OUTPATIENT)
Dept: ULTRASOUND IMAGING | Facility: HOSPITAL | Age: 60
Discharge: HOME | End: 2020-05-27
Payer: COMMERCIAL

## 2020-05-27 DIAGNOSIS — H43.393 VITREOUS FLOATERS OF BOTH EYES: ICD-10-CM

## 2020-05-27 DIAGNOSIS — Z85.850 HISTORY OF THYROID CANCER: ICD-10-CM

## 2020-05-27 DIAGNOSIS — I10 ESSENTIAL HYPERTENSION: Primary | ICD-10-CM

## 2020-05-27 DIAGNOSIS — Z13.220 SCREENING FOR HYPERLIPIDEMIA: ICD-10-CM

## 2020-05-27 DIAGNOSIS — R42 DIZZINESS: ICD-10-CM

## 2020-05-27 DIAGNOSIS — R42 LIGHTHEADEDNESS: ICD-10-CM

## 2020-05-27 DIAGNOSIS — E66.9 OBESITY (BMI 30-39.9): ICD-10-CM

## 2020-05-27 DIAGNOSIS — R51.9 NONINTRACTABLE HEADACHE, UNSPECIFIED CHRONICITY PATTERN, UNSPECIFIED HEADACHE TYPE: ICD-10-CM

## 2020-05-27 PROCEDURE — 76536 US EXAM OF HEAD AND NECK: CPT

## 2020-05-27 PROCEDURE — 93880 EXTRACRANIAL BILAT STUDY: CPT

## 2020-05-27 PROCEDURE — 99214 OFFICE O/P EST MOD 30 MIN: CPT | Performed by: FAMILY MEDICINE

## 2020-05-27 PROCEDURE — 93880 EXTRACRANIAL BILAT STUDY: CPT | Performed by: SURGERY

## 2020-05-27 RX ORDER — MECLIZINE HYDROCHLORIDE 25 MG/1
25 TABLET ORAL DAILY PRN
Qty: 30 TABLET | Refills: 0 | Status: SHIPPED | OUTPATIENT
Start: 2020-05-27 | End: 2020-07-10 | Stop reason: HOSPADM

## 2020-06-02 DIAGNOSIS — I10 ESSENTIAL HYPERTENSION: Primary | ICD-10-CM

## 2020-06-02 RX ORDER — HYDROCHLOROTHIAZIDE 12.5 MG/1
12.5 TABLET ORAL DAILY
Qty: 30 TABLET | Refills: 5 | Status: SHIPPED | OUTPATIENT
Start: 2020-06-02 | End: 2020-06-08 | Stop reason: SDUPTHER

## 2020-06-05 ENCOUNTER — TELEPHONE (OUTPATIENT)
Dept: FAMILY MEDICINE CLINIC | Facility: CLINIC | Age: 60
End: 2020-06-05

## 2020-06-08 ENCOUNTER — TELEMEDICINE (OUTPATIENT)
Dept: FAMILY MEDICINE CLINIC | Facility: CLINIC | Age: 60
End: 2020-06-08
Payer: COMMERCIAL

## 2020-06-08 DIAGNOSIS — I10 ESSENTIAL HYPERTENSION: Primary | ICD-10-CM

## 2020-06-08 DIAGNOSIS — E66.9 OBESITY (BMI 30-39.9): ICD-10-CM

## 2020-06-08 PROCEDURE — 99213 OFFICE O/P EST LOW 20 MIN: CPT | Performed by: FAMILY MEDICINE

## 2020-06-08 RX ORDER — HYDROCHLOROTHIAZIDE 25 MG/1
25 TABLET ORAL DAILY
Qty: 30 TABLET | Refills: 5 | Status: SHIPPED | OUTPATIENT
Start: 2020-06-08 | End: 2020-07-10 | Stop reason: HOSPADM

## 2020-06-15 ENCOUNTER — OFFICE VISIT (OUTPATIENT)
Dept: UROLOGY | Facility: MEDICAL CENTER | Age: 60
End: 2020-06-15
Payer: COMMERCIAL

## 2020-06-15 VITALS
SYSTOLIC BLOOD PRESSURE: 124 MMHG | TEMPERATURE: 97.3 F | WEIGHT: 228 LBS | HEART RATE: 69 BPM | BODY MASS INDEX: 33.77 KG/M2 | DIASTOLIC BLOOD PRESSURE: 82 MMHG | HEIGHT: 69 IN

## 2020-06-15 DIAGNOSIS — N52.8 OTHER MALE ERECTILE DYSFUNCTION: ICD-10-CM

## 2020-06-15 DIAGNOSIS — N40.0 ENLARGED PROSTATE WITHOUT LOWER URINARY TRACT SYMPTOMS (LUTS): Primary | ICD-10-CM

## 2020-06-15 LAB
SL AMB  POCT GLUCOSE, UA: NORMAL
SL AMB LEUKOCYTE ESTERASE,UA: NORMAL
SL AMB POCT BILIRUBIN,UA: NORMAL
SL AMB POCT BLOOD,UA: NORMAL
SL AMB POCT CLARITY,UA: CLEAR
SL AMB POCT COLOR,UA: YELLOW
SL AMB POCT KETONES,UA: NORMAL
SL AMB POCT NITRITE,UA: NORMAL
SL AMB POCT PH,UA: 7
SL AMB POCT SPECIFIC GRAVITY,UA: 1.02
SL AMB POCT URINE PROTEIN: NORMAL
SL AMB POCT UROBILINOGEN: 0.2

## 2020-06-15 PROCEDURE — 3079F DIAST BP 80-89 MM HG: CPT | Performed by: UROLOGY

## 2020-06-15 PROCEDURE — 3074F SYST BP LT 130 MM HG: CPT | Performed by: UROLOGY

## 2020-06-15 PROCEDURE — 3008F BODY MASS INDEX DOCD: CPT | Performed by: UROLOGY

## 2020-06-15 PROCEDURE — 99214 OFFICE O/P EST MOD 30 MIN: CPT | Performed by: UROLOGY

## 2020-06-15 PROCEDURE — 81003 URINALYSIS AUTO W/O SCOPE: CPT | Performed by: UROLOGY

## 2020-06-15 PROCEDURE — 1036F TOBACCO NON-USER: CPT | Performed by: UROLOGY

## 2020-06-15 RX ORDER — TADALAFIL 20 MG/1
20 TABLET ORAL AS NEEDED
Qty: 10 TABLET | Refills: 3 | Status: SHIPPED | OUTPATIENT
Start: 2020-06-15

## 2020-06-25 ENCOUNTER — OFFICE VISIT (OUTPATIENT)
Dept: FAMILY MEDICINE CLINIC | Facility: CLINIC | Age: 60
End: 2020-06-25
Payer: COMMERCIAL

## 2020-06-25 VITALS
DIASTOLIC BLOOD PRESSURE: 78 MMHG | TEMPERATURE: 98.2 F | RESPIRATION RATE: 17 BRPM | SYSTOLIC BLOOD PRESSURE: 124 MMHG | WEIGHT: 224.2 LBS | BODY MASS INDEX: 33.21 KG/M2 | HEIGHT: 69 IN | HEART RATE: 75 BPM | OXYGEN SATURATION: 96 %

## 2020-06-25 DIAGNOSIS — M25.511 RIGHT SHOULDER PAIN, UNSPECIFIED CHRONICITY: ICD-10-CM

## 2020-06-25 DIAGNOSIS — Z00.00 HEALTH CARE MAINTENANCE: ICD-10-CM

## 2020-06-25 DIAGNOSIS — E78.1 HYPERTRIGLYCERIDEMIA: ICD-10-CM

## 2020-06-25 DIAGNOSIS — I10 ESSENTIAL HYPERTENSION: ICD-10-CM

## 2020-06-25 DIAGNOSIS — E66.9 OBESITY (BMI 30-39.9): ICD-10-CM

## 2020-06-25 DIAGNOSIS — M62.830 MUSCLE SPASM OF BACK: Primary | ICD-10-CM

## 2020-06-25 DIAGNOSIS — K21.9 GASTROESOPHAGEAL REFLUX DISEASE, ESOPHAGITIS PRESENCE NOT SPECIFIED: ICD-10-CM

## 2020-06-25 DIAGNOSIS — N52.9 ERECTILE DYSFUNCTION, UNSPECIFIED ERECTILE DYSFUNCTION TYPE: ICD-10-CM

## 2020-06-25 PROCEDURE — 99396 PREV VISIT EST AGE 40-64: CPT | Performed by: FAMILY MEDICINE

## 2020-06-25 RX ORDER — METHOCARBAMOL 750 MG/1
750 TABLET, FILM COATED ORAL DAILY PRN
Qty: 30 TABLET | Refills: 0 | Status: SHIPPED | OUTPATIENT
Start: 2020-06-25 | End: 2022-04-28 | Stop reason: SDUPTHER

## 2020-06-25 RX ORDER — METHOCARBAMOL 750 MG/1
750 TABLET, FILM COATED ORAL EVERY 6 HOURS PRN
COMMUNITY
End: 2020-06-25 | Stop reason: SDUPTHER

## 2020-06-25 RX ORDER — KETOROLAC TROMETHAMINE 30 MG/ML
60 INJECTION, SOLUTION INTRAMUSCULAR; INTRAVENOUS ONCE
Status: COMPLETED | OUTPATIENT
Start: 2020-06-25 | End: 2020-06-25

## 2020-06-25 RX ADMIN — KETOROLAC TROMETHAMINE 60 MG: 30 INJECTION, SOLUTION INTRAMUSCULAR; INTRAVENOUS at 09:25

## 2020-07-07 ENCOUNTER — OFFICE VISIT (OUTPATIENT)
Dept: FAMILY MEDICINE CLINIC | Facility: CLINIC | Age: 60
End: 2020-07-07
Payer: COMMERCIAL

## 2020-07-07 VITALS
OXYGEN SATURATION: 98 % | HEART RATE: 70 BPM | DIASTOLIC BLOOD PRESSURE: 88 MMHG | RESPIRATION RATE: 16 BRPM | SYSTOLIC BLOOD PRESSURE: 132 MMHG | WEIGHT: 223.6 LBS | TEMPERATURE: 97.7 F | HEIGHT: 69 IN | BODY MASS INDEX: 33.12 KG/M2

## 2020-07-07 DIAGNOSIS — N20.0 NEPHROLITHIASIS: ICD-10-CM

## 2020-07-07 DIAGNOSIS — R10.9 FLANK PAIN: Primary | ICD-10-CM

## 2020-07-07 LAB
SL AMB  POCT GLUCOSE, UA: ABNORMAL
SL AMB LEUKOCYTE ESTERASE,UA: ABNORMAL
SL AMB POCT BILIRUBIN,UA: ABNORMAL
SL AMB POCT BLOOD,UA: ABNORMAL
SL AMB POCT CLARITY,UA: ABNORMAL
SL AMB POCT COLOR,UA: ABNORMAL
SL AMB POCT KETONES,UA: ABNORMAL
SL AMB POCT NITRITE,UA: ABNORMAL
SL AMB POCT PH,UA: 6
SL AMB POCT SPECIFIC GRAVITY,UA: 1
SL AMB POCT URINE PROTEIN: ABNORMAL
SL AMB POCT UROBILINOGEN: 0.2

## 2020-07-07 PROCEDURE — 3079F DIAST BP 80-89 MM HG: CPT | Performed by: NURSE PRACTITIONER

## 2020-07-07 PROCEDURE — 99213 OFFICE O/P EST LOW 20 MIN: CPT | Performed by: NURSE PRACTITIONER

## 2020-07-07 PROCEDURE — 3075F SYST BP GE 130 - 139MM HG: CPT | Performed by: NURSE PRACTITIONER

## 2020-07-07 PROCEDURE — 96372 THER/PROPH/DIAG INJ SC/IM: CPT | Performed by: NURSE PRACTITIONER

## 2020-07-07 PROCEDURE — 3008F BODY MASS INDEX DOCD: CPT | Performed by: NURSE PRACTITIONER

## 2020-07-07 PROCEDURE — 1036F TOBACCO NON-USER: CPT | Performed by: NURSE PRACTITIONER

## 2020-07-07 PROCEDURE — 81003 URINALYSIS AUTO W/O SCOPE: CPT | Performed by: NURSE PRACTITIONER

## 2020-07-07 RX ORDER — KETOROLAC TROMETHAMINE 30 MG/ML
60 INJECTION, SOLUTION INTRAMUSCULAR; INTRAVENOUS ONCE
Status: DISCONTINUED | OUTPATIENT
Start: 2020-07-07 | End: 2020-07-07 | Stop reason: HOSPADM

## 2020-07-07 RX ADMIN — KETOROLAC TROMETHAMINE 60 MG: 30 INJECTION, SOLUTION INTRAMUSCULAR; INTRAVENOUS at 16:20

## 2020-07-07 NOTE — PATIENT INSTRUCTIONS
Please stay well hydrated, monitor urine output and for any stone passing  If no improvement or any worsening symptoms, imaging will be needed- as discussed CT is usually indicated but Ultrasound would be an option if that's what you would prefer  Order for CT given today if needed  Please go to ER for any worsening pain/ decreased urinary output/ fever/ chills  See handout below  Follow up with urology  Please call the office if you are experiencing any worsening of symptoms or no symptom improvement  Kidney Stones   AMBULATORY CARE:   Kidney stones  form in the urinary system when the water and waste in your urine are out of balance  When this happens, certain types of waste crystals separate from the urine  The crystals build up and form kidney stones  Kidney stones can be made of uric acid, calcium, phosphate, or oxalate crystals  You may have 1 or more kidney stones  Common symptoms include the following:   · Pain in the middle of your back that moves across to your side or that may spread to your groin    · Nausea and vomiting    · Urge to urinate often, burning feeling when you urinate, or pink or red urine    · Tenderness in your lower back, side, or stomach  Seek care immediately if:   · You have vomiting that is not relieved by medicine  Contact your healthcare provider if:   · You have a fever  · You have trouble passing urine  · You see blood in your urine  · You have severe pain  · You have any questions or concerns about your condition or care  Treatment for kidney stones  may include any of the following:  · NSAIDs , such as ibuprofen, help decrease swelling, pain, and fever  This medicine is available with or without a doctor's order  NSAIDs can cause stomach bleeding or kidney problems in certain people  If you take blood thinner medicine, always ask your healthcare provider if NSAIDs are safe for you   Always read the medicine label and follow directions  · Prescription medicine  may be given  Ask how to take this medicine safely  · Medicines  to balance your electrolytes may be needed  · A procedure or surgery  to remove the kidney stones may be needed if they do not pass on their own  Your treatment will depend on the size and location of your kidney stones  Manage your symptoms:   · Drink plenty of liquids  Your healthcare provider may tell you to drink at least 8 to 12 (eight-ounce) cups of liquids each day  This helps flush out the kidney stones when you urinate  Water is the best liquid to drink  · Strain your urine every time you go to the bathroom  Urinate through a strainer or a piece of thin cloth to catch the stones  Take the stones to your healthcare provider so they can be sent to the lab for tests  This will help your healthcare providers plan the best treatment for you  · Eat a variety of healthy foods  Healthy foods include fruits, vegetables, whole-grain breads, low-fat dairy products, beans, and fish  You may need to limit how much sodium (salt) or protein you eat  Ask for information about the best foods for you  · Exercise regularly  Your stones may pass more easily if you stay active  Ask about the best activities for you  After you pass your kidney stones:  Once you have passed your kidney stones, your healthcare provider may  order a 24-hour urine test  Results from a 24-hour urine test will help your healthcare provider plan ways to prevent more stones from forming  If you are told to do a 24-hour test, your healthcare provider will give you more instructions  Follow up with your healthcare provider as directed:  Write down your questions so you remember to ask them during your visits  © 2017 [facility] Information is for End User's use only and may not be sold, redistributed or otherwise used for commercial purposes   All illustrations and images included in CareNotes® are the copyrighted property of [employer]  or [employer]  The above information is an  only  It is not intended as medical advice for individual conditions or treatments  Talk to your doctor, nurse or pharmacist before following any medical regimen to see if it is safe and effective for you

## 2020-07-07 NOTE — PROGRESS NOTES
Assessment/Plan:    Nephrolithiasis/ Flank Pain  Symptoms and urine dip consistent with kidney stone  Patient does not want to go to the hospital or go for imaging at this time, he declines  Given symptomology at this time, it is likely the stone is passing as he no longer has flank pain and it's more pelvic pain  Toradol given in office today  Please stay well hydrated, monitor urine output and for any stone passing  If no improvement or any worsening symptoms, imaging will be needed- as discussed CT is usually indicated but Ultrasound would be an option if that's what you would prefer  Order for CT given today if needed  Please go to ER for any worsening pain/ decreased urinary output/ fever/ chills  See handout below  Follow up with urology  Handout provided to patient  Please call the office if you are experiencing any worsening of symptoms or no symptom improvement  Patient verbalizes understand and agrees with treatment plan  Diagnoses and all orders for this visit:    Flank pain  -     POCT urine dip auto non-scope  -     ketorolac (TORADOL) 60 mg/2 mL IM injection 60 mg  -     CT renal stone study abdomen pelvis wo contrast; Future    Nephrolithiasis  -     ketorolac (TORADOL) 60 mg/2 mL IM injection 60 mg  -     CT renal stone study abdomen pelvis wo contrast; Future        Subjective:      Patient ID: Selena Ayala is a 63 y o  male  Chief Complaint   Patient presents with    Nephrolithiasis     Pt is here w/ c/o possible kidney stone        Here for evaluation of possible kidney stone  Started with lower abdominal pain into groin  Yesterday he noticed his urine was discolored  He's said since today his urine has been pink in color  He has had kidney stones in the past and this feels similar to prior kidney stones  He has had some very severe ones in the past and this is more mild than previous ones  He has had stent placements in the past and laser treatment for some   Hasn't felt stone pass yet but feels it may have moved as he doesn't really have as much flank pain now  Was hoping to get Toradol injections he is trying to avoid going to the emergency room at all costs  Pain a this time in lower pelvis/ penile area  Still able to urinate well  Patient has not been straining urine  Last imaging done January 2019 did not show any more stones but he's had them frequently in the past  He does follow with urology  The following portions of the patient's history were reviewed and updated as appropriate: allergies, current medications, past family history, past social history and problem list     Review of Systems   Constitutional: Negative for chills and fever  Eyes: Negative for discharge  Respiratory: Negative for shortness of breath  Cardiovascular: Negative for chest pain  Gastrointestinal: Negative for constipation and diarrhea  Genitourinary: Positive for hematuria and penile pain  Negative for difficulty urinating and flank pain  Pelvic pain   Musculoskeletal: Negative for joint swelling  Skin: Negative for rash  Neurological: Negative for headaches  Hematological: Negative for adenopathy  Psychiatric/Behavioral: The patient is not nervous/anxious  Objective:  /88   Pulse 70   Temp 97 7 °F (36 5 °C) (Temporal)   Resp 16   Ht 5' 8 9" (1 75 m)   Wt 101 kg (223 lb 9 6 oz)   SpO2 98%   BMI 33 12 kg/m²      Physical Exam   Constitutional: He is oriented to person, place, and time  He appears well-developed  No distress  HENT:   Head: Normocephalic and atraumatic  Right Ear: External ear normal    Left Ear: External ear normal    Eyes: Conjunctivae and lids are normal  Right eye exhibits no discharge  Left eye exhibits no discharge  Neck: Neck supple  Cardiovascular: Normal rate and regular rhythm  No murmur heard  Pulmonary/Chest: Effort normal and breath sounds normal  No respiratory distress  He has no wheezes  Abdominal: Soft   Bowel sounds are normal  There is no tenderness  There is no CVA tenderness  Musculoskeletal: Normal range of motion  He exhibits no deformity  Neurological: He is alert and oriented to person, place, and time  Coordination normal    Skin: Skin is warm and dry  He is not diaphoretic  Psychiatric: He has a normal mood and affect  His speech is normal and behavior is normal  Judgment and thought content normal  Cognition and memory are normal    Nursing note and vitals reviewed  Current Outpatient Medications:     famotidine (PEPCID) 10 mg tablet, Take 10 mg by mouth, Disp: , Rfl:     hydrochlorothiazide (HYDRODIURIL) 25 mg tablet, Take 1 tablet (25 mg total) by mouth daily, Disp: 30 tablet, Rfl: 5    ibuprofen (MOTRIN) 800 mg tablet, Take 800 mg by mouth every 6 (six) hours as needed, Disp: , Rfl: 0    methocarbamol (ROBAXIN) 750 mg tablet, Take 1 tablet (750 mg total) by mouth daily as needed for muscle spasms (Pt states he takes on tablet at bedtime ), Disp: 30 tablet, Rfl: 0    tadalafil (CIALIS) 20 MG tablet, Take 1 tablet (20 mg total) by mouth as needed for erectile dysfunction, Disp: 10 tablet, Rfl: 3    meclizine (ANTIVERT) 25 mg tablet, Take 1 tablet (25 mg total) by mouth daily as needed for dizziness (Patient not taking: Reported on 6/15/2020), Disp: 30 tablet, Rfl: 0  No current facility-administered medications for this visit     Allergies   Allergen Reactions    Morphine Other (See Comments)    Other      Pt states that any narcotic makes him feel sick    Oxycodone-Acetaminophen Nausea Only, Vomiting and GI Intolerance    Seasonal Ic  [Cholestatin]      Other reaction(s): Nasal Congestion, Sinus Pain

## 2020-07-08 ENCOUNTER — APPOINTMENT (EMERGENCY)
Dept: CT IMAGING | Facility: HOSPITAL | Age: 60
End: 2020-07-08
Payer: COMMERCIAL

## 2020-07-08 ENCOUNTER — TELEPHONE (OUTPATIENT)
Dept: UROLOGY | Facility: MEDICAL CENTER | Age: 60
End: 2020-07-08

## 2020-07-08 ENCOUNTER — HOSPITAL ENCOUNTER (OUTPATIENT)
Facility: HOSPITAL | Age: 60
Setting detail: OBSERVATION
Discharge: HOME | End: 2020-07-10
Attending: EMERGENCY MEDICINE | Admitting: INTERNAL MEDICINE
Payer: COMMERCIAL

## 2020-07-08 DIAGNOSIS — N20.0 NEPHROLITHIASIS: Primary | ICD-10-CM

## 2020-07-08 DIAGNOSIS — N17.9 AKI (ACUTE KIDNEY INJURY) (HCC): ICD-10-CM

## 2020-07-08 DIAGNOSIS — N20.1 LEFT URETERAL CALCULUS: ICD-10-CM

## 2020-07-08 LAB
ALBUMIN SERPL BCP-MCNC: 3.4 G/DL (ref 3.5–5)
ALP SERPL-CCNC: 52 U/L (ref 46–116)
ALT SERPL W P-5'-P-CCNC: 23 U/L (ref 12–78)
ANION GAP SERPL CALCULATED.3IONS-SCNC: 7 MMOL/L (ref 4–13)
AST SERPL W P-5'-P-CCNC: 20 U/L (ref 5–45)
BACTERIA UR QL AUTO: ABNORMAL /HPF
BASOPHILS # BLD AUTO: 0.04 THOUSANDS/ΜL (ref 0–0.1)
BASOPHILS NFR BLD AUTO: 0 % (ref 0–1)
BILIRUB SERPL-MCNC: 0.63 MG/DL (ref 0.2–1)
BILIRUB UR QL STRIP: NEGATIVE
BUN SERPL-MCNC: 29 MG/DL (ref 5–25)
CALCIUM SERPL-MCNC: 8.4 MG/DL (ref 8.3–10.1)
CHLORIDE SERPL-SCNC: 103 MMOL/L (ref 100–108)
CLARITY UR: CLEAR
CO2 SERPL-SCNC: 29 MMOL/L (ref 21–32)
COLOR UR: YELLOW
CREAT SERPL-MCNC: 1.51 MG/DL (ref 0.6–1.3)
EOSINOPHIL # BLD AUTO: 0.14 THOUSAND/ΜL (ref 0–0.61)
EOSINOPHIL NFR BLD AUTO: 1 % (ref 0–6)
ERYTHROCYTE [DISTWIDTH] IN BLOOD BY AUTOMATED COUNT: 13 % (ref 11.6–15.1)
GFR SERPL CREATININE-BSD FRML MDRD: 49 ML/MIN/1.73SQ M
GLUCOSE SERPL-MCNC: 105 MG/DL (ref 65–140)
GLUCOSE UR STRIP-MCNC: NEGATIVE MG/DL
HCT VFR BLD AUTO: 43.4 % (ref 36.5–49.3)
HGB BLD-MCNC: 14.5 G/DL (ref 12–17)
HGB UR QL STRIP.AUTO: ABNORMAL
IMM GRANULOCYTES # BLD AUTO: 0.07 THOUSAND/UL (ref 0–0.2)
IMM GRANULOCYTES NFR BLD AUTO: 1 % (ref 0–2)
KETONES UR STRIP-MCNC: NEGATIVE MG/DL
LEUKOCYTE ESTERASE UR QL STRIP: NEGATIVE
LYMPHOCYTES # BLD AUTO: 1.38 THOUSANDS/ΜL (ref 0.6–4.47)
LYMPHOCYTES NFR BLD AUTO: 11 % (ref 14–44)
MCH RBC QN AUTO: 31 PG (ref 26.8–34.3)
MCHC RBC AUTO-ENTMCNC: 33.4 G/DL (ref 31.4–37.4)
MCV RBC AUTO: 93 FL (ref 82–98)
MONOCYTES # BLD AUTO: 1.27 THOUSAND/ΜL (ref 0.17–1.22)
MONOCYTES NFR BLD AUTO: 10 % (ref 4–12)
NEUTROPHILS # BLD AUTO: 9.4 THOUSANDS/ΜL (ref 1.85–7.62)
NEUTS SEG NFR BLD AUTO: 77 % (ref 43–75)
NITRITE UR QL STRIP: NEGATIVE
NON-SQ EPI CELLS URNS QL MICRO: ABNORMAL /HPF
NRBC BLD AUTO-RTO: 0 /100 WBCS
PH UR STRIP.AUTO: 5.5 [PH] (ref 4.5–8)
PLATELET # BLD AUTO: 239 THOUSANDS/UL (ref 149–390)
PMV BLD AUTO: 9.1 FL (ref 8.9–12.7)
POTASSIUM SERPL-SCNC: 3.5 MMOL/L (ref 3.5–5.3)
PROT SERPL-MCNC: 7.3 G/DL (ref 6.4–8.2)
PROT UR STRIP-MCNC: NEGATIVE MG/DL
RBC # BLD AUTO: 4.68 MILLION/UL (ref 3.88–5.62)
RBC #/AREA URNS AUTO: ABNORMAL /HPF
SARS-COV-2 RNA RESP QL NAA+PROBE: NEGATIVE
SODIUM SERPL-SCNC: 139 MMOL/L (ref 136–145)
SP GR UR STRIP.AUTO: 1.01 (ref 1–1.03)
UROBILINOGEN UR QL STRIP.AUTO: 0.2 E.U./DL
WBC # BLD AUTO: 12.3 THOUSAND/UL (ref 4.31–10.16)
WBC #/AREA URNS AUTO: ABNORMAL /HPF

## 2020-07-08 PROCEDURE — 99220 PR INITIAL OBSERVATION CARE/DAY 70 MINUTES: CPT | Performed by: PHYSICIAN ASSISTANT

## 2020-07-08 PROCEDURE — 99285 EMERGENCY DEPT VISIT HI MDM: CPT

## 2020-07-08 PROCEDURE — 99285 EMERGENCY DEPT VISIT HI MDM: CPT | Performed by: PHYSICIAN ASSISTANT

## 2020-07-08 PROCEDURE — 36415 COLL VENOUS BLD VENIPUNCTURE: CPT | Performed by: PHYSICIAN ASSISTANT

## 2020-07-08 PROCEDURE — 74176 CT ABD & PELVIS W/O CONTRAST: CPT

## 2020-07-08 PROCEDURE — 81001 URINALYSIS AUTO W/SCOPE: CPT

## 2020-07-08 PROCEDURE — 99214 OFFICE O/P EST MOD 30 MIN: CPT | Performed by: PHYSICIAN ASSISTANT

## 2020-07-08 PROCEDURE — 80053 COMPREHEN METABOLIC PANEL: CPT | Performed by: PHYSICIAN ASSISTANT

## 2020-07-08 PROCEDURE — 96360 HYDRATION IV INFUSION INIT: CPT

## 2020-07-08 PROCEDURE — 87635 SARS-COV-2 COVID-19 AMP PRB: CPT | Performed by: PHYSICIAN ASSISTANT

## 2020-07-08 PROCEDURE — 85025 COMPLETE CBC W/AUTO DIFF WBC: CPT | Performed by: PHYSICIAN ASSISTANT

## 2020-07-08 RX ORDER — FAMOTIDINE 20 MG/1
10 TABLET, FILM COATED ORAL DAILY
Status: DISCONTINUED | OUTPATIENT
Start: 2020-07-09 | End: 2020-07-10 | Stop reason: HOSPADM

## 2020-07-08 RX ORDER — METHOCARBAMOL 500 MG/1
750 TABLET, FILM COATED ORAL
Status: DISCONTINUED | OUTPATIENT
Start: 2020-07-08 | End: 2020-07-10 | Stop reason: HOSPADM

## 2020-07-08 RX ORDER — SENNOSIDES 8.6 MG
1 TABLET ORAL
Status: DISCONTINUED | OUTPATIENT
Start: 2020-07-08 | End: 2020-07-10

## 2020-07-08 RX ORDER — SODIUM CHLORIDE 9 MG/ML
125 INJECTION, SOLUTION INTRAVENOUS CONTINUOUS
Status: DISCONTINUED | OUTPATIENT
Start: 2020-07-08 | End: 2020-07-10

## 2020-07-08 RX ORDER — TAMSULOSIN HYDROCHLORIDE 0.4 MG/1
0.4 CAPSULE ORAL
Status: DISCONTINUED | OUTPATIENT
Start: 2020-07-08 | End: 2020-07-10 | Stop reason: HOSPADM

## 2020-07-08 RX ORDER — HEPARIN SODIUM 5000 [USP'U]/ML
5000 INJECTION, SOLUTION INTRAVENOUS; SUBCUTANEOUS EVERY 8 HOURS SCHEDULED
Status: DISCONTINUED | OUTPATIENT
Start: 2020-07-09 | End: 2020-07-10

## 2020-07-08 RX ORDER — ACETAMINOPHEN 325 MG/1
650 TABLET ORAL EVERY 6 HOURS PRN
Status: DISCONTINUED | OUTPATIENT
Start: 2020-07-08 | End: 2020-07-09

## 2020-07-08 RX ORDER — ONDANSETRON 2 MG/ML
4 INJECTION INTRAMUSCULAR; INTRAVENOUS EVERY 6 HOURS PRN
Status: DISCONTINUED | OUTPATIENT
Start: 2020-07-08 | End: 2020-07-10

## 2020-07-08 RX ORDER — KETOROLAC TROMETHAMINE 30 MG/ML
15 INJECTION, SOLUTION INTRAMUSCULAR; INTRAVENOUS ONCE
Status: DISCONTINUED | OUTPATIENT
Start: 2020-07-08 | End: 2020-07-08

## 2020-07-08 RX ORDER — CALCIUM CARBONATE 200(500)MG
1000 TABLET,CHEWABLE ORAL DAILY PRN
Status: DISCONTINUED | OUTPATIENT
Start: 2020-07-08 | End: 2020-07-10

## 2020-07-08 RX ADMIN — TAMSULOSIN HYDROCHLORIDE 0.4 MG: 0.4 CAPSULE ORAL at 15:51

## 2020-07-08 RX ADMIN — SODIUM CHLORIDE 1000 ML: 0.9 INJECTION, SOLUTION INTRAVENOUS at 11:51

## 2020-07-08 RX ADMIN — ACETAMINOPHEN 650 MG: 325 TABLET ORAL at 19:36

## 2020-07-08 RX ADMIN — SODIUM CHLORIDE 125 ML/HR: 0.9 INJECTION, SOLUTION INTRAVENOUS at 16:58

## 2020-07-08 NOTE — ASSESSMENT & PLAN NOTE
History of AVM following outpatient with [facility]  Asymptomatic  Continue outpatient follow-up

## 2020-07-08 NOTE — H&P
[facility]  H&P- Ulises Feeling 1960, 63 y o  male MRN: [id number]    Unit/Bed#: ED 24 Encounter: [id number]    Primary Care Provider: Marsha Stringer DO   Date and time admitted to hospital: 7/8/2020 11:02 AM        * Hydronephrosis with urinary obstruction due to ureteral calculus  Assessment & Plan  Patient presents with 3 days of left groin pain  Found a left-sided hydronephrosis secondary to 5 mm calculus in distal left ureter  Urology consult  NPO for surgery today  IV fluid hydration  Hold HCTZ    CHASIDY (acute kidney injury) ([facility] [address] )  Assessment & Plan  Creatinine elevated to 1 51, baseline 1 1  Likely secondary to obstruction  Monitor BMP following urologic surgery   IV fluids     Esophageal reflux  Assessment & Plan  Continue Pepcid    Essential hypertension  Assessment & Plan  BP control time of admission  Hold HCTZ given CHASIDY  Monitor BP with p r n  Hydralazine    Obesity (BMI 30-39  9)  Assessment & Plan  Discussed diet and lifestyle modifications    Hyperlipidemia  Assessment & Plan  Controlled with diet    AVM (arteriovenous malformation) brain  Assessment & Plan  History of AVM following outpatient with [facility]  Asymptomatic  Continue outpatient follow-up      VTE Prophylaxis: Heparin  / sequential compression device   Code Status:  Full code  POLST: POLST form is not discussed and not completed at this time  Discussion with family:  None    Anticipated Length of Stay:  Patient will be admitted on an Observation basis with an anticipated length of stay of  less than 2 midnights  Justification for Hospital Stay:  Obstructing kidney stone    Total Time for Visit, including Counseling / Coordination of Care: 1 hour  Greater than 50% of this total time spent on direct patient counseling and coordination of care      Chief Complaint:   Abdominal pain    History of Present Illness:    Ulises Feeling is a 63 y o  male with past medical history of hypertension , obesity , GERD who presents with abdominal pain  Patient reports 3 days ago he noticed he developed suprapubic abdominal pain  Also notes his urine was dark pink colored  Patient reports history of multiple kidney stones requiring urologic surgery  Patient reports today he noticed his urine has become more yellow but continues to have suprapubic abdominal pain  Also notes mild left-sided flank pain  Patient notes yesterday he developed some diaphoresis and chills without fevers  Also notes nausea without vomiting  Denies any chest pain or shortness of breath  Review of Systems:    Review of Systems   Constitutional: Positive for chills and diaphoresis  Negative for fever  HENT: Negative for trouble swallowing  Eyes: Negative for visual disturbance  Respiratory: Negative for cough and shortness of breath  Cardiovascular: Negative for chest pain and leg swelling  Gastrointestinal: Positive for abdominal pain and nausea  Negative for vomiting  Genitourinary: Positive for flank pain  Negative for dysuria  Musculoskeletal: Negative for gait problem  Skin: Negative for rash  Neurological: Negative for dizziness and weakness  Psychiatric/Behavioral: Negative for confusion         Past Medical and Surgical History:     Past Medical History:   Diagnosis Date    AVM (arteriovenous malformation)     Back pain     BPH without urinary obstruction     last assessed 01/16/18    Disc disorder     Herniated    Hemorrhoids     Hydronephrosis     Kidney disease     Renal calculi     Renal colic     Ureter colic        Past Surgical History:   Procedure Laterality Date    BRAIN SURGERY      COLONOSCOPY      CYSTOSCOPY      w/removal of object, last assessed 01/16/18    CYSTOSCOPY      w/removal of ureteral calculus last assessed 01/16/18    CYSTOSCOPY KIDNEY W/ URETERAL GUIDE WIRE      last assessed 01/16/18    CYSTOSCOPY W/ URETERAL STENT PLACEMENT      last assessed 01/16/18   [facility]      KIDNEY SURGERY      removal of kidney stone in 2013 at [address] &MANJULA STENT INSRT Right 12/13/2018    Procedure: CYSTOSCOPY URETEROSCOPY  RETROGRADE PYELOGRAM AND INSERTION RIGHT STENT URETERAL;  Surgeon: Shira Hughes MD;  Location: [facility] [place];  Service: Urology    TOOTH EXTRACTION         Meds/Allergies:    Prior to Admission medications    Medication Sig Start Date End Date Taking? Authorizing Provider   famotidine (PEPCID) 10 mg tablet Take 10 mg by mouth   Yes Historical Provider, MD   hydrochlorothiazide (HYDRODIURIL) 25 mg tablet Take 1 tablet (25 mg total) by mouth daily 6/8/20  Yes Esther Jeff DO   ibuprofen (MOTRIN) 800 mg tablet Take 800 mg by mouth every 6 (six) hours as needed 11/1/18  Yes Historical Provider, MD   methocarbamol (ROBAXIN) 750 mg tablet Take 1 tablet (750 mg total) by mouth daily as needed for muscle spasms (Pt states he takes on tablet at bedtime ) 6/25/20  Yes Esther Jeff DO   tadalafil (CIALIS) 20 MG tablet Take 1 tablet (20 mg total) by mouth as needed for erectile dysfunction 6/15/20  Yes Natan Brown MD   meclizine (ANTIVERT) 25 mg tablet Take 1 tablet (25 mg total) by mouth daily as needed for dizziness  Patient not taking: Reported on 6/15/2020 5/27/20   Esther Jeff DO     I have reviewed home medications with patient personally  Allergies:    Allergies   Allergen Reactions    Morphine Other (See Comments)    Other      Pt states that any narcotic makes him feel sick    Oxycodone-Acetaminophen Nausea Only, Vomiting and GI Intolerance    Seasonal Ic  [Cholestatin]      Other reaction(s): Nasal Congestion, Sinus Pain       Social History:     Marital Status: /Civil Union   Occupation:  Unknown  Patient Pre-hospital Living Situation:  Home  Patient Pre-hospital Level of Mobility:  Ambulatory  Patient Pre-hospital Diet Restrictions:  None  Substance Use History: Social History     Substance and Sexual Activity   Alcohol Use Yes    Comment: rare     Social History     Tobacco Use   Smoking Status Never Smoker   Smokeless Tobacco Never Used     Social History     Substance and Sexual Activity   Drug Use No       Family History:    Family History   Problem Relation Age of Onset    Stroke Mother     Cancer Father     Diabetes Father     Prostate cancer Father     Heart disease Father     Hypertension Father        Physical Exam:     Vitals:   Blood Pressure: 149/88 (07/08/20 1321)  Pulse: 75 (07/08/20 1321)  Temperature: (!) 97 2 °F (36 2 °C) (07/08/20 0933)  Temp Source: Temporal (07/08/20 0933)  Respirations: 16 (07/08/20 1321)  Weight - Scale: 102 kg (224 lb 13 9 oz) (07/08/20 0933)  SpO2: 97 % (07/08/20 1321)    Physical Exam   Constitutional: He is oriented to person, place, and time  He appears well-nourished  No distress  HENT:   Head: Normocephalic and atraumatic  Eyes: Conjunctivae are normal  No scleral icterus  Neck: Neck supple  Cardiovascular: Normal rate, regular rhythm and normal heart sounds  No murmur heard  Pulmonary/Chest: Effort normal and breath sounds normal  No respiratory distress  He has no wheezes  Abdominal: Soft  Bowel sounds are normal  There is tenderness (Mild suprapubic)  Musculoskeletal: He exhibits no edema or deformity  Neurological: He is alert and oriented to person, place, and time  Skin: Skin is warm and dry  Psychiatric: He has a normal mood and affect  His behavior is normal  Thought content normal    Vitals reviewed  Additional Data:     Lab Results: I have personally reviewed pertinent reports        Results from last 7 days   Lab Units 07/08/20  1150   WBC Thousand/uL 12 30*   HEMOGLOBIN g/dL 14 5   HEMATOCRIT % 43 4   PLATELETS Thousands/uL 239   NEUTROS PCT % 77*   LYMPHS PCT % 11*   MONOS PCT % 10   EOS PCT % 1     Results from last 7 days   Lab Units 07/08/20  1150   SODIUM mmol/L 139   POTASSIUM mmol/L 3 5   CHLORIDE mmol/L 103   CO2 mmol/L 29   BUN mg/dL 29*   CREATININE mg/dL 1 51*   ANION GAP mmol/L 7   CALCIUM mg/dL 8 4   ALBUMIN g/dL 3 4*   TOTAL BILIRUBIN mg/dL 0 63   ALK PHOS U/L 52   ALT U/L 23   AST U/L 20   GLUCOSE RANDOM mg/dL 105                       Imaging: I have personally reviewed pertinent reports  CT renal stone study abdomen pelvis without contrast   Final Result by Cesar Moscoso MD (07/08 1258)      Left-sided hydronephrosis secondary to 5 mm calculus of the distal left ureter as above described  Right kidney lower pole nonobstructing 4 mm calculus             Workstation performed: [id number]               Allscripts / Epic Records Reviewed: Yes     ** Please Note: This note has been constructed using a voice recognition system   **

## 2020-07-08 NOTE — PLAN OF CARE
Problem: Potential for Falls  Goal: Patient will remain free of falls  Description  INTERVENTIONS:  - Assess patient frequently for physical needs  -  Identify cognitive and physical deficits and behaviors that affect risk of falls  -  [place] fall precautions as indicated by assessment   - Educate patient/family on patient safety including physical limitations  - Instruct patient to call for assistance with activity based on assessment  - Modify environment to reduce risk of injury  - Consider OT/PT consult to assist with strengthening/mobility  Outcome: Progressing     Problem: Nutrition/Hydration-ADULT  Goal: Nutrient/Hydration intake appropriate for improving, restoring or maintaining nutritional needs  Description  Monitor and assess patient's nutrition/hydration status for malnutrition  Collaborate with interdisciplinary team and initiate plan and interventions as ordered  Monitor patient's weight and dietary intake as ordered or per policy  Utilize nutrition screening tool and intervene as necessary  Determine patient's food preferences and provide high-protein, high-caloric foods as appropriate       INTERVENTIONS:  - Monitor oral intake, urinary output, labs, and treatment plans  - Assess nutrition and hydration status and recommend course of action  - Evaluate amount of meals eaten  - Assist patient with eating if necessary   - Allow adequate time for meals  - Recommend/ encourage appropriate diets, oral nutritional supplements, and vitamin/mineral supplements  - Order, calculate, and assess calorie counts as needed  - Recommend, monitor, and adjust tube feedings and TPN/PPN based on assessed needs  - Assess need for intravenous fluids  - Provide specific nutrition/hydration education as appropriate  - Include patient/family/caregiver in decisions related to nutrition  Outcome: Progressing     Problem: PAIN - ADULT  Goal: Verbalizes/displays adequate comfort level or baseline comfort level  Description  Interventions:  - Encourage patient to monitor pain and request assistance  - Assess pain using appropriate pain scale  - Administer analgesics based on type and severity of pain and evaluate response  - Implement non-pharmacological measures as appropriate and evaluate response  - Consider cultural and social influences on pain and pain management  - Notify physician/advanced practitioner if interventions unsuccessful or patient reports new pain  Outcome: Progressing     Problem: INFECTION - ADULT  Goal: Absence or prevention of progression during hospitalization  Description  INTERVENTIONS:  - Assess and monitor for signs and symptoms of infection  - Monitor lab/diagnostic results  - Monitor all insertion sites, i e  indwelling lines, tubes, and drains  - Monitor endotracheal if appropriate and nasal secretions for changes in amount and color  - [place] appropriate cooling/warming therapies per order  - Administer medications as ordered  - Instruct and encourage patient and family to use good hand hygiene technique  - Identify and instruct in appropriate isolation precautions for identified infection/condition  Outcome: Progressing  Goal: Absence of fever/infection during neutropenic period  Description  INTERVENTIONS:  - Monitor WBC    Outcome: Progressing     Problem: SAFETY ADULT  Goal: Maintain or return to baseline ADL function  Description  INTERVENTIONS:  -  Assess patient's ability to carry out ADLs; assess patient's baseline for ADL function and identify physical deficits which impact ability to perform ADLs (bathing, care of mouth/teeth, toileting, grooming, dressing, etc )  - Assess/evaluate cause of self-care deficits   - Assess range of motion  - Assess patient's mobility; develop plan if impaired  - Assess patient's need for assistive devices and provide as appropriate  - Encourage maximum independence but intervene and supervise when necessary  - Involve family in performance of ADLs  - Assess for home care needs following discharge   - Consider OT consult to assist with ADL evaluation and planning for discharge  - Provide patient education as appropriate  Outcome: Progressing  Goal: Maintain or return mobility status to optimal level  Description  INTERVENTIONS:  - Assess patient's baseline mobility status (ambulation, transfers, stairs, etc )    - Identify cognitive and physical deficits and behaviors that affect mobility  - Identify mobility aids required to assist with transfers and/or ambulation (gait belt, sit-to-stand, lift, walker, cane, etc )  - [place] fall precautions as indicated by assessment  - Record patient progress and toleration of activity level on Mobility SBAR; progress patient to next Phase/Stage  - Instruct patient to call for assistance with activity based on assessment  - Consider rehabilitation consult to assist with strengthening/weightbearing, etc   Outcome: Progressing     Problem: DISCHARGE PLANNING  Goal: Discharge to home or other facility with appropriate resources  Description  INTERVENTIONS:  - Identify barriers to discharge w/patient and caregiver  - Arrange for needed discharge resources and transportation as appropriate  - Identify discharge learning needs (meds, wound care, etc )  - Arrange for interpretive services to assist at discharge as needed  - Refer to Case Management Department for coordinating discharge planning if the patient needs post-hospital services based on physician/advanced practitioner order or complex needs related to functional status, cognitive ability, or social support system  Outcome: Progressing     Problem: Knowledge Deficit  Goal: Patient/family/caregiver demonstrates understanding of disease process, treatment plan, medications, and discharge instructions  Description  Complete learning assessment and assess knowledge base    Interventions:  - Provide teaching at level of understanding  - Provide teaching via preferred learning methods  Outcome: Progressing

## 2020-07-08 NOTE — ASSESSMENT & PLAN NOTE
Patient presents with 3 days of left groin pain  Found a left-sided hydronephrosis secondary to 5 mm calculus in distal left ureter  Urology consult  NPO for surgery today  IV fluid hydration  Hold HCTZ

## 2020-07-08 NOTE — ASSESSMENT & PLAN NOTE
Creatinine elevated to 1 51, baseline 1 1  Likely secondary to obstruction  Monitor BMP following urologic surgery   IV fluids

## 2020-07-08 NOTE — H&P (VIEW-ONLY)
Consult - Urology   Ulises Feeling 1960, 63 y o  male MRN: [id number]     Unit/Bed#: ED 24 Encounter: [id number]     * Hydronephrosis with urinary obstruction due to ureteral calculus  Assessment & Plan  Left flank and groin pain few days, with microscopic hematuria  CT with 5 mm distal left ureteral calculus and hydronephrosis  CHASIDY present: baseline creatinine 1 1, current creatinine 1 5  Afebrile, without infectious signs or symptoms, WBC 12  Urinalysis bland aside from 0-1 RBCs     Flomax, strain all urine  NPO after midnight  IV hydration  Analgesia  Antiemetics  Preop COVID swab     For trial of MET overnight tonight       If no spontaneous passage, discussed plan for OR tomorrow 7/9/2020 for cystoscopy, left ureteroscopy with holmium laser, retrograde pyelogram, ureteral stent insertion        Risks benefits and technique of above procedure reviewed with patient and he wishes to proceed with MET first to try to avoid surgery but agreeable if no stone passage by tomorrow and persistent CHASIDY  He understands in the event of difficult access, poor visualization, or appearance of infection just a ureteral stent will be placed temporarily, and he would need a follow-up stage procedure to address the stone at a later date  Formal consent to be reviewed and signed with surgical attending prior         Subjective:   Left flank pain for 2 days feeling similar to previous kidney stones  Now pain is more in the left lower quadrant and groin  Urinalysis yesterday at PCP office showed microscopic blood  Initially patient was resistant to further workup and wanted to try to pass the stone at home after IM toradol in the office, however pain intensified overnight prompting presentation to the ED  No fevers chills vomiting  No gross hematuria or difficulty voiding  Currently is pain is very mild in the LLQ 2/10      Review of Systems   Constitutional: Negative  Negative for chills and fever     Respiratory: Negative  Cardiovascular: Negative  Genitourinary: Positive for flank pain  Negative for decreased urine volume, difficulty urinating, dysuria, frequency, hematuria, penile pain, testicular pain and urgency  Musculoskeletal: Positive for back pain          Objective:  Vitals: Blood pressure 149/88, pulse 75, temperature (!) 97 2 °F (36 2 °C), temperature source Temporal, resp  rate 16, weight 102 kg (224 lb 13 9 oz), SpO2 97 %  ,Body mass index is 33 3 kg/m²         Intake/Output Summary (Last 24 hours) at 7/8/2020 1447  Last data filed at 7/8/2020 1251      Gross per 24 hour   Intake 1000 ml   Output    Net 1000 ml             Invasive Devices            Peripheral Intravenous Line                     Peripheral IV 07/08/20 Left Antecubital less than 1 day             Drain                     Ureteral Drain/Stent Right ureter 4 7 Fr  573 days                     Physical Exam   Constitutional: He is oriented to person, place, and time  He appears well-developed and well-nourished  HENT:   Head: Normocephalic and atraumatic  Cardiovascular: Normal rate, regular rhythm and normal heart sounds  No murmur heard  Pulmonary/Chest: Effort normal and breath sounds normal    Abdominal: Soft  Bowel sounds are normal  There is no tenderness  Musculoskeletal: Normal range of motion  He exhibits no edema  Neurological: He is alert and oriented to person, place, and time  Skin: Skin is warm and dry  Capillary refill takes less than 2 seconds  No pallor     Nursing note and vitals reviewed         Medical History        History:     Past Medical History:   Diagnosis Date    AVM (arteriovenous malformation)      Back pain      BPH without urinary obstruction       last assessed 01/16/18    Disc disorder       Herniated    Hemorrhoids      Hydronephrosis      Kidney disease      Renal calculi      Renal colic      Ureter colic           Surgical History         Past Surgical History:   Procedure Laterality Date    BRAIN SURGERY        COLONOSCOPY        CYSTOSCOPY         w/removal of object, last assessed 01/16/18    CYSTOSCOPY         w/removal of ureteral calculus last assessed 01/16/18    CYSTOSCOPY KIDNEY W/ URETERAL GUIDE WIRE         last assessed 01/16/18    CYSTOSCOPY W/ URETERAL STENT PLACEMENT         last assessed 01/16/18    [facility]PA[facility]   1965   [facility]    HERNIA REPAIR        KIDNEY SURGERY         removal of kidney stone in 2013 at [address] W/LITHOTRIPSY &INDWELL STENT INSRT Right 12/13/2018     Procedure: CYSTOSCOPY URETEROSCOPY  RETROGRADE PYELOGRAM AND INSERTION RIGHT STENT URETERAL;  Surgeon: Carlie Pagan MD;  Location: [facility];  Service: Urology    TOOTH EXTRACTION                   Family History   Problem Relation Age of Onset    Stroke Mother      Cancer Father      Diabetes Father      Prostate cancer Father      Heart disease Father      Hypertension Father        Social History   Social History            Socioeconomic History    Marital status: /Civil Union       Spouse name: None    Number of children: 1    Years of education: completed college    Highest education level: None   Occupational History    Occupation: Antiques   Social Needs    Financial resource strain: None    Food insecurity:       Worry: None       Inability: None    Transportation needs:       Medical: None       Non-medical: None   Tobacco Use    Smoking status: Never Smoker    Smokeless tobacco: Never Used   Substance and Sexual Activity    Alcohol use:  Yes       Comment: rare    Drug use: No    Sexual activity: Yes   Lifestyle    Physical activity:       Days per week: None       Minutes per session: None    Stress: None   Relationships    Social connections:       Talks on phone: None       Gets together: None       Attends L[employer] service: None       Active member of club or organization: None       Attends meetings of clubs or organizations: None       Relationship status: None    Intimate partner violence:       Fear of current or ex partner: None       Emotionally abused: None       Physically abused: None       Forced sexual activity: None   Other Topics Concern    None   Social History Narrative     Lives with spouse     No caffeine use            Imaging:   CT renal stone study abdomen pelvis without contrast [[id number]] Collected: 07/08/20 1252   Order Status: Completed Updated: 07/08/20 1259   Narrative:     CT ABDOMEN AND PELVIS WITHOUT IV CONTRAST - LOW DOSE RENAL STONE     INDICATION:   Flank pain, kidney stone suspected  Hematuria, renal cause suspected  COMPARISON:  12/22/2018    TECHNIQUE:  Low dose thin section CT examination of the abdomen and pelvis was performed without intravenous or oral contrast according to a protocol specifically designed to evaluate for urinary tract calculus   Axial, sagittal, and coronal 2D   reformatted images were created from the source data and submitted for interpretation    Evaluation for pathology in the abdomen and pelvis that is unrelated to urinary tract calculi is limited  Radiation dose length product (DLP) for this visit:  285 mGy-cm    This examination, like all CT scans performed in the [facility], was performed utilizing techniques to minimize radiation dose exposure, including the use of iterative   reconstruction and automated exposure control       FINDINGS:    RIGHT KIDNEY AND URETER:  No hydronephrosis   No perinephric inflammation   Lower pole nonobstructing calculus measures 4 mm    LEFT KIDNEY AND URETER:  Left-sided hydronephrosis, secondary to calculus located in distal ureter just above the level of the left hip joint, and measuring 5 mm   Periureteral, perinephric and left retroperitoneal edema noted   Lower pole hypodense nodule earlier described on   CT and ultrasound is visualized but less distinct secondary to surrounding edema with presence or absence of change difficult to determine    URINARY BLADDER:   Unremarkable  There is a small hiatal hernia  Limited low radiation dose noncontrast CT evaluation demonstrates no clinically significant abnormality of liver, spleen, pancreas, or adrenal glands  No calcified gallstones or gallbladder wall thickening noted  No ascites or bulky lymphadenopathy on this limited noncontrast study  Colonic diverticulosis noted  Limited evaluation demonstrates no evidence to suggest acute appendicitis  Adipose containing left inguinal hernia noted      Impression:       Left-sided hydronephrosis secondary to 5 mm calculus of the distal left ureter as above described      Right kidney lower pole nonobstructing 4 mm calculus          Imaging reviewed - both report and images personally reviewed as well as reviewed witht he patient at bedside     Labs:      Recent Labs     07/08/20  1150   WBC 12 30*              Recent Labs     07/08/20  1150   HGB 14 5        Recent Labs     07/08/20  1150   CREATININE 1 51*        Microbiology:  sars-cov2 negative     UA:  0-1 RBCs, no WBCs or bacteria seen     Kal Brennan PA-C  Date: 7/8/2020 Time: 2:47 PM

## 2020-07-08 NOTE — TELEPHONE ENCOUNTER
Pt managed by Robbi Mendez, pt feels he has stone he's experiencing groin/flank pain was seen by PCP 07/07/20 was given shot Torodol,he's questioning if he should present to ER or office?

## 2020-07-08 NOTE — TELEPHONE ENCOUNTER
Pt of Dr Chencho Weathers managed at the hospitals office  History of ED, BPH and kidney stones  Last seen in the office on 6/15/2020  Pt is calling into the office stating that he has been experiencing lower abdominal and groin pain starting last night  He had the chills and a 10/10 starting yesterday  Pt presented to his PCP in regards to this complaint and his symptoms and they gave him an injection of Toradol, which helped him but at night the medication wore off and he was in extreme pain  They checked his urine at his PCP and it showed a moderate amount of blood according to patient  Informed the patient that if his pain level is uncontrolled that he should present to the nearest ER for further evaluation and assessment  Pt was in agreement with this plan and is heading to the ER at this time

## 2020-07-08 NOTE — LETTER
[address]  Dept: [phone]    July 10, 2020     Patient: Lazara Chiang   YOB: 1960   Date of Visit: 7/8/2020       To Whom it May Concern:    Lazara Chiang is under my professional care  He was seen in the hospital from 7/8/2020   to 07/10/20  If you have any questions or concerns, please don't hesitate to call           Sincerely,          Gama Soria, DO

## 2020-07-08 NOTE — CONSULTS
Consult - Urology   Shayla Kemps 1960, 63 y o  male MRN: [id number]     Unit/Bed#: ED 24 Encounter: [id number]     * Hydronephrosis with urinary obstruction due to ureteral calculus  Assessment & Plan  Left flank and groin pain few days, with microscopic hematuria  CT with 5 mm distal left ureteral calculus and hydronephrosis  CHASIDY present: baseline creatinine 1 1, current creatinine 1 5  Afebrile, without infectious signs or symptoms, WBC 12  Urinalysis bland aside from 0-1 RBCs     Flomax, strain all urine  NPO after midnight  IV hydration  Analgesia  Antiemetics  Preop COVID swab     For trial of MET overnight tonight       If no spontaneous passage, discussed plan for OR tomorrow 7/9/2020 for cystoscopy, left ureteroscopy with holmium laser, retrograde pyelogram, ureteral stent insertion        Risks benefits and technique of above procedure reviewed with patient and he wishes to proceed with MET first to try to avoid surgery but agreeable if no stone passage by tomorrow and persistent CHASIDY  He understands in the event of difficult access, poor visualization, or appearance of infection just a ureteral stent will be placed temporarily, and he would need a follow-up stage procedure to address the stone at a later date  Formal consent to be reviewed and signed with surgical attending prior         Subjective:   Left flank pain for 2 days feeling similar to previous kidney stones  Now pain is more in the left lower quadrant and groin  Urinalysis yesterday at PCP office showed microscopic blood  Initially patient was resistant to further workup and wanted to try to pass the stone at home after IM toradol in the office, however pain intensified overnight prompting presentation to the ED  No fevers chills vomiting  No gross hematuria or difficulty voiding  Currently is pain is very mild in the LLQ 2/10      Review of Systems   Constitutional: Negative  Negative for chills and fever     Respiratory: Negative  Cardiovascular: Negative  Genitourinary: Positive for flank pain  Negative for decreased urine volume, difficulty urinating, dysuria, frequency, hematuria, penile pain, testicular pain and urgency  Musculoskeletal: Positive for back pain          Objective:  Vitals: Blood pressure 149/88, pulse 75, temperature (!) 97 2 °F (36 2 °C), temperature source Temporal, resp  rate 16, weight 102 kg (224 lb 13 9 oz), SpO2 97 %  ,Body mass index is 33 3 kg/m²         Intake/Output Summary (Last 24 hours) at 7/8/2020 1447  Last data filed at 7/8/2020 1251      Gross per 24 hour   Intake 1000 ml   Output --   Net 1000 ml             Invasive Devices            Peripheral Intravenous Line                     Peripheral IV 07/08/20 Left Antecubital less than 1 day             Drain                     Ureteral Drain/Stent Right ureter 4 7 Fr  573 days                     Physical Exam   Constitutional: He is oriented to person, place, and time  He appears well-developed and well-nourished  HENT:   Head: Normocephalic and atraumatic  Cardiovascular: Normal rate, regular rhythm and normal heart sounds  No murmur heard  Pulmonary/Chest: Effort normal and breath sounds normal    Abdominal: Soft  Bowel sounds are normal  There is no tenderness  Musculoskeletal: Normal range of motion  He exhibits no edema  Neurological: He is alert and oriented to person, place, and time  Skin: Skin is warm and dry  Capillary refill takes less than 2 seconds  No pallor     Nursing note and vitals reviewed         Medical History        History:     Past Medical History:   Diagnosis Date    AVM (arteriovenous malformation)      Back pain      BPH without urinary obstruction       last assessed 01/16/18    Disc disorder       Herniated    Hemorrhoids      Hydronephrosis      Kidney disease      Renal calculi      Renal colic      Ureter colic           Surgical History         Past Surgical History:   Procedure Laterality Date    BRAIN SURGERY        COLONOSCOPY        CYSTOSCOPY         w/removal of object, last assessed 01/16/18    CYSTOSCOPY         w/removal of ureteral calculus last assessed 01/16/18    CYSTOSCOPY KIDNEY W/ URETERAL GUIDE WIRE         last assessed 01/16/18    CYSTOSCOPY W/ URETERAL STENT PLACEMENT         last assessed 01/16/18    [facility] REPAIR   1965   [facility]    HERNIA REPAIR        KIDNEY SURGERY         removal of kidney stone in 2013 at [address] W/LITHOTRIPSY &INDWELL STENT INSRT Right 12/13/2018     Procedure: CYSTOSCOPY URETEROSCOPY  RETROGRADE PYELOGRAM AND INSERTION RIGHT STENT URETERAL;  Surgeon: Shira Hughes MD;  Location: [facility];  Service: Urology    TOOTH EXTRACTION                   Family History   Problem Relation Age of Onset    Stroke Mother      Cancer Father      Diabetes Father      Prostate cancer Father      Heart disease Father      Hypertension Father        Social History   Social History            Socioeconomic History    Marital status: /Civil Union       Spouse name: None    Number of children: 1    Years of education: completed college    Highest education level: None   Occupational History    Occupation: Antiques   Social Needs    Financial resource strain: None    Food insecurity:       Worry: None       Inability: None    Transportation needs:       Medical: None       Non-medical: None   Tobacco Use    Smoking status: Never Smoker    Smokeless tobacco: Never Used   Substance and Sexual Activity    Alcohol use:  Yes       Comment: rare    Drug use: No    Sexual activity: Yes   Lifestyle    Physical activity:       Days per week: None       Minutes per session: None    Stress: None   Relationships    Social connections:       Talks on phone: None       Gets together: None       Attends Nondenominational service: None       Active member of club or organization: None       Attends meetings of clubs or organizations: None       Relationship status: None    Intimate partner violence:       Fear of current or ex partner: None       Emotionally abused: None       Physically abused: None       Forced sexual activity: None   Other Topics Concern    None   Social History Narrative     Lives with spouse     No caffeine use            Imaging:   CT renal stone study abdomen pelvis without contrast [[id number]] Collected: 07/08/20 1252   Order Status: Completed Updated: 07/08/20 1259   Narrative:     CT ABDOMEN AND PELVIS WITHOUT IV CONTRAST - LOW DOSE RENAL STONE     INDICATION:   Flank pain, kidney stone suspected  Hematuria, renal cause suspected  COMPARISON:  12/22/2018    TECHNIQUE:  Low dose thin section CT examination of the abdomen and pelvis was performed without intravenous or oral contrast according to a protocol specifically designed to evaluate for urinary tract calculus   Axial, sagittal, and coronal 2D   reformatted images were created from the source data and submitted for interpretation    Evaluation for pathology in the abdomen and pelvis that is unrelated to urinary tract calculi is limited  Radiation dose length product (DLP) for this visit:  649 mGy-cm    This examination, like all CT scans performed in the [facility], was performed utilizing techniques to minimize radiation dose exposure, including the use of iterative   reconstruction and automated exposure control       FINDINGS:    RIGHT KIDNEY AND URETER:  No hydronephrosis   No perinephric inflammation   Lower pole nonobstructing calculus measures 4 mm    LEFT KIDNEY AND URETER:  Left-sided hydronephrosis, secondary to calculus located in distal ureter just above the level of the left hip joint, and measuring 5 mm   Periureteral, perinephric and left retroperitoneal edema noted   Lower pole hypodense nodule earlier described on   CT and ultrasound is visualized but less distinct secondary to surrounding edema with presence or absence of change difficult to determine    URINARY BLADDER:   Unremarkable  There is a small hiatal hernia  Limited low radiation dose noncontrast CT evaluation demonstrates no clinically significant abnormality of liver, spleen, pancreas, or adrenal glands  No calcified gallstones or gallbladder wall thickening noted  No ascites or bulky lymphadenopathy on this limited noncontrast study  Colonic diverticulosis noted  Limited evaluation demonstrates no evidence to suggest acute appendicitis  Adipose containing left inguinal hernia noted      Impression:       Left-sided hydronephrosis secondary to 5 mm calculus of the distal left ureter as above described      Right kidney lower pole nonobstructing 4 mm calculus          Imaging reviewed - both report and images personally reviewed as well as reviewed witht he patient at bedside     Labs:      Recent Labs     07/08/20  1150   WBC 12 30*              Recent Labs     07/08/20  1150   HGB 14 5        Recent Labs     07/08/20  1150   CREATININE 1 51*        Microbiology:  sars-cov2 negative     UA:  0-1 RBCs, no WBCs or bacteria seen     Amos Watters PA-C  Date: 7/8/2020 Time: 2:47 PM

## 2020-07-08 NOTE — ASSESSMENT & PLAN NOTE
Left flank and groin pain few days, with microscopic hematuria  CT shows a 5 mm distal left ureteral calculus with proximal hydronephrosis    CHASIDY present: baseline creatinine 1 1, current creatinine 1 56, unchanged after IV fluid hydration overnight  Afebrile, without infectious signs or symptoms  Overnight failed medical expulsion therapy with no stone passage thus far  NPO in anticipation of the OR today for cystoscopy, left ureteral stent placement with retrograde pyelogram and possible left ureteroscopy  We reviewed again the possibility of staged procedure and  he understands in the event of difficult access, poor visualization, or appearance of infection just a ureteral stent will be placed temporarily, and he would need a follow-up stage procedure to address the stone at a later date  Formal consent to be reviewed and signed with surgical attending prior  Expresses his desire to be discharged home this evening, as long as he recovers from anesthesia okay, and fever free and pain controlled no contraindications to this noted

## 2020-07-08 NOTE — ED PROVIDER NOTES
History  Chief Complaint   Patient presents with    Groin Pain     Patient reports groin pain radiating from abdomen, feels similar to past kidney stones  Seen by pcp yesterday, was told he had blood in his urine, sent to ED for stone workup  Patient is a 62 y/o male with hx of BPH, kidney stones, presents to the ED for evaluation of left flank pain radiating into left groin  Pt states he began with pain yesterday  Pt reports pain feels similar to pain he has had in the past with kidney stones  Pt did not take any medication for his sx  Pt states he saw his PCP yesterday who obtained UA and saw blood in his urine  Pt was told to f/u with his Urologist, patient states he called his Urologist today who gave him option of obtaining outpatient CT or coming to the ED for evaluation, patient came to the ED  Pt has not eaten anything since 830AM this morning  Pt denies fever, chills, dysuria, testicular pain/swelling, N/V/D  Prior to Admission Medications   Prescriptions Last Dose Informant Patient Reported?  Taking?   famotidine (PEPCID) 10 mg tablet   Yes Yes   Sig: Take 10 mg by mouth   hydrochlorothiazide (HYDRODIURIL) 25 mg tablet   No Yes   Sig: Take 1 tablet (25 mg total) by mouth daily   ibuprofen (MOTRIN) 800 mg tablet   Yes Yes   Sig: Take 800 mg by mouth every 6 (six) hours as needed   meclizine (ANTIVERT) 25 mg tablet   No No   Sig: Take 1 tablet (25 mg total) by mouth daily as needed for dizziness   Patient not taking: Reported on 6/15/2020   methocarbamol (ROBAXIN) 750 mg tablet   No Yes   Sig: Take 1 tablet (750 mg total) by mouth daily as needed for muscle spasms (Pt states he takes on tablet at bedtime )   tadalafil (CIALIS) 20 MG tablet   No Yes   Sig: Take 1 tablet (20 mg total) by mouth as needed for erectile dysfunction      Facility-Administered Medications Last Administration Doses Remaining   ketorolac (TORADOL) 60 mg/2 mL IM injection 60 mg 7/7/2020  4:20 PM 0          Past Medical History:   Diagnosis Date    AVM (arteriovenous malformation)     Back pain     BPH without urinary obstruction     last assessed 01/16/18    Disc disorder     Herniated    Hemorrhoids     Hydronephrosis     Kidney disease     Renal calculi     Renal colic     Ureter colic        Past Surgical History:   Procedure Laterality Date    BRAIN SURGERY      COLONOSCOPY      CYSTOSCOPY      w/removal of object, last assessed 01/16/18    CYSTOSCOPY      w/removal of ureteral calculus last assessed 01/16/18    CYSTOSCOPY KIDNEY W/ URETERAL GUIDE WIRE      last assessed 01/16/18    CYSTOSCOPY W/ URETERAL STENT PLACEMENT      last assessed 01/16/18   [address]    HERNIA REPAIR      KIDNEY SURGERY      removal of kidney stone in 2013 at [address] W/LITHOTRIPSY &INDWELL STENT INSRT Right 12/13/2018    Procedure: CYSTOSCOPY URETEROSCOPY  RETROGRADE PYELOGRAM AND INSERTION RIGHT STENT URETERAL;  Surgeon: Sanjuana Brady MD;  Location: [facility];  Service: Urology    TOOTH EXTRACTION         Family History   Problem Relation Age of Onset    Stroke Mother     Cancer Father     Diabetes Father     Prostate cancer Father     Heart disease Father     Hypertension Father      I have reviewed and agree with the history as documented  E-Cigarette/Vaping    E-Cigarette Use Never User      E-Cigarette/Vaping Substances     Social History     Tobacco Use    Smoking status: Never Smoker    Smokeless tobacco: Never Used   Substance Use Topics    Alcohol use: Yes     Comment: rare    Drug use: No       Review of Systems   Constitutional: Negative for chills and fever  HENT: Negative for ear pain and sore throat  Eyes: Negative for redness  Respiratory: Negative for chest tightness and shortness of breath  Cardiovascular: Negative for chest pain  Gastrointestinal: Positive for abdominal pain  Negative for diarrhea, nausea and vomiting  Genitourinary: Positive for flank pain and hematuria  Negative for dysuria, penile swelling, scrotal swelling and testicular pain  Groin pain   Musculoskeletal: Negative for back pain and neck pain  Skin: Negative for rash  Neurological: Negative for speech difficulty and weakness  Psychiatric/Behavioral: Negative for confusion  Physical Exam  Physical Exam   Constitutional: He is oriented to person, place, and time  He appears well-developed and well-nourished  HENT:   Head: Normocephalic and atraumatic  Nose: Nose normal    Neck: Normal range of motion  Cardiovascular: Normal rate and regular rhythm  Pulmonary/Chest: Effort normal and breath sounds normal    Abdominal: Soft  Normal appearance and bowel sounds are normal  There is no tenderness  There is CVA tenderness (left)  There is no rigidity, no rebound and no guarding  Musculoskeletal: Normal range of motion  Neurological: He is alert and oriented to person, place, and time  Skin: Skin is warm and dry  Psychiatric: He has a normal mood and affect   His behavior is normal        Vital Signs  ED Triage Vitals [07/08/20 0933]   Temperature Pulse Respirations Blood Pressure SpO2   (!) 97 2 °F (36 2 °C) 77 18 (!) 177/89 98 %      Temp Source Heart Rate Source Patient Position - Orthostatic VS BP Location FiO2 (%)   Temporal Monitor Sitting Right arm --      Pain Score       5           Vitals:    07/08/20 0933 07/08/20 1138 07/08/20 1321 07/08/20 1542   BP: (!) 177/89 141/83 149/88 150/84   Pulse: 77 71 75 77   Patient Position - Orthostatic VS: Sitting Sitting Sitting Sitting         Visual Acuity      ED Medications  Medications   tamsulosin (FLOMAX) capsule 0 4 mg (0 4 mg Oral Given 7/8/20 1551)   sodium chloride 0 9 % bolus 1,000 mL (0 mL Intravenous Stopped 7/8/20 1251)       Diagnostic Studies  Results Reviewed     Procedure Component Value Units Date/Time    Novel Coronavirus (Covid-19),PCR [facility] [[id number]]  (Normal) Collected:  07/08/20 1313    Lab Status:  Final result Specimen:  Nares from Nose Updated:  07/08/20 1508     SARS-CoV-2 Negative    Narrative: The specimen collection materials, transport medium, and/or testing methodology utilized in the production of these test results have been proven to be reliable in a limited validation with an abbreviated program under the Emergency Utilization Authorization provided by the FDA  Testing reported as "Presumptive positive" will be confirmed with secondary testing with a reference laboratory to ensure result accuracy  Clinical caution and judgement should be used with the interpretation of these results with consideration of the clinical impression and other laboratory testing  Testing reported as "Positive" or "Negative" has been proven to be accurate according to standard laboratory validation requirements  All testing is performed with control materials showing appropriate reactivity at standard intervals        Comprehensive metabolic panel [[id number]]  (Abnormal) Collected:  07/08/20 1150    Lab Status:  Final result Specimen:  Blood from Arm, Left Updated:  07/08/20 1235     Sodium 139 mmol/L      Potassium 3 5 mmol/L      Chloride 103 mmol/L      CO2 29 mmol/L      ANION GAP 7 mmol/L      BUN 29 mg/dL      Creatinine 1 51 mg/dL      Glucose 105 mg/dL      Calcium 8 4 mg/dL      AST 20 U/L      ALT 23 U/L      Alkaline Phosphatase 52 U/L      Total Protein 7 3 g/dL      Albumin 3 4 g/dL      Total Bilirubin 0 63 mg/dL      eGFR 49 ml/min/1 73sq m     Narrative:       [place] guidelines for Chronic Kidney Disease (CKD):     Stage 1 with normal or high GFR (GFR > 90 mL/min/1 73 square meters)    Stage 2 Mild CKD (GFR = 60-89 mL/min/1 73 square meters)    Stage 3A Moderate CKD (GFR = 45-59 mL/min/1 73 square meters)    Stage 3B Moderate CKD (GFR = 30-44 mL/min/1 73 square meters)    Stage 4 Severe CKD (GFR = 15-29 mL/min/1 73 square meters)    Stage 5 End Stage CKD (GFR <15 mL/min/1 73 square meters)  Note: GFR calculation is accurate only with a steady state creatinine    CBC and differential [[id number]]  (Abnormal) Collected:  07/08/20 1150    Lab Status:  Final result Specimen:  Blood from Arm, Left Updated:  07/08/20 1157     WBC 12 30 Thousand/uL      RBC 4 68 Million/uL      Hemoglobin 14 5 g/dL      Hematocrit 43 4 %      MCV 93 fL      MCH 31 0 pg      MCHC 33 4 g/dL      RDW 13 0 %      MPV 9 1 fL      Platelets 177 Thousands/uL      nRBC 0 /100 WBCs      Neutrophils Relative 77 %      Immat GRANS % 1 %      Lymphocytes Relative 11 %      Monocytes Relative 10 %      Eosinophils Relative 1 %      Basophils Relative 0 %      Neutrophils Absolute 9 40 Thousands/µL      Immature Grans Absolute 0 07 Thousand/uL      Lymphocytes Absolute 1 38 Thousands/µL      Monocytes Absolute 1 27 Thousand/µL      Eosinophils Absolute 0 14 Thousand/µL      Basophils Absolute 0 04 Thousands/µL     Urine Microscopic [[id number]]  (Abnormal) Collected:  07/08/20 1032    Lab Status:  Final result Specimen:  Urine, Clean Catch Updated:  07/08/20 1149     RBC, UA 0-1 /hpf      WBC, UA None Seen /hpf      Epithelial Cells None Seen /hpf      Bacteria, UA None Seen /hpf     Urine Macroscopic, POC [[id number]]  (Abnormal) Collected:  07/08/20 1032    Lab Status:  Final result Specimen:  Urine Updated:  07/08/20 1033     Color, UA Yellow     Clarity, UA Clear     pH, UA 5 5     Leukocytes, UA Negative     Nitrite, UA Negative     Protein, UA Negative mg/dl      Glucose, UA Negative mg/dl      Ketones, UA Negative mg/dl      Urobilinogen, UA 0 2 E U /dl      Bilirubin, UA Negative     Blood, UA Trace     Specific Conde, UA 1 015    Narrative:       [facility] RESULT                 CT renal stone study abdomen pelvis without contrast   Final Result by Vahid Antonio MD (07/08 1258)      Left-sided hydronephrosis secondary to 5 mm calculus of the distal left ureter as above described  Right kidney lower pole nonobstructing 4 mm calculus             Workstation performed: [id number]                    Procedures  Procedures         ED Course  ED Course as of Jul 08 1635 Wed Jul 08, 2020   1103 Blood, UA(!): Trace   1241 Elevated from 1 14 one year ago   Creatinine(!): 1 51   1300 Left-sided hydronephrosis, secondary to calculus located in distal ureter just above the level of the left hip joint, and measuring 5 mm  Periureteral, perinephric and left retroperitoneal edema noted  CT renal stone study abdomen pelvis without contrast   [address] [place] text sent to Urology      [phone] Discussed case with Abner Gonzalez with Urology, recommends admission to medicine for OR tonight  Will obtain COVID swab and keep NPO      1530 EXT SARS-COV-2: Negative   1530 Discussed with Abner Gonzalez Urology who states will try flomax and fluids tonight and if patient does not pass stone, will go to OR tomorrow  Patient able to eat tonight, NPO at midnight                                                MDM  Number of Diagnoses or Management Options  CHASIDY (acute kidney injury) [facility]): new and requires workup  Left ureteral calculus: new and requires workup  Diagnosis management comments: Patient is a 62 y/o male with hx of BPH, kidney stones, presents to the ED for evaluation of left flank pain radiating into left groin  Pt states he began with pain yesterday  Pt reports pain feels similar to pain he has had in the past with kidney stones  Pt did not take any medication for his sx  Pt states he saw his PCP yesterday who obtained UA and saw blood in his urine  Pt was told to f/u with his Urologist, patient states he called his Urologist today who gave him option of obtaining outpatient CT or coming to the ED for evaluation, patient came to the ED  UA shows blood, without infection  Lab work shows WBC 12 3, creatinine elevated at 1 51 (1 14 one year ago)     CT a/p shows: Left-sided hydronephrosis, secondary to calculus located in distal ureter just above the level of the left hip joint, and measuring 5 mm  Periureteral, perinephric and left retroperitoneal edema noted  Discussed case with Dorian Dupont with Urology, recommends admission to medicine for OR tonight  Will obtain COVID swab and keep NPO  Discussed with SL[facility]  We had a detailed discussion of the patient's condition and case, including need for admission   Accepts to Dr Daisy Pereyra request/bridging orders placed  Disposition  Final diagnoses:   Left ureteral calculus   CHASIDY (acute kidney injury) ([facility] [address] )     Time reflects when diagnosis was documented in both MDM as applicable and the Disposition within this note     Time User Action Codes Description Comment    7/8/2020  1:33 PM Leydi Alvarez Add [N20 0] Nephrolithiasis     7/8/2020  1:33 PM Leydi Alvarez Modify [N20 0] Nephrolithiasis     7/8/2020  1:51 PM Adrianna Evans Add [N20 1] Left ureteral calculus     7/8/2020  4:34 PM Misti Heath Add [N17 9] CHASIDY (acute kidney injury) [facility])       ED Disposition     ED Disposition Condition Date/Time Comment    Admit Stable Wed Jul 8, 2020  1:15 PM Case was discussed with VIC and the patient's admission status was agreed to be Admission Status: observation status to the service of Dr Jesse Ramos          Follow-up Information    None         Patient's Medications   Discharge Prescriptions    No medications on file     No discharge procedures on file      PDMP Review       Value Time User    PDMP Reviewed  Yes 4/3/2020  9:36 AM Marsha Stringer DO          ED Provider  Electronically Signed by           Gunjan Montalvo PA-C  07/08/20 7043

## 2020-07-08 NOTE — CONSULTS
Consult - Urology   Alethea Devine 1960, 63 y o  male MRN: [id number]    Unit/Bed#: ED 24 Encounter: [id number]    * Hydronephrosis with urinary obstruction due to ureteral calculus  Assessment & Plan  Left flank and groin pain few days, with microscopic hematuria  CT with 5 mm distal left ureteral calculus and hydronephrosis  CHASIDY present: baseline creatinine 1 1, current creatinine 1 5  Afebrile, without infectious signs or symptoms, WBC 12  Urinalysis bland aside from 0-1 RBCs    Flomax, strain all urine  NPO after midnight  IV hydration  Analgesia  Antiemetics  Preop COVID swab    For trial of MET overnight tonight  If no spontaneous passage, discussed plan for OR tomorrow 7/9/2020 for cystoscopy, left ureteroscopy with holmium laser, retrograde pyelogram, ureteral stent insertion  Risks benefits and technique of above procedure reviewed with patient and he wishes to proceed with MET first to try to avoid surgery but agreeable if no stone passage by tomorrow and persistent CHASIDY  He understands in the event of difficult access, poor visualization, or appearance of infection just a ureteral stent will be placed temporarily, and he would need a follow-up stage procedure to address the stone at a later date  Formal consent to be reviewed and signed with surgical attending prior  Subjective:   Left flank pain for 2 days feeling similar to previous kidney stones  Now pain is more in the left lower quadrant and groin  Urinalysis yesterday at PCP office showed microscopic blood  Initially patient was resistant to further workup and wanted to try to pass the stone at home after IM toradol in the office, however pain intensified overnight prompting presentation to the ED  No fevers chills vomiting  No gross hematuria or difficulty voiding  Currently is pain is very mild in the LLQ 2/10  Review of Systems   Constitutional: Negative  Negative for chills and fever  Respiratory: Negative  Cardiovascular: Negative  Genitourinary: Positive for flank pain  Negative for decreased urine volume, difficulty urinating, dysuria, frequency, hematuria, penile pain, testicular pain and urgency  Musculoskeletal: Positive for back pain  Objective:  Vitals: Blood pressure 149/88, pulse 75, temperature (!) 97 2 °F (36 2 °C), temperature source Temporal, resp  rate 16, weight 102 kg (224 lb 13 9 oz), SpO2 97 %  ,Body mass index is 33 3 kg/m²  Intake/Output Summary (Last 24 hours) at 7/8/2020 1447  Last data filed at 7/8/2020 1251  Gross per 24 hour   Intake 1000 ml   Output    Net 1000 ml       Invasive Devices     Peripheral Intravenous Line            Peripheral IV 07/08/20 Left Antecubital less than 1 day          Drain            Ureteral Drain/Stent Right ureter 4 7 Fr  573 days                Physical Exam   Constitutional: He is oriented to person, place, and time  He appears well-developed and well-nourished  HENT:   Head: Normocephalic and atraumatic  Cardiovascular: Normal rate, regular rhythm and normal heart sounds  No murmur heard  Pulmonary/Chest: Effort normal and breath sounds normal    Abdominal: Soft  Bowel sounds are normal  There is no tenderness  Musculoskeletal: Normal range of motion  He exhibits no edema  Neurological: He is alert and oriented to person, place, and time  Skin: Skin is warm and dry  Capillary refill takes less than 2 seconds  No pallor  Nursing note and vitals reviewed        History:    Past Medical History:   Diagnosis Date    AVM (arteriovenous malformation)     Back pain     BPH without urinary obstruction     last assessed 01/16/18    Disc disorder     Herniated    Hemorrhoids     Hydronephrosis     Kidney disease     Renal calculi     Renal colic     Ureter colic      Past Surgical History:   Procedure Laterality Date    BRAIN SURGERY      COLONOSCOPY      CYSTOSCOPY      w/removal of object, last assessed 01/16/18    CYSTOSCOPY      w/removal of ureteral calculus last assessed 01/16/18    CYSTOSCOPY KIDNEY W/ URETERAL GUIDE WIRE      last assessed 01/16/18    CYSTOSCOPY W/ URETERAL STENT PLACEMENT      last assessed 01/16/18   [address]    HERNIA REPAIR      KIDNEY SURGERY      removal of kidney stone in 2013 at [address] W/LITHOTRIPSY &INDWELL STENT INSRT Right 12/13/2018    Procedure: CYSTOSCOPY URETEROSCOPY  RETROGRADE PYELOGRAM AND INSERTION RIGHT STENT URETERAL;  Surgeon: Sourav Stone MD;  Location[facility];  Service: Urology    TOOTH EXTRACTION       Family History   Problem Relation Age of Onset    Stroke Mother     Cancer Father     Diabetes Father     Prostate cancer Father     Heart disease Father     Hypertension Father      Social History     Socioeconomic History    Marital status: /Civil Union     Spouse name: None    Number of children: 1    Years of education: completed college    Highest education level: None   Occupational History    Occupation: [employer]   Social Needs    Financial resource strain: None    Food insecurity:     Worry: None     Inability: None    Transportation needs:     Medical: None     Non-medical: None   Tobacco Use    Smoking status: Never Smoker    Smokeless tobacco: Never Used   Substance and Sexual Activity    Alcohol use: Yes     Comment: rare    Drug use: No    Sexual activity: Yes   Lifestyle    Physical activity:     Days per week: None     Minutes per session: None    Stress: None   Relationships    Social connections:     Talks on phone: None     Gets together: None     Attends Congregation service: None     Active member of club or organization: None     Attends meetings of clubs or organizations: None     Relationship status: None    Intimate partner violence:     Fear of current or ex partner: None     Emotionally abused: None     Physically abused: None     Forced sexual activity: None Other Topics Concern    None   Social History Narrative    Lives with spouse    No caffeine use       Imaging:    CT renal stone study abdomen pelvis without contrast [[id number]] Collected: 07/08/20 1252   Order Status: Completed Updated: 07/08/20 1259   Narrative:     CT ABDOMEN AND PELVIS WITHOUT IV CONTRAST - LOW DOSE RENAL STONE     INDICATION:   Flank pain, kidney stone suspected  Hematuria, renal cause suspected  COMPARISON:  12/22/2018    TECHNIQUE:  Low dose thin section CT examination of the abdomen and pelvis was performed without intravenous or oral contrast according to a protocol specifically designed to evaluate for urinary tract calculus   Axial, sagittal, and coronal 2D   reformatted images were created from the source data and submitted for interpretation    Evaluation for pathology in the abdomen and pelvis that is unrelated to urinary tract calculi is limited  Radiation dose length product (DLP) for this visit:  338 mGy-cm    This examination, like all CT scans performed in the [facility], was performed utilizing techniques to minimize radiation dose exposure, including the use of iterative   reconstruction and automated exposure control  FINDINGS:    RIGHT KIDNEY AND URETER:  No hydronephrosis   No perinephric inflammation   Lower pole nonobstructing calculus measures 4 mm    LEFT KIDNEY AND URETER:  Left-sided hydronephrosis, secondary to calculus located in distal ureter just above the level of the left hip joint, and measuring 5 mm   Periureteral, perinephric and left retroperitoneal edema noted   Lower pole hypodense nodule earlier described on   CT and ultrasound is visualized but less distinct secondary to surrounding edema with presence or absence of change difficult to determine    URINARY BLADDER:   Unremarkable  There is a small hiatal hernia      Limited low radiation dose noncontrast CT evaluation demonstrates no clinically significant abnormality of liver, spleen, pancreas, or adrenal glands  No calcified gallstones or gallbladder wall thickening noted  No ascites or bulky lymphadenopathy on this limited noncontrast study  Colonic diverticulosis noted  Limited evaluation demonstrates no evidence to suggest acute appendicitis  Adipose containing left inguinal hernia noted     Impression:       Left-sided hydronephrosis secondary to 5 mm calculus of the distal left ureter as above described      Right kidney lower pole nonobstructing 4 mm calculus        Imaging reviewed - both report and images personally reviewed as well as reviewed witht he patient at bedside    Labs:  Recent Labs     07/08/20  1150   WBC 12 30*     Recent Labs     07/08/20  1150   HGB 14 5       Recent Labs     07/08/20  1150   CREATININE 1 51*       Microbiology:  sars-cov2 negative    UA:  0-1 RBCs, no WBCs or bacteria seen    Loulou Wyman PA-C  Date: 7/8/2020 Time: 2:47 PM

## 2020-07-09 ENCOUNTER — ANESTHESIA (OUTPATIENT)
Dept: PERIOP | Facility: HOSPITAL | Age: 60
End: 2020-07-09
Payer: COMMERCIAL

## 2020-07-09 ENCOUNTER — APPOINTMENT (OUTPATIENT)
Dept: RADIOLOGY | Facility: HOSPITAL | Age: 60
End: 2020-07-09
Payer: COMMERCIAL

## 2020-07-09 ENCOUNTER — TELEPHONE (OUTPATIENT)
Dept: OTHER | Facility: HOSPITAL | Age: 60
End: 2020-07-09

## 2020-07-09 ENCOUNTER — ANESTHESIA EVENT (OUTPATIENT)
Dept: PERIOP | Facility: HOSPITAL | Age: 60
End: 2020-07-09
Payer: COMMERCIAL

## 2020-07-09 LAB
ANION GAP SERPL CALCULATED.3IONS-SCNC: 8 MMOL/L (ref 4–13)
BASOPHILS # BLD AUTO: 0.05 THOUSANDS/ΜL (ref 0–0.1)
BASOPHILS NFR BLD AUTO: 0 % (ref 0–1)
BUN SERPL-MCNC: 20 MG/DL (ref 5–25)
CALCIUM SERPL-MCNC: 8.2 MG/DL (ref 8.3–10.1)
CHLORIDE SERPL-SCNC: 105 MMOL/L (ref 100–108)
CO2 SERPL-SCNC: 28 MMOL/L (ref 21–32)
CREAT SERPL-MCNC: 1.56 MG/DL (ref 0.6–1.3)
EOSINOPHIL # BLD AUTO: 0.26 THOUSAND/ΜL (ref 0–0.61)
EOSINOPHIL NFR BLD AUTO: 2 % (ref 0–6)
ERYTHROCYTE [DISTWIDTH] IN BLOOD BY AUTOMATED COUNT: 13.2 % (ref 11.6–15.1)
GFR SERPL CREATININE-BSD FRML MDRD: 47 ML/MIN/1.73SQ M
GLUCOSE SERPL-MCNC: 104 MG/DL (ref 65–140)
HCT VFR BLD AUTO: 39.2 % (ref 36.5–49.3)
HGB BLD-MCNC: 13 G/DL (ref 12–17)
IMM GRANULOCYTES # BLD AUTO: 0.06 THOUSAND/UL (ref 0–0.2)
IMM GRANULOCYTES NFR BLD AUTO: 1 % (ref 0–2)
LYMPHOCYTES # BLD AUTO: 1.36 THOUSANDS/ΜL (ref 0.6–4.47)
LYMPHOCYTES NFR BLD AUTO: 12 % (ref 14–44)
MCH RBC QN AUTO: 31 PG (ref 26.8–34.3)
MCHC RBC AUTO-ENTMCNC: 33.2 G/DL (ref 31.4–37.4)
MCV RBC AUTO: 93 FL (ref 82–98)
MONOCYTES # BLD AUTO: 1.45 THOUSAND/ΜL (ref 0.17–1.22)
MONOCYTES NFR BLD AUTO: 13 % (ref 4–12)
NEUTROPHILS # BLD AUTO: 8.38 THOUSANDS/ΜL (ref 1.85–7.62)
NEUTS SEG NFR BLD AUTO: 72 % (ref 43–75)
NRBC BLD AUTO-RTO: 0 /100 WBCS
PLATELET # BLD AUTO: 213 THOUSANDS/UL (ref 149–390)
PMV BLD AUTO: 9.1 FL (ref 8.9–12.7)
POTASSIUM SERPL-SCNC: 3.4 MMOL/L (ref 3.5–5.3)
RBC # BLD AUTO: 4.2 MILLION/UL (ref 3.88–5.62)
SODIUM SERPL-SCNC: 141 MMOL/L (ref 136–145)
WBC # BLD AUTO: 11.56 THOUSAND/UL (ref 4.31–10.16)

## 2020-07-09 PROCEDURE — C1769 GUIDE WIRE: HCPCS | Performed by: UROLOGY

## 2020-07-09 PROCEDURE — 74420 UROGRAPHY RTRGR +-KUB: CPT

## 2020-07-09 PROCEDURE — C2617 STENT, NON-COR, TEM W/O DEL: HCPCS | Performed by: UROLOGY

## 2020-07-09 PROCEDURE — 80048 BASIC METABOLIC PNL TOTAL CA: CPT | Performed by: PHYSICIAN ASSISTANT

## 2020-07-09 PROCEDURE — 52356 CYSTO/URETERO W/LITHOTRIPSY: CPT | Performed by: UROLOGY

## 2020-07-09 PROCEDURE — 85025 COMPLETE CBC W/AUTO DIFF WBC: CPT | Performed by: PHYSICIAN ASSISTANT

## 2020-07-09 PROCEDURE — 99225 PR SBSQ OBSERVATION CARE/DAY 25 MINUTES: CPT | Performed by: UROLOGY

## 2020-07-09 DEVICE — STENT CONTOUR INJ 6FR 30CM: Type: IMPLANTABLE DEVICE | Site: URETER | Status: NON-FUNCTIONAL

## 2020-07-09 RX ORDER — LIDOCAINE HYDROCHLORIDE 10 MG/ML
INJECTION, SOLUTION EPIDURAL; INFILTRATION; INTRACAUDAL; PERINEURAL AS NEEDED
Status: DISCONTINUED | OUTPATIENT
Start: 2020-07-09 | End: 2020-07-09 | Stop reason: SURG

## 2020-07-09 RX ORDER — ONDANSETRON 2 MG/ML
4 INJECTION INTRAMUSCULAR; INTRAVENOUS ONCE AS NEEDED
Status: DISCONTINUED | OUTPATIENT
Start: 2020-07-09 | End: 2020-07-09 | Stop reason: HOSPADM

## 2020-07-09 RX ORDER — PHENAZOPYRIDINE HYDROCHLORIDE 100 MG/1
100 TABLET, FILM COATED ORAL
Qty: 10 TABLET | Refills: 0 | Status: SHIPPED | OUTPATIENT
Start: 2020-07-10 | End: 2020-07-10 | Stop reason: HOSPADM

## 2020-07-09 RX ORDER — SODIUM CHLORIDE 9 MG/ML
INJECTION, SOLUTION INTRAVENOUS AS NEEDED
Status: DISCONTINUED | OUTPATIENT
Start: 2020-07-09 | End: 2020-07-09 | Stop reason: HOSPADM

## 2020-07-09 RX ORDER — MIDAZOLAM HYDROCHLORIDE 2 MG/2ML
INJECTION, SOLUTION INTRAMUSCULAR; INTRAVENOUS AS NEEDED
Status: DISCONTINUED | OUTPATIENT
Start: 2020-07-09 | End: 2020-07-09 | Stop reason: SURG

## 2020-07-09 RX ORDER — PROMETHAZINE HYDROCHLORIDE 25 MG/ML
12.5 INJECTION, SOLUTION INTRAMUSCULAR; INTRAVENOUS ONCE AS NEEDED
Status: DISCONTINUED | OUTPATIENT
Start: 2020-07-09 | End: 2020-07-09 | Stop reason: HOSPADM

## 2020-07-09 RX ORDER — CIPROFLOXACIN 250 MG/1
250 TABLET, FILM COATED ORAL EVERY 12 HOURS SCHEDULED
Status: DISCONTINUED | OUTPATIENT
Start: 2020-07-09 | End: 2020-07-10 | Stop reason: HOSPADM

## 2020-07-09 RX ORDER — ACETAMINOPHEN 325 MG/1
975 TABLET ORAL EVERY 8 HOURS PRN
Status: DISCONTINUED | OUTPATIENT
Start: 2020-07-09 | End: 2020-07-10 | Stop reason: HOSPADM

## 2020-07-09 RX ORDER — ONDANSETRON 2 MG/ML
INJECTION INTRAMUSCULAR; INTRAVENOUS AS NEEDED
Status: DISCONTINUED | OUTPATIENT
Start: 2020-07-09 | End: 2020-07-09 | Stop reason: SURG

## 2020-07-09 RX ORDER — TRAMADOL HYDROCHLORIDE 50 MG/1
50 TABLET ORAL EVERY 6 HOURS PRN
Status: DISCONTINUED | OUTPATIENT
Start: 2020-07-09 | End: 2020-07-10

## 2020-07-09 RX ORDER — CEFAZOLIN SODIUM 2 G/50ML
2000 SOLUTION INTRAVENOUS ONCE
Status: COMPLETED | OUTPATIENT
Start: 2020-07-09 | End: 2020-07-09

## 2020-07-09 RX ORDER — OXYBUTYNIN CHLORIDE 5 MG/1
5 TABLET ORAL 3 TIMES DAILY PRN
Qty: 15 TABLET | Refills: 0 | Status: SHIPPED | OUTPATIENT
Start: 2020-07-09 | End: 2020-07-10 | Stop reason: HOSPADM

## 2020-07-09 RX ORDER — CIPROFLOXACIN 250 MG/1
250 TABLET, FILM COATED ORAL EVERY 12 HOURS SCHEDULED
Qty: 10 TABLET | Refills: 0 | Status: SHIPPED | OUTPATIENT
Start: 2020-07-10 | End: 2020-07-10

## 2020-07-09 RX ORDER — SODIUM CHLORIDE, SODIUM LACTATE, POTASSIUM CHLORIDE, CALCIUM CHLORIDE 600; 310; 30; 20 MG/100ML; MG/100ML; MG/100ML; MG/100ML
INJECTION, SOLUTION INTRAVENOUS CONTINUOUS PRN
Status: DISCONTINUED | OUTPATIENT
Start: 2020-07-09 | End: 2020-07-09 | Stop reason: SURG

## 2020-07-09 RX ORDER — DEXAMETHASONE SODIUM PHOSPHATE 10 MG/ML
INJECTION, SOLUTION INTRAMUSCULAR; INTRAVENOUS AS NEEDED
Status: DISCONTINUED | OUTPATIENT
Start: 2020-07-09 | End: 2020-07-09 | Stop reason: SURG

## 2020-07-09 RX ORDER — FLUTICASONE PROPIONATE 50 MCG
1 SPRAY, SUSPENSION (ML) NASAL DAILY
Status: DISCONTINUED | OUTPATIENT
Start: 2020-07-09 | End: 2020-07-10 | Stop reason: HOSPADM

## 2020-07-09 RX ORDER — FENTANYL CITRATE/PF 50 MCG/ML
50 SYRINGE (ML) INJECTION
Status: DISCONTINUED | OUTPATIENT
Start: 2020-07-09 | End: 2020-07-09 | Stop reason: HOSPADM

## 2020-07-09 RX ORDER — SCOLOPAMINE TRANSDERMAL SYSTEM 1 MG/1
1 PATCH, EXTENDED RELEASE TRANSDERMAL ONCE AS NEEDED
Status: DISCONTINUED | OUTPATIENT
Start: 2020-07-09 | End: 2020-07-09

## 2020-07-09 RX ORDER — PHENAZOPYRIDINE HYDROCHLORIDE 100 MG/1
100 TABLET, FILM COATED ORAL
Status: DISCONTINUED | OUTPATIENT
Start: 2020-07-10 | End: 2020-07-10

## 2020-07-09 RX ORDER — KETOROLAC TROMETHAMINE 30 MG/ML
INJECTION, SOLUTION INTRAMUSCULAR; INTRAVENOUS AS NEEDED
Status: DISCONTINUED | OUTPATIENT
Start: 2020-07-09 | End: 2020-07-09 | Stop reason: SURG

## 2020-07-09 RX ORDER — ECHINACEA PURPUREA EXTRACT 125 MG
1 TABLET ORAL EVERY 2 HOUR PRN
Status: DISCONTINUED | OUTPATIENT
Start: 2020-07-09 | End: 2020-07-10

## 2020-07-09 RX ORDER — OXYBUTYNIN CHLORIDE 5 MG/1
5 TABLET ORAL 3 TIMES DAILY PRN
Status: DISCONTINUED | OUTPATIENT
Start: 2020-07-09 | End: 2020-07-10

## 2020-07-09 RX ORDER — PROPOFOL 10 MG/ML
INJECTION, EMULSION INTRAVENOUS AS NEEDED
Status: DISCONTINUED | OUTPATIENT
Start: 2020-07-09 | End: 2020-07-09 | Stop reason: SURG

## 2020-07-09 RX ADMIN — CIPROFLOXACIN HYDROCHLORIDE 250 MG: 250 TABLET, FILM COATED ORAL at 21:18

## 2020-07-09 RX ADMIN — SCOPALAMINE 1 PATCH: 1 PATCH, EXTENDED RELEASE TRANSDERMAL at 15:27

## 2020-07-09 RX ADMIN — CEFAZOLIN SODIUM 2000 MG: 2 SOLUTION INTRAVENOUS at 15:36

## 2020-07-09 RX ADMIN — FAMOTIDINE 10 MG: 20 TABLET, FILM COATED ORAL at 09:05

## 2020-07-09 RX ADMIN — SALINE NASAL SPRAY 1 SPRAY: 1.5 SOLUTION NASAL at 11:29

## 2020-07-09 RX ADMIN — IOHEXOL 15 ML: 240 INJECTION, SOLUTION INTRATHECAL; INTRAVASCULAR; INTRAVENOUS; ORAL at 16:17

## 2020-07-09 RX ADMIN — KETOROLAC TROMETHAMINE 30 MG: 30 INJECTION, SOLUTION INTRAMUSCULAR at 16:04

## 2020-07-09 RX ADMIN — LIDOCAINE HYDROCHLORIDE 50 MG: 10 INJECTION, SOLUTION EPIDURAL; INFILTRATION; INTRACAUDAL; PERINEURAL at 15:40

## 2020-07-09 RX ADMIN — SODIUM CHLORIDE 125 ML/HR: 0.9 INJECTION, SOLUTION INTRAVENOUS at 19:44

## 2020-07-09 RX ADMIN — CEFTRIAXONE SODIUM 1000 MG: 10 INJECTION, POWDER, FOR SOLUTION INTRAVENOUS at 09:05

## 2020-07-09 RX ADMIN — ACETAMINOPHEN 975 MG: 325 TABLET ORAL at 19:45

## 2020-07-09 RX ADMIN — ONDANSETRON 4 MG: 2 INJECTION INTRAMUSCULAR; INTRAVENOUS at 15:47

## 2020-07-09 RX ADMIN — HEPARIN SODIUM 5000 UNITS: 5000 INJECTION INTRAVENOUS; SUBCUTANEOUS at 21:20

## 2020-07-09 RX ADMIN — MIDAZOLAM 2 MG: 1 INJECTION INTRAMUSCULAR; INTRAVENOUS at 15:36

## 2020-07-09 RX ADMIN — SODIUM CHLORIDE 125 ML/HR: 0.9 INJECTION, SOLUTION INTRAVENOUS at 00:34

## 2020-07-09 RX ADMIN — DEXAMETHASONE SODIUM PHOSPHATE 4 MG: 10 INJECTION, SOLUTION INTRAMUSCULAR; INTRAVENOUS at 15:47

## 2020-07-09 RX ADMIN — PROPOFOL 200 MG: 10 INJECTION, EMULSION INTRAVENOUS at 15:40

## 2020-07-09 RX ADMIN — SODIUM CHLORIDE, SODIUM LACTATE, POTASSIUM CHLORIDE, AND CALCIUM CHLORIDE: .6; .31; .03; .02 INJECTION, SOLUTION INTRAVENOUS at 15:43

## 2020-07-09 NOTE — ANESTHESIA PREPROCEDURE EVALUATION
Review of Systems/Medical History  Patient summary reviewed  Chart reviewed  History of anesthetic complications PONV    Cardiovascular  Negative cardio ROS Hyperlipidemia,    Pulmonary  Negative pulmonary ROS   Comment: Lung nodule     GI/Hepatic  Negative GI/hepatic ROS   GERD well controlled, Liver disease ,        Negative  ROS Kidney stones, Kidney disease ,        Endo/Other  Negative endo/other ROS   Obesity    GYN  Negative gynecology ROS          Hematology  Negative hematology ROS      Musculoskeletal  Negative musculoskeletal ROS Back pain ,        Neurology  Negative neurology ROS   Headaches,   Comment: Hx Brain aneurysm/AVM  Tinnitus Psychology   Negative psychology ROS Anxiety,              Physical Exam    Airway    Mallampati score: III  TM Distance: >3 FB  Neck ROM: full     Dental   No notable dental hx     Cardiovascular  Comment: Negative ROS, Rhythm: regular, Rate: normal, Cardiovascular exam normal    Pulmonary  Pulmonary exam normal Breath sounds clear to auscultation,     Other Findings        Anesthesia Plan  ASA Score- 3     Anesthesia Type- general with ASA Monitors  Additional Monitors:   Airway Plan: ETT  Comment: PONV Rx  Pt also requests scopolamine patch  Patient gets very nauseous from narcotics  Plan Factors-    Induction- intravenous  Postoperative Plan- Plan for postoperative opioid use  Informed Consent- Anesthetic plan and risks discussed with patient

## 2020-07-09 NOTE — UTILIZATION REVIEW
Notification of Observation Admission/Observation Authorization Request   This is a Notification of Observation Admission for [address]  Be advised that this patient was admitted to our facility under Observation Status  Contact Freida Harris at [phone] for additional admission information  [facility] DEPT  DEDICATED [phone]  Patient Name:   Gelacio White   YOB: 1960       [address]   [address]:   [address]   Tax ID: [id number]  NPI: [id number] Attending Provider/NPI: Yanni Myers [[id number]]   Place of Service Code: 25     Place of Service Name:  CPT Code for Observation:  On [facility] / CPT 04474   Start Date:      Discharge Date & Time: No discharge date for patient encounter  Type of Admission: Observation Status Discharge Disposition (if discharged): Home/Self Care   Patient Diagnoses: Kidney stones [N20 0]  Nephrolithiasis [N20 0]  Left ureteral calculus [N20 1]  CHASIDY (acute kidney injury) ([facility] [address] ) [N17 9]     Orders: Admission Orders (From admission, onward)     Ordered        07/08/20 1316  Place in Observation  Once                    Assigned Utilization Review Contact: Al Kirk  Utilization   Network Utilization Review Department  Phone: [phone]; Fax [phone]  Email: [email or web address]   ATTENTION PAYERS: Please call the assigned Utilization  directly with any questions or concerns ALL voicemails in the department are confidential  Send all requests for admission clinical reviews, approved or denied determinations and any other requests to dedicated fax number belonging to the campus where the patient is receiving treatment

## 2020-07-09 NOTE — PLAN OF CARE
Problem: Potential for Falls  Goal: Patient will remain free of falls  Description  INTERVENTIONS:  - Assess patient frequently for physical needs  -  Identify cognitive and physical deficits and behaviors that affect risk of falls  -  [place] fall precautions as indicated by assessment   - Educate patient/family on patient safety including physical limitations  - Instruct patient to call for assistance with activity based on assessment  - Modify environment to reduce risk of injury  - Consider OT/PT consult to assist with strengthening/mobility  Outcome: Progressing     Problem: Nutrition/Hydration-ADULT  Goal: Nutrient/Hydration intake appropriate for improving, restoring or maintaining nutritional needs  Description  Monitor and assess patient's nutrition/hydration status for malnutrition  Collaborate with interdisciplinary team and initiate plan and interventions as ordered  Monitor patient's weight and dietary intake as ordered or per policy  Utilize nutrition screening tool and intervene as necessary  Determine patient's food preferences and provide high-protein, high-caloric foods as appropriate       INTERVENTIONS:  - Monitor oral intake, urinary output, labs, and treatment plans  - Assess nutrition and hydration status and recommend course of action  - Evaluate amount of meals eaten  - Assist patient with eating if necessary   - Allow adequate time for meals  - Recommend/ encourage appropriate diets, oral nutritional supplements, and vitamin/mineral supplements  - Order, calculate, and assess calorie counts as needed  - Recommend, monitor, and adjust tube feedings and TPN/PPN based on assessed needs  - Assess need for intravenous fluids  - Provide specific nutrition/hydration education as appropriate  - Include patient/family/caregiver in decisions related to nutrition  Outcome: Progressing     Problem: PAIN - ADULT  Goal: Verbalizes/displays adequate comfort level or baseline comfort level  Description  Interventions:  - Encourage patient to monitor pain and request assistance  - Assess pain using appropriate pain scale  - Administer analgesics based on type and severity of pain and evaluate response  - Implement non-pharmacological measures as appropriate and evaluate response  - Consider cultural and social influences on pain and pain management  - Notify physician/advanced practitioner if interventions unsuccessful or patient reports new pain  Outcome: Progressing     Problem: INFECTION - ADULT  Goal: Absence or prevention of progression during hospitalization  Description  INTERVENTIONS:  - Assess and monitor for signs and symptoms of infection  - Monitor lab/diagnostic results  - Monitor all insertion sites, i e  indwelling lines, tubes, and drains  - Monitor endotracheal if appropriate and nasal secretions for changes in amount and color  - [place] appropriate cooling/warming therapies per order  - Administer medications as ordered  - Instruct and encourage patient and family to use good hand hygiene technique  - Identify and instruct in appropriate isolation precautions for identified infection/condition  Outcome: Progressing  Goal: Absence of fever/infection during neutropenic period  Description  INTERVENTIONS:  - Monitor WBC    Outcome: Progressing     Problem: SAFETY ADULT  Goal: Maintain or return to baseline ADL function  Description  INTERVENTIONS:  -  Assess patient's ability to carry out ADLs; assess patient's baseline for ADL function and identify physical deficits which impact ability to perform ADLs (bathing, care of mouth/teeth, toileting, grooming, dressing, etc )  - Assess/evaluate cause of self-care deficits   - Assess range of motion  - Assess patient's mobility; develop plan if impaired  - Assess patient's need for assistive devices and provide as appropriate  - Encourage maximum independence but intervene and supervise when necessary  - Involve family in performance of ADLs  - Assess for home care needs following discharge   - Consider OT consult to assist with ADL evaluation and planning for discharge  - Provide patient education as appropriate  Outcome: Progressing  Goal: Maintain or return mobility status to optimal level  Description  INTERVENTIONS:  - Assess patient's baseline mobility status (ambulation, transfers, stairs, etc )    - Identify cognitive and physical deficits and behaviors that affect mobility  - Identify mobility aids required to assist with transfers and/or ambulation (gait belt, sit-to-stand, lift, walker, cane, etc )  - [place] fall precautions as indicated by assessment  - Record patient progress and toleration of activity level on Mobility SBAR; progress patient to next Phase/Stage  - Instruct patient to call for assistance with activity based on assessment  - Consider rehabilitation consult to assist with strengthening/weightbearing, etc   Outcome: Progressing     Problem: DISCHARGE PLANNING  Goal: Discharge to home or other facility with appropriate resources  Description  INTERVENTIONS:  - Identify barriers to discharge w/patient and caregiver  - Arrange for needed discharge resources and transportation as appropriate  - Identify discharge learning needs (meds, wound care, etc )  - Arrange for interpretive services to assist at discharge as needed  - Refer to Case Management Department for coordinating discharge planning if the patient needs post-hospital services based on physician/advanced practitioner order or complex needs related to functional status, cognitive ability, or social support system  Outcome: Progressing     Problem: Knowledge Deficit  Goal: Patient/family/caregiver demonstrates understanding of disease process, treatment plan, medications, and discharge instructions  Description  Complete learning assessment and assess knowledge base    Interventions:  - Provide teaching at level of understanding  - Provide teaching via preferred learning methods  Outcome: Progressing

## 2020-07-09 NOTE — ANESTHESIA POSTPROCEDURE EVALUATION
Post-Op Assessment Note    CV Status:  Stable    Pain management: adequate     Mental Status:  Alert and awake   Hydration Status:  Euvolemic   PONV Controlled:  Controlled   Airway Patency:  Patent   Post Op Vitals Reviewed: Yes      Staff: Anesthesiologist           /63 (07/09/20 1622)    Temp 98 3 °F (36 8 °C) (07/09/20 1622)    Pulse 64 (07/09/20 1622)   Resp 16 (07/09/20 1622)    SpO2 100 % (07/09/20 1622)

## 2020-07-09 NOTE — UTILIZATION REVIEW
Initial Clinical Review    Admission: Date/Time/Statement: Admission Orders (From admission, onward)     Ordered        07/08/20 1316  Place in Observation  Once                   Orders Placed This Encounter   Procedures    Place in Observation     Standing Status:   Standing     Number of Occurrences:   1     Order Specific Question:   Admitting Physician     Answer:   Beryle Shu [[id number]]     Order Specific Question:   Level of Care     Answer:   Med Surg [16]     ED Arrival Information     Expected Arrival Acuity Means of Arrival Escorted By Service Admission Type    - 7/8/2020 09:19 Urgent Walk-In Self Hospitalist Urgent    Arrival Complaint    possible kidney stones        Chief Complaint   Patient presents with    Groin Pain     Patient reports groin pain radiating from abdomen, feels similar to past kidney stones  Seen by pcp yesterday, was told he had blood in his urine, sent to ED for stone workup  Assessment/Plan:    63  Y O male  Presents to ED from home  With suprapubic abdominal pain,  Left sided flank pain  and dark pink colored urine for past 3 days  Had  Diaphoresis and chills  The day  PTA    + nausea  PMH  Is  Multiple kidney  Stones requiring urologic surgery,  Hypertension,AVM and  GERD  CT scan  Shows  Calculus in left  Ureter/hydronephrosis  Labs reveal elevated creatinine  Admit  Observation  With   Hydronephrosis with urinary obstruction  Due to ureteral calculus and  CHASIDY and plan is  Urology consult,  Monitor labs, IVF  And pain control  Per urology consult   ( 7/8)  Plan  OR  7/9  If no spontaneous  Passage  Will screen all urine  Mo  Signs of infection noted           ED Triage Vitals [07/08/20 0933]   Temperature Pulse Respirations Blood Pressure SpO2   (!) 97 2 °F (36 2 °C) 77 18 (!) 177/89 98 %      Temp Source Heart Rate Source Patient Position - Orthostatic VS BP Location FiO2 (%)   Temporal Monitor Sitting Right arm --      Pain Score       5        Wt Readings from Last 1 Encounters:   07/08/20 102 kg (224 lb 13 9 oz)     Additional Vital Signs:   07/09/20 0700  97 3 °F (36 3 °C)Abnormal   76  18  139/87  94 %  None (Room air)  Lying   07/08/20 2236  97 4 °F (36 3 °C)Abnormal   66  18  142/90  97 %  None (Room air)  Sitting   07/08/20 1949  97 8 °F (36 6 °C)  77  18  146/76  98 %  None (Room air)  Sitting   07/08/20 1647  97 7 °F (36 5 °C)  66  17  162/79  95 %  None (Room air)  Sitting   07/08/20 1542  --  77  16  150/84  98 %  None (Room air)  Sitting   07/08/20 1321  --  75  16  149/88  97 %  None (Room air)  Sitting   07/08/20 1138  --  71  16  141/83  98 %  None (Room air)  Sitting   07/08/20 0933  97 2 °F (36 2 °C)Abnormal   77  18  177/89Abnormal   98 %  None (Room air)  Sitting         Pertinent Labs/Diagnostic Test Results:   Ct  Abd/pelvis  ( 7/8)   Left-sided hydronephrosis secondary to 5 mm calculus of the distal left ureter as above described      Right kidney lower pole nonobstructing 4 mm calculus   Results from last 7 days   Lab Units 07/08/20  1313   SARS-COV-2  Negative     Results from last 7 days   Lab Units 07/09/20  0505 07/08/20  1150   WBC Thousand/uL 11 56* 12 30*   HEMOGLOBIN g/dL 13 0 14 5   HEMATOCRIT % 39 2 43 4   PLATELETS Thousands/uL 213 239   NEUTROS ABS Thousands/µL 8 38* 9 40*         Results from last 7 days   Lab Units 07/09/20  0505 07/08/20  1150   SODIUM mmol/L 141 139   POTASSIUM mmol/L 3 4* 3 5   CHLORIDE mmol/L 105 103   CO2 mmol/L 28 29   ANION GAP mmol/L 8 7   BUN mg/dL 20 29*   CREATININE mg/dL 1 56* 1 51*   EGFR ml/min/1 73sq m 47 49   CALCIUM mg/dL 8 2* 8 4     Results from last 7 days   Lab Units 07/08/20  1150   AST U/L 20   ALT U/L 23   ALK PHOS U/L 52   TOTAL PROTEIN g/dL 7 3   ALBUMIN g/dL 3 4*   TOTAL BILIRUBIN mg/dL 0 63         Results from last 7 days   Lab Units 07/09/20  0505 07/08/20  1150   GLUCOSE RANDOM mg/dL 104 105           Results from last 7 days   Lab Units 07/08/20  1032 07/07/20  1558   CLARITY UA Clear cloudy   COLOR UA  Yellow pink   SPEC GRAV UA  1 015  --    PH UA  5 5  --    GLUCOSE UA mg/dl Negative -   KETONES UA mg/dl Negative -   BLOOD UA  Trace* +++   PROTEIN UA mg/dl Negative -   NITRITE UA  Negative -   BILIRUBIN UA  Negative  --    BILIRUBIN UA POC   --  -   UROBILINOGEN UA E U /dl 0 2 0 2   LEUKOCYTES UA  Negative -   WBC UA /hpf None Seen  --    RBC UA /hpf 0-1*  --    BACTERIA UA /hpf None Seen  --    EPITHELIAL CELLS WET PREP /hpf None Seen  --          ED Treatment:   Medication Administration from 07/08/2020 0919 to 07/08/2020 1644       Date/Time Order Dose Route Action Comments     07/08/2020 1251 sodium chloride 0 9 % bolus 1,000 mL 0 mL Intravenous Stopped      07/08/2020 1151 sodium chloride 0 9 % bolus 1,000 mL 1,000 mL Intravenous New Bag      07/08/2020 1551 tamsulosin (FLOMAX) capsule 0 4 mg 0 4 mg Oral Given         Present on Admission:   Esophageal reflux   Hyperlipidemia   Obesity (BMI 30-39  9)      Admitting Diagnosis: Kidney stones [N20 0]  Nephrolithiasis [N20 0]  Left ureteral calculus [N20 1]  CHASIDY (acute kidney injury) ([facility] [address] ) [N17 9]  Age/Sex: 63 y o  male  Admission Orders:  Scheduled Medications:    Medications:  cefTRIAXone 1,000 mg Intravenous Q24H   famotidine 10 mg Oral Daily   heparin (porcine) 5,000 Units Subcutaneous Q8H [address]   tamsulosin 0 4 mg Oral Daily With Dinner     Continuous IV Infusions:    sodium chloride 125 mL/hr Intravenous Continuous     PRN Meds:    acetaminophen 650 mg Oral Q6H PRN   calcium carbonate 1,000 mg Oral Daily PRN   methocarbamol 750 mg Oral HS PRN   ondansetron 4 mg Intravenous Q6H PRN   senna 1 tablet Oral HS PRN       IP CONSULT TO UROLOGY    Network Utilization Review Department  [email or web address]  ATTENTION: Please call with any questions or concerns to [phone] and carefully listen to the prompts so that you are directed to the right person   All voicemails are confidential   Sarah Alarcon all requests for admission clinical reviews, approved or denied determinations and any other requests to dedicated fax number below belonging to the campus where the patient is receiving treatment   List of dedicated fax numbers for the Facilities:  [address] [place] [address] DENIALS (Administrative/Medical Necessity) [phone]   [address] (Maternity/NICU/Pediatrics) [phone]   Jason Yesi [phone]   Jaymie Duron [phone]   Delorise Jaun [phone]   [facility] [phone]   [address] [place] [address] [phone]   [facility]  [phone]   [address] [facility] [address] [facility]  [address] [facility] [address] [facility] [phone]

## 2020-07-09 NOTE — PROGRESS NOTES
Progress Note - Urology  Pablo Powell 1960, 63 y o  male MRN: [id number]    Unit/Bed#: E5 -01 Encounter: [id number]    * Hydronephrosis with urinary obstruction due to ureteral calculus  Assessment & Plan  Left flank and groin pain few days, with microscopic hematuria  CT shows a 5 mm distal left ureteral calculus with proximal hydronephrosis    CHASIDY present: baseline creatinine 1 1, current creatinine 1 56, unchanged after IV fluid hydration overnight  Afebrile, without infectious signs or symptoms  Overnight failed medical expulsion therapy with no stone passage thus far  NPO in anticipation of the OR today for cystoscopy, left ureteral stent placement with retrograde pyelogram and possible left ureteroscopy  We reviewed again the possibility of staged procedure and  he understands in the event of difficult access, poor visualization, or appearance of infection just a ureteral stent will be placed temporarily, and he would need a follow-up stage procedure to address the stone at a later date  Formal consent to be reviewed and signed with surgical attending prior  Expresses his desire to be discharged home this evening, as long as he recovers from anesthesia okay, and fever free and pain controlled no contraindications to this noted  Bedside rounds performed with BRAXTON Harris  Discussed with Dr Mary Galindo  Subjective/Objective     Subjective:   CC: "The stone didn't come out yet "  HPI:  Martita Bazan is feeling well today  Without significant pain  He notes no passage of stone  Nurses have been straining his urine  He has been ambulating  He has been NPO after midnight  Creatinine continues to be elevated 1 56 this morning  ROS:  Review of Systems   Constitutional: Negative for activity change and appetite change  HENT: Negative for congestion and ear pain  Eyes: Negative for pain  Respiratory: Negative for cough and shortness of breath      Cardiovascular: Negative for chest pain and palpitations  Gastrointestinal: Negative for abdominal distention, abdominal pain, blood in stool, constipation, diarrhea and nausea  Genitourinary: Negative for difficulty urinating, dysuria, flank pain, frequency and hematuria  Musculoskeletal: Negative for arthralgias and myalgias  Skin: Negative for rash  Allergic/Immunologic: Negative for immunocompromised state  Neurological: Negative for dizziness and headaches  Hematological: Negative for adenopathy  Does not bruise/bleed easily  Psychiatric/Behavioral: Negative for agitation  The patient is not nervous/anxious  Objective:  Vitals: Blood pressure 139/87, pulse 76, temperature (!) 97 3 °F (36 3 °C), temperature source Temporal, resp  rate 18, weight 102 kg (224 lb 13 9 oz), SpO2 94 %  ,Body mass index is 33 3 kg/m²  Intake/Output Summary (Last 24 hours) at 7/9/2020 1104  Last data filed at 7/9/2020 0508  Gross per 24 hour   Intake 2950 ml   Output 1800 ml   Net 1150 ml     Invasive Devices     Peripheral Intravenous Line            Peripheral IV 07/08/20 Left Antecubital less than 1 day          Drain            Ureteral Drain/Stent Right ureter 4 7 Fr  573 days                Physical Exam   Constitutional: He is oriented to person, place, and time  He appears well-developed and well-nourished  He is cooperative  He does not appear ill  No distress  58-year-old male, out of bed to the chair  Pleasant, chatty  No acute distress  HENT:   Head: Normocephalic and atraumatic  Moist mucous membranes  Eyes: Conjunctivae and EOM are normal    Neck: Normal range of motion  Neck supple  No tracheal deviation present  Cardiovascular: Normal rate, regular rhythm and normal heart sounds  No murmur heard  Pulmonary/Chest: Effort normal and breath sounds normal  No respiratory distress  He has no wheezes  Good airflow bilaterally on deep inspiration  Abdominal: Soft   Bowel sounds are normal  He exhibits no distension and no mass  There is no tenderness  Abdomen obese soft and benign without tenderness  Musculoskeletal: Normal range of motion  He exhibits no edema  Neurological: He is alert and oriented to person, place, and time  Skin: Skin is warm and dry  No rash noted  He is not diaphoretic  No erythema  No pallor  Psychiatric: He has a normal mood and affect  His behavior is normal  Judgment and thought content normal    Nursing note and vitals reviewed        History:    Past Medical History:   Diagnosis Date    AVM (arteriovenous malformation)     Back pain     BPH without urinary obstruction     last assessed 01/16/18    Disc disorder     Herniated    Hemorrhoids     Hydronephrosis     Kidney disease     Renal calculi     Renal colic     Ureter colic      Past Surgical History:   Procedure Laterality Date    BRAIN SURGERY      COLONOSCOPY      CYSTOSCOPY      w/removal of object, last assessed 01/16/18    CYSTOSCOPY      w/removal of ureteral calculus last assessed 01/16/18    CYSTOSCOPY KIDNEY W/ URETERAL GUIDE WIRE      last assessed 01/16/18    CYSTOSCOPY W/ URETERAL STENT PLACEMENT      last assessed 01/16/18   [address]    HERNIA REPAIR      KIDNEY SURGERY      removal of kidney stone in 2013 at [address] W/LITHOTRIPSY &INDWELL STENT INSRT Right 12/13/2018    Procedure: CYSTOSCOPY URETEROSCOPY  RETROGRADE PYELOGRAM AND INSERTION RIGHT STENT URETERAL;  Surgeon: Lorna Rodriguez MD;  Location: [facility];  Service: Urology    TOOTH EXTRACTION       Family History   Problem Relation Age of Onset    Stroke Mother     Cancer Father     Diabetes Father     Prostate cancer Father     Heart disease Father     Hypertension Father      Social History     Socioeconomic History    Marital status: /Civil Union     Spouse name: None    Number of children: 1    Years of education: completed college    Highest education level: None   Occupational History    Occupation: [employer]   Social Needs    Financial resource strain: None    Food insecurity:     Worry: None     Inability: None    Transportation needs:     Medical: None     Non-medical: None   Tobacco Use    Smoking status: Never Smoker    Smokeless tobacco: Never Used   Substance and Sexual Activity    Alcohol use: Yes     Comment: rare    Drug use: No    Sexual activity: Yes   Lifestyle    Physical activity:     Days per week: None     Minutes per session: None    Stress: None   Relationships    Social connections:     Talks on phone: None     Gets together: None     Attends Confucianism service: None     Active member of club or organization: None     Attends meetings of clubs or organizations: None     Relationship status: None    Intimate partner violence:     Fear of current or ex partner: None     Emotionally abused: None     Physically abused: None     Forced sexual activity: None   Other Topics Concern    None   Social History Narrative    Lives with spouse    No caffeine use       Labs:  Results from last 7 days   Lab Units 07/09/20  0505 07/08/20  1150   WBC Thousand/uL 11 56* 12 30*   HEMOGLOBIN g/dL 13 0 14 5   PLATELETS Thousands/uL 213 239     Results from last 7 days   Lab Units 07/09/20  0505 07/08/20  1150   SODIUM mmol/L 141 139   POTASSIUM mmol/L 3 4* 3 5   CHLORIDE mmol/L 105 103   CO2 mmol/L 28 29   BUN mg/dL 20 29*   CREATININE mg/dL 1 56* 1 51*   EGFR ml/min/1 73sq m 47 49   CALCIUM mg/dL 8 2* 8 4   AST U/L  --  20   ALT U/L  --  23   ALK PHOS U/L  --  52               Shonna Spivey PA-C  Date: 7/9/2020 Time: 11:04 AM

## 2020-07-09 NOTE — OP NOTE
OPERATIVE REPORT  PATIENT NAME: Rosa Negro    :  1960  MRN: [id number]  Pt Location: [facility] OR ROOM 03    SURGERY DATE: 2020    Surgeon(s) and Role:     * Sheila Luu MD - Primary    Preop Diagnosis:  Left ureteral calculus [N20 1]    Post-Op Diagnosis Codes:     * Left ureteral calculus [N20 1]    Procedure(s) (LRB):  CYSTOSCOPY URETEROSCOPY WITH LITHOTRIPSY HOLMIUM LASER, RETROGRADE PYELOGRAM AND INSERTION STENT URETERAL (Left)    Specimen(s):  * No specimens in log *    Estimated Blood Loss:   Minimal    Drains:  Ureteral Drain/Stent Right ureter 4 7 Fr  (Active)   Number of days: 574       Anesthesia Type:   General    Operative Indications:  Left ureteral calculus [H26 1]    Complications:   None    Procedure and Technique:  PLAN FOR STENT:     Attached to a string, to be removed in the office in 3-4 days    The patient was brought into the OR, properly identified and positioned on the table  General anesthesia was induced, and he was prepped using betadine solution and draped in lithotomy position  The 23F scope was introduced in the urethra, which showed no strictures  The prostate had moderate hyperplasia  The bladder was inspected and had no lesions, stones, or tumors  The left ureteral orifice was identified and a retrograde catheter was positioned at the orifice  A retrograde pyelogram performed demonstrated a high-grade obstruction due to a filling defect at the left ureterovesical junction  The wire was then left in place, next to which a semi-rigid ureteroscope was introduced and carefully advanced up to the stone, which was in the UVJ portion of the ureter      The Holmium laser fiber was introduced thru the scope and advanced to the edge of stone  All particles were judged small enough to pass around the stent       The semi-rigid ureteroscope  was removed from the ureter, and the cystoscope was used to place a 6F multi-length double-J 6 stent in the ureter, with the upper coils in the renal pelvis, and the distal coil in the bladder  Retrograde pyelogram on that side confirmed good position and no extravasation of contrast   The stent was left attached to a string and Tegadermed to the penis  The patient was awaken from anesthesia and taken to the PACU in good condition       Patient Disposition:  PACU     SIGNATURE: Lorna Rodriguez MD  DATE: July 9, 2020  TIME: 4:19 PM

## 2020-07-09 NOTE — PROGRESS NOTES
Progress Note - Ulysses Baird 63 y o  male MRN: [id number]    Unit/Bed#: E5 -01 Encounter: [id number]      Assessment/Plan:  1-left hydronephrosis/ureterolithiasis:   -patient presented with left-sided groin pain  He has a history of recurrent nephrolithiasis   -patient had left-sided hydronephrosis secondary to a 5 mm distal left ureteral calculus   -patient was evaluated by the Urology team   He underwent cystoscopy, ureteroscopy with lithotripsy holmium laser, and left ureteral stent placement   -patient tolerated the procedure well   -unfortunately patient had associated acute kidney injury, and will require a 2nd midnight  Continue IV fluids   -urinalysis without evidence of infection  Patient on antibiotics due to instrumentation and presence of stent    2-acute kidney injury:   -patient's baseline creatinine appears to range 1 1    -he presented with acute kidney injury with a creatinine of 1 5    -likely secondary to obstructive uropathy from his kidney stone   -continue IV fluids, repeat BMP in a m  3-GERD:  Continue H2 blocker    4-essential hypertension:  Hold hydrochlorothiazide due to acute kidney injury  Blood pressure currently adequately controlled    5-hyperlipidemia:  Continue diet therapy    6-history of AVM of the brain: asymptomatic  Continue outpatient follow-up at [facility]:  Updated patient and his wife at the bedside  Reviewed all test results and treatment plan    Discussed with patient's nurse and       VTE Pharmacologic Prophylaxis: Heparin  VTE Mechanical Prophylaxis: sequential compression device    Certification Statement: The patient will continue to require additional inpatient hospital stay due to need for further acute intervention for CHASIDY, ivf    Status: inpatient     ===================================================================    Subjective:  Pt notes he feels better  Notes he has a h/o recurrent nephrolithiasis    C/o L groin pain and burning with urination  Denies any other pain  Denies any n/v/d  Denies any sob/cough  Denies any dizziness, lightheadedness  Tolerating po      Physical Exam:   Temp:  [97 1 °F (36 2 °C)-98 3 °F (36 8 °C)] 97 1 °F (36 2 °C)  HR:  [58-88] 88  Resp:  [12-22] 16  BP: (120-152)/(63-90) 120/74    Gen:  Pleasant, non-tachypnic, non-dyspnic  Conversant  Heart: regular rate and rhythm, S1S2 present, no murmur, rub or gallop  Lungs: clear to ausculatation bilaterally  No wheezing, crackles, or rhonchi  No accessory muscle use or respiratory distress  Abd: soft, non-tender, non-distended  NABS, no guarding, rebound or peritoneal signs  Extremities: no clubbing, cyanosis or edema  2+pedal pulses bilaterally  Full range of motion  Neuro: awake, alert and oriented  Fluent speech  Strength globally intact  Skin: warm and dry: no petechiae, purpura and rash  LABS:   Results from last 7 days   Lab Units 07/09/20  0505 07/08/20  1150   WBC Thousand/uL 11 56* 12 30*   HEMOGLOBIN g/dL 13 0 14 5   HEMATOCRIT % 39 2 43 4   PLATELETS Thousands/uL 213 239     Results from last 7 days   Lab Units 07/09/20  0505 07/08/20  1150   POTASSIUM mmol/L 3 4* 3 5   CHLORIDE mmol/L 105 103   CO2 mmol/L 28 29   BUN mg/dL 20 29*   CREATININE mg/dL 1 56* 1 51*   CALCIUM mg/dL 8 2* 8 4       Hospital Data:        Procedure  7/9:Procedure(s) (LRB):  CYSTOSCOPY URETEROSCOPY WITH LITHOTRIPSY HOLMIUM LASER, RETROGRADE PYELOGRAM AND INSERTION STENT URETERAL (Left)    ---------------------------------------------------------------------------------------------------------------  This note has been constructed using a voice recognition system

## 2020-07-09 NOTE — INTERVAL H&P NOTE
H&P reviewed  After examining the patient I find no changes in the patients condition since the H&P had been written      Vitals:    07/09/20 0700   BP: 139/87   Pulse: 76   Resp: 18   Temp: (!) 97 3 °F (36 3 °C)   SpO2: 94%

## 2020-07-10 VITALS
WEIGHT: 224.87 LBS | BODY MASS INDEX: 33.3 KG/M2 | SYSTOLIC BLOOD PRESSURE: 132 MMHG | TEMPERATURE: 95.3 F | OXYGEN SATURATION: 97 % | DIASTOLIC BLOOD PRESSURE: 62 MMHG | RESPIRATION RATE: 18 BRPM | HEART RATE: 52 BPM

## 2020-07-10 LAB
ANION GAP SERPL CALCULATED.3IONS-SCNC: 6 MMOL/L (ref 4–13)
BUN SERPL-MCNC: 15 MG/DL (ref 5–25)
CALCIUM SERPL-MCNC: 8.3 MG/DL (ref 8.3–10.1)
CHLORIDE SERPL-SCNC: 106 MMOL/L (ref 100–108)
CO2 SERPL-SCNC: 28 MMOL/L (ref 21–32)
CREAT SERPL-MCNC: 1.08 MG/DL (ref 0.6–1.3)
ERYTHROCYTE [DISTWIDTH] IN BLOOD BY AUTOMATED COUNT: 13.1 % (ref 11.6–15.1)
GFR SERPL CREATININE-BSD FRML MDRD: 74 ML/MIN/1.73SQ M
GLUCOSE P FAST SERPL-MCNC: 127 MG/DL (ref 65–99)
GLUCOSE SERPL-MCNC: 127 MG/DL (ref 65–140)
HCT VFR BLD AUTO: 35.5 % (ref 36.5–49.3)
HGB BLD-MCNC: 12 G/DL (ref 12–17)
MCH RBC QN AUTO: 31.6 PG (ref 26.8–34.3)
MCHC RBC AUTO-ENTMCNC: 33.8 G/DL (ref 31.4–37.4)
MCV RBC AUTO: 93 FL (ref 82–98)
PLATELET # BLD AUTO: 209 THOUSANDS/UL (ref 149–390)
PMV BLD AUTO: 9.4 FL (ref 8.9–12.7)
POTASSIUM SERPL-SCNC: 3.9 MMOL/L (ref 3.5–5.3)
RBC # BLD AUTO: 3.8 MILLION/UL (ref 3.88–5.62)
SODIUM SERPL-SCNC: 140 MMOL/L (ref 136–145)
WBC # BLD AUTO: 9.72 THOUSAND/UL (ref 4.31–10.16)

## 2020-07-10 PROCEDURE — 85027 COMPLETE CBC AUTOMATED: CPT | Performed by: INTERNAL MEDICINE

## 2020-07-10 PROCEDURE — 80048 BASIC METABOLIC PNL TOTAL CA: CPT | Performed by: INTERNAL MEDICINE

## 2020-07-10 PROCEDURE — 99217 PR OBSERVATION CARE DISCHARGE MANAGEMENT: CPT | Performed by: INTERNAL MEDICINE

## 2020-07-10 RX ORDER — CIPROFLOXACIN 250 MG/1
250 TABLET, FILM COATED ORAL EVERY 12 HOURS SCHEDULED
Qty: 10 TABLET | Refills: 0 | Status: SHIPPED | OUTPATIENT
Start: 2020-07-10 | End: 2020-07-15

## 2020-07-10 RX ADMIN — SODIUM CHLORIDE 125 ML/HR: 0.9 INJECTION, SOLUTION INTRAVENOUS at 03:33

## 2020-07-10 RX ADMIN — CIPROFLOXACIN HYDROCHLORIDE 250 MG: 250 TABLET, FILM COATED ORAL at 08:38

## 2020-07-10 RX ADMIN — SALINE NASAL SPRAY 1 SPRAY: 1.5 SOLUTION NASAL at 08:37

## 2020-07-10 RX ADMIN — HEPARIN SODIUM 5000 UNITS: 5000 INJECTION INTRAVENOUS; SUBCUTANEOUS at 05:02

## 2020-07-10 NOTE — TELEPHONE ENCOUNTER
Patient calling to speak with clinical in regards to follow up      He can be reached at [phone]

## 2020-07-10 NOTE — NURSING NOTE
Discharger instructions reviewed with patients, all questions answered at time of D/C  IV removed, patient ambulated off unit in stable condition

## 2020-07-10 NOTE — QUICK NOTE
Val Gilford is a 50-year-old male with left ureteral calculus  Postoperative day 0 cystoscopy, retrograde pyelography, ureteroscopy, laser lithotripsy and left ureteral stent placement  Procedure went well without adverse event or complication  Plan:  1  Overnight observation  2  Discharge at the discretion of Internal Medicine service in the a m  As long as patient is afebrile and symptom controlled  3  Stent on string  Taped to dorsal aspect of penis   Do not remove or manipulate  4  Manage stent colic with the following:  Fluids, bowel regimen, anti inflammatories (if otherwise not contraindicated) bladder antispasmodics and modest narcotics  5  Please keep in mind, narcotics alone will not relieve stent related pain  6  Office will contact patient for hospital follow-up appointment date and time for stent removal   7  Stent related medications electronically prescribed  8  No further  intervention during this hospital stay

## 2020-07-10 NOTE — PLAN OF CARE
Problem: Potential for Falls  Goal: Patient will remain free of falls  Description  INTERVENTIONS:  - Assess patient frequently for physical needs  -  Identify cognitive and physical deficits and behaviors that affect risk of falls  -  [place] fall precautions as indicated by assessment   - Educate patient/family on patient safety including physical limitations  - Instruct patient to call for assistance with activity based on assessment  - Modify environment to reduce risk of injury  - Consider OT/PT consult to assist with strengthening/mobility  Outcome: Adequate for Discharge     Problem: Nutrition/Hydration-ADULT  Goal: Nutrient/Hydration intake appropriate for improving, restoring or maintaining nutritional needs  Description  Monitor and assess patient's nutrition/hydration status for malnutrition  Collaborate with interdisciplinary team and initiate plan and interventions as ordered  Monitor patient's weight and dietary intake as ordered or per policy  Utilize nutrition screening tool and intervene as necessary  Determine patient's food preferences and provide high-protein, high-caloric foods as appropriate       INTERVENTIONS:  - Monitor oral intake, urinary output, labs, and treatment plans  - Assess nutrition and hydration status and recommend course of action  - Evaluate amount of meals eaten  - Assist patient with eating if necessary   - Allow adequate time for meals  - Recommend/ encourage appropriate diets, oral nutritional supplements, and vitamin/mineral supplements  - Order, calculate, and assess calorie counts as needed  - Recommend, monitor, and adjust tube feedings and TPN/PPN based on assessed needs  - Assess need for intravenous fluids  - Provide specific nutrition/hydration education as appropriate  - Include patient/family/caregiver in decisions related to nutrition  Outcome: Adequate for Discharge     Problem: PAIN - ADULT  Goal: Verbalizes/displays adequate comfort level or baseline comfort level  Description  Interventions:  - Encourage patient to monitor pain and request assistance  - Assess pain using appropriate pain scale  - Administer analgesics based on type and severity of pain and evaluate response  - Implement non-pharmacological measures as appropriate and evaluate response  - Consider cultural and social influences on pain and pain management  - Notify physician/advanced practitioner if interventions unsuccessful or patient reports new pain  Outcome: Adequate for Discharge     Problem: INFECTION - ADULT  Goal: Absence or prevention of progression during hospitalization  Description  INTERVENTIONS:  - Assess and monitor for signs and symptoms of infection  - Monitor lab/diagnostic results  - Monitor all insertion sites, i e  indwelling lines, tubes, and drains  - Monitor endotracheal if appropriate and nasal secretions for changes in amount and color  - [place]o appropriate cooling/warming therapies per order  - Administer medications as ordered  - Instruct and encourage patient and family to use good hand hygiene technique  - Identify and instruct in appropriate isolation precautions for identified infection/condition  Outcome: Adequate for Discharge  Goal: Absence of fever/infection during neutropenic period  Description  INTERVENTIONS:  - Monitor WBC    Outcome: Adequate for Discharge     Problem: SAFETY ADULT  Goal: Maintain or return to baseline ADL function  Description  INTERVENTIONS:  -  Assess patient's ability to carry out ADLs; assess patient's baseline for ADL function and identify physical deficits which impact ability to perform ADLs (bathing, care of mouth/teeth, toileting, grooming, dressing, etc )  - Assess/evaluate cause of self-care deficits   - Assess range of motion  - Assess patient's mobility; develop plan if impaired  - Assess patient's need for assistive devices and provide as appropriate  - Encourage maximum independence but intervene and supervise when necessary  - Involve family in performance of ADLs  - Assess for home care needs following discharge   - Consider OT consult to assist with ADL evaluation and planning for discharge  - Provide patient education as appropriate  Outcome: Adequate for Discharge  Goal: Maintain or return mobility status to optimal level  Description  INTERVENTIONS:  - Assess patient's baseline mobility status (ambulation, transfers, stairs, etc )    - Identify cognitive and physical deficits and behaviors that affect mobility  - Identify mobility aids required to assist with transfers and/or ambulation (gait belt, sit-to-stand, lift, walker, cane, etc )  - [place] fall precautions as indicated by assessment  - Record patient progress and toleration of activity level on Mobility SBAR; progress patient to next Phase/Stage  - Instruct patient to call for assistance with activity based on assessment  - Consider rehabilitation consult to assist with strengthening/weightbearing, etc   Outcome: Adequate for Discharge     Problem: DISCHARGE PLANNING  Goal: Discharge to home or other facility with appropriate resources  Description  INTERVENTIONS:  - Identify barriers to discharge w/patient and caregiver  - Arrange for needed discharge resources and transportation as appropriate  - Identify discharge learning needs (meds, wound care, etc )  - Arrange for interpretive services to assist at discharge as needed  - Refer to Case Management Department for coordinating discharge planning if the patient needs post-hospital services based on physician/advanced practitioner order or complex needs related to functional status, cognitive ability, or social support system  Outcome: Adequate for Discharge     Problem: Knowledge Deficit  Goal: Patient/family/caregiver demonstrates understanding of disease process, treatment plan, medications, and discharge instructions  Description  Complete learning assessment and assess knowledge base    Interventions:  - Provide teaching at level of understanding  - Provide teaching via preferred learning methods  Outcome: Adequate for Discharge

## 2020-07-10 NOTE — PROGRESS NOTES
Progress Note - Ulises Feeling 63 y o  male MRN: [id number]    Unit/Bed#: E5 -01 Encounter: [id number]    Assessment/Plan:    CHASIDY   obstructive uropathy, improved with stent placement and IV fluids creatinine 1 08 today continue to avoid nephrotoxic agent    Hydronephrosis related to 5 millimeter calculus distal rib left ureter, status post stent and will continue follow-up with Urology    Hypertension  well controlled continue to hold HCTZ    Esophageal reflux continue Pepcid for acid control    Subjective:   Feels good, denies chest pain shortness of breath nausea vomiting diarrhea no fevers chills appetite is good no further flank pain    Objective:     Vitals: Blood pressure 132/62, pulse (!) 52, temperature (!) 95 3 °F (35 2 °C), temperature source Tympanic, resp  rate 18, weight 102 kg (224 lb 13 9 oz), SpO2 97 %  ,Body mass index is 33 3 kg/m²  Results from last 7 days   Lab Units 07/10/20  0437   WBC Thousand/uL 9 72   HEMOGLOBIN g/dL 12 0   HEMATOCRIT % 35 5*   PLATELETS Thousands/uL 209     Results from last 7 days   Lab Units 07/10/20  0438  07/08/20  1150   POTASSIUM mmol/L 3 9   < > 3 5   CHLORIDE mmol/L 106   < > 103   CO2 mmol/L 28   < > 29   BUN mg/dL 15   < > 29*   CREATININE mg/dL 1 08   < > 1 51*   CALCIUM mg/dL 8 3   < > 8 4   ALK PHOS U/L  --   --  52   ALT U/L  --   --  23   AST U/L  --   --  20    < > = values in this interval not displayed         Scheduled Meds:    Current Facility-Administered Medications:  acetaminophen 975 mg Oral Q8H PRN Gustavo Corona MD   ciprofloxacin 250 mg Oral Q12H [facility] Janine Melendrez MD   famotidine 10 mg Oral Daily Janine Melendrez MD   fluticasone 1 spray Each Nare Daily Janine Melendrez MD   heparin (porcine) 5,000 Units Subcutaneous [facility] Janine Melendrez MD   methocarbamol 750 mg Oral HS PRN Janine Melendrez MD   oxybutynin 5 mg Oral TID PRN ION Zapata   phenazopyridine 100 mg Oral TID With Meals ION Zapata   tamsulosin 0 4 mg Oral Daily With Nikhil Chris MD   traMADol 50 mg Oral Q6H PRN Solange Zamora MD       Continuous Infusions:     Physical exam:  General appearance:  Alert no distress interaction appropriate  Head/Eyes:  Nonicteric PERRL EOMI  Neck:  Supple  Lungs: CTA bilateral no wheezing rhonchi or rales  Heart: normal S1 S2 regular  Abdomen: Soft nontender with bowel sounds  Extremities: no edema  Skin: no rash    Invasive Devices     Peripheral Intravenous Line            Peripheral IV 07/08/20 Left Antecubital 1 day          Drain            Ureteral Drain/Stent Right ureter 4 7 Fr  574 days    Ureteral Drain/Stent Left ureter 6 Fr  less than 1 day                  VTE Pharmacologic Prophylaxis:  Heparin    Counseling / Coordination of Care  Total floor / unit time spent today 30  minutes  Greater than 50% of total time was spent with the patient and / or family counseling and / or coordination of care    A description of the counseling / coordination of care:

## 2020-07-10 NOTE — DISCHARGE INSTRUCTIONS
Per Urology--you were  admitted to the Internal Medicine service for left flank pain  CT obtained demonstrated obstructing left ureteral calculus (kidney stone coming out of your kidney and blocking the narrow to connecting kidney to bladder)  You were  taken to operative theater by Dr Keaton Diaz, on 07/09/20  for cystoscopy, ureteroscopy, laser lithotripsy, basket stone extraction and insertion of left ureteral stent placed the procedure went well without adverse event or complications  You have a ureteral stent in place on the left  You can see the string taped to the dorsal aspect of penis, abdomen or thigh  You were deemed both medically and surgically stable for discharge  At home you will take antibiotic , anti spasmodic (Ditropan and Pyridium)  You may shower; but recommend against submersion in water to include:  Pool, beach, hot tub etc , until stent is removed  You may continue normal activities with the exception of extremely heavy lifting  There is no recommendation for bed rest or other activity limitation  Office will contact you at once home to arrange for office follow-up visit for stent removal  by removing tape and gently pulling on the string  If you have any questions you may contact the office, refer to information above  You will have varying degrees of blood in your urine after any urologic procedure  This is to be expected  However,  if you experience fever, chills, significant blood in urine accompanied by flank, abdominal, suprapubic pain or inability to void, shortness of breath and/or fainting; please seek immediate medical attention at your local emergency room  [facility] preferably )  Our office will contact you with hospital follow-up appointment date and time  On behalf of [facility], we wish you a quick and speedy recovery      Kind regards,    [facility]          Ureteral Stent Placement   WHAT YOU NEED TO KNOW: Ureteral stent placement is a procedure to open a blocked or narrow ureter  The ureter is the tube that carries urine from your kidney into your bladder  A stent is a thin hollow plastic tube used to hold your ureter open and allow urine to flow  The stent may stay in for several weeks  DISCHARGE INSTRUCTIONS:   Medicines:   · Pain medicine  may be given to take away or decrease pain  Do not wait until the pain is severe before you take your medicine  · Antibiotics  help prevent infections  Your healthcare provider may prescribe these for you while your stent remains in  · Take your medicine as directed  Contact your healthcare provider if you think your medicine is not helping or if you have side effects  Tell him or her if you are allergic to any medicine  Keep a list of the medicines, vitamins, and herbs you take  Include the amounts, and when and why you take them  Bring the list or the pill bottles to follow-up visits  Carry your medicine list with you in case of an emergency  Follow up with your urologist as directed: You will need regular follow-up visits with your urologist as long as the stent remains in  He will check to make sure the stent is working properly  He may do urine cultures to check for infection  Write down your questions so you remember to ask them during your visits  Self-care:   · Drink liquids  as directed  Ask your healthcare provider how much liquid to drink each day and which liquids are best for you  Fluids such as cranberry or apple juice may be especially helpful to prevent urinary infections  · Return to normal activities  the day after your stent placement or as directed by your healthcare provider  · You may take a shower  the day after your stent placement if your healthcare provider says it is okay  Contact your healthcare provider or urologist if:   · You have a fever or chills  · You feel like you need to urinate often      · You have pain when you urinate or pain around your bladder or kidney  · You see blood in your urine or it looks cloudy  · You have questions or concerns about your condition or care  Seek care immediately or call 911 if:   · You urinate little or not at all  · You have severe pain in your abdomen  © 2017 [employer] Information is for End User's use only and may not be sold, redistributed or otherwise used for commercial purposes  All illustrations and images included in CareNotes® are the copyrighted property of A [employer] M [employer] Inc  or [employer]  The above information is an  only  It is not intended as medical advice for individual conditions or treatments  Talk to your doctor, nurse or pharmacist before following any medical regimen to see if it is safe and effective for you

## 2020-07-10 NOTE — DISCHARGE SUMMARY
Discharge Summary - Medical Gelacio White 63 y o  male MRN: [id number]    [address] Room / Bed: [facility] 5 /E5 -* Encounter: [id number]    BRIEF OVERVIEW    Admitting Provider: Prasanna Zarco MD  Discharge Provider: Anil Cortez DO  Admission Date: 7/8/2020     Discharge Date: No discharge date for patient encounter    Primary Care Physician at Discharge: Amna Ferrer, [address]    Primary Discharge Diagnosis  Ureteral calculi  CHASIDY    Other Problems Addressed  Patient Active Problem List    Diagnosis Date Noted    CHASIDY (acute kidney injury) ([address] ) 07/08/2020    Ulnar nerve abnormality 06/24/2019    Lumbar sprain 06/24/2019    Abnormal EKG 06/24/2019    Allergic rhinitis 06/24/2019    Hyperlipidemia 04/29/2019    Obesity (BMI 30-39 9) 04/29/2019    Snoring 04/29/2019    Enlarged prostate without lower urinary tract symptoms (luts) 03/25/2019    AVM (arteriovenous malformation) brain 01/18/2019    Nephrolithiasis 12/14/2018    History of colonic polyps 10/23/2018    Essential hypertension 02/26/2018    Radiculopathy, lumbar region 02/14/2018    BPH without urinary obstruction 01/03/2018    Left-sided headache 12/11/2017    Bilateral low back pain with left-sided sciatica 10/16/2017    DDD (degenerative disc disease), lumbar 10/16/2017    Constipation 12/21/2016    Brain aneurysm 12/21/2016    Tinnitus of both ears 07/08/2016    Cerebrovascular dural venous malformation 91/13/5762    Periumbilical hernia 66/23/4650    Colitis 05/31/2016    Hepatic steatosis 03/24/2016    Lung nodule 03/15/2016    Chest pain 03/11/2016    Mass of breast 02/01/2016    Lateral epicondylitis 03/03/2015    Elevated blood-pressure reading without diagnosis of hypertension 03/03/2015    Irritable bowel syndrome 03/19/2013    Esophageal reflux 03/19/2013    Strain of thoracic region 03/19/2013    Backache 03/04/2013    Hydronephrosis with urinary obstruction due to ureteral calculus 03/04/2013    Anxiety 03/04/2013    Other male erectile dysfunction 08/10/2012       Consulting Providers   Urology  Therapeutic Operative Procedures Performed  Cystoscopy ureteral scope with insert trip see retrograde pyelogram and insertion stent left    Discharge Disposition: home    Allergies  Allergies   Allergen Reactions    Morphine Other (See Comments)    Other      Pt states that any narcotic makes him feel sick    Oxycodone-Acetaminophen Nausea Only, Vomiting and GI Intolerance    Seasonal Ic  [Cholestatin]      Other reaction(s): Nasal Congestion, Sinus Pain     Diet restrictions:  Low-salt diet  Activity restrictions:  None  Discharge Condition:  Katherin Sink  Dr Theresa Booth in 1 week  Follow up with consulting providers  Urology in 1 week     [address] [place] [address]    Presenting Problem/History of Present Illness  Hydronephrosis with urinary obstruction due to ureteral calculus  Hospital Course  48-year-old male presents with suprapubic abdominal pain discolored urine    Left ureter stone  patient was provided IV fluids and IV narcotic pain control seen in consultation with urology  A cystoscopy was completed and a stent placed, postop experience no pain or discomfort and clear urine  Follow-up with urology in 1 week for stent removal     A KI   obstructive uropathy, creatinine on admission 1 56, after obstruction relief and IV fluids creatinine baseline 1 08  Patient is to avoid nephrotoxic agents including nonsteroidals and HCTZ until seen by Urology and PCP    Hypertension  blood pressure well controlled without medication, day of discharge 132/62  Told to stop hydrochlorothiazide to seen by PCP      Discharge  Statement   I spent 35 minutes discharging the patient  This time was spent on the day of discharge  I had direct contact with the patient on the day of discharge   Additional documentation is required if more than 30 minutes were spent on discharge  Discussed discharge and follow-up    Discharge instructions/Information to patient and family:   See after visit summary for information provided to patient and family

## 2020-07-10 NOTE — TELEPHONE ENCOUNTER
Val Gilford is a 26-year-old male seen in consultation [facility] for left obstructing ureteral calculus taken to operative theater by Dr Leander Armstrong 7/9 for cystoscopy, ureteroscopy, laser lithotripsy, retrograde pyelography and left ureteral stent placement  Stent on string  Patient will be discharged by the Internal Medicine service  Please contact patient for stent on string removal in 5 days  Thank you

## 2020-07-14 ENCOUNTER — PROCEDURE VISIT (OUTPATIENT)
Dept: UROLOGY | Facility: MEDICAL CENTER | Age: 60
End: 2020-07-14
Payer: COMMERCIAL

## 2020-07-14 ENCOUNTER — TELEPHONE (OUTPATIENT)
Dept: UROLOGY | Facility: MEDICAL CENTER | Age: 60
End: 2020-07-14

## 2020-07-14 ENCOUNTER — TELEPHONE (OUTPATIENT)
Dept: FAMILY MEDICINE CLINIC | Facility: CLINIC | Age: 60
End: 2020-07-14

## 2020-07-14 VITALS
SYSTOLIC BLOOD PRESSURE: 138 MMHG | DIASTOLIC BLOOD PRESSURE: 88 MMHG | HEIGHT: 68 IN | BODY MASS INDEX: 33.8 KG/M2 | WEIGHT: 223 LBS

## 2020-07-14 DIAGNOSIS — N20.0 NEPHROLITHIASIS: Primary | ICD-10-CM

## 2020-07-14 PROCEDURE — 3075F SYST BP GE 130 - 139MM HG: CPT

## 2020-07-14 PROCEDURE — 3079F DIAST BP 80-89 MM HG: CPT

## 2020-07-14 PROCEDURE — 1111F DSCHRG MED/CURRENT MED MERGE: CPT

## 2020-07-14 PROCEDURE — 99211 OFF/OP EST MAY X REQ PHY/QHP: CPT

## 2020-07-14 PROCEDURE — 3008F BODY MASS INDEX DOCD: CPT

## 2020-07-14 NOTE — PROGRESS NOTES
7/14/2020  Yaniv Louis is a 63 y o  male  [id number]        Diagnosis  Chief Complaint     Nephrolithiasis          Patient is s/p left ureteroscopy with stone extraction on 7/9/2020 with Dr Kiki Crowder  Return to the office as scheduled for FU with provider  Procedure Stent with String Removal    Vitals:    07/14/20 0907   BP: 138/88   BP Location: Left arm   Patient Position: Sitting   Cuff Size: Adult   Weight: 101 kg (223 lb)   Height: 5' 8" (1 727 m)       Stent with string removed intact without difficulty  Reviewed post stent removal symptoms including flank pain, dysuria, and hematuria  Instructed patient to increase oral fluid intake  Encouraged the use of NSAIDS and other prescribed pain medication as needed for discomfort  Patient instructed to call the office or report to the ER for uncontrolled pain, fever, chills, nausea or vomiting         Karely Aguilar RN

## 2020-07-14 NOTE — TELEPHONE ENCOUNTER
Please contact Jenna Gunter he was recently [facility] for a procedure for cystoscopy ureteroscopy  Please call pt to see if he would like to see Terese Jason  Pt is not on the [facility] therefore this not a TCM

## 2020-07-15 ENCOUNTER — OFFICE VISIT (OUTPATIENT)
Dept: FAMILY MEDICINE CLINIC | Facility: CLINIC | Age: 60
End: 2020-07-15
Payer: COMMERCIAL

## 2020-07-15 VITALS
WEIGHT: 220.8 LBS | BODY MASS INDEX: 33.46 KG/M2 | HEART RATE: 65 BPM | RESPIRATION RATE: 17 BRPM | SYSTOLIC BLOOD PRESSURE: 130 MMHG | DIASTOLIC BLOOD PRESSURE: 86 MMHG | OXYGEN SATURATION: 98 % | HEIGHT: 68 IN | TEMPERATURE: 97.6 F

## 2020-07-15 DIAGNOSIS — N20.0 RIGHT KIDNEY STONE: ICD-10-CM

## 2020-07-15 DIAGNOSIS — K21.9 GASTROESOPHAGEAL REFLUX DISEASE, ESOPHAGITIS PRESENCE NOT SPECIFIED: ICD-10-CM

## 2020-07-15 DIAGNOSIS — R52 PAIN: Primary | ICD-10-CM

## 2020-07-15 DIAGNOSIS — E66.9 OBESITY (BMI 30-39.9): ICD-10-CM

## 2020-07-15 DIAGNOSIS — I10 ESSENTIAL HYPERTENSION: ICD-10-CM

## 2020-07-15 PROCEDURE — 3008F BODY MASS INDEX DOCD: CPT | Performed by: FAMILY MEDICINE

## 2020-07-15 PROCEDURE — 3075F SYST BP GE 130 - 139MM HG: CPT | Performed by: FAMILY MEDICINE

## 2020-07-15 PROCEDURE — 99214 OFFICE O/P EST MOD 30 MIN: CPT | Performed by: FAMILY MEDICINE

## 2020-07-15 PROCEDURE — 1036F TOBACCO NON-USER: CPT | Performed by: FAMILY MEDICINE

## 2020-07-15 PROCEDURE — 3079F DIAST BP 80-89 MM HG: CPT | Performed by: FAMILY MEDICINE

## 2020-07-15 RX ORDER — HYDROCHLOROTHIAZIDE 25 MG/1
25 TABLET ORAL DAILY
Qty: 30 TABLET | Refills: 5 | Status: SHIPPED | OUTPATIENT
Start: 2020-07-15 | End: 2021-01-13

## 2020-07-15 RX ORDER — IBUPROFEN 800 MG/1
800 TABLET ORAL DAILY PRN
Qty: 30 TABLET | Refills: 0 | Status: SHIPPED | OUTPATIENT
Start: 2020-07-15 | End: 2020-08-13

## 2020-07-15 NOTE — PROGRESS NOTES
Assessment/Plan:  Chief Complaint   Patient presents with    Follow-up     Routine     Patient Instructions   Here for recheck after left kidney stone removal, doing well, he still has a right kidney stone  Stay well hydrated and rec also taking BP med HCTZ 25 mg daily and recheck BP in 23 months, check BP at home and call if any problems  Lose weight and take gerd med as directed prn  Refilled prescription for motrin 800 mg strength  Follow CDC guidelines for prevention of COVID 19  No problem-specific Assessment & Plan notes found for this encounter  Diagnoses and all orders for this visit:    Pain  -     ibuprofen (MOTRIN) 800 mg tablet; Take 1 tablet (800 mg total) by mouth daily as needed for moderate pain (take with food)    Essential hypertension  -     hydrochlorothiazide (HYDRODIURIL) 25 mg tablet; Take 1 tablet (25 mg total) by mouth daily    Right kidney stone    Obesity (BMI 30-39  9)    Gastroesophageal reflux disease, esophagitis presence not specified          Subjective:      Patient ID: Pablo Powell is a 63 y o  male  Here for recheck and had urology surgery for kidney stone left  Gerd stable, trying to lose weight and also has a right kidney stone  His left kidney stone was removed surgically  No cp or sob, or fever  No cough  No GI or  complaints and no hematuria as per patient  The following portions of the patient's history were reviewed and updated as appropriate: allergies, current medications, past family history, past medical history, past social history, past surgical history and problem list     Review of Systems   Constitutional: Negative for fever  Obesity   HENT: Negative  Eyes: Negative  Respiratory: Negative  Cardiovascular: Negative  Gastrointestinal: Negative  Endocrine: Negative  Genitourinary: Negative  Musculoskeletal: Negative  Skin: Negative  Allergic/Immunologic: Negative  Neurological: Negative  Hematological: Negative  Psychiatric/Behavioral: Negative  Objective:      /86 (BP Location: Left arm, Patient Position: Sitting, Cuff Size: Large)   Pulse 65   Temp 97 6 °F (36 4 °C) (Temporal)   Resp 17   Ht 5' 8" (1 727 m)   Wt 100 kg (220 lb 12 8 oz)   SpO2 98%   BMI 33 57 kg/m²          Physical Exam   Constitutional: He is oriented to person, place, and time  He appears well-developed and well-nourished  obesity   HENT:   Head: Normocephalic and atraumatic  Right Ear: External ear normal    Left Ear: External ear normal    Eyes: Pupils are equal, round, and reactive to light  Conjunctivae and EOM are normal    Neck: Normal range of motion  Neck supple  Cardiovascular: Normal rate, regular rhythm, normal heart sounds and intact distal pulses  Pulmonary/Chest: Effort normal and breath sounds normal    Musculoskeletal: Normal range of motion  Neurological: He is alert and oriented to person, place, and time  He has normal reflexes  Skin: Skin is warm and dry  Psychiatric: He has a normal mood and affect   His behavior is normal

## 2020-07-15 NOTE — PATIENT INSTRUCTIONS
Here for recheck after left kidney stone removal, doing well, he still has a right kidney stone  Stay well hydrated and rec also taking BP med HCTZ 25 mg daily and recheck BP in 23 months, check BP at home and call if any problems  Lose weight and take gerd med as directed prn  Refilled prescription for motrin 800 mg strength  Follow CDC guidelines for prevention of COVID 19

## 2020-07-16 DIAGNOSIS — N20.0 NEPHROLITHIASIS: Primary | ICD-10-CM

## 2020-07-16 NOTE — TELEPHONE ENCOUNTER
Left message per Dr Meenakshi Mccullough recommendations to have a 24 hour urine study performed and that it could take a good 4-6 weeks for him to recover and heal  He can contact the office if he has any other questions and/or concerns  Office phone number given

## 2020-07-16 NOTE — TELEPHONE ENCOUNTER
24 hour urine ordered  The patient should wait about 1 month so his kidney has time to  recover completely  I will contact him with the results  Please let him know  Thank you

## 2020-08-06 ENCOUNTER — TELEPHONE (OUTPATIENT)
Dept: FAMILY MEDICINE CLINIC | Facility: CLINIC | Age: 60
End: 2020-08-06

## 2020-08-06 DIAGNOSIS — M54.9 BACK PAIN, UNSPECIFIED BACK LOCATION, UNSPECIFIED BACK PAIN LATERALITY, UNSPECIFIED CHRONICITY: Primary | ICD-10-CM

## 2020-08-06 RX ORDER — TRAMADOL HYDROCHLORIDE 50 MG/1
50 TABLET ORAL DAILY PRN
Qty: 30 TABLET | Refills: 0 | Status: SHIPPED | OUTPATIENT
Start: 2020-08-06 | End: 2020-09-25 | Stop reason: SDUPTHER

## 2020-08-06 NOTE — TELEPHONE ENCOUNTER
Pt is requesting a refill of Tramadol for continuous back pain  Please send medication to [facility] pharmacy on [address]  Thank you

## 2020-08-13 DIAGNOSIS — R52 PAIN: ICD-10-CM

## 2020-08-13 RX ORDER — IBUPROFEN 800 MG/1
800 TABLET ORAL DAILY PRN
Qty: 30 TABLET | Refills: 0 | Status: SHIPPED | OUTPATIENT
Start: 2020-08-13 | End: 2020-10-28 | Stop reason: SDUPTHER

## 2020-09-03 ENCOUNTER — OFFICE VISIT (OUTPATIENT)
Dept: FAMILY MEDICINE CLINIC | Facility: CLINIC | Age: 60
End: 2020-09-03
Payer: COMMERCIAL

## 2020-09-03 VITALS
WEIGHT: 223 LBS | TEMPERATURE: 97.8 F | OXYGEN SATURATION: 95 % | DIASTOLIC BLOOD PRESSURE: 82 MMHG | RESPIRATION RATE: 18 BRPM | HEIGHT: 68 IN | HEART RATE: 78 BPM | SYSTOLIC BLOOD PRESSURE: 136 MMHG | BODY MASS INDEX: 33.8 KG/M2

## 2020-09-03 DIAGNOSIS — R26.89 DRAGS LEG: Primary | ICD-10-CM

## 2020-09-03 DIAGNOSIS — R26.89 DRAGS LEG: ICD-10-CM

## 2020-09-03 DIAGNOSIS — M54.9 UPPER BACK PAIN ON RIGHT SIDE: ICD-10-CM

## 2020-09-03 DIAGNOSIS — I10 ESSENTIAL HYPERTENSION: ICD-10-CM

## 2020-09-03 DIAGNOSIS — M25.511 RIGHT SHOULDER PAIN, UNSPECIFIED CHRONICITY: Primary | ICD-10-CM

## 2020-09-03 PROCEDURE — 99213 OFFICE O/P EST LOW 20 MIN: CPT | Performed by: FAMILY MEDICINE

## 2020-09-03 RX ORDER — PREDNISONE 10 MG/1
TABLET ORAL
Qty: 21 TABLET | Refills: 0 | Status: SHIPPED | OUTPATIENT
Start: 2020-09-03 | End: 2021-01-14

## 2020-09-03 NOTE — PATIENT INSTRUCTIONS
Here for right upper back and shoulder pain for 1 week  Take prednisone with food and also take BP med as directed  F-up with Dr Hooper Shown if not improved  No advil if on prednisone  Consult neurology for right and left lower extremity dragging

## 2020-09-03 NOTE — PROGRESS NOTES
Assessment/Plan:  Chief Complaint   Patient presents with    Shoulder Pain     Right     Back Pain     upper     Patient Instructions   Here for right upper back and shoulder pain for 1 week  Take prednisone with food and also take BP med as directed  F-up with Dr Shelbi Waterman if not improved  No advil if on prednisone  No problem-specific Assessment & Plan notes found for this encounter  Diagnoses and all orders for this visit:    Right shoulder pain, unspecified chronicity  -     predniSONE 10 mg tablet; Take 60 mg po day#1, 50 mg po day#2, 40 mg po day#3, 30 mg po day#4, 20 mg po day#5m and 10 mg po day#6    Upper back pain on right side  -     predniSONE 10 mg tablet; Take 60 mg po day#1, 50 mg po day#2, 40 mg po day#3, 30 mg po day#4, 20 mg po day#5m and 10 mg po day#6    Essential hypertension    Drags leg          Subjective:      Patient ID: Ravin Mcarthur is a 63 y o  male  Shoulder Pain (Right )  Back Pain (upper)    Sits at a computer all day  No other complaints  Has hx of HTN and takes med as directed  Already sees Dr Shelbi Waterman for low back pain  Patient has a foot drag at times and some days it is more prevalent  The following portions of the patient's history were reviewed and updated as appropriate: allergies, current medications, past family history, past medical history, past social history, past surgical history and problem list     Review of Systems   Constitutional: Negative  HENT: Negative  Eyes: Negative  Respiratory: Negative  Cardiovascular: Negative  Gastrointestinal: Negative  Endocrine: Negative  Genitourinary: Negative  Musculoskeletal: Negative  Skin: Negative  Allergic/Immunologic: Negative  Neurological: Negative  Hematological: Negative  Psychiatric/Behavioral: Negative            Objective:      /82 (BP Location: Left arm, Patient Position: Sitting, Cuff Size: Standard)   Pulse 78   Temp 97 8 °F (36 6 °C) (Temporal)   Resp 18   Ht 5' 8" (1 727 m)   Wt 101 kg (223 lb)   SpO2 95%   BMI 33 91 kg/m²          Physical Exam  Constitutional:       Appearance: He is well-developed  HENT:      Head: Normocephalic and atraumatic  Right Ear: External ear normal       Left Ear: External ear normal       Nose: Nose normal    Eyes:      Conjunctiva/sclera: Conjunctivae normal       Pupils: Pupils are equal, round, and reactive to light  Neck:      Musculoskeletal: Normal range of motion and neck supple  Cardiovascular:      Rate and Rhythm: Normal rate and regular rhythm  Heart sounds: Normal heart sounds  Pulmonary:      Effort: Pulmonary effort is normal       Breath sounds: Normal breath sounds  Musculoskeletal:      Comments: Right shoulder and right upper back pain   Skin:     General: Skin is warm and dry  Neurological:      Mental Status: He is alert and oriented to person, place, and time  Deep Tendon Reflexes: Reflexes are normal and symmetric     Psychiatric:         Behavior: Behavior normal

## 2020-09-07 ENCOUNTER — HOSPITAL ENCOUNTER (EMERGENCY)
Facility: HOSPITAL | Age: 60
Discharge: HOME | End: 2020-09-07
Attending: EMERGENCY MEDICINE | Admitting: EMERGENCY MEDICINE
Payer: COMMERCIAL

## 2020-09-07 VITALS
RESPIRATION RATE: 14 BRPM | HEART RATE: 71 BPM | TEMPERATURE: 98.1 F | DIASTOLIC BLOOD PRESSURE: 71 MMHG | SYSTOLIC BLOOD PRESSURE: 149 MMHG | OXYGEN SATURATION: 97 %

## 2020-09-07 DIAGNOSIS — M25.511 RIGHT SHOULDER PAIN: Primary | ICD-10-CM

## 2020-09-07 PROCEDURE — 96372 THER/PROPH/DIAG INJ SC/IM: CPT

## 2020-09-07 PROCEDURE — 99283 EMERGENCY DEPT VISIT LOW MDM: CPT

## 2020-09-07 PROCEDURE — 99284 EMERGENCY DEPT VISIT MOD MDM: CPT | Performed by: EMERGENCY MEDICINE

## 2020-09-07 RX ORDER — KETOROLAC TROMETHAMINE 30 MG/ML
30 INJECTION, SOLUTION INTRAMUSCULAR; INTRAVENOUS ONCE
Status: COMPLETED | OUTPATIENT
Start: 2020-09-07 | End: 2020-09-07

## 2020-09-07 RX ORDER — NAPROXEN 500 MG/1
500 TABLET ORAL 2 TIMES DAILY WITH MEALS
Qty: 10 TABLET | Refills: 0 | Status: SHIPPED | OUTPATIENT
Start: 2020-09-07 | End: 2022-01-27

## 2020-09-07 RX ADMIN — KETOROLAC TROMETHAMINE 30 MG: 30 INJECTION, SOLUTION INTRAMUSCULAR at 08:56

## 2020-09-07 NOTE — ED PROVIDER NOTES
History  Chief Complaint   Patient presents with    Shoulder Pain     pt c/o right sided shoulder and neck pain that got worse yesterday ongoing for about a week saw pcp rx prednisone yesterday working lifting boxes woke up with severe pain to area  History provided by:  Patient   used: No    Shoulder Pain   Location:  Shoulder  Shoulder location:  R shoulder  Injury: no    Pain details:     Quality:  Aching    Radiates to:  Does not radiate    Severity:  Moderate    Onset quality:  Gradual    Duration:  10 days    Timing:  Intermittent    Progression:  Waxing and waning  Dislocation: no    Foreign body present:  Unable to specify  Tetanus status:  Unknown  Prior injury to area:  No  Relieved by:  Nothing  Worsened by:  Nothing  Ineffective treatments:  None tried  Associated symptoms: decreased range of motion (due to pain)    Associated symptoms: no back pain, no fever, no muscle weakness, no neck pain, no numbness and no swelling    Risk factors: no frequent fractures and no recent illness        Prior to Admission Medications   Prescriptions Last Dose Informant Patient Reported?  Taking?   famotidine (PEPCID) 10 mg tablet  Self Yes Yes   Sig: Take 10 mg by mouth Taken as needed   hydrochlorothiazide (HYDRODIURIL) 25 mg tablet   No Yes   Sig: Take 1 tablet (25 mg total) by mouth daily   ibuprofen (MOTRIN) 800 mg tablet   No Yes   Sig: TAKE 1 TABLET (800 MG TOTAL) BY MOUTH DAILY AS NEEDED FOR MODERATE PAIN (TAKE WITH FOOD)   methocarbamol (ROBAXIN) 750 mg tablet   No Yes   Sig: Take 1 tablet (750 mg total) by mouth daily as needed for muscle spasms (Pt states he takes on tablet at bedtime )   predniSONE 10 mg tablet   No Yes   Sig: Take 60 mg po day#1, 50 mg po day#2, 40 mg po day#3, 30 mg po day#4, 20 mg po day#5m and 10 mg po day#6   tadalafil (CIALIS) 20 MG tablet   No Yes   Sig: Take 1 tablet (20 mg total) by mouth as needed for erectile dysfunction   traMADol (ULTRAM) 50 mg tablet No Yes   Sig: Take 1 tablet (50 mg total) by mouth daily as needed for severe pain      Facility-Administered Medications: None       Past Medical History:   Diagnosis Date    AVM (arteriovenous malformation)     Back pain     BPH without urinary obstruction     last assessed 01/16/18    Disc disorder     Herniated    Hemorrhoids     Hydronephrosis     Kidney disease     Renal calculi     Renal colic     Ureter colic        Past Surgical History:   Procedure Laterality Date    BRAIN SURGERY      COLONOSCOPY      CYSTOSCOPY      w/removal of object, last assessed 01/16/18    CYSTOSCOPY      w/removal of ureteral calculus last assessed 01/16/18    CYSTOSCOPY KIDNEY W/ URETERAL GUIDE WIRE      last assessed 01/16/18    CYSTOSCOPY W/ URETERAL STENT PLACEMENT      last assessed 01/16/18    FL RETROGRADE PYELOGRAM  7/9/2020   [address]    HERNIA REPAIR      KIDNEY SURGERY      removal of kidney stone in 2013 at [address] [facility] &[facility]WE[facility] STENT INSRT Right 12/13/2018    Procedure: CYSTOSCOPY URETEROSCOPY  RETROGRADE PYELOGRAM AND INSERTION RIGHT STENT URETERAL;  Surgeon: Aisha Kruse MD;  [facility];  Service: Urology    IA CYSTO/URETERO W/LITHOTRIPSY &INDWELL STENT INSRT Left 7/9/2020    Procedure: CYSTOSCOPY URETEROSCOPY WITH LITHOTRIPSY HOLMIUM LASER, RETROGRADE PYELOGRAM AND INSERTION STENT URETERAL;  Surgeon: Aisha Kruse MD;  [facility];  Service: Urology    TOOTH EXTRACTION         Family History   Problem Relation Age of Onset    Stroke Mother     Cancer Father     Diabetes Father     Prostate cancer Father     Heart disease Father     Hypertension Father      I have reviewed and agree with the history as documented      E-Cigarette/Vaping    E-Cigarette Use Never User      E-Cigarette/Vaping Substances    Nicotine No     THC No     CBD No     Flavoring No      Social History     Tobacco Use    Smoking status: Never Smoker    Smokeless tobacco: Never Used   Substance Use Topics    Alcohol use: Yes     Comment: rare    Drug use: No       Review of Systems   Constitutional: Negative for chills and fever  HENT: Negative for facial swelling, sore throat and trouble swallowing  Eyes: Negative for pain and visual disturbance  Respiratory: Negative for cough and shortness of breath  Cardiovascular: Negative for chest pain and leg swelling  Gastrointestinal: Negative for abdominal pain, nausea and vomiting  Genitourinary: Negative for dysuria and flank pain  Musculoskeletal: Negative for back pain, neck pain and neck stiffness  Skin: Negative for pallor and rash  Allergic/Immunologic: Negative for environmental allergies and immunocompromised state  Neurological: Negative for dizziness and headaches  Hematological: Negative for adenopathy  Does not bruise/bleed easily  Psychiatric/Behavioral: Negative for agitation and behavioral problems  All other systems reviewed and are negative  Physical Exam  Physical Exam  Vitals signs and nursing note reviewed  Constitutional:       General: He is not in acute distress  Appearance: He is well-developed  HENT:      Head: Normocephalic and atraumatic  Neck:      Musculoskeletal: Normal range of motion and neck supple  Cardiovascular:      Rate and Rhythm: Normal rate and regular rhythm  Heart sounds: Normal heart sounds  Pulmonary:      Effort: Pulmonary effort is normal       Breath sounds: Normal breath sounds  Abdominal:      General: Bowel sounds are normal       Palpations: Abdomen is soft  Tenderness: There is no abdominal tenderness  There is no guarding or rebound  Musculoskeletal:         General: No swelling or tenderness  Comments: Able to slowly have ROM of right shoulder, able to touch the other shoulder, no deformity of right shoulder, NV intact distally   Skin:     General: Skin is warm and dry  Neurological:      Mental Status: He is alert and oriented to person, place, and time  Vital Signs  ED Triage Vitals [09/07/20 0833]   Temperature Pulse Respirations Blood Pressure SpO2   98 1 °F (36 7 °C) 71 14 149/71 97 %      Temp Source Heart Rate Source Patient Position - Orthostatic VS BP Location FiO2 (%)   Oral Monitor Sitting Left arm --      Pain Score       Worst Possible Pain           Vitals:    09/07/20 0833   BP: 149/71   Pulse: 71   Patient Position - Orthostatic VS: Sitting         Visual Acuity      ED Medications  Medications   ketorolac (TORADOL) injection 30 mg (30 mg Intramuscular Given 9/7/20 0856)       Diagnostic Studies  Results Reviewed     None                 No orders to display              Procedures  Procedures         ED Course       US AUDIT      Most Recent Value   Initial Alcohol Screen: US AUDIT-C    1  How often do you have a drink containing alcohol?  0 Filed at: 09/07/2020 0834   2  How many drinks containing alcohol do you have on a typical day you are drinking? 0 Filed at: 09/07/2020 0834   3a  Male UNDER 65: How often do you have five or more drinks on one occasion? 0 Filed at: 09/07/2020 0834   3b  FEMALE Any Age, or MALE 65+: How often do you have 4 or more drinks on one occassion? 0 Filed at: 09/07/2020 0834   Audit-C Score  0 Filed at: 09/07/2020 4750                  BOB/DAST-10      Most Recent Value   How many times in the past year have you    Used an illegal drug or used a prescription medication for non-medical reasons?   Never Filed at: 09/07/2020 1439                                MDM  Number of Diagnoses or Management Options  Right shoulder pain:   Diagnosis management comments: Impression: Right shoulder pain, has been lifting heavy work, no injuries or fall, been treating with Prednisone as outpatient with no relief, used Lidocaine patch which patient has at home and took Methocarbamol which patient has at home; has hx of injections in right shoulder for chronic pain in the past  No abnormality on exam other than slow ROM due to pain  Discussed about getting shoulder x-ray, however informed patient that nothing might show up on XR as there is not hx of injuries  Patient would like to hold off ion XR  OK with Toradol injection, continue Robaxin and Lidocaine patch which pt has at home; we will [facility] follow up, may need outpatient MRI  Patient ok with plan  Disposition  Final diagnoses:   Right shoulder pain     Time reflects when diagnosis was documented in both MDM as applicable and the Disposition within this note     Time User Action Codes Description Comment    9/7/2020  8:49 AM Hailey, Pr-[address] Lara Olea 917 Right shoulder pain       ED Disposition     ED Disposition Condition Date/Time Comment    Discharge Stable Mon Sep 7, 2020  8:49 AM Aleida Smith discharge to home/self care              Follow-up Information     Follow up With Specialties Details Why Contact Info Additional [address] [facility] Orthopedic Surgery Schedule an appointment as soon as possible for a visit   [address] [facility]  [place] [address] [facility] [id number]  [address] [place] [address] [address] [facility], [address] [place] [address], [address] [phone]          Discharge Medication List as of 9/7/2020  8:51 AM      START taking these medications    Details   naproxen (NAPROSYN) 500 mg tablet Take 1 tablet (500 mg total) by mouth 2 (two) times a day with meals for 5 days, Starting Mon 9/7/2020, Until Sat 9/12/2020, Print         CONTINUE these medications which have NOT CHANGED    Details   famotidine (PEPCID) 10 mg tablet Take 10 mg by mouth Taken as needed, Historical Med      hydrochlorothiazide (HYDRODIURIL) 25 mg tablet Take 1 tablet (25 mg total) by mouth daily, Starting Wed 7/15/2020, Normal      ibuprofen (MOTRIN) 800 mg tablet TAKE 1 TABLET (800 MG TOTAL) BY MOUTH DAILY AS NEEDED FOR MODERATE PAIN (TAKE WITH FOOD), Starting Thu 8/13/2020, Normal      methocarbamol (ROBAXIN) 750 mg tablet Take 1 tablet (750 mg total) by mouth daily as needed for muscle spasms (Pt states he takes on tablet at bedtime ), Starting Thu 6/25/2020, Normal      predniSONE 10 mg tablet Take 60 mg po day#1, 50 mg po day#2, 40 mg po day#3, 30 mg po day#4, 20 mg po day#5m and 10 mg po day#6, Normal      tadalafil (CIALIS) 20 MG tablet Take 1 tablet (20 mg total) by mouth as needed for erectile dysfunction, Starting Mon 6/15/2020, Print      traMADol (ULTRAM) 50 mg tablet Take 1 tablet (50 mg total) by mouth daily as needed for severe pain, Starting Thu 8/6/2020, Normal           No discharge procedures on file      PDMP Review       Value Time User    PDMP Reviewed  Yes 8/6/2020  5:12 PM Luis Urban DO          ED Provider  Electronically Signed by           Nita Cantu MD  09/07/20 7683

## 2020-09-24 DIAGNOSIS — M47.22 CERVICAL SPONDYLOSIS WITH RADICULOPATHY: Primary | ICD-10-CM

## 2020-09-25 DIAGNOSIS — M54.9 BACK PAIN, UNSPECIFIED BACK LOCATION, UNSPECIFIED BACK PAIN LATERALITY, UNSPECIFIED CHRONICITY: ICD-10-CM

## 2020-09-25 RX ORDER — TRAMADOL HYDROCHLORIDE 50 MG/1
50 TABLET ORAL DAILY PRN
Qty: 30 TABLET | Refills: 0 | Status: SHIPPED | OUTPATIENT
Start: 2020-09-25 | End: 2020-12-28 | Stop reason: SDUPTHER

## 2020-09-25 NOTE — TELEPHONE ENCOUNTER
Pt called asking for a refill of Tramadol sent to [facility] on [facility]  Please advise

## 2020-10-05 ENCOUNTER — OFFICE VISIT (OUTPATIENT)
Dept: FAMILY MEDICINE CLINIC | Facility: CLINIC | Age: 60
End: 2020-10-05
Payer: COMMERCIAL

## 2020-10-05 VITALS
BODY MASS INDEX: 33.03 KG/M2 | WEIGHT: 223 LBS | SYSTOLIC BLOOD PRESSURE: 122 MMHG | RESPIRATION RATE: 18 BRPM | TEMPERATURE: 97.2 F | DIASTOLIC BLOOD PRESSURE: 74 MMHG | HEIGHT: 69 IN | HEART RATE: 65 BPM | OXYGEN SATURATION: 97 %

## 2020-10-05 DIAGNOSIS — K21.9 GASTROESOPHAGEAL REFLUX DISEASE, UNSPECIFIED WHETHER ESOPHAGITIS PRESENT: ICD-10-CM

## 2020-10-05 DIAGNOSIS — I10 ESSENTIAL HYPERTENSION: Primary | ICD-10-CM

## 2020-10-05 DIAGNOSIS — N52.9 ERECTILE DYSFUNCTION, UNSPECIFIED ERECTILE DYSFUNCTION TYPE: ICD-10-CM

## 2020-10-05 DIAGNOSIS — E66.9 OBESITY (BMI 30-39.9): ICD-10-CM

## 2020-10-05 PROCEDURE — 3074F SYST BP LT 130 MM HG: CPT | Performed by: FAMILY MEDICINE

## 2020-10-05 PROCEDURE — 1036F TOBACCO NON-USER: CPT | Performed by: FAMILY MEDICINE

## 2020-10-05 PROCEDURE — 3078F DIAST BP <80 MM HG: CPT | Performed by: FAMILY MEDICINE

## 2020-10-05 PROCEDURE — 99214 OFFICE O/P EST MOD 30 MIN: CPT | Performed by: FAMILY MEDICINE

## 2020-10-05 RX ORDER — FLUOROURACIL 50 MG/G
CREAM TOPICAL
COMMUNITY
Start: 2020-09-23 | End: 2022-01-27

## 2020-10-28 ENCOUNTER — TELEPHONE (OUTPATIENT)
Dept: FAMILY MEDICINE CLINIC | Facility: CLINIC | Age: 60
End: 2020-10-28

## 2020-10-28 ENCOUNTER — HOSPITAL ENCOUNTER (EMERGENCY)
Facility: HOSPITAL | Age: 60
Discharge: HOME | End: 2020-10-28
Attending: EMERGENCY MEDICINE
Payer: COMMERCIAL

## 2020-10-28 VITALS
BODY MASS INDEX: 34.84 KG/M2 | WEIGHT: 235.89 LBS | HEART RATE: 80 BPM | DIASTOLIC BLOOD PRESSURE: 89 MMHG | TEMPERATURE: 98.6 F | RESPIRATION RATE: 18 BRPM | SYSTOLIC BLOOD PRESSURE: 140 MMHG | OXYGEN SATURATION: 98 %

## 2020-10-28 DIAGNOSIS — G89.29 ACUTE EXACERBATION OF CHRONIC LOW BACK PAIN: Primary | ICD-10-CM

## 2020-10-28 DIAGNOSIS — M54.50 ACUTE EXACERBATION OF CHRONIC LOW BACK PAIN: Primary | ICD-10-CM

## 2020-10-28 DIAGNOSIS — R52 PAIN: ICD-10-CM

## 2020-10-28 PROCEDURE — 99284 EMERGENCY DEPT VISIT MOD MDM: CPT | Performed by: PHYSICIAN ASSISTANT

## 2020-10-28 PROCEDURE — 96372 THER/PROPH/DIAG INJ SC/IM: CPT

## 2020-10-28 PROCEDURE — 99283 EMERGENCY DEPT VISIT LOW MDM: CPT

## 2020-10-28 RX ORDER — IBUPROFEN 800 MG/1
800 TABLET ORAL DAILY PRN
Qty: 30 TABLET | Refills: 0 | Status: SHIPPED | OUTPATIENT
Start: 2020-10-28 | End: 2022-01-27

## 2020-10-28 RX ORDER — LIDOCAINE 50 MG/G
1 PATCH TOPICAL ONCE
Status: DISCONTINUED | OUTPATIENT
Start: 2020-10-28 | End: 2020-10-28 | Stop reason: HOSPADM

## 2020-10-28 RX ORDER — KETOROLAC TROMETHAMINE 30 MG/ML
15 INJECTION, SOLUTION INTRAMUSCULAR; INTRAVENOUS ONCE
Status: COMPLETED | OUTPATIENT
Start: 2020-10-28 | End: 2020-10-28

## 2020-10-28 RX ADMIN — KETOROLAC TROMETHAMINE 15 MG: 30 INJECTION, SOLUTION INTRAMUSCULAR; INTRAVENOUS at 12:47

## 2020-10-28 RX ADMIN — LIDOCAINE 1 PATCH: 50 PATCH CUTANEOUS at 12:47

## 2020-12-01 ENCOUNTER — TELEMEDICINE (OUTPATIENT)
Dept: FAMILY MEDICINE CLINIC | Facility: CLINIC | Age: 60
End: 2020-12-01
Payer: COMMERCIAL

## 2020-12-01 DIAGNOSIS — R51.9 NONINTRACTABLE HEADACHE, UNSPECIFIED CHRONICITY PATTERN, UNSPECIFIED HEADACHE TYPE: ICD-10-CM

## 2020-12-01 DIAGNOSIS — Z20.822 CLOSE EXPOSURE TO COVID-19 VIRUS: Primary | ICD-10-CM

## 2020-12-01 DIAGNOSIS — Z20.822 CLOSE EXPOSURE TO COVID-19 VIRUS: ICD-10-CM

## 2020-12-01 PROCEDURE — U0003 INFECTIOUS AGENT DETECTION BY NUCLEIC ACID (DNA OR RNA); SEVERE ACUTE RESPIRATORY SYNDROME CORONAVIRUS 2 (SARS-COV-2) (CORONAVIRUS DISEASE [COVID-19]), AMPLIFIED PROBE TECHNIQUE, MAKING USE OF HIGH THROUGHPUT TECHNOLOGIES AS DESCRIBED BY CMS-2020-01-R: HCPCS | Performed by: FAMILY MEDICINE

## 2020-12-01 PROCEDURE — 99213 OFFICE O/P EST LOW 20 MIN: CPT | Performed by: FAMILY MEDICINE

## 2020-12-02 LAB — SARS-COV-2 RNA SPEC QL NAA+PROBE: NOT DETECTED

## 2020-12-28 DIAGNOSIS — M54.9 BACK PAIN, UNSPECIFIED BACK LOCATION, UNSPECIFIED BACK PAIN LATERALITY, UNSPECIFIED CHRONICITY: ICD-10-CM

## 2020-12-28 RX ORDER — TRAMADOL HYDROCHLORIDE 50 MG/1
50 TABLET ORAL DAILY PRN
Qty: 30 TABLET | Refills: 0 | Status: SHIPPED | OUTPATIENT
Start: 2020-12-28 | End: 2021-01-14

## 2021-01-04 ENCOUNTER — HOSPITAL ENCOUNTER (EMERGENCY)
Facility: HOSPITAL | Age: 61
Discharge: HOME | End: 2021-01-04
Attending: EMERGENCY MEDICINE | Admitting: EMERGENCY MEDICINE
Payer: COMMERCIAL

## 2021-01-04 VITALS
HEART RATE: 102 BPM | WEIGHT: 244.71 LBS | TEMPERATURE: 100.3 F | DIASTOLIC BLOOD PRESSURE: 83 MMHG | OXYGEN SATURATION: 93 % | SYSTOLIC BLOOD PRESSURE: 150 MMHG | RESPIRATION RATE: 20 BRPM | BODY MASS INDEX: 36.14 KG/M2

## 2021-01-04 DIAGNOSIS — Z20.822 ENCOUNTER FOR LABORATORY TESTING FOR COVID-19 VIRUS: ICD-10-CM

## 2021-01-04 DIAGNOSIS — B34.9 VIRAL SYNDROME: Primary | ICD-10-CM

## 2021-01-04 PROCEDURE — 99283 EMERGENCY DEPT VISIT LOW MDM: CPT

## 2021-01-04 PROCEDURE — 99282 EMERGENCY DEPT VISIT SF MDM: CPT | Performed by: PHYSICIAN ASSISTANT

## 2021-01-04 PROCEDURE — U0003 INFECTIOUS AGENT DETECTION BY NUCLEIC ACID (DNA OR RNA); SEVERE ACUTE RESPIRATORY SYNDROME CORONAVIRUS 2 (SARS-COV-2) (CORONAVIRUS DISEASE [COVID-19]), AMPLIFIED PROBE TECHNIQUE, MAKING USE OF HIGH THROUGHPUT TECHNOLOGIES AS DESCRIBED BY CMS-2020-01-R: HCPCS | Performed by: PHYSICIAN ASSISTANT

## 2021-01-04 RX ORDER — MELATONIN
1000 DAILY
Qty: 10 TABLET | Refills: 0 | Status: SHIPPED | OUTPATIENT
Start: 2021-01-04 | End: 2021-04-23

## 2021-01-04 RX ORDER — MULTIVIT WITH MINERALS/LUTEIN
1000 TABLET ORAL 2 TIMES DAILY
Qty: 20 TABLET | Refills: 0 | Status: SHIPPED | OUTPATIENT
Start: 2021-01-04 | End: 2021-04-23

## 2021-01-04 RX ORDER — MULTIVITAMIN
1 TABLET ORAL DAILY
Qty: 10 TABLET | Refills: 0 | Status: SHIPPED | OUTPATIENT
Start: 2021-01-04 | End: 2021-04-23

## 2021-01-04 NOTE — ED PROVIDER NOTES
History  Chief Complaint   Patient presents with    Chills     Onset yesterday of chills and cough  No known Covid exposure  63year old male with a history of AVM, lumbar back pain and nephrolithiasis presents to the emergency department for evaluation of fever, chills, nonproductive cough and body aches  Patient reports the symptoms started yesterday  He denies any dysuria, hematuria, congestion, shortness of breath, chest pain, n/v/d, changes in taste or smell, or sick contacts  He reports mild frontal headache, but denies it being maximal in onset, not the worst headache he has ever had, not consistent with his AVM rupture  History provided by:  Medical records and patient   used: No    Flu Symptoms  Presenting symptoms: cough, fatigue, fever, headache and myalgias    Presenting symptoms: no diarrhea, no nausea, no rhinorrhea, no shortness of breath, no sore throat and no vomiting    Severity:  Mild  Onset quality:  Gradual  Duration:  2 days  Progression:  Unchanged  Chronicity:  New  Relieved by:  None tried  Worsened by:  Nothing  Ineffective treatments:  None tried  Associated symptoms: decreased appetite    Associated symptoms: no chills, no congestion and no syncope    Risk factors: no immunocompromised state and no sick contacts        Prior to Admission Medications   Prescriptions Last Dose Informant Patient Reported?  Taking?   famotidine (PEPCID) 10 mg tablet  Self Yes No   Sig: Take 10 mg by mouth Taken as needed   fluorouracil (EFUDEX) 5 % cream   Yes No   Sig: PLEASE SEE ATTACHED FOR DETAILED DIRECTIONS   hydrochlorothiazide (HYDRODIURIL) 25 mg tablet   No No   Sig: Take 1 tablet (25 mg total) by mouth daily   ibuprofen (MOTRIN) 800 mg tablet   No No   Sig: Take 1 tablet (800 mg total) by mouth daily as needed for moderate pain (take with food)   methocarbamol (ROBAXIN) 750 mg tablet   No No   Sig: Take 1 tablet (750 mg total) by mouth daily as needed for muscle spasms (Pt states he takes on tablet at bedtime )   naproxen (NAPROSYN) 500 mg tablet   No No   Sig: Take 1 tablet (500 mg total) by mouth 2 (two) times a day with meals for 5 days   predniSONE 10 mg tablet   No No   Sig: Take 60 mg po day#1, 50 mg po day#2, 40 mg po day#3, 30 mg po day#4, 20 mg po day#5m and 10 mg po day#6   Patient not taking: Reported on 10/5/2020   tadalafil (CIALIS) 20 MG tablet   No No   Sig: Take 1 tablet (20 mg total) by mouth as needed for erectile dysfunction   traMADol (ULTRAM) 50 mg tablet   No No   Sig: Take 1 tablet (50 mg total) by mouth daily as needed for severe pain      Facility-Administered Medications: None       Past Medical History:   Diagnosis Date    AVM (arteriovenous malformation)     Back pain     BPH without urinary obstruction     last assessed 01/16/18    Disc disorder     Herniated    Hemorrhoids     Hydronephrosis     Kidney disease     Renal calculi     Renal colic     Ureter colic        Past Surgical History:   Procedure Laterality Date    BRAIN SURGERY      COLONOSCOPY      CYSTOSCOPY      w/removal of object, last assessed 01/16/18    CYSTOSCOPY      w/removal of ureteral calculus last assessed 01/16/18    CYSTOSCOPY KIDNEY W/ URETERAL GUIDE WIRE      last assessed 01/16/18    CYSTOSCOPY W/ URETERAL STENT PLACEMENT      last assessed 01/16/18    FL RETROGRADE PYELOGRAM  7/9/2020   [address]    HERNIA REPAIR      KIDNEY SURGERY      removal of kidney stone in 2013 at [address] &INDWELL STENT INSRT Right 12/13/2018    Procedure: CYSTOSCOPY URETEROSCOPY  RETROGRADE PYELOGRAM AND INSERTION RIGHT STENT URETERAL;  Surgeon: Scott Dover MD;  [facility];  Service: Urology    [place] CYSTO/URETERO W/LITHOTRIPSY &INDWELL STENT INSRT Left 7/9/2020    Procedure: CYSTOSCOPY URETEROSCOPY WITH LITHOTRIPSY HOLMIUM LASER, RETROGRADE PYELOGRAM AND INSERTION STENT URETERAL;  Surgeon: Ciaran Motta Dbera Weber MD;  Location: [facility];  Service: Urology    TOOTH EXTRACTION         Family History   Problem Relation Age of Onset    Stroke Mother     Cancer Father     Diabetes Father     Prostate cancer Father     Heart disease Father     Hypertension Father      I have reviewed and agree with the history as documented  E-Cigarette/Vaping    E-Cigarette Use Never User      E-Cigarette/Vaping Substances    Nicotine No     THC No     CBD No     Flavoring No      Social History     Tobacco Use    Smoking status: Never Smoker    Smokeless tobacco: Never Used   Substance Use Topics    Alcohol use: Not Currently     Comment: rare    Drug use: No       Review of Systems   Constitutional: Positive for decreased appetite, fatigue and fever  Negative for chills  HENT: Negative for congestion, rhinorrhea and sore throat  Respiratory: Positive for cough  Negative for shortness of breath  Cardiovascular: Negative for chest pain and palpitations  Gastrointestinal: Negative for diarrhea, nausea and vomiting  Musculoskeletal: Positive for myalgias  Neurological: Positive for headaches  All other systems reviewed and are negative  Physical Exam  Physical Exam  Vitals signs and nursing note reviewed  Constitutional:       General: He is not in acute distress  Appearance: He is well-developed  He is not ill-appearing or toxic-appearing  HENT:      Head: Normocephalic and atraumatic  Right Ear: Hearing and external ear normal       Left Ear: Hearing and external ear normal    Eyes:      Conjunctiva/sclera: Conjunctivae normal    Neck:      Vascular: No JVD  Trachea: No tracheal deviation  Cardiovascular:      Rate and Rhythm: Regular rhythm  Tachycardia present  Heart sounds: Normal heart sounds, S1 normal and S2 normal  No murmur  Pulmonary:      Effort: Pulmonary effort is normal  No tachypnea, accessory muscle usage or retractions        Breath sounds: Normal breath sounds  No decreased breath sounds, wheezing, rhonchi or rales  Skin:     General: Skin is warm and dry  Findings: No rash  Neurological:      Mental Status: He is alert and oriented to person, place, and time  GCS: GCS eye subscore is 4  GCS verbal subscore is 5  GCS motor subscore is 6  Psychiatric:         Mood and Affect: Mood normal          Speech: Speech normal          Vital Signs  ED Triage Vitals [01/04/21 0607]   Temperature Pulse Respirations Blood Pressure SpO2   100 3 °F (37 9 °C) 102 20 150/83 93 %      Temp Source Heart Rate Source Patient Position - Orthostatic VS BP Location FiO2 (%)   Temporal Monitor Sitting Right arm --      Pain Score       5           Vitals:    01/04/21 0607   BP: 150/83   Pulse: 102   Patient Position - Orthostatic VS: Sitting         Visual Acuity      ED Medications  Medications - No data to display    Diagnostic Studies  Results Reviewed     Procedure Component Value Units Date/Time    Novel Coronavirus (COVID-19), PCR [facility] [[id number]]  (Abnormal) Collected: 01/04/21 0639    Lab Status: Final result Specimen: Nasopharyngeal Swab Updated: 01/05/21 9150     SARS-CoV-2  Detected    Narrative:      Performed at:  [address]  [id number]  : Maricruz Chan MD, Phone:  [phone]                 No orders to display              Procedures  Procedures         ED Course  ED Course as of Jan 05 1954 Mon Jan 04, 2021   0594 94% ambulatory pulse ox in room                                              MDM  Number of Diagnoses or Management Options  Encounter for laboratory testing for COVID-19 virus:   Viral syndrome:   Diagnosis management comments: Patient presents with symptoms of a viral syndrome  During visit, a specimen was collected for COVID-19      There is no clear clinical evidence to support serious bacterial process, the patient is not hypoxic, is not in respiratory distress, lungs are clear, oxygen saturation is >92% on room air, well hydrated and is nontoxic appearing  Patient's symptoms are most consistent with a viral process  It is felt to be safely discharged home  Patient educated to self isolate/quarantine at home away from family members and pets for 14 days and until symptoms of fever, cough, sore throat, nausea and diarrhea are completely resolved for at least 3 days  Patient is medically stable for discharge home  Patient was instructed on infection prevention, to stay well-hydrated, and control fever with OTC anti-pyretics  Strict return precautions were given including, but not limited to difficulty breathing, dizziness, or worsening symptoms  Patient demonstrated understanding and agreement with plan  The management plan was discussed in detail with the patient at bedside and all questions were answered  The prior to discharge, we provided both verbal and written instructions  We discussed with the patient the signs and symptoms for which to return to the emergency department  All questions were answered and patient was comfortable with the plan of care and discharged to home  Instructed the patient to follow up with the primary care provider and/or special as provided and their written instructions  The patient verbalized understanding of our discussion and plan of care, and agrees to return to the Emergency Department for concerns and progression of illness          Disposition  Final diagnoses:   Viral syndrome   Encounter for laboratory testing for COVID-19 virus     Time reflects when diagnosis was documented in both MDM as applicable and the Disposition within this note     Time User Action Codes Description Comment    1/4/2021  6:34 AM Lizzie Humphrey Add [B34 9] Viral syndrome     1/4/2021  6:36 AM Lizzie Humphrey Add [Z20 822] Suspected COVID-19 virus infection     1/4/2021  6:36 AM Lizzie Humphrey Remove [Z20 822] Suspected COVID-19 virus infection 1/4/2021  6:36 AM Veronica Clemons Add [Z20 822] Encounter for laboratory testing for COVID-19 virus       ED Disposition     ED Disposition Condition Date/Time Comment    Discharge Stable Mon Jan 4, 2021  6:34 AM Sanket Palacios discharge to home/self care  Follow-up Information     Follow up With Specialties Details Why Contact Info Additional Phil Robertson DO Family Medicine Schedule an appointment as soon as possible for a visit  telemedicine visit [address]   [phone]       [facility] Emergency Department Emergency Medicine Go to  If symptoms worsen [facility] [address]  [phone] [place] [facility], [address]  , [place], [place], [address]          Discharge Medication List as of 1/4/2021  6:37 AM      START taking these medications    Details   Ascorbic Acid (vitamin C) 1000 MG tablet Take 1 tablet (1,000 mg total) by mouth 2 (two) times a day for 10 days, Starting Mon 1/4/2021, Until Thu 1/14/2021, Normal      cholecalciferol (VITAMIN D3) 1,000 units tablet Take 1 tablet (1,000 Units total) by mouth daily for 10 days, Starting Mon 1/4/2021, Until Thu 1/14/2021, Normal      Multiple Vitamin (multivitamin) tablet Take 1 tablet by mouth daily for 10 days, Starting Mon 1/4/2021, Until Thu 1/14/2021, Normal         CONTINUE these medications which have NOT CHANGED    Details   famotidine (PEPCID) 10 mg tablet Take 10 mg by mouth Taken as needed, Historical Med      fluorouracil (EFUDEX) 5 % cream PLEASE SEE ATTACHED FOR DETAILED DIRECTIONS, Historical Med      hydrochlorothiazide (HYDRODIURIL) 25 mg tablet Take 1 tablet (25 mg total) by mouth daily, Starting Wed 7/15/2020, Normal      ibuprofen (MOTRIN) 800 mg tablet Take 1 tablet (800 mg total) by mouth daily as needed for moderate pain (take with food), Starting Wed 10/28/2020, Normal      methocarbamol (ROBAXIN) 750 mg tablet Take 1 tablet (750 mg total) by mouth daily as needed for muscle spasms (Pt states he takes on tablet at bedtime ), Starting Thu 6/25/2020, Normal      naproxen (NAPROSYN) 500 mg tablet Take 1 tablet (500 mg total) by mouth 2 (two) times a day with meals for 5 days, Starting Mon 9/7/2020, Until Sat 9/12/2020, Print      predniSONE 10 mg tablet Take 60 mg po day#1, 50 mg po day#2, 40 mg po day#3, 30 mg po day#4, 20 mg po day#5m and 10 mg po day#6, Normal      tadalafil (CIALIS) 20 MG tablet Take 1 tablet (20 mg total) by mouth as needed for erectile dysfunction, Starting Mon 6/15/2020, Print      traMADol (ULTRAM) 50 mg tablet Take 1 tablet (50 mg total) by mouth daily as needed for severe pain, Starting Mon 12/28/2020, Normal           No discharge procedures on file      PDMP Review       Value Time User    PDMP Reviewed  Yes 12/28/2020 10:11 AM Juan Campbell DO          ED Provider  Electronically Signed by           Rob Reyes PA-C  01/05/21 1954

## 2021-01-04 NOTE — Clinical Note
Michael Wan was seen and treated in our emergency department on 1/4/2021  Diagnosis:     Sabrina Alas    He may return on this date:     Patient is being tested for COVID-19  Patient must self quarantine until test results  Due to the possibility of false negative result, you must be without symptoms for 3 days before returning to work  If you have any questions or concerns, please don't hesitate to call        Brittnee Ang PA-C    ______________________________           _______________          _______________  Hospital Representative                              Date                                Time

## 2021-01-05 ENCOUNTER — TELEMEDICINE (OUTPATIENT)
Dept: FAMILY MEDICINE CLINIC | Facility: CLINIC | Age: 61
End: 2021-01-05
Payer: COMMERCIAL

## 2021-01-05 ENCOUNTER — TELEPHONE (OUTPATIENT)
Dept: FAMILY MEDICINE CLINIC | Facility: CLINIC | Age: 61
End: 2021-01-05

## 2021-01-05 DIAGNOSIS — U07.1 COVID-19: Primary | ICD-10-CM

## 2021-01-05 LAB — SARS-COV-2 RNA SPEC QL NAA+PROBE: DETECTED

## 2021-01-05 PROCEDURE — 99214 OFFICE O/P EST MOD 30 MIN: CPT | Performed by: NURSE PRACTITIONER

## 2021-01-05 NOTE — PROGRESS NOTES
COVID-19 Virtual Visit     Assessment/Plan:    Problem List Items Addressed This Visit     None         Disposition:     I recommended continued isolation until at least 24 hours have passed since recovery defined as resolution of fever without the use of fever-reducing medications and improvement in respiratory symptoms (e g , cough, shortness of breath) AND 10 days have passed since onset of symptoms  Encouraged vitamin C , vitamin D, and Zinc- this was prescribed by ER  Continue with OTC management as needed  Continue in isolation  Discussed isolation guidelines  Discussed Bamlanivimab with patient and that he is a candidate  Reviewed process/ risks/ benefits/ and administration information / fact sheet  Fact sheet also emailed to patient for his review  He will think about this and get back to us if he wishes to proceed  Follow up Thursday for recheck  Advised to monitor closely for any respiratory symptoms  Please call the office if you are experiencing any worsening of symptoms or no symptom improvement  I have spent 25 minutes directly with the patient  Greater than 50% of this time was spent in counseling/coordination of care regarding: prognosis, risks and benefits of treatment options, instructions for management, patient and family education, risk factor reductions and impressions  Encounter provider Wade Lugo, [address]    Provider located at [address] [address]    Recent Visits  No visits were found meeting these conditions  Showing recent visits within past 7 days and meeting all other requirements     Today's Visits  Date Type Provider Dept   01/05/21 [facility]   01/05/21 Telephone Jeison Willis MA [facility] today's visits and meeting all other requirements     Future Appointments  No visits were found meeting these conditions  Showing future appointments within next 150 days and meeting all other requirements      This virtual check-in was done via [employer] and patient was informed that this is a secure, HIPAA-compliant platform  He agrees to proceed  Patient agrees to participate in a virtual check in via telephone or video visit instead of presenting to the office to address urgent/immediate medical needs  Patient is aware this is a billable service  After connecting through [facility], the patient was identified by name and date of birth  Ayesha Munguia was informed that this was a telemedicine visit and that the exam was being conducted confidentially over secure lines  My office door was closed  No one else was in the room  Ayesha Munguia acknowledged consent and understanding of privacy and security of the telemedicine visit  I informed the patient that I have reviewed his record in Epic and presented the opportunity for him to ask any questions regarding the visit today  The patient agreed to participate  Subjective:   Ayesha Munguia is a 63 y o  male who has been screened for COVID-19  Symptom change since last report: improving  Date of symptom onset: 1/3/2021    Patient's symptoms include chills, fatigue, malaise, cough (productive), diarrhea and myalgias  Patient denies fever, congestion, rhinorrhea, sore throat, anosmia, loss of taste, shortness of breath, chest tightness, abdominal pain, nausea, vomiting and headaches  Kenneth Dukes has been staying home and has isolated themselves in his home  He is taking care to not share personal items and is cleaning all surfaces that are touched often, like counters, tabletops, and doorknobs using household cleaning sprays or wipes  He is wearing a mask when he leaves his room  Had fevers the last two days  Has not had one today  Tmax 100 3 at that time  Today it was 98  He's been taking mucinex and tylenol and dimetap (alternating)   It's hard to tell if this is helping with symptoms per patient  He feels better today overall       Lab Results   Component Value Date    SARSCOV2 Detected (A) 01/04/2021     Past Medical History:   Diagnosis Date    AVM (arteriovenous malformation)     Back pain     BPH without urinary obstruction     last assessed 01/16/18    Disc disorder     Herniated    Hemorrhoids     Hydronephrosis     Kidney disease     Renal calculi     Renal colic     Ureter colic      Past Surgical History:   Procedure Laterality Date    BRAIN SURGERY      COLONOSCOPY      CYSTOSCOPY      w/removal of object, last assessed 01/16/18    CYSTOSCOPY      w/removal of ureteral calculus last assessed 01/16/18    CYSTOSCOPY KIDNEY W/ URETERAL GUIDE WIRE      last assessed 01/16/18    CYSTOSCOPY W/ URETERAL STENT PLACEMENT      last assessed 01/16/18    FL RETROGRADE PYELOGRAM  7/9/[address]   [address]    HERNIA REPAIR      KIDNEY SURGERY      removal of kidney stone in 2013 at [address] W/LITHOTRIPSY &INDWELL STENT INSRT Right 12/13/2018    Procedure: CYSTOSCOPY URETEROSCOPY  RETROGRADE PYELOGRAM AND INSERTION RIGHT STENT URETERAL;  Surgeon: Brie Morrissey MD;  [facility];  Service: Urology    [place] CYSTO/URETERO W/LITHOTRIPSY &INDWELL STENT INSRT Left 7/9/2020    Procedure: CYSTOSCOPY URETEROSCOPY WITH LITHOTRIPSY HOLMIUM LASER, RETROGRADE PYELOGRAM AND INSERTION STENT URETERAL;  Surgeon: Brie Morrissey MD;  [facility];  Service: Urology    TOOTH EXTRACTION       Current Outpatient Medications   Medication Sig Dispense Refill    Ascorbic Acid (vitamin C) 1000 MG tablet Take 1 tablet (1,000 mg total) by mouth 2 (two) times a day for 10 days 20 tablet 0    cholecalciferol (VITAMIN D3) 1,000 units tablet Take 1 tablet (1,000 Units total) by mouth daily for 10 days 10 tablet 0    famotidine (PEPCID) 10 mg tablet Take 10 mg by mouth Taken as needed      fluorouracil (EFUDEX) 5 % cream PLEASE SEE ATTACHED FOR DETAILED DIRECTIONS      hydrochlorothiazide (HYDRODIURIL) 25 mg tablet Take 1 tablet (25 mg total) by mouth daily 30 tablet 5    ibuprofen (MOTRIN) 800 mg tablet Take 1 tablet (800 mg total) by mouth daily as needed for moderate pain (take with food) 30 tablet 0    methocarbamol (ROBAXIN) 750 mg tablet Take 1 tablet (750 mg total) by mouth daily as needed for muscle spasms (Pt states he takes on tablet at bedtime ) 30 tablet 0    Multiple Vitamin (multivitamin) tablet Take 1 tablet by mouth daily for 10 days 10 tablet 0    naproxen (NAPROSYN) 500 mg tablet Take 1 tablet (500 mg total) by mouth 2 (two) times a day with meals for 5 days 10 tablet 0    predniSONE 10 mg tablet Take 60 mg po day#1, 50 mg po day#2, 40 mg po day#3, 30 mg po day#4, 20 mg po day#5m and 10 mg po day#6 (Patient not taking: Reported on 10/5/2020) 21 tablet 0    tadalafil (CIALIS) 20 MG tablet Take 1 tablet (20 mg total) by mouth as needed for erectile dysfunction 10 tablet 3    traMADol (ULTRAM) 50 mg tablet Take 1 tablet (50 mg total) by mouth daily as needed for severe pain 30 tablet 0     No current facility-administered medications for this visit  Allergies   Allergen Reactions    Morphine Other (See Comments)    Other      Pt states that any narcotic makes him feel sick    Oxycodone-Acetaminophen Nausea Only, Vomiting and GI Intolerance    Seasonal Ic  [Cholestatin]      Other reaction(s): Nasal Congestion, Sinus Pain       Review of Systems   Constitutional: Positive for chills and fatigue  Negative for fever  HENT: Negative for congestion, rhinorrhea and sore throat  Respiratory: Positive for cough (productive)  Negative for chest tightness and shortness of breath  Gastrointestinal: Positive for diarrhea  Negative for abdominal pain, nausea and vomiting  Musculoskeletal: Positive for myalgias  Neurological: Negative for headaches  Objective:     There were no vitals filed for this visit     Physical Exam  Constitutional:       General: He is not in acute distress  Appearance: Normal appearance  He is not ill-appearing, toxic-appearing or diaphoretic  HENT:      Head: Normocephalic and atraumatic  Eyes:      General: No scleral icterus  Pulmonary:      Effort: Pulmonary effort is normal  No respiratory distress  Skin:     Coloration: Skin is not pale  Neurological:      Mental Status: He is alert and oriented to person, place, and time  Psychiatric:         Mood and Affect: Mood normal        VIRTUAL VISIT DISCLAIMER    Xin Teixeira acknowledges that he has consented to an online visit or consultation  He understands that the online visit is based solely on information provided by him, and that, in the absence of a face-to-face physical evaluation by the physician, the diagnosis he receives is both limited and provisional in terms of accuracy and completeness  This is not intended to replace a full medical face-to-face evaluation by the physician  Xin Teixeira understands and accepts these terms

## 2021-01-05 NOTE — TELEPHONE ENCOUNTER
Mark Ascencio called the office he was in the ER yesterday 01/04 in the morning and was tested for COVID 19    Pt expressed he was only given vitamins in the ER he is not feeling well  Per verbal permission form upper management Mark Ascencio was informed he has tested positive to COVID 19  He is to please remain in quarantine until given further   until given further directions later  Pt aware he must stay in a sperate room/area of the home away from others  Pt expressed verbal understanding

## 2021-01-06 ENCOUNTER — TELEMEDICINE (OUTPATIENT)
Dept: FAMILY MEDICINE CLINIC | Facility: CLINIC | Age: 61
End: 2021-01-06
Payer: COMMERCIAL

## 2021-01-06 DIAGNOSIS — U07.1 COVID-19: Primary | ICD-10-CM

## 2021-01-06 PROCEDURE — 99213 OFFICE O/P EST LOW 20 MIN: CPT | Performed by: FAMILY MEDICINE

## 2021-01-06 NOTE — PROGRESS NOTES
COVID-19 Virtual Visit     Assessment/Plan:    Problem List Items Addressed This Visit     None      Visit Diagnoses     COVID-19    -  Primary         Disposition:     I recommended continued isolation until at least 24 hours have passed since recovery defined as resolution of fever without the use of fever-reducing medications and improvement in respiratory symptoms (e g , cough, shortness of breath) AND 10 days have passed since onset of symptoms  I have spent 20 minutes directly with the patient  Greater than 50% of this time was spent in counseling/coordination of care regarding: diagnostic results, prognosis, risks and benefits of treatment options, instructions for management, patient and family education, importance of treatment compliance, risk factor reductions and impressions  Encounter provider Sixto Richardson DO    Provider located at [address] [place] [id number]    Recent Visits  Date Type Provider Dept   01/05/21 [facility] 20, 1021 [facility] Total 5460 [facility]   01/05/21 Telephone Fide Patton [address] Total 129 [facility] recent visits within past 7 days and meeting all other requirements     Today's Visits  Date Type Provider Dept   01/06/21 Telemedicine Sixto Richardson DO Pg Total 129 [facility] today's visits and meeting all other requirements     Future Appointments  No visits were found meeting these conditions  Showing future appointments within next 150 days and meeting all other requirements      This virtual check-in was done via [employer] and patient was informed that this is a secure, HIPAA-compliant platform  He agrees to proceed  Patient agrees to participate in a virtual check in via telephone or video visit instead of presenting to the office to address urgent/immediate medical needs  Patient is aware this is a billable service      After connecting through [facility], the patient was identified by name and date of birth  Phillip Chan was informed that this was a telemedicine visit and that the exam was being conducted confidentially over secure lines  My office door was closed  No one else was in the room  Phillip Chan acknowledged consent and understanding of privacy and security of the telemedicine visit  I informed the patient that I have reviewed his record in Epic and presented the opportunity for him to ask any questions regarding the visit today  The patient agreed to participate  Subjective:   Phillip Chan is a 63 y o  male who has been screened for COVID-19  Symptom change since last report: resolving  Date of symptom onset: 1/4/2020    Patient denies fever, cough and shortness of breath  Lamonte Silva has been staying home and has isolated themselves in his home  He is taking care to not share personal items and is cleaning all surfaces that are touched often, like counters, tabletops, and doorknobs using household cleaning sprays or wipes  He is wearing a mask when he leaves his room  No fever today as it was 98 6 and has no respiratory difficulty       Lab Results   Component Value Date    SARSCOV2 Detected (A) 01/04/2021     Past Medical History:   Diagnosis Date    AVM (arteriovenous malformation)     Back pain     BPH without urinary obstruction     last assessed 01/16/18    Disc disorder     Herniated    Hemorrhoids     Hydronephrosis     Kidney disease     Renal calculi     Renal colic     Ureter colic      Past Surgical History:   Procedure Laterality Date    BRAIN SURGERY      COLONOSCOPY      CYSTOSCOPY      w/removal of object, last assessed 01/16/18    CYSTOSCOPY      w/removal of ureteral calculus last assessed 01/16/18    CYSTOSCOPY KIDNEY W/ URETERAL GUIDE WIRE      last assessed 01/16/18    CYSTOSCOPY W/ URETERAL STENT PLACEMENT      last assessed 01/16/18    FL RETROGRADE PYELOGRAM  7/9/2020   [facility]      KIDNEY SURGERY      removal of kidney stone in 2013 at [address] W/LITHOTRIPSY &INDWELL STENT INSRT Right 12/13/2018    Procedure: CYSTOSCOPY URETEROSCOPY  RETROGRADE PYELOGRAM AND INSERTION RIGHT STENT URETERAL;  Surgeon: Angelia Lauren MD;  [facility];  Service: Urology    [place] CYSTO/URETERO W/LITHOTRIPSY &INDWELL STENT INSRT Left 7/9/2020    Procedure: CYSTOSCOPY URETEROSCOPY WITH LITHOTRIPSY HOLMIUM LASER, RETROGRADE PYELOGRAM AND INSERTION STENT URETERAL;  Surgeon: Angelia Lauren MD;  [facility];  Service: Urology    TOOTH EXTRACTION       Current Outpatient Medications   Medication Sig Dispense Refill    Ascorbic Acid (vitamin C) 1000 MG tablet Take 1 tablet (1,000 mg total) by mouth 2 (two) times a day for 10 days 20 tablet 0    cholecalciferol (VITAMIN D3) 1,000 units tablet Take 1 tablet (1,000 Units total) by mouth daily for 10 days 10 tablet 0    famotidine (PEPCID) 10 mg tablet Take 10 mg by mouth Taken as needed      fluorouracil (EFUDEX) 5 % cream PLEASE SEE ATTACHED FOR DETAILED DIRECTIONS      hydrochlorothiazide (HYDRODIURIL) 25 mg tablet Take 1 tablet (25 mg total) by mouth daily 30 tablet 5    ibuprofen (MOTRIN) 800 mg tablet Take 1 tablet (800 mg total) by mouth daily as needed for moderate pain (take with food) 30 tablet 0    methocarbamol (ROBAXIN) 750 mg tablet Take 1 tablet (750 mg total) by mouth daily as needed for muscle spasms (Pt states he takes on tablet at bedtime ) 30 tablet 0    Multiple Vitamin (multivitamin) tablet Take 1 tablet by mouth daily for 10 days 10 tablet 0    naproxen (NAPROSYN) 500 mg tablet Take 1 tablet (500 mg total) by mouth 2 (two) times a day with meals for 5 days 10 tablet 0    predniSONE 10 mg tablet Take 60 mg po day#1, 50 mg po day#2, 40 mg po day#3, 30 mg po day#4, 20 mg po day#5m and 10 mg po day#6 (Patient not taking: Reported on 10/5/2020) 21 tablet 0    tadalafil (CIALIS) 20 MG tablet Take 1 tablet (20 mg total) by mouth as needed for erectile dysfunction 10 tablet 3    traMADol (ULTRAM) 50 mg tablet Take 1 tablet (50 mg total) by mouth daily as needed for severe pain 30 tablet 0     No current facility-administered medications for this visit  Allergies   Allergen Reactions    Morphine Other (See Comments)    Other      Pt states that any narcotic makes him feel sick    Oxycodone-Acetaminophen Nausea Only, Vomiting and GI Intolerance    Seasonal Ic  [Cholestatin]      Other reaction(s): Nasal Congestion, Sinus Pain       Review of Systems   Constitutional: Negative  Negative for fever  HENT: Negative  Eyes: Negative  Respiratory: Negative  Negative for cough and shortness of breath  Cardiovascular: Negative  Gastrointestinal: Negative  Endocrine: Negative  Genitourinary: Negative  Musculoskeletal: Negative  Skin: Negative  Allergic/Immunologic: Negative  Neurological: Negative  Hematological: Negative  Psychiatric/Behavioral: Negative  Objective: There were no vitals filed for this visit  Physical Exam  Constitutional:       Appearance: Normal appearance  HENT:      Head: Normocephalic and atraumatic  Eyes:      Conjunctiva/sclera: Conjunctivae normal    Pulmonary:      Effort: Pulmonary effort is normal  No respiratory distress  Skin:     Coloration: Skin is not pale  Neurological:      General: No focal deficit present  Mental Status: He is alert and oriented to person, place, and time  Psychiatric:         Mood and Affect: Mood normal          Behavior: Behavior normal          Thought Content: Thought content normal          Judgment: Judgment normal        Patient Instructions   covid follow-up [facility]- [phone]  saw Bin Mendez yesterday, would like to speak with dr Dean Wan  Here for covid f-up and decided he did not want BAM today, his temp is 98 6 and has no respiratory difficulty   He will call if any problems and take vitamin D and c and zinc as directed and follow CDC guidelines for COVID 19  Recheck in 5 days  Call if worse  Stay well hydrated  VIRTUAL VISIT DISCLAIMER    Yue Olvera acknowledges that he has consented to an online visit or consultation  He understands that the online visit is based solely on information provided by him, and that, in the absence of a face-to-face physical evaluation by the physician, the diagnosis he receives is both limited and provisional in terms of accuracy and completeness  This is not intended to replace a full medical face-to-face evaluation by the physician  Yue Olvera understands and accepts these terms

## 2021-01-06 NOTE — PATIENT INSTRUCTIONS
covid follow-up [facility]- [phone]  saw Irma Concepcion yesterday, would like to speak with dr Dalene Landau  Here for covid f-up and decided he did not want BAM today, his temp is 98 6 and has no respiratory difficulty  He will call if any problems and take vitamin D and c and zinc as directed and follow CDC guidelines for COVID 19  Recheck in 2 days  Call if worse  Stay well hydrated

## 2021-01-08 ENCOUNTER — TELEMEDICINE (OUTPATIENT)
Dept: FAMILY MEDICINE CLINIC | Facility: CLINIC | Age: 61
End: 2021-01-08
Payer: COMMERCIAL

## 2021-01-08 DIAGNOSIS — U07.1 COVID-19: Primary | ICD-10-CM

## 2021-01-08 PROCEDURE — 99214 OFFICE O/P EST MOD 30 MIN: CPT | Performed by: NURSE PRACTITIONER

## 2021-01-08 RX ORDER — ALBUTEROL SULFATE 90 UG/1
3 AEROSOL, METERED RESPIRATORY (INHALATION) ONCE AS NEEDED
Status: CANCELLED | OUTPATIENT
Start: 2021-01-08

## 2021-01-08 RX ORDER — ACETAMINOPHEN 325 MG/1
650 TABLET ORAL ONCE AS NEEDED
Status: CANCELLED | OUTPATIENT
Start: 2021-01-08

## 2021-01-08 RX ORDER — SODIUM CHLORIDE 9 MG/ML
20 INJECTION, SOLUTION INTRAVENOUS ONCE
Status: CANCELLED | OUTPATIENT
Start: 2021-01-08

## 2021-01-08 NOTE — PROGRESS NOTES
COVID-19 Virtual Visit     Assessment/Plan:    Problem List Items Addressed This Visit        Other    COVID-19 - Primary         Disposition:     I recommended continued isolation until at least 24 hours have passed since recovery defined as resolution of fever without the use of fever-reducing medications AND improvement in COVID symptoms AND 10 days have passed since onset of symptoms (or 10 days have passed since date of first positive viral diagnostic test for asymptomatic patients)  Encouraged vitamin C , vitamin D, and Zinc- this was prescribed by ER  Continue with OTC management as needed  Continue in isolation  Discussed isolation guidelines  Discussed Bamlanivimab with patient and that he is a candidate  Reviewed process/ risks/ benefits/ and administration information / fact sheet  Fact sheet also emailed to patient for his review previously  Discussed this at length again with patient and answered all of his questions  He wishes to have infusion completed  Will get him scheduled  Will need follow up the following day  1/14/21 earliest d/c isolation date  Patient is a candidate for Bamlanivimab  They were counseled in regards to risks, benefits, and side effects of this infusion  Possible side effects of bamlanivimab are: Allergic reactions   Allergic reactions can happen during and after infusion with bamlanivimab which include:    Fever, chills, nausea, headache, shortness of breath, low blood pressure, wheezing, swelling of your lips, face, or throat, rash including hives, itching, muscle aches, and dizziness  The side effects of getting any medicine by vein may include brief pain, bleeding, bruising of the skin, soreness, swelling, and possible infection at the infusion site  These are not all the possible side effects of bamlanivimab  Not a lot of people have been given bamlanivimab  Serious and unexpected side effects may happen   Blas Mojica is still being studied so it is possible that all of the risks are not known at this time  Please note that this drug was approved under the Emergency Use Authorization of the FDA and has not gone through the full, formal FDA approval process    It is possible that bamlanivimab could interfere with your body's own ability to fight off a future infection of SARS-CoV-2  Similarly, bamlanivimab may reduce your bodys immune response to a vaccine for SARS-CoV-2  Specific studies have not been conducted to address these possible risks  Currently there is no data or safety or efficacy of COVID-19 vaccination in persons who received monoclonal antibodies as part of COVID-19 treatment  Treatment should be deferred for at least 90 days to avoid interference of the treatment with vaccine-induced immune responses (this is based on estimated half-life of therapies and evidence suggesting reinfection is uncommon within 90 days of initial infection)  The patient consents to proceed with bamlanivimab infusion  *[email or web address]  [email or web address]-[email or web address]-factsheet-hcp  pdf    I have spent 30 minutes directly with the patient  Greater than 50% of this time was spent in counseling/coordination of care regarding: prognosis, risks and benefits of treatment options, instructions for management, patient and family education, risk factor reductions and impressions          Encounter provider [place], [place]  Provider located at [address] [phone]    Recent Visits  Date Type Provider Dept   01/06/21 Telemedicine Mynor Blair, 1635 [facility] Total 5460 [facility]   01/05/21 [facility] 20, 1021 [facility] Total 5460 [facility]   01/05/21 Telephone Fide Dc Total 129 [facility] recent visits within past 7 days and meeting all other requirements     Today's Visits  Date Type Provider Dept   [facility]/21 [facility], 1021 [facility] Total 5460 [facility] Showing today's visits and meeting all other requirements     Future Appointments  No visits were found meeting these conditions  Showing future appointments within next 150 days and meeting all other requirements      This virtual check-in was done via [employer] and patient was informed that this is a secure, HIPAA-compliant platform  He agrees to proceed  Patient agrees to participate in a virtual check in via telephone or video visit instead of presenting to the office to address urgent/immediate medical needs  Patient is aware this is a billable service  After connecting through [facility], the patient was identified by name and date of birth  Efren Wolfe was informed that this was a telemedicine visit and that the exam was being conducted confidentially over secure lines  My office door was closed  No one else was in the room  Efren Wolfe acknowledged consent and understanding of privacy and security of the telemedicine visit  I informed the patient that I have reviewed his record in Epic and presented the opportunity for him to ask any questions regarding the visit today  The patient agreed to participate  Subjective:   Efren Wolfe is a 63 y o  male who has been screened for COVID-19  Symptom change since last report: unchanged  Patient's symptoms include chills, fatigue, malaise, cough (productive), shortness of breath (exterional), diarrhea and myalgias  Patient denies fever, congestion, rhinorrhea, sore throat, anosmia, loss of taste, chest tightness, abdominal pain, nausea, vomiting and headaches  Mayra Ibarra has been staying home and has isolated themselves in his home  He is taking care to not share personal items and is cleaning all surfaces that are touched often, like counters, tabletops, and doorknobs using household cleaning sprays or wipes  He is wearing a mask when he leaves his room  Date of symptom onset: 1/3/2021    Has not had fevers  Symptoms wax and wane   Currently taking tylenol/ OTC cough medication/ mucinex cold and flu, vitamins as well as directed  He states overall he does not feel well       Lab Results   Component Value Date    SARSCOV2 Detected (A) 01/04/2021     Past Medical History:   Diagnosis Date    AVM (arteriovenous malformation)     Back pain     BPH without urinary obstruction     last assessed 01/16/18    Disc disorder     Herniated    Hemorrhoids     Hydronephrosis     Kidney disease     Renal calculi     Renal colic     Ureter colic      Past Surgical History:   Procedure Laterality Date    BRAIN SURGERY      COLONOSCOPY      CYSTOSCOPY      w/removal of object, last assessed 01/16/18    CYSTOSCOPY      w/removal of ureteral calculus last assessed 01/16/18    CYSTOSCOPY KIDNEY W/ URETERAL GUIDE WIRE      last assessed 01/16/18    CYSTOSCOPY W/ URETERAL STENT PLACEMENT      last assessed 01/16/18    FL RETROGRADE PYELOGRAM  7/9/2020   [address]    HERNIA REPAIR      KIDNEY SURGERY      removal of kidney stone in 2013 at [address] W/LITHOTRIPSY &INDWELL STENT INSRT Right 12/13/2018    Procedure: CYSTOSCOPY URETEROSCOPY  RETROGRADE PYELOGRAM AND INSERTION RIGHT STENT URETERAL;  Surgeon: Juana Tim MD;  [facility];  Service: Urology    [place] CYSTO/URETERO W/LITHOTRIPSY &INDWELL STENT INSRT Left 7/9/2020    Procedure: CYSTOSCOPY URETEROSCOPY WITH LITHOTRIPSY HOLMIUM LASER, RETROGRADE PYELOGRAM AND INSERTION STENT URETERAL;  Surgeon: Juana Tim MD;  [facility];  Service: Urology    TOOTH EXTRACTION       Current Outpatient Medications   Medication Sig Dispense Refill    Ascorbic Acid (vitamin C) 1000 MG tablet Take 1 tablet (1,000 mg total) by mouth 2 (two) times a day for 10 days 20 tablet 0    cholecalciferol (VITAMIN D3) 1,000 units tablet Take 1 tablet (1,000 Units total) by mouth daily for 10 days 10 tablet 0    famotidine (PEPCID) 10 mg tablet Take 10 mg by mouth Taken as needed      fluorouracil (EFUDEX) 5 % cream PLEASE SEE ATTACHED FOR DETAILED DIRECTIONS      hydrochlorothiazide (HYDRODIURIL) 25 mg tablet Take 1 tablet (25 mg total) by mouth daily 30 tablet 5    ibuprofen (MOTRIN) 800 mg tablet Take 1 tablet (800 mg total) by mouth daily as needed for moderate pain (take with food) 30 tablet 0    methocarbamol (ROBAXIN) 750 mg tablet Take 1 tablet (750 mg total) by mouth daily as needed for muscle spasms (Pt states he takes on tablet at bedtime ) 30 tablet 0    Multiple Vitamin (multivitamin) tablet Take 1 tablet by mouth daily for 10 days 10 tablet 0    naproxen (NAPROSYN) 500 mg tablet Take 1 tablet (500 mg total) by mouth 2 (two) times a day with meals for 5 days 10 tablet 0    predniSONE 10 mg tablet Take 60 mg po day#1, 50 mg po day#2, 40 mg po day#3, 30 mg po day#4, 20 mg po day#5m and 10 mg po day#6 (Patient not taking: Reported on 10/5/2020) 21 tablet 0    tadalafil (CIALIS) 20 MG tablet Take 1 tablet (20 mg total) by mouth as needed for erectile dysfunction 10 tablet 3    traMADol (ULTRAM) 50 mg tablet Take 1 tablet (50 mg total) by mouth daily as needed for severe pain 30 tablet 0     No current facility-administered medications for this visit  Allergies   Allergen Reactions    Morphine Other (See Comments)    Other      Pt states that any narcotic makes him feel sick    Oxycodone-Acetaminophen Nausea Only, Vomiting and GI Intolerance    Seasonal Ic  [Cholestatin]      Other reaction(s): Nasal Congestion, Sinus Pain       Review of Systems   Constitutional: Positive for chills and fatigue  Negative for fever  HENT: Negative for congestion, rhinorrhea and sore throat  Respiratory: Positive for cough (productive) and shortness of breath (exterional)  Negative for chest tightness  Gastrointestinal: Positive for diarrhea  Negative for abdominal pain, nausea and vomiting  Musculoskeletal: Positive for myalgias     Neurological: Negative for headaches  Objective: There were no vitals filed for this visit  Physical Exam  Constitutional:       General: He is not in acute distress  Appearance: Normal appearance  He is not ill-appearing, toxic-appearing or diaphoretic  HENT:      Head: Normocephalic and atraumatic  Eyes:      General: No scleral icterus  Pulmonary:      Effort: Pulmonary effort is normal  No respiratory distress  Skin:     Coloration: Skin is not pale  Neurological:      Mental Status: He is alert and oriented to person, place, and time  Psychiatric:         Mood and Affect: Mood normal        VIRTUAL VISIT DISCLAIMER    Whitley Henry acknowledges that he has consented to an online visit or consultation  He understands that the online visit is based solely on information provided by him, and that, in the absence of a face-to-face physical evaluation by the physician, the diagnosis he receives is both limited and provisional in terms of accuracy and completeness  This is not intended to replace a full medical face-to-face evaluation by the physician  Whitley Henry understands and accepts these terms

## 2021-01-09 ENCOUNTER — NURSE TRIAGE (OUTPATIENT)
Dept: OTHER | Facility: OTHER | Age: 61
End: 2021-01-09

## 2021-01-10 NOTE — TELEPHONE ENCOUNTER
Pt reports recent Covid diagnosis  Pt states he had been feeling better but then started with diarrhea today  Has had 3 episodes of diarrhea, is sweating, feels lightheaded  Pt has been able to drink fluids today and did eat today: English muffin with jelly, bagel, tuna, lemonade  Last urinated just prior to call  No blood in stool  Pt states during this call that he actually feels a bit better than he had  Pt dosed with Imodium while on this call  Advice offered per protocol

## 2021-01-10 NOTE — TELEPHONE ENCOUNTER
Reason for Disposition   MILD-MODERATE diarrhea (e g , 1-6 times / day more than normal)    Answer Assessment - Initial Assessment Questions  1  DIARRHEA SEVERITY: "How bad is the diarrhea?" "How many extra stools have you had in the past 24 hours than normal?"     - NO DIARRHEA (SCALE 0)    - MILD (SCALE 1-3): Few loose or mushy BMs; increase of 1-3 stools over normal daily number of stools; mild increase in ostomy output  -  MODERATE (SCALE 4-7): Increase of 4-6 stools daily over normal; moderate increase in ostomy output  * SEVERE (SCALE 8-10; OR 'WORST POSSIBLE'): Increase of 7 or more stools daily over normal; moderate increase in ostomy output; incontinence  3 times today  2  ONSET: "When did the diarrhea begin?"       Today  Starting to calm down a little  3  BM CONSISTENCY: "How loose or watery is the diarrhea?"       Very watery  4  VOMITING: "Are you also vomiting?" If so, ask: "How many times in the past 24 hours?"       No vomiting  5  ABDOMINAL PAIN: "Are you having any abdominal pain?" If yes: "What does it feel like?" (e g , crampy, dull, intermittent, constant)       Some cramping prior to episodes of diarrhea  6  ABDOMINAL PAIN SEVERITY: If present, ask: "How bad is the pain?"  (e g , Scale 1-10; mild, moderate, or severe)    - MILD (1-3): doesn't interfere with normal activities, abdomen soft and not tender to touch     - MODERATE (4-7): interferes with normal activities or awakens from sleep, tender to touch     - SEVERE (8-10): excruciating pain, doubled over, unable to do any normal activities        Mild, gets better with diarrhea  7  ORAL INTAKE: If vomiting, "Have you been able to drink liquids?" "How much fluids have you had in the past 24 hours?"      Has been drinking today and ate a little  8  HYDRATION: "Any signs of dehydration?" (e g , dry mouth [not just dry lips], too weak to stand, dizziness, new weight loss) "When did you last urinate?"      Just awhile ago urinated  Very dizzy, hands are tingling  9  EXPOSURE: "Have you traveled to a foreign country recently?" "Have you been exposed to anyone with diarrhea?" "Could you have eaten any food that was spoiled?"      n/a  10  ANTIBIOTIC USE: "Are you taking antibiotics now or have you taken antibiotics in the past 2 months?"        No antibiotics  11  OTHER SYMPTOMS: "Do you have any other symptoms?" (e g , fever, blood in stool)        Very dizzy, clammy, sweating profusely, "feels like may pass out"  12   PREGNANCY: "Is there any chance you are pregnant?" "When was your last menstrual period?"        n/a    Protocols used: DIARRHEA-ADULT-AH

## 2021-01-10 NOTE — TELEPHONE ENCOUNTER
Regarding: COVID/Worsening Symptoms   ----- Message from Yesica Anguiano sent at 1/9/2021  7:58 PM EST -----  COVID/Symptomatic: recently tested positive for COVID  Symptoms are worsening: profuse sweating, sever diarrhea, and feels like he's about to pass out

## 2021-01-11 ENCOUNTER — TELEPHONE (OUTPATIENT)
Dept: FAMILY MEDICINE CLINIC | Facility: CLINIC | Age: 61
End: 2021-01-11

## 2021-01-11 ENCOUNTER — OFFICE VISIT (OUTPATIENT)
Dept: URGENT CARE | Age: 61
End: 2021-01-11
Payer: COMMERCIAL

## 2021-01-11 VITALS — OXYGEN SATURATION: 97 % | TEMPERATURE: 96.6 F | RESPIRATION RATE: 20 BRPM | HEART RATE: 97 BPM

## 2021-01-11 DIAGNOSIS — U07.1 COVID-19: Primary | ICD-10-CM

## 2021-01-11 PROCEDURE — 99213 OFFICE O/P EST LOW 20 MIN: CPT | Performed by: PHYSICIAN ASSISTANT

## 2021-01-11 RX ORDER — ALBUTEROL SULFATE 90 UG/1
2 AEROSOL, METERED RESPIRATORY (INHALATION) EVERY 6 HOURS PRN
Qty: 18 G | Refills: 0 | Status: SHIPPED | OUTPATIENT
Start: 2021-01-11 | End: 2022-01-27

## 2021-01-11 NOTE — TELEPHONE ENCOUNTER
Please call patient  I see he's on my schedule later but I want to try and arrange this asap  If he's having worsening symptoms over the weekend the next step at this point would be evaluation at respiratory clinic   Please notify patient and see if he's willing to do so, if so then please set up for patient

## 2021-01-11 NOTE — PROGRESS NOTES
[facility] [facility]        NAME: Golden Feliz is a 63 y o  male  : 1960    MRN: [id number]  DATE: 2021  TIME: 4:23 PM    Assessment and Plan   COVID-19 [U07 1]  1  COVID-19  XR chest pa & lateral    albuterol (Ventolin HFA) 90 mcg/act inhaler     Advised on continued supportive therapies, close follow-up with PCP, and continuation of vitamin supplements  Reviewed use of albuterol inhaler which will hopefully provide some symptomatic relief  Patient agreeable to the treatment plan  All questions answered  Precautions given  Patient Instructions   Please remain at home in quarantine until further instruction given  While at home you should isolate from others  Use the inhaler as reviewed  You may take Tylenol for fever and discomfort  Get plenty of rest and drink lots of water  Continue Mucinex DM (guaifenesin-dextromethorphan) for cough and congestion relief  Follow BRAT (bananas, rice, apples, toast) diet as discussed  Once feeling up to it, you can begin to introduce new foods and advance diet as it is tolerated  Stay hydrated drinking plenty of water  Supplement with Gatorade if vomiting or diarrhea persist   Follow up with your family doctor in  1-2 days  Proceed to the ER if symptoms worsen  Chief Complaint     Chief Complaint   Patient presents with    COVID-19     positive 2021 worsening of symtoms , sent by PCP for respiratory clinic     Fever    Cough     contiued     Dizziness     and weakness,     Diarrhea     intermittent  x saturday  History of Present Illness   69-year-old male presenting for evaluation s/p diagnosis of COVID-19  Pt dx with COVID 19 on 2021  He reports sx of fever, fatigue, body aches, and dizziness  Notes nasal congestion, runny nose, cough, shortness of breath, nausea and vomiting  Cough is primarily nonproductive but occasionally will produce phlegm after prolonged coughing fits  Denies any chest tightness or wheezing    Some exertional LUIS present  He is treating with Mucinex cold and Sinus relief  Is also taking Vitamn C, Vitamin D3, and multivitamin  Review of Systems   Review of Systems   Constitutional: Positive for fatigue and fever  HENT: Positive for congestion and rhinorrhea  Negative for ear pain and sore throat  Respiratory: Positive for cough and shortness of breath  Negative for chest tightness and wheezing  Cardiovascular: Negative for chest pain and palpitations  Gastrointestinal: Positive for nausea and vomiting  Negative for abdominal pain and diarrhea  Musculoskeletal: Positive for myalgias  Neurological: Positive for dizziness       Current Medications       Current Outpatient Medications:     albuterol (Ventolin HFA) 90 mcg/act inhaler, Inhale 2 puffs every 6 (six) hours as needed for wheezing, Disp: 18 g, Rfl: 0    Ascorbic Acid (vitamin C) 1000 MG tablet, Take 1 tablet (1,000 mg total) by mouth 2 (two) times a day for 10 days, Disp: 20 tablet, Rfl: 0    cholecalciferol (VITAMIN D3) 1,000 units tablet, Take 1 tablet (1,000 Units total) by mouth daily for 10 days, Disp: 10 tablet, Rfl: 0    famotidine (PEPCID) 10 mg tablet, Take 10 mg by mouth Taken as needed, Disp: , Rfl:     fluorouracil (EFUDEX) 5 % cream, PLEASE SEE ATTACHED FOR DETAILED DIRECTIONS, Disp: , Rfl:     hydrochlorothiazide (HYDRODIURIL) 25 mg tablet, Take 1 tablet (25 mg total) by mouth daily, Disp: 30 tablet, Rfl: 5    ibuprofen (MOTRIN) 800 mg tablet, Take 1 tablet (800 mg total) by mouth daily as needed for moderate pain (take with food), Disp: 30 tablet, Rfl: 0    methocarbamol (ROBAXIN) 750 mg tablet, Take 1 tablet (750 mg total) by mouth daily as needed for muscle spasms (Pt states he takes on tablet at bedtime ), Disp: 30 tablet, Rfl: 0    Multiple Vitamin (multivitamin) tablet, Take 1 tablet by mouth daily for 10 days, Disp: 10 tablet, Rfl: 0    naproxen (NAPROSYN) 500 mg tablet, Take 1 tablet (500 mg total) by mouth 2 (two) times a day with meals for 5 days, Disp: 10 tablet, Rfl: 0    predniSONE 10 mg tablet, Take 60 mg po day#1, 50 mg po day#2, 40 mg po day#3, 30 mg po day#4, 20 mg po day#5m and 10 mg po day#6 (Patient not taking: Reported on 10/5/2020), Disp: 21 tablet, Rfl: 0    tadalafil (CIALIS) 20 MG tablet, Take 1 tablet (20 mg total) by mouth as needed for erectile dysfunction, Disp: 10 tablet, Rfl: 3    traMADol (ULTRAM) 50 mg tablet, Take 1 tablet (50 mg total) by mouth daily as needed for severe pain, Disp: 30 tablet, Rfl: 0    Current Allergies     Allergies as of 01/11/2021 - Reviewed 01/11/2021   Allergen Reaction Noted    Morphine Other (See Comments) 02/26/2018    Other  12/22/2018    Oxycodone-acetaminophen Nausea Only, Vomiting, and GI Intolerance 01/08/2018    Seasonal ic  [cholestatin]  07/07/2016            The following portions of the patient's history were reviewed and updated as appropriate: allergies, current medications, past family history, past medical history, past social history, past surgical history and problem list      Past Medical History:   Diagnosis Date    AVM (arteriovenous malformation)     Back pain     BPH without urinary obstruction     last assessed 01/16/18    Disc disorder     Herniated    Hemorrhoids     Hydronephrosis     Kidney disease     Renal calculi     Renal colic     Ureter colic        Past Surgical History:   Procedure Laterality Date    BRAIN SURGERY      COLONOSCOPY      CYSTOSCOPY      w/removal of object, last assessed 01/16/18    CYSTOSCOPY      w/removal of ureteral calculus last assessed 01/16/18    CYSTOSCOPY KIDNEY W/ URETERAL GUIDE WIRE      last assessed 01/16/18    CYSTOSCOPY W/ URETERAL STENT PLACEMENT      last assessed 01/16/18    FL RETROGRADE PYELOGRAM  7/9/2020   [address]    HERNIA REPAIR      KIDNEY SURGERY      removal of kidney stone in 2013 at [address] [facility] &INDWELL STENT INSRT Right 12/13/2018    Procedure: CYSTOSCOPY URETEROSCOPY  RETROGRADE PYELOGRAM AND INSERTION RIGHT STENT URETERAL;  Surgeon: Glen Miranda MD;  [facility];  Service: Urology    [place] CYSTO/URETERO W/LITHOTRIPSY &INDWELL STENT INSRT Left 7/9/2020    Procedure: CYSTOSCOPY URETEROSCOPY WITH LITHOTRIPSY HOLMIUM LASER, RETROGRADE PYELOGRAM AND INSERTION STENT URETERAL;  Surgeon: Glen Miranda MD;  [facility];  Service: Urology    TOOTH EXTRACTION         Family History   Problem Relation Age of Onset    Stroke Mother     Cancer Father     Diabetes Father     Prostate cancer Father     Heart disease Father     Hypertension Father          Medications have been verified  Objective   Pulse 97   Temp (!) 96 6 °F (35 9 °C)   Resp 20   SpO2 97% Comment: sitting  No LMP for male patient  CXR: No acute abnormality  Physical Exam     Physical Exam  Vitals signs and nursing note reviewed  Constitutional:       General: He is not in acute distress  Appearance: He is well-developed  He is ill-appearing  He is not diaphoretic  HENT:      Head: Normocephalic and atraumatic  Eyes:      General: Lids are normal          Right eye: No discharge  Left eye: No discharge  Conjunctiva/sclera: Conjunctivae normal    Cardiovascular:      Rate and Rhythm: Normal rate and regular rhythm  Heart sounds: Normal heart sounds  Heart sounds not distant  Pulmonary:      Effort: Pulmonary effort is normal  No respiratory distress  Breath sounds: No stridor  Examination of the right-lower field reveals decreased breath sounds  Examination of the left-lower field reveals decreased breath sounds  Decreased breath sounds present  No wheezing, rhonchi or rales  Skin:     General: Skin is warm and dry  Coloration: Skin is not pale  Findings: No erythema or rash  Neurological:      Mental Status: He is alert  He is not disoriented        Cranial Nerves: No cranial nerve deficit  Motor: No abnormal muscle tone  Coordination: Coordination normal       Gait: Gait normal    Psychiatric:         Behavior: Behavior normal  Behavior is cooperative  Thought Content:  Thought content normal          Judgment: Judgment normal

## 2021-01-11 NOTE — PATIENT INSTRUCTIONS
Please remain at home in quarantine until further instruction given  While at home you should isolate from others  Use the inhaler as reviewed  You may take Tylenol for fever and discomfort  Get plenty of rest and drink lots of water  Continue Mucinex DM (guaifenesin-dextromethorphan) for cough and congestion relief  Follow BRAT (bananas, rice, apples, toast) diet as discussed  Once feeling up to it, you can begin to introduce new foods and advance diet as it is tolerated  Stay hydrated drinking plenty of water  Supplement with Gatorade if vomiting or diarrhea persist   Follow up with your family doctor in  1-2 days  Proceed to the ER if symptoms worsen

## 2021-01-11 NOTE — TELEPHONE ENCOUNTER
Loni Denney  called the office he just finished his visit with the respiratory clinic and was told he needed to call our office

## 2021-01-11 NOTE — TELEPHONE ENCOUNTER
Appointment scheduled for today 1/11/21 at 12:00 PM at [address]  Patient aware of all directions and agreeable to appointment

## 2021-01-11 NOTE — TELEPHONE ENCOUNTER
I spoke to the patient, he is agreeable to seeing respiratory clinic depending on what site is available  I will call to schedule and check availability

## 2021-01-12 ENCOUNTER — HOSPITAL ENCOUNTER (EMERGENCY)
Facility: HOSPITAL | Age: 61
Discharge: HOME | End: 2021-01-12
Attending: EMERGENCY MEDICINE | Admitting: EMERGENCY MEDICINE
Payer: COMMERCIAL

## 2021-01-12 ENCOUNTER — APPOINTMENT (EMERGENCY)
Dept: CT IMAGING | Facility: HOSPITAL | Age: 61
End: 2021-01-12
Payer: COMMERCIAL

## 2021-01-12 ENCOUNTER — TELEMEDICINE (OUTPATIENT)
Dept: FAMILY MEDICINE CLINIC | Facility: CLINIC | Age: 61
End: 2021-01-12
Payer: COMMERCIAL

## 2021-01-12 VITALS
RESPIRATION RATE: 18 BRPM | DIASTOLIC BLOOD PRESSURE: 93 MMHG | WEIGHT: 234.13 LBS | SYSTOLIC BLOOD PRESSURE: 167 MMHG | OXYGEN SATURATION: 96 % | TEMPERATURE: 98.1 F | HEART RATE: 98 BPM | BODY MASS INDEX: 34.57 KG/M2

## 2021-01-12 DIAGNOSIS — R50.9 FEVER, UNSPECIFIED FEVER CAUSE: ICD-10-CM

## 2021-01-12 DIAGNOSIS — R11.2 NON-INTRACTABLE VOMITING WITH NAUSEA, UNSPECIFIED VOMITING TYPE: ICD-10-CM

## 2021-01-12 DIAGNOSIS — U07.1 COVID-19 VIRUS INFECTION: Primary | ICD-10-CM

## 2021-01-12 DIAGNOSIS — E87.6 HYPOKALEMIA: ICD-10-CM

## 2021-01-12 DIAGNOSIS — R11.0 NAUSEA: ICD-10-CM

## 2021-01-12 LAB
ALBUMIN SERPL BCP-MCNC: 3.5 G/DL (ref 3.5–5)
ALP SERPL-CCNC: 43 U/L (ref 46–116)
ALT SERPL W P-5'-P-CCNC: 32 U/L (ref 12–78)
ANION GAP SERPL CALCULATED.3IONS-SCNC: 10 MMOL/L (ref 4–13)
AST SERPL W P-5'-P-CCNC: 33 U/L (ref 5–45)
BASOPHILS # BLD AUTO: 0.01 THOUSANDS/ΜL (ref 0–0.1)
BASOPHILS NFR BLD AUTO: 0 % (ref 0–1)
BILIRUB SERPL-MCNC: 0.5 MG/DL (ref 0.2–1)
BUN SERPL-MCNC: 18 MG/DL (ref 5–25)
CALCIUM SERPL-MCNC: 9.2 MG/DL (ref 8.3–10.1)
CHLORIDE SERPL-SCNC: 99 MMOL/L (ref 100–108)
CO2 SERPL-SCNC: 29 MMOL/L (ref 21–32)
CREAT SERPL-MCNC: 1.11 MG/DL (ref 0.6–1.3)
EOSINOPHIL # BLD AUTO: 0 THOUSAND/ΜL (ref 0–0.61)
EOSINOPHIL NFR BLD AUTO: 0 % (ref 0–6)
ERYTHROCYTE [DISTWIDTH] IN BLOOD BY AUTOMATED COUNT: 13.3 % (ref 11.6–15.1)
GFR SERPL CREATININE-BSD FRML MDRD: 71 ML/MIN/1.73SQ M
GLUCOSE SERPL-MCNC: 110 MG/DL (ref 65–140)
HCT VFR BLD AUTO: 45.9 % (ref 36.5–49.3)
HGB BLD-MCNC: 15.6 G/DL (ref 12–17)
IMM GRANULOCYTES # BLD AUTO: 0.04 THOUSAND/UL (ref 0–0.2)
IMM GRANULOCYTES NFR BLD AUTO: 1 % (ref 0–2)
LIPASE SERPL-CCNC: 179 U/L (ref 73–393)
LYMPHOCYTES # BLD AUTO: 1.48 THOUSANDS/ΜL (ref 0.6–4.47)
LYMPHOCYTES NFR BLD AUTO: 21 % (ref 14–44)
MCH RBC QN AUTO: 30.9 PG (ref 26.8–34.3)
MCHC RBC AUTO-ENTMCNC: 34 G/DL (ref 31.4–37.4)
MCV RBC AUTO: 91 FL (ref 82–98)
MONOCYTES # BLD AUTO: 0.66 THOUSAND/ΜL (ref 0.17–1.22)
MONOCYTES NFR BLD AUTO: 9 % (ref 4–12)
NEUTROPHILS # BLD AUTO: 4.82 THOUSANDS/ΜL (ref 1.85–7.62)
NEUTS SEG NFR BLD AUTO: 69 % (ref 43–75)
NRBC BLD AUTO-RTO: 0 /100 WBCS
PLATELET # BLD AUTO: 220 THOUSANDS/UL (ref 149–390)
PMV BLD AUTO: 9.4 FL (ref 8.9–12.7)
POTASSIUM SERPL-SCNC: 2.9 MMOL/L (ref 3.5–5.3)
PROT SERPL-MCNC: 8.3 G/DL (ref 6.4–8.2)
RBC # BLD AUTO: 5.05 MILLION/UL (ref 3.88–5.62)
SODIUM SERPL-SCNC: 138 MMOL/L (ref 136–145)
TROPONIN I SERPL-MCNC: <0.02 NG/ML
WBC # BLD AUTO: 7.01 THOUSAND/UL (ref 4.31–10.16)

## 2021-01-12 PROCEDURE — 84484 ASSAY OF TROPONIN QUANT: CPT | Performed by: EMERGENCY MEDICINE

## 2021-01-12 PROCEDURE — 74176 CT ABD & PELVIS W/O CONTRAST: CPT

## 2021-01-12 PROCEDURE — 36415 COLL VENOUS BLD VENIPUNCTURE: CPT

## 2021-01-12 PROCEDURE — 99284 EMERGENCY DEPT VISIT MOD MDM: CPT

## 2021-01-12 PROCEDURE — 99285 EMERGENCY DEPT VISIT HI MDM: CPT | Performed by: EMERGENCY MEDICINE

## 2021-01-12 PROCEDURE — 99215 OFFICE O/P EST HI 40 MIN: CPT | Performed by: NURSE PRACTITIONER

## 2021-01-12 PROCEDURE — 80053 COMPREHEN METABOLIC PANEL: CPT | Performed by: EMERGENCY MEDICINE

## 2021-01-12 PROCEDURE — 93005 ELECTROCARDIOGRAM TRACING: CPT | Performed by: PHYSICIAN ASSISTANT

## 2021-01-12 PROCEDURE — 96366 THER/PROPH/DIAG IV INF ADDON: CPT

## 2021-01-12 PROCEDURE — 96375 TX/PRO/DX INJ NEW DRUG ADDON: CPT

## 2021-01-12 PROCEDURE — 83690 ASSAY OF LIPASE: CPT | Performed by: EMERGENCY MEDICINE

## 2021-01-12 PROCEDURE — 85025 COMPLETE CBC W/AUTO DIFF WBC: CPT | Performed by: EMERGENCY MEDICINE

## 2021-01-12 PROCEDURE — 96365 THER/PROPH/DIAG IV INF INIT: CPT

## 2021-01-12 RX ORDER — ONDANSETRON 4 MG/1
4 TABLET, ORALLY DISINTEGRATING ORAL EVERY 8 HOURS PRN
Qty: 10 TABLET | Refills: 0 | Status: SHIPPED | OUTPATIENT
Start: 2021-01-12 | End: 2021-04-23

## 2021-01-12 RX ORDER — POTASSIUM CHLORIDE 20 MEQ/1
40 TABLET, EXTENDED RELEASE ORAL ONCE
Status: COMPLETED | OUTPATIENT
Start: 2021-01-12 | End: 2021-01-12

## 2021-01-12 RX ORDER — ONDANSETRON 2 MG/ML
4 INJECTION INTRAMUSCULAR; INTRAVENOUS ONCE
Status: COMPLETED | OUTPATIENT
Start: 2021-01-12 | End: 2021-01-12

## 2021-01-12 RX ORDER — POTASSIUM CHLORIDE 14.9 MG/ML
20 INJECTION INTRAVENOUS ONCE
Status: COMPLETED | OUTPATIENT
Start: 2021-01-12 | End: 2021-01-12

## 2021-01-12 RX ADMIN — ONDANSETRON 4 MG: 2 INJECTION INTRAMUSCULAR; INTRAVENOUS at 20:18

## 2021-01-12 RX ADMIN — POTASSIUM CHLORIDE 40 MEQ: 1500 TABLET, EXTENDED RELEASE ORAL at 20:19

## 2021-01-12 RX ADMIN — POTASSIUM CHLORIDE 20 MEQ: 14.9 INJECTION, SOLUTION INTRAVENOUS at 20:21

## 2021-01-12 RX ADMIN — SODIUM CHLORIDE 1000 ML: 0.9 INJECTION, SOLUTION INTRAVENOUS at 20:19

## 2021-01-12 NOTE — PROGRESS NOTES
COVID-19 Virtual Visit     Assessment/Plan:    Problem List Items Addressed This Visit     None      Visit Diagnoses     COVID-19 virus infection    -  Primary    Relevant Orders    Transfer to other facility    Non-intractable vomiting with nausea, unspecified vomiting type        Relevant Orders    Transfer to other facility    Fever, unspecified fever cause        Relevant Orders    Transfer to other facility         Disposition:     I referred patient to the Emergency Department at: [facility] [place] [address]  ED  I recommended continued isolation until at least 24 hours have passed since recovery defined as resolution of fever without the use of fever-reducing medications AND improvement in COVID symptoms AND 10 days have passed since onset of symptoms (or 10 days have passed since date of first positive viral diagnostic test for asymptomatic patients)  Patient has subjective and objective signs of worsening symptoms  Nausea/ vomiting/ diarrhea/ dizziness/ weakness/ fevers/chills  Limited mobility  At this time it is indicated for patient to be seen in ER for evaluation to rule out any acute findings/changes, as this is day 10 and symptoms continue to worsen  Discussed with patient and what the process is and what testing they may do and how IV fluids could be beneficial  Patient verbalized understanding and agreed with treatment plan  He will go to [facility] order placed and office called ahead  1/14/21 earliest d/c isolation date initially, will need 20 day isolation at this time due to severity of covid 19  I have spent 30 minutes directly with the patient  Greater than 50% of this time was spent in counseling/coordination of care regarding: prognosis, risks and benefits of treatment options, instructions for management, patient and family education, risk factor reductions and impressions        Encounter provider Braeden Munguia, Barbra Clemonsia   Provider located at [facility] [address]  [facility]  [address]  [facility] [address]    Recent Visits  Date Type Provider Dept   01/11/21 Telephone Ryleymarty Augustin, [address] [facility]   01/08/21 Telemedicine Ryley Augustin, [address] [facility]   01/06/21 Telemedicine [place], [place] [facility] [facility]   01/05/21 Telemedicine Ryley Augustin, [address] [facility]   01/05/21 Telephone Emeterio Carbajal [place] Pg [address] [facility] recent visits within past 7 days and meeting all other requirements     Future Appointments  No visits were found meeting these conditions  Showing future appointments within next 150 days and meeting all other requirements      This virtual check-in was done via [employer] and patient was informed that this is a secure, HIPAA-compliant platform  He agrees to proceed  Patient agrees to participate in a virtual check in via telephone or video visit instead of presenting to the office to address urgent/immediate medical needs  Patient is aware this is a billable service  After connecting through [place], the patient was identified by name and date of birth  Golden Feliz was informed that this was a telemedicine visit and that the exam was being conducted confidentially over secure lines  My office door was closed  No one else was in the room  Golden Feliz acknowledged consent and understanding of privacy and security of the telemedicine visit  I informed the patient that I have reviewed his record in Epic and presented the opportunity for him to ask any questions regarding the visit today  The patient agreed to participate  Subjective:   Golden Feliz is a 63 y o  male who has been screened for COVID-19  Symptom change since last report: worsening  Patient's symptoms include fever (100 0 15 minutes ago), chills, fatigue, malaise, nasal congestion, rhinorrhea, cough (productive), nausea, vomiting, diarrhea and myalgias   Patient denies anosmia, loss of taste, shortness of breath, chest tightness, abdominal pain and headaches  Lord Avila has been staying home and has isolated themselves in his home  He is taking care to not share personal items and is cleaning all surfaces that are touched often, like counters, tabletops, and doorknobs using household cleaning sprays or wipes  He is wearing a mask when he leaves his room  Date of symptom onset: 1/3/2021    Since last visit, he did not receive BAM and he did go to respiratory clinic  He cancelled Monday's appointment for bamlanivimab as he felt it was too late at that time  He was seen then at respiratory clinic  Chest x ray was normal  He was given an inhaler to use as needed  Oxygenation was normal  He states he doesn't feel well at all  He states he's not eating much at all  Had nausea this morning and dry heaving  Has tried mucinex as well  He ate toast/ 1/2 banana this morning which stayed down  He states ever since he had episode of diarrhea Saturday his stomach has been off  He states he is very weak and dizzy and had a really hard time getting out of bed this morning  His fevers are back, 15 minutes ago was 100          Lab Results   Component Value Date    SARSCOV2 Detected (A) 01/04/2021     Past Medical History:   Diagnosis Date    AVM (arteriovenous malformation)     Back pain     BPH without urinary obstruction     last assessed 01/16/18    Disc disorder     Herniated    Hemorrhoids     Hydronephrosis     Kidney disease     Renal calculi     Renal colic     Ureter colic      Past Surgical History:   Procedure Laterality Date    BRAIN SURGERY      COLONOSCOPY      CYSTOSCOPY      w/removal of object, last assessed 01/16/18    CYSTOSCOPY      w/removal of ureteral calculus last assessed 01/16/18    CYSTOSCOPY KIDNEY W/ URETERAL GUIDE WIRE      last assessed 01/16/18    CYSTOSCOPY W/ URETERAL STENT PLACEMENT      last assessed 01/16/18    FL RETROGRADE PYELOGRAM 7/9/2020   [address]    HERNIA REPAIR      KIDNEY SURGERY      removal of kidney stone in 2013 at [address] W/LITHOTRIPSY &INDWELL STENT INSRT Right 12/13/2018    Procedure: CYSTOSCOPY URETEROSCOPY  RETROGRADE PYELOGRAM AND INSERTION RIGHT STENT URETERAL;  Surgeon: Glen Miranda MD;  [facility];  Service: Urology    [place] CYSTO/URETERO W/LITHOTRIPSY &INDWELL STENT INSRT Left 7/9/2020    Procedure: CYSTOSCOPY URETEROSCOPY WITH LITHOTRIPSY HOLMIUM LASER, RETROGRADE PYELOGRAM AND INSERTION STENT URETERAL;  Surgeon: Glen Miranda MD;  [facility];  Service: Urology    TOOTH EXTRACTION       Current Outpatient Medications   Medication Sig Dispense Refill    albuterol (Ventolin HFA) 90 mcg/act inhaler Inhale 2 puffs every 6 (six) hours as needed for wheezing 18 g 0    Ascorbic Acid (vitamin C) 1000 MG tablet Take 1 tablet (1,000 mg total) by mouth 2 (two) times a day for 10 days 20 tablet 0    cholecalciferol (VITAMIN D3) 1,000 units tablet Take 1 tablet (1,000 Units total) by mouth daily for 10 days 10 tablet 0    famotidine (PEPCID) 10 mg tablet Take 10 mg by mouth Taken as needed      fluorouracil (EFUDEX) 5 % cream PLEASE SEE ATTACHED FOR DETAILED DIRECTIONS      hydrochlorothiazide (HYDRODIURIL) 25 mg tablet Take 1 tablet (25 mg total) by mouth daily 30 tablet 5    ibuprofen (MOTRIN) 800 mg tablet Take 1 tablet (800 mg total) by mouth daily as needed for moderate pain (take with food) 30 tablet 0    methocarbamol (ROBAXIN) 750 mg tablet Take 1 tablet (750 mg total) by mouth daily as needed for muscle spasms (Pt states he takes on tablet at bedtime ) 30 tablet 0    Multiple Vitamin (multivitamin) tablet Take 1 tablet by mouth daily for 10 days 10 tablet 0    naproxen (NAPROSYN) 500 mg tablet Take 1 tablet (500 mg total) by mouth 2 (two) times a day with meals for 5 days 10 tablet 0    predniSONE 10 mg tablet Take 60 mg po day#1, 50 mg po day#2, 40 mg po day#3, 30 mg po day#4, 20 mg po day#5m and 10 mg po day#6 (Patient not taking: Reported on 10/5/2020) 21 tablet 0    tadalafil (CIALIS) 20 MG tablet Take 1 tablet (20 mg total) by mouth as needed for erectile dysfunction 10 tablet 3    traMADol (ULTRAM) 50 mg tablet Take 1 tablet (50 mg total) by mouth daily as needed for severe pain 30 tablet 0     No current facility-administered medications for this visit  Allergies   Allergen Reactions    Morphine Other (See Comments)    Other      Pt states that any narcotic makes him feel sick    Oxycodone-Acetaminophen Nausea Only, Vomiting and GI Intolerance    Seasonal Ic  [Cholestatin]      Other reaction(s): Nasal Congestion, Sinus Pain       Review of Systems   Constitutional: Positive for chills, fatigue and fever (100 0 15 minutes ago)  HENT: Positive for congestion, postnasal drip and rhinorrhea  Respiratory: Positive for cough (productive)  Negative for chest tightness and shortness of breath  Gastrointestinal: Positive for diarrhea, nausea and vomiting  Negative for abdominal pain  Genitourinary:        Decreased urine output   Musculoskeletal: Positive for myalgias  Neurological: Positive for dizziness and light-headedness  Negative for headaches  Objective: There were no vitals filed for this visit  Physical Exam  Constitutional:       Appearance: Normal appearance  He is ill-appearing and diaphoretic  HENT:      Head: Normocephalic and atraumatic  Eyes:      General: No scleral icterus  Pulmonary:      Effort: Pulmonary effort is normal  No respiratory distress  Skin:     Coloration: Skin is not pale  Neurological:      Mental Status: He is oriented to person, place, and time  He is lethargic  Psychiatric:         Mood and Affect: Mood normal        VIRTUAL VISIT DISCLAIMER    Chrissie Saldana acknowledges that he has consented to an online visit or consultation   He understands that the online visit is based solely on information provided by him, and that, in the absence of a face-to-face physical evaluation by the physician, the diagnosis he receives is both limited and provisional in terms of accuracy and completeness  This is not intended to replace a full medical face-to-face evaluation by the physician  Enedina Cowden understands and accepts these terms

## 2021-01-13 DIAGNOSIS — I10 ESSENTIAL HYPERTENSION: ICD-10-CM

## 2021-01-13 LAB
ATRIAL RATE: 104 BPM
P AXIS: 52 DEGREES
PR INTERVAL: 142 MS
QRS AXIS: -13 DEGREES
QRSD INTERVAL: 96 MS
QT INTERVAL: 352 MS
QTC INTERVAL: 462 MS
T WAVE AXIS: 11 DEGREES
VENTRICULAR RATE: 104 BPM

## 2021-01-13 PROCEDURE — 93010 ELECTROCARDIOGRAM REPORT: CPT | Performed by: INTERNAL MEDICINE

## 2021-01-13 RX ORDER — HYDROCHLOROTHIAZIDE 25 MG/1
TABLET ORAL
Qty: 30 TABLET | Refills: 5 | Status: SHIPPED | OUTPATIENT
Start: 2021-01-13 | End: 2021-07-10

## 2021-01-13 NOTE — DISCHARGE INSTRUCTIONS
Take Zofran as needed for nausea  Stay well hydrated    For COVID, we recommend vitamins:  - multivitamin daily  - Vitamin D3 2000 IU daily  - Vitamin C 1g every 12 hours    Call for close follow up with your family doctor  Return to the ER if any new or worsening symptoms including but not limited to difficulty breathing, passing out, intractable vomiting, etc

## 2021-01-13 NOTE — ED PROVIDER NOTES
History  Chief Complaint   Patient presents with    Nausea     Nausea, chills, body aches, cough  COVID positive 1/4  Sent by PCP for dehydration per patient  Decreased PO intake, decreased urine output, dry mouth  Generalized weakness  Reporting low grade fevers at home  62 yo M with COVID presenting for evaluation of worsening sx the past few days  Pt reports cough, SOB, abdominal pain, nausea and loose stools  Reports decreased PO intake  Nausea without vomiting, had some retching early this am that he thought was from postnasal drip  Diffuse abdominal pain, not associated with BMs  Reports numerous loose stools on Saturday, took Immodium and has not had a BM since  Had a near syncopal episode on Saturday around the time of the diarrhea, resolved with cool compresses/drinking liquids  Reports body aches, chills  Tested positive for COVID on 1/4, 8 days ago  Did telemed visit with PCP who referred him to ED with concern for dehydration  MDM: 62 yo M with COVID, abdominal pain/N/D- TTP in LLQ  Abdominal labs already done first nurse and K noted to be 2 9- will replete, otherwise abdominal labs unremarkable, CXR reviewed from yesterday and WNL, will add troponin/EKG to eval for ischemia, treat with IVF, K repletion and Zofran, CT A/P to r/o diverticulitis/other intra-abdominal pathology, reassess and if neg workup and ambulatory pulse ox/tolerating PO, may be able to be discharged            Prior to Admission Medications   Prescriptions Last Dose Informant Patient Reported? Taking?    Ascorbic Acid (vitamin C) 1000 MG tablet   No No   Sig: Take 1 tablet (1,000 mg total) by mouth 2 (two) times a day for 10 days   Multiple Vitamin (multivitamin) tablet   No No   Sig: Take 1 tablet by mouth daily for 10 days   albuterol (Ventolin HFA) 90 mcg/act inhaler   No No   Sig: Inhale 2 puffs every 6 (six) hours as needed for wheezing   cholecalciferol (VITAMIN D3) 1,000 units tablet   No No   Sig: Take 1 tablet (1,000 Units total) by mouth daily for 10 days   famotidine (PEPCID) 10 mg tablet  Self Yes No   Sig: Take 10 mg by mouth Taken as needed   fluorouracil (EFUDEX) 5 % cream   Yes No   Sig: PLEASE SEE ATTACHED FOR DETAILED DIRECTIONS   ibuprofen (MOTRIN) 800 mg tablet   No No   Sig: Take 1 tablet (800 mg total) by mouth daily as needed for moderate pain (take with food)   methocarbamol (ROBAXIN) 750 mg tablet   No No   Sig: Take 1 tablet (750 mg total) by mouth daily as needed for muscle spasms (Pt states he takes on tablet at bedtime )   naproxen (NAPROSYN) 500 mg tablet   No No   Sig: Take 1 tablet (500 mg total) by mouth 2 (two) times a day with meals for 5 days   predniSONE 10 mg tablet   No No   Sig: Take 60 mg po day#1, 50 mg po day#2, 40 mg po day#3, 30 mg po day#4, 20 mg po day#5m and 10 mg po day#6   Patient not taking: Reported on 10/5/2020   tadalafil (CIALIS) 20 MG tablet   No No   Sig: Take 1 tablet (20 mg total) by mouth as needed for erectile dysfunction   traMADol (ULTRAM) 50 mg tablet   No No   Sig: Take 1 tablet (50 mg total) by mouth daily as needed for severe pain      Facility-Administered Medications: None       Past Medical History:   Diagnosis Date    AVM (arteriovenous malformation)     Back pain     BPH without urinary obstruction     last assessed 01/16/18    Disc disorder     Herniated    Hemorrhoids     Hydronephrosis     Kidney disease     Renal calculi     Renal colic     Ureter colic        Past Surgical History:   Procedure Laterality Date    BRAIN SURGERY      COLONOSCOPY      CYSTOSCOPY      w/removal of object, last assessed 01/16/18    CYSTOSCOPY      w/removal of ureteral calculus last assessed 01/16/18    CYSTOSCOPY KIDNEY W/ URETERAL GUIDE WIRE      last assessed 01/16/18    CYSTOSCOPY W/ URETERAL STENT PLACEMENT      last assessed 01/16/18    FL RETROGRADE PYELOGRAM  7/9/2020   [address]    HERNIA REPAIR      KIDNEY SURGERY removal of kidney stone in 2013 at [address] W/LITHOTRIPSY &INDWELL STENT INSRT Right 12/13/2018    Procedure: CYSTOSCOPY URETEROSCOPY  RETROGRADE PYELOGRAM AND INSERTION RIGHT STENT URETERAL;  Surgeon: Joe Jernigan MD;  Location: [facility];  Service: Urology    ID CYSTO/URETERO W/LITHOTRIPSY &INDWELL STENT INSRT Left 7/9/2020    Procedure: CYSTOSCOPY URETEROSCOPY WITH LITHOTRIPSY HOLMIUM LASER, RETROGRADE PYELOGRAM AND INSERTION STENT URETERAL;  Surgeon: Joe Jernigan MD;  Location[facility];  Service: Urology    TOOTH EXTRACTION         Family History   Problem Relation Age of Onset    Stroke Mother     Cancer Father     Diabetes Father     Prostate cancer Father     Heart disease Father     Hypertension Father      I have reviewed and agree with the history as documented  E-Cigarette/Vaping    E-Cigarette Use Never User      E-Cigarette/Vaping Substances    Nicotine No     THC No     CBD No     Flavoring No      Social History     Tobacco Use    Smoking status: Never Smoker    Smokeless tobacco: Never Used   Substance Use Topics    Alcohol use: Not Currently     Comment: rare    Drug use: No       Review of Systems   Constitutional: Positive for activity change, appetite change, chills, fatigue and fever  Negative for unexpected weight change  HENT: Negative for ear pain, rhinorrhea and sore throat  Eyes: Negative for pain and visual disturbance  Respiratory: Positive for cough and shortness of breath  Cardiovascular: Negative for chest pain and leg swelling  Gastrointestinal: Positive for abdominal pain, diarrhea and nausea  Negative for constipation and vomiting  Endocrine: Negative for polydipsia, polyphagia and polyuria  Genitourinary: Negative for dysuria, frequency, hematuria and urgency  Musculoskeletal: Negative for back pain, myalgias and neck pain  Skin: Negative for color change and rash     Allergic/Immunologic: Negative for environmental allergies and immunocompromised state  Neurological: Negative for dizziness, weakness, light-headedness, numbness and headaches  Hematological: Negative for adenopathy  Does not bruise/bleed easily  Psychiatric/Behavioral: Negative for agitation and confusion  All other systems reviewed and are negative  Physical Exam  Physical Exam  Vitals signs and nursing note reviewed  Constitutional:       Appearance: Normal appearance  He is well-developed  Comments: Resting comfortably, looks like he doesn't feel well, but no acute distress   HENT:      Head: Normocephalic and atraumatic  Nose: Nose normal    Eyes:      Conjunctiva/sclera: Conjunctivae normal    Neck:      Musculoskeletal: Normal range of motion and neck supple  Cardiovascular:      Rate and Rhythm: Normal rate and regular rhythm  Heart sounds: Normal heart sounds  Pulmonary:      Effort: Pulmonary effort is normal  No respiratory distress  Breath sounds: Normal breath sounds  No stridor  No wheezing or rales  Chest:      Chest wall: No tenderness  Abdominal:      General: There is no distension  Palpations: Abdomen is soft  Tenderness: There is no guarding or rebound  Comments: Mild ttp suprapubic region and LLQ, no rebound/guarding   Musculoskeletal:         General: No swelling, tenderness or deformity  Comments: No calf swelling/ttp, no peripheral edema   Skin:     General: Skin is warm and dry  Findings: No rash  Neurological:      Mental Status: He is alert and oriented to person, place, and time  Motor: No abnormal muscle tone  Coordination: Coordination normal    Psychiatric:         Thought Content:  Thought content normal          Judgment: Judgment normal          Vital Signs  ED Triage Vitals [01/12/21 1613]   Temperature Pulse Respirations Blood Pressure SpO2   98 1 °F (36 7 °C) (!) 109 18 134/86 97 %      Temp Source Heart Rate Source Patient Position - Orthostatic VS BP Location FiO2 (%)   Temporal Monitor Sitting Right arm --      Pain Score       5           Vitals:    01/12/21 1613 01/12/21 1750 01/12/21 2153   BP: 134/86 142/87 167/93   Pulse: (!) 109 (!) 109 98   Patient Position - Orthostatic VS: Sitting Sitting          Visual Acuity      ED Medications  Medications   potassium chloride (K-DUR,KLOR-CON) CR tablet 40 mEq (40 mEq Oral Given 1/12/21 2019)   sodium chloride 0 9 % bolus 1,000 mL (0 mL Intravenous Stopped 1/12/21 2232)   ondansetron (ZOFRAN) injection 4 mg (4 mg Intravenous Given 1/12/21 2018)   potassium chloride 20 mEq IVPB (premix) (0 mEq Intravenous Stopped 1/12/21 2232)       Diagnostic Studies  Results Reviewed     Procedure Component Value Units Date/Time    Troponin I [[id number]]  (Normal) Collected: 01/12/21 2018    Lab Status: Final result Specimen: Blood from Arm, Right Updated: 01/12/21 2049     Troponin I <0 02 ng/mL     Comprehensive metabolic panel [[id number]]  (Abnormal) Collected: 01/12/21 1758    Lab Status: Final result Specimen: Blood from Arm, Right Updated: 01/12/21 1917     Sodium 138 mmol/L      Potassium 2 9 mmol/L      Chloride 99 mmol/L      CO2 29 mmol/L      ANION GAP 10 mmol/L      BUN 18 mg/dL      Creatinine 1 11 mg/dL      Glucose 110 mg/dL      Calcium 9 2 mg/dL      AST 33 U/L      ALT 32 U/L      Alkaline Phosphatase 43 U/L      Total Protein 8 3 g/dL      Albumin 3 5 g/dL      Total Bilirubin 0 50 mg/dL      eGFR 71 ml/min/1 73sq m     Narrative:      [facility] guidelines for Chronic Kidney Disease (CKD):     Stage 1 with normal or high GFR (GFR > 90 mL/min/1 73 square meters)    Stage 2 Mild CKD (GFR = 60-89 mL/min/1 73 square meters)    Stage 3A Moderate CKD (GFR = 45-59 mL/min/1 73 square meters)    Stage 3B Moderate CKD (GFR = 30-44 mL/min/1 73 square meters)    Stage 4 Severe CKD (GFR = 15-29 mL/min/1 73 square meters)    Stage 5 End Stage CKD (GFR <15 mL/min/1 73 square meters)  Note: GFR calculation is accurate only with a steady state creatinine    Lipase [[id number]]  (Normal) Collected: 01/12/21 1758    Lab Status: Final result Specimen: Blood from Arm, Right Updated: 01/12/21 1917     Lipase 179 u/L     CBC and differential [[id number]] Collected: 01/12/21 1758    Lab Status: Final result Specimen: Blood from Arm, Right Updated: 01/12/21 1812     WBC 7 01 Thousand/uL      RBC 5 05 Million/uL      Hemoglobin 15 6 g/dL      Hematocrit 45 9 %      MCV 91 fL      MCH 30 9 pg      MCHC 34 0 g/dL      RDW 13 3 %      MPV 9 4 fL      Platelets 652 Thousands/uL      nRBC 0 /100 WBCs      Neutrophils Relative 69 %      Immat GRANS % 1 %      Lymphocytes Relative 21 %      Monocytes Relative 9 %      Eosinophils Relative 0 %      Basophils Relative 0 %      Neutrophils Absolute 4 82 Thousands/µL      Immature Grans Absolute 0 04 Thousand/uL      Lymphocytes Absolute 1 48 Thousands/µL      Monocytes Absolute 0 66 Thousand/µL      Eosinophils Absolute 0 00 Thousand/µL      Basophils Absolute 0 01 Thousands/µL                  CT abdomen pelvis wo contrast   Final Result by Omar Farfan MD (01/12 2248)      No acute intra-abdominal abnormality  No free air or free fluid  No hydronephrosis or hydroureter  3 mm nonobstructing calculus at the right renal lower pole  Patchy groundglass opacities noted at the periphery of both lung bases most compatible with the patient's known history of covid-19 infection        Workstation performed: [id number]                    Procedures  Procedures         ED Course  ED Course as of Jan 14 0736   Tue Jan 12, 2021 1946 Will replete with 40 po and 20 IV   Potassium(!): 2 9   1947 Pulse(!): 109   2016 EKG: sinus tachycardia @ 104 bpm, LAD, normal intervals, t wave inversion III, no new changes, previous EKG reviewed                                SBIRT 20yo+      Most Recent Value   SBIRT (25 yo +)   In order to provide better care to our patients, we are screening all of our patients for alcohol and drug use  Would it be okay to ask you these screening questions? Yes Filed at: 01/12/2021 2310   Initial Alcohol Screen: US AUDIT-C    1  How often do you have a drink containing alcohol?  0 Filed at: 01/12/2021 2310   2  How many drinks containing alcohol do you have on a typical day you are drinking? 0 Filed at: 01/12/2021 2310   3a  Male UNDER 65: How often do you have five or more drinks on one occasion? 0 Filed at: 01/12/2021 2310   3b  FEMALE Any Age, or MALE 65+: How often do you have 4 or more drinks on one occassion? 0 Filed at: 01/12/2021 2310   Audit-C Score  0 Filed at: 01/12/2021 2310   BOB: How many times in the past year have you    Used an illegal drug or used a prescription medication for non-medical reasons? Never Filed at: 01/12/2021 2310                    MDM  Number of Diagnoses or Management Options  COVID-19 virus infection:   Hypokalemia:   Nausea:   Diagnosis management comments: 64 yo M with COVID and progressive nausea/decreased po intake/abdominal pain and transient loose stools   Signed out to Dr Kath Odell pending CT scan results and if negative and can tolerate PO plan is for DC       Amount and/or Complexity of Data Reviewed  Clinical lab tests: ordered and reviewed  Tests in the radiology section of CPT®: ordered  Tests in the medicine section of CPT®: ordered and reviewed  Review and summarize past medical records: yes  Independent visualization of images, tracings, or specimens: yes        Disposition  Final diagnoses:   COVID-19 virus infection   Nausea   Hypokalemia     Time reflects when diagnosis was documented in both MDM as applicable and the Disposition within this note     Time User Action Codes Description Comment    1/12/2021  9:26 PM Leocadia Hopes A Add [U07 1] COVID-19 virus infection     1/12/2021  9:26 PM Leocadia Hopes A Add [R11 0] Nausea     1/12/2021  9:26 PM Leocadia Hopes A Add [E87 6] Hypokalemia       ED Disposition ED Disposition Condition Date/Time Comment    Discharge Stable Tue Jan 12, 2021  9:29 PM Radha Hoffmann discharge to home/self care  Follow-up Information     Follow up With Specialties Details Why [facility],  Family Medicine   [address] [place] [address]   [phone]            Discharge Medication List as of 1/12/2021  9:29 PM      START taking these medications    Details   ondansetron (ZOFRAN-ODT) 4 mg disintegrating tablet Take 1 tablet (4 mg total) by mouth every 8 (eight) hours as needed for nausea for up to 10 doses, Starting Tue 1/12/2021, Print         CONTINUE these medications which have NOT CHANGED    Details   albuterol (Ventolin HFA) 90 mcg/act inhaler Inhale 2 puffs every 6 (six) hours as needed for wheezing, Starting Mon 1/11/2021, Normal      Ascorbic Acid (vitamin C) 1000 MG tablet Take 1 tablet (1,000 mg total) by mouth 2 (two) times a day for 10 days, Starting Mon 1/4/2021, Until Thu 1/14/2021, Normal      cholecalciferol (VITAMIN D3) 1,000 units tablet Take 1 tablet (1,000 Units total) by mouth daily for 10 days, Starting Mon 1/4/2021, Until Thu 1/14/2021, Normal      famotidine (PEPCID) 10 mg tablet Take 10 mg by mouth Taken as needed, Historical Med      fluorouracil (EFUDEX) 5 % cream PLEASE SEE ATTACHED FOR DETAILED DIRECTIONS, Historical Med      ibuprofen (MOTRIN) 800 mg tablet Take 1 tablet (800 mg total) by mouth daily as needed for moderate pain (take with food), Starting Wed 10/28/2020, Normal      methocarbamol (ROBAXIN) 750 mg tablet Take 1 tablet (750 mg total) by mouth daily as needed for muscle spasms (Pt states he takes on tablet at bedtime ), Starting Thu 6/25/2020, Normal      Multiple Vitamin (multivitamin) tablet Take 1 tablet by mouth daily for 10 days, Starting Mon 1/4/2021, Until Thu 1/14/2021, Normal      naproxen (NAPROSYN) 500 mg tablet Take 1 tablet (500 mg total) by mouth 2 (two) times a day with meals for 5 days, Starting Mon 9/7/2020, Until Sat 9/12/2020, Print      predniSONE 10 mg tablet Take 60 mg po day#1, 50 mg po day#2, 40 mg po day#3, 30 mg po day#4, 20 mg po day#5m and 10 mg po day#6, Normal      tadalafil (CIALIS) 20 MG tablet Take 1 tablet (20 mg total) by mouth as needed for erectile dysfunction, Starting Mon 6/15/2020, Print      traMADol (ULTRAM) 50 mg tablet Take 1 tablet (50 mg total) by mouth daily as needed for severe pain, Starting Mon 12/28/2020, Normal      hydrochlorothiazide (HYDRODIURIL) 25 mg tablet Take 1 tablet (25 mg total) by mouth daily, Starting Wed 7/15/2020, Normal           No discharge procedures on file      PDMP Review       Value Time User    PDMP Reviewed  Yes 12/28/2020 10:11 AM Philippe Ho DO          ED Provider  Electronically Signed by           Sara Victor DO  01/14/21 6637

## 2021-01-13 NOTE — ED CARE HANDOFF
Emergency Department Sign Out Note        Sign out and transfer of care from Dr Monica Rogers  See Separate Emergency Department note  The patient, Sarah Beth Ramos, was evaluated by the previous provider for Abdominal Pain  Workup Completed:  Labs, IVF, Zofran and K+ replacement    ED Course / Workup Pending (followup):  CT A/P    11:08 PM  CT abdomen pelvis wo contrast   Final Result      No acute intra-abdominal abnormality  No free air or free fluid  No hydronephrosis or hydroureter  3 mm nonobstructing calculus at the right renal lower pole  Patchy groundglass opacities noted at the periphery of both lung bases most compatible with the patient's known history of covid-19 infection  Workstation performed: [id number]                 Patient signed out to me earlier  Asked to follow up on CT scan  CT shows no acute intra-abdominal pathology  Does have mild ground-glass opacities which he was diagnosed with COVID-19 about 10 days ago and this would be consistent with that  Patient had no complications with his potassium replacement  Pain is improving and he was able to drink fluids without vomiting  Will discharge home with previous physicians discharge instructions and strict return ER precautions  Procedures  MDM    Disposition  Final diagnoses:   [id number] virus infection   Nausea   Hypokalemia     Time reflects when diagnosis was documented in both MDM as applicable and the Disposition within this note     Time User Action Codes Description Comment    1/12/2021  9:26 PM [place] Ayla A Add [U07 1] COVID-19 virus infection     1/12/2021  9:26 PM [place] Ayla A Add [R11 0] Nausea     1/12/2021  9:26 PM [place] Ayla A Add [E87 6] Hypokalemia       ED Disposition     ED Disposition Condition Date/Time Comment    Discharge Stable Tue Jan 12, 2021  9:29 PM Sarah Beth Ramos discharge to home/self care              Follow-up Information     Follow up With Specialties Details Why [facility] Family Medicine   [address]  [phone]          Patient's Medications   Discharge Prescriptions    ONDANSETRON (ZOFRAN-ODT) 4 MG DISINTEGRATING TABLET    Take 1 tablet (4 mg total) by mouth every 8 (eight) hours as needed for nausea for up to 10 doses       Start Date: 1/12/2021 End Date: --       Order Dose: 4 mg       Quantity: 10 tablet    Refills: 0     No discharge procedures on file         ED Provider  Electronically Signed by     Alexa Jasso DO  01/12/21 9207

## 2021-01-14 ENCOUNTER — TELEPHONE (OUTPATIENT)
Dept: FAMILY MEDICINE CLINIC | Facility: CLINIC | Age: 61
End: 2021-01-14

## 2021-01-14 ENCOUNTER — TELEMEDICINE (OUTPATIENT)
Dept: FAMILY MEDICINE CLINIC | Facility: CLINIC | Age: 61
End: 2021-01-14
Payer: COMMERCIAL

## 2021-01-14 DIAGNOSIS — U07.1 COVID-19 VIRUS INFECTION: Primary | ICD-10-CM

## 2021-01-14 PROCEDURE — 99214 OFFICE O/P EST MOD 30 MIN: CPT | Performed by: NURSE PRACTITIONER

## 2021-01-14 RX ORDER — AZITHROMYCIN 250 MG/1
TABLET, FILM COATED ORAL
Qty: 6 TABLET | Refills: 0 | Status: SHIPPED | OUTPATIENT
Start: 2021-01-14 | End: 2021-01-19

## 2021-01-14 RX ORDER — BENZONATATE 100 MG/1
100 CAPSULE ORAL 3 TIMES DAILY PRN
Qty: 20 CAPSULE | Refills: 0 | Status: SHIPPED | OUTPATIENT
Start: 2021-01-14 | End: 2021-04-23

## 2021-01-14 NOTE — PROGRESS NOTES
COVID-19 Virtual Visit     Assessment/Plan:    Problem List Items Addressed This Visit     None      Visit Diagnoses     COVID-19 virus infection    -  Primary    Relevant Medications    benzonatate (TESSALON PERLES) 100 mg capsule    azithromycin (ZITHROMAX) 250 mg tablet         Disposition:     I referred patient to the Emergency Department at: [facility] [place] [address]  ED  I recommended continued isolation until at least 24 hours have passed since recovery defined as resolution of fever without the use of fever-reducing medications AND improvement in COVID symptoms AND 10 days have passed since onset of symptoms (or 10 days have passed since date of first positive viral diagnostic test for asymptomatic patients)  Start cough medication, this is the Tessalon perles  One pill every 8 hours as needed for cough  Start azithromycin, this is the antibiotic, This is 2 pills on day one and then 1 pill for the following 4 days  Can use inhaler PRN  Stay well hydrated  Please call the office if you are experiencing any worsening of symptoms or no symptom improvement  1/14/21 earliest d/c isolation date initially, will need 20 day isolation at this time due to severity of covid 19  Day 20 will be 1/23  Recheck Monday  Monitor temperature  Advised to use probiotic/ eat with antibiotic  I have spent 30 minutes directly with the patient  Greater than 50% of this time was spent in counseling/coordination of care regarding: prognosis, risks and benefits of treatment options, instructions for management, patient and family education, risk factor reductions and impressions        Encounter provider Abraham White [facility]  Provider located at [phone] [address]    Recent Visits  Date Type Provider Dept   01/12/21 Enedina Blackmon   01/11/21 Telephone Enedina Kaur 01/08/21 Telemedicine Erma tex Alexis, [address] [facility] [address] [facility]   Showing recent visits within past 7 days and meeting all other requirements     Future Appointments  No visits were found meeting these conditions  Showing future appointments within next 150 days and meeting all other requirements      This virtual check-in was done via [employer] and patient was informed that this is a secure, HIPAA-compliant platform  He agrees to proceed  Patient agrees to participate in a virtual check in via telephone or video visit instead of presenting to the office to address urgent/immediate medical needs  Patient is aware this is a billable service  After connecting through [facility], the patient was identified by name and date of birth  Evan Oh was informed that this was a telemedicine visit and that the exam was being conducted confidentially over secure lines  My office door was closed  No one else was in the room  Evan Oh acknowledged consent and understanding of privacy and security of the telemedicine visit  I informed the patient that I have reviewed his record in Epic and presented the opportunity for him to ask any questions regarding the visit today  The patient agreed to participate  Subjective:   Evan Oh is a 63 y o  male who has been screened for COVID-19  Symptom change since last report: improving  Patient's symptoms include chills, fatigue, nasal congestion (worse at night), cough (productive) and myalgias  Patient denies fever, malaise, rhinorrhea, sore throat, anosmia, loss of taste, shortness of breath, chest tightness, abdominal pain, nausea, vomiting, diarrhea and headaches  Сергей Lamas has been staying home and has isolated themselves in his home  He is taking care to not share personal items and is cleaning all surfaces that are touched often, like counters, tabletops, and doorknobs using household cleaning sprays or wipes   He is wearing a mask when he leaves his room  Date of symptom onset: 1/3/2021      Was seen in ER 1/12 and was given IVF, that night he felt worse at home but then started urinating more and his appetite was better  Potassium replaced in ER  He was constipated since Saturday after diarrhea episode but had BM today  He is staying well hydrated  Hasn't taken temperature lately because he's been feeling okay from that aspect  Is having slight improvement  CT of abdomen ruled out acute abdomen but did show patchy groundglass opacities B/L consistent with COVID 19 pneumonia  Patient has not yet been started on antibiotics  On 1/11 he had chest x ray at respiratory clinic which was normal and treated with albuterol       Lab Results   Component Value Date    SARSCOV2 Detected (A) 01/04/2021     Past Medical History:   Diagnosis Date    AVM (arteriovenous malformation)     Back pain     BPH without urinary obstruction     last assessed 01/16/18    Disc disorder     Herniated    Hemorrhoids     Hydronephrosis     Kidney disease     Renal calculi     Renal colic     Ureter colic      Past Surgical History:   Procedure Laterality Date    BRAIN SURGERY      COLONOSCOPY      CYSTOSCOPY      w/removal of object, last assessed 01/16/18    CYSTOSCOPY      w/removal of ureteral calculus last assessed 01/16/18    CYSTOSCOPY KIDNEY W/ URETERAL GUIDE WIRE      last assessed 01/16/18    CYSTOSCOPY W/ URETERAL STENT PLACEMENT      last assessed 01/16/18    FL RETROGRADE PYELOGRAM  7/9/2020   [address]    HERNIA REPAIR      KIDNEY SURGERY      removal of kidney stone in 2013 at [address] &INDWELL STENT INSRT Right 12/13/2018    Procedure: CYSTOSCOPY URETEROSCOPY  RETROGRADE PYELOGRAM AND INSERTION RIGHT STENT URETERAL;  Surgeon: Kajal Paredes MD;  Location: [facility];  Service: Urology    NC CYSTO/URETERO W/LITHOTRIPSY &INDWELL STENT INSRT Left 7/9/2020    Procedure: CYSTOSCOPY URETEROSCOPY WITH LITHOTRIPSY HOLMIUM LASER, RETROGRADE PYELOGRAM AND INSERTION STENT URETERAL;  Surgeon: Delsie Bence, MD;  Location: [facility];  Service: Urology    TOOTH EXTRACTION       Current Outpatient Medications   Medication Sig Dispense Refill    famotidine (PEPCID) 10 mg tablet Take 10 mg by mouth Taken as needed      hydrochlorothiazide (HYDRODIURIL) 25 mg tablet TAKE 1 TABLET BY MOUTH EVERY DAY 30 tablet 5    tadalafil (CIALIS) 20 MG tablet Take 1 tablet (20 mg total) by mouth as needed for erectile dysfunction 10 tablet 3    albuterol (Ventolin HFA) 90 mcg/act inhaler Inhale 2 puffs every 6 (six) hours as needed for wheezing 18 g 0    Ascorbic Acid (vitamin C) 1000 MG tablet Take 1 tablet (1,000 mg total) by mouth 2 (two) times a day for 10 days 20 tablet 0    azithromycin (ZITHROMAX) 250 mg tablet Take 2 tablets today then 1 tablet daily x 4 days 6 tablet 0    benzonatate (TESSALON PERLES) 100 mg capsule Take 1 capsule (100 mg total) by mouth 3 (three) times a day as needed for cough 20 capsule 0    cholecalciferol (VITAMIN D3) 1,000 units tablet Take 1 tablet (1,000 Units total) by mouth daily for 10 days 10 tablet 0    fluorouracil (EFUDEX) 5 % cream PLEASE SEE ATTACHED FOR DETAILED DIRECTIONS      ibuprofen (MOTRIN) 800 mg tablet Take 1 tablet (800 mg total) by mouth daily as needed for moderate pain (take with food) 30 tablet 0    methocarbamol (ROBAXIN) 750 mg tablet Take 1 tablet (750 mg total) by mouth daily as needed for muscle spasms (Pt states he takes on tablet at bedtime ) 30 tablet 0    Multiple Vitamin (multivitamin) tablet Take 1 tablet by mouth daily for 10 days 10 tablet 0    naproxen (NAPROSYN) 500 mg tablet Take 1 tablet (500 mg total) by mouth 2 (two) times a day with meals for 5 days 10 tablet 0    ondansetron (ZOFRAN-ODT) 4 mg disintegrating tablet Take 1 tablet (4 mg total) by mouth every 8 (eight) hours as needed for nausea for up to 10 doses 10 tablet 0 No current facility-administered medications for this visit  Allergies   Allergen Reactions    Morphine Other (See Comments)    Other      Pt states that any narcotic makes him feel sick    Oxycodone-Acetaminophen Nausea Only, Vomiting and GI Intolerance    Seasonal Ic  [Cholestatin]      Other reaction(s): Nasal Congestion, Sinus Pain       Review of Systems   Constitutional: Positive for chills and fatigue  Negative for fever  HENT: Positive for congestion (worse at night) and postnasal drip  Negative for rhinorrhea and sore throat  Respiratory: Positive for cough (productive)  Negative for chest tightness and shortness of breath  Gastrointestinal: Negative for abdominal pain, diarrhea, nausea and vomiting  Genitourinary:        Decreased urine output   Musculoskeletal: Positive for myalgias  Neurological: Positive for dizziness and light-headedness  Negative for headaches  Objective: There were no vitals filed for this visit  Physical Exam  Constitutional:       Appearance: Normal appearance  He is not ill-appearing or diaphoretic  HENT:      Head: Normocephalic and atraumatic  Eyes:      General: No scleral icterus  Pulmonary:      Effort: Pulmonary effort is normal  No respiratory distress  Skin:     Coloration: Skin is not pale  Neurological:      Mental Status: He is oriented to person, place, and time  He is lethargic  Psychiatric:         Mood and Affect: Mood normal        VIRTUAL VISIT DISCLAIMER    Gary Madison acknowledges that he has consented to an online visit or consultation  He understands that the online visit is based solely on information provided by him, and that, in the absence of a face-to-face physical evaluation by the physician, the diagnosis he receives is both limited and provisional in terms of accuracy and completeness  This is not intended to replace a full medical face-to-face evaluation by the physician   Gary Madison understands and accepts these terms

## 2021-01-15 ENCOUNTER — TELEPHONE (OUTPATIENT)
Dept: FAMILY MEDICINE CLINIC | Facility: CLINIC | Age: 61
End: 2021-01-15

## 2021-01-15 DIAGNOSIS — U07.1 COVID-19 VIRUS INFECTION: Primary | ICD-10-CM

## 2021-01-15 RX ORDER — FLUTICASONE PROPIONATE 50 MCG
2 SPRAY, SUSPENSION (ML) NASAL DAILY
Qty: 1 BOTTLE | Refills: 0 | Status: SHIPPED | OUTPATIENT
Start: 2021-01-15 | End: 2022-01-27

## 2021-01-15 NOTE — TELEPHONE ENCOUNTER
Pt called and is asking about the coricidin  Pt states he got the one that say it's for chest congestion and cough  Pt states he was also prescribed the tessalon pearls and wants to know if this is ok to take both of them as they are both for cough  Pt would like a call back

## 2021-01-15 NOTE — TELEPHONE ENCOUNTER
Cindy Yo called today- he did take a probiotic with the azithromycin and food and ended up with bad diarrhea  He took pepto-bismol and asked if that was okay as it helped a bit  Also, he said the pearls you gave him for his cough have not helped at all as he is still coughing badly  He is aslo asking if there may be a decongestant you can call in for him, he is so congested and stuffy at night he cannot sleep, it makes it hard for him to sleep at night  Please advise  Thank you!       HE CAN BE REACHED [phone]

## 2021-01-15 NOTE — TELEPHONE ENCOUNTER
Continue with the probiotic and antibiotic  Pepto can be used if needed, ideally controlling the diarrhea with diet (binding foods- sticking to toast/applesauce/bananas/rice) just so it doesn't then make him constipated  He can double up on the cough medication, they are 100 mg capsules, he can take 2 for 200mg total TID PRN    flonase was sent to pharmacy to start, 2 sprays each nostril daily       He can use over the counter decongestants as well but it would need to be the ones that are safe for high blood pressure like coricidin HBP or vicks HBP

## 2021-01-16 ENCOUNTER — HOSPITAL ENCOUNTER (EMERGENCY)
Facility: HOSPITAL | Age: 61
Discharge: HOME | End: 2021-01-16
Attending: EMERGENCY MEDICINE
Payer: COMMERCIAL

## 2021-01-16 VITALS
TEMPERATURE: 99.5 F | SYSTOLIC BLOOD PRESSURE: 130 MMHG | OXYGEN SATURATION: 95 % | DIASTOLIC BLOOD PRESSURE: 74 MMHG | RESPIRATION RATE: 20 BRPM | HEART RATE: 99 BPM | WEIGHT: 234.79 LBS | BODY MASS INDEX: 34.67 KG/M2

## 2021-01-16 DIAGNOSIS — E87.6 HYPOKALEMIA: ICD-10-CM

## 2021-01-16 DIAGNOSIS — U07.1 COVID-19: Primary | ICD-10-CM

## 2021-01-16 LAB
ALBUMIN SERPL BCP-MCNC: 2.9 G/DL (ref 3.5–5)
ALP SERPL-CCNC: 38 U/L (ref 46–116)
ALT SERPL W P-5'-P-CCNC: 29 U/L (ref 12–78)
ANION GAP SERPL CALCULATED.3IONS-SCNC: 12 MMOL/L (ref 4–13)
AST SERPL W P-5'-P-CCNC: 23 U/L (ref 5–45)
BASOPHILS # BLD AUTO: 0.02 THOUSANDS/ΜL (ref 0–0.1)
BASOPHILS NFR BLD AUTO: 0 % (ref 0–1)
BILIRUB SERPL-MCNC: 0.44 MG/DL (ref 0.2–1)
BUN SERPL-MCNC: 11 MG/DL (ref 5–25)
CALCIUM ALBUM COR SERPL-MCNC: 9.7 MG/DL (ref 8.3–10.1)
CALCIUM SERPL-MCNC: 8.8 MG/DL (ref 8.3–10.1)
CHLORIDE SERPL-SCNC: 98 MMOL/L (ref 100–108)
CO2 SERPL-SCNC: 27 MMOL/L (ref 21–32)
CREAT SERPL-MCNC: 0.96 MG/DL (ref 0.6–1.3)
EOSINOPHIL # BLD AUTO: 0.03 THOUSAND/ΜL (ref 0–0.61)
EOSINOPHIL NFR BLD AUTO: 0 % (ref 0–6)
ERYTHROCYTE [DISTWIDTH] IN BLOOD BY AUTOMATED COUNT: 13.1 % (ref 11.6–15.1)
GFR SERPL CREATININE-BSD FRML MDRD: 85 ML/MIN/1.73SQ M
GLUCOSE SERPL-MCNC: 129 MG/DL (ref 65–140)
HCT VFR BLD AUTO: 41.3 % (ref 36.5–49.3)
HGB BLD-MCNC: 14 G/DL (ref 12–17)
IMM GRANULOCYTES # BLD AUTO: 0.06 THOUSAND/UL (ref 0–0.2)
IMM GRANULOCYTES NFR BLD AUTO: 1 % (ref 0–2)
LYMPHOCYTES # BLD AUTO: 1.06 THOUSANDS/ΜL (ref 0.6–4.47)
LYMPHOCYTES NFR BLD AUTO: 13 % (ref 14–44)
MCH RBC QN AUTO: 30.6 PG (ref 26.8–34.3)
MCHC RBC AUTO-ENTMCNC: 33.9 G/DL (ref 31.4–37.4)
MCV RBC AUTO: 90 FL (ref 82–98)
MONOCYTES # BLD AUTO: 0.99 THOUSAND/ΜL (ref 0.17–1.22)
MONOCYTES NFR BLD AUTO: 12 % (ref 4–12)
NEUTROPHILS # BLD AUTO: 5.89 THOUSANDS/ΜL (ref 1.85–7.62)
NEUTS SEG NFR BLD AUTO: 74 % (ref 43–75)
NRBC BLD AUTO-RTO: 0 /100 WBCS
PLATELET # BLD AUTO: 350 THOUSANDS/UL (ref 149–390)
PMV BLD AUTO: 9.1 FL (ref 8.9–12.7)
POTASSIUM SERPL-SCNC: 2.8 MMOL/L (ref 3.5–5.3)
PROT SERPL-MCNC: 7.6 G/DL (ref 6.4–8.2)
RBC # BLD AUTO: 4.58 MILLION/UL (ref 3.88–5.62)
SODIUM SERPL-SCNC: 137 MMOL/L (ref 136–145)
WBC # BLD AUTO: 8.05 THOUSAND/UL (ref 4.31–10.16)

## 2021-01-16 PROCEDURE — 99284 EMERGENCY DEPT VISIT MOD MDM: CPT

## 2021-01-16 PROCEDURE — 96366 THER/PROPH/DIAG IV INF ADDON: CPT

## 2021-01-16 PROCEDURE — 36415 COLL VENOUS BLD VENIPUNCTURE: CPT | Performed by: EMERGENCY MEDICINE

## 2021-01-16 PROCEDURE — 85025 COMPLETE CBC W/AUTO DIFF WBC: CPT | Performed by: EMERGENCY MEDICINE

## 2021-01-16 PROCEDURE — 96365 THER/PROPH/DIAG IV INF INIT: CPT

## 2021-01-16 PROCEDURE — 96361 HYDRATE IV INFUSION ADD-ON: CPT

## 2021-01-16 PROCEDURE — 93005 ELECTROCARDIOGRAM TRACING: CPT

## 2021-01-16 PROCEDURE — 99284 EMERGENCY DEPT VISIT MOD MDM: CPT | Performed by: EMERGENCY MEDICINE

## 2021-01-16 PROCEDURE — 96368 THER/DIAG CONCURRENT INF: CPT

## 2021-01-16 PROCEDURE — 80053 COMPREHEN METABOLIC PANEL: CPT | Performed by: EMERGENCY MEDICINE

## 2021-01-16 RX ORDER — PROMETHAZINE HYDROCHLORIDE AND CODEINE PHOSPHATE 6.25; 1 MG/5ML; MG/5ML
2.5 SYRUP ORAL EVERY 6 HOURS PRN
Qty: 118 ML | Refills: 0 | Status: SHIPPED | OUTPATIENT
Start: 2021-01-16 | End: 2021-01-26

## 2021-01-16 RX ORDER — POTASSIUM CHLORIDE 20 MEQ/1
60 TABLET, EXTENDED RELEASE ORAL ONCE
Status: COMPLETED | OUTPATIENT
Start: 2021-01-16 | End: 2021-01-16

## 2021-01-16 RX ORDER — MAGNESIUM SULFATE HEPTAHYDRATE 40 MG/ML
2 INJECTION, SOLUTION INTRAVENOUS ONCE
Status: COMPLETED | OUTPATIENT
Start: 2021-01-16 | End: 2021-01-16

## 2021-01-16 RX ORDER — PROMETHAZINE HYDROCHLORIDE AND CODEINE PHOSPHATE 6.25; 1 MG/5ML; MG/5ML
2.5 SYRUP ORAL EVERY 6 HOURS PRN
Qty: 118 ML | Refills: 0 | Status: SHIPPED | OUTPATIENT
Start: 2021-01-16 | End: 2021-01-16 | Stop reason: SDUPTHER

## 2021-01-16 RX ORDER — POTASSIUM CHLORIDE 14.9 MG/ML
20 INJECTION INTRAVENOUS ONCE
Status: COMPLETED | OUTPATIENT
Start: 2021-01-16 | End: 2021-01-16

## 2021-01-16 RX ADMIN — POTASSIUM CHLORIDE 20 MEQ: 14.9 INJECTION, SOLUTION INTRAVENOUS at 18:49

## 2021-01-16 RX ADMIN — MAGNESIUM SULFATE HEPTAHYDRATE 2 G: 40 INJECTION, SOLUTION INTRAVENOUS at 18:39

## 2021-01-16 RX ADMIN — SODIUM CHLORIDE 500 ML: 0.9 INJECTION, SOLUTION INTRAVENOUS at 17:13

## 2021-01-16 RX ADMIN — POTASSIUM CHLORIDE 60 MEQ: 1500 TABLET, EXTENDED RELEASE ORAL at 18:39

## 2021-01-16 NOTE — ED ATTENDING ATTESTATION
1/16/2021  IAnam MD, saw and evaluated the patient  I have discussed the patient with the resident/non-physician practitioner and agree with the resident's/non-physician practitioner's findings, Plan of Care, and MDM as documented in the resident's/non-physician practitioner's note, except where noted  All available labs and Radiology studies were reviewed  I was present for key portions of any procedure(s) performed by the resident/non-physician practitioner and I was immediately available to provide assistance  At this point I agree with the current assessment done in the Emergency Department  I have conducted an independent evaluation of this patient a history and physical is as follows:  62 y/o M who is covid +presents for re-evaluation of covid symptoms  Pt reports increasing fatigue, diarrhea  10 systems reviewed and otherwise neg  On exam no distress, lungs nml, carddiac nml, abd nml   MDM: covid-will repeat labs, amb pulse ox, tx symptoms  ED Course         Critical Care Time  Procedures

## 2021-01-17 LAB
ATRIAL RATE: 111 BPM
P AXIS: 50 DEGREES
PR INTERVAL: 142 MS
QRS AXIS: 9 DEGREES
QRSD INTERVAL: 94 MS
QT INTERVAL: 332 MS
QTC INTERVAL: 451 MS
T WAVE AXIS: 27 DEGREES
VENTRICULAR RATE: 111 BPM

## 2021-01-17 PROCEDURE — 93010 ELECTROCARDIOGRAM REPORT: CPT | Performed by: INTERNAL MEDICINE

## 2021-01-17 NOTE — ED PROVIDER NOTES
History  Chief Complaint   Patient presents with    Dehydration     tested postive for covid on 4th, c/o cough, feeling dehydrated, chills, weakness, bodyaches     HPI  64 yo male with PMH elevated BMI presents to the ED with concern for cough, chills, fatigue, generalized weakness, body aches, and decreased PO intake x approximately 2 weeks  Pt reports he tested positive for COVID in the ED on 1/4  Since then he has been seen several times by his PCP and again in the ED on 1/12, during which he had blood work, EKG, and CT abd/pelvis within normal limits but was found to have a low potassium which was repleted  PCP recently started him on a course of azithromycin  Pt has not had any documented hypoxia  States chest pain and sore throat with coughing  Denies difficulty swallowing  Denies abdominal pain or vomiting  Had a few episodes of diarrhea a few days ago  States some intermittent nausea, but has not needed to take prescribed zofran  Reports decreased appetite and decreased PO intake  Prior to Admission Medications   Prescriptions Last Dose Informant Patient Reported? Taking?    Ascorbic Acid (vitamin C) 1000 MG tablet   No No   Sig: Take 1 tablet (1,000 mg total) by mouth 2 (two) times a day for 10 days   Multiple Vitamin (multivitamin) tablet   No No   Sig: Take 1 tablet by mouth daily for 10 days   albuterol (Ventolin HFA) 90 mcg/act inhaler Not Taking at Unknown time  No No   Sig: Inhale 2 puffs every 6 (six) hours as needed for wheezing   Patient not taking: Reported on 1/16/2021   azithromycin (ZITHROMAX) 250 mg tablet   No Yes   Sig: Take 2 tablets today then 1 tablet daily x 4 days   benzonatate (TESSALON PERLES) 100 mg capsule   No Yes   Sig: Take 1 capsule (100 mg total) by mouth 3 (three) times a day as needed for cough   cholecalciferol (VITAMIN D3) 1,000 units tablet   No No   Sig: Take 1 tablet (1,000 Units total) by mouth daily for 10 days   famotidine (PEPCID) 10 mg tablet Not Taking at Unknown time Self Yes No   Sig: Take 10 mg by mouth Taken as needed   fluorouracil (EFUDEX) 5 % cream Not Taking at Unknown time  Yes No   Sig: PLEASE SEE ATTACHED FOR DETAILED DIRECTIONS   fluticasone (FLONASE) 50 mcg/act nasal spray   No Yes   Si sprays into each nostril daily   hydrochlorothiazide (HYDRODIURIL) 25 mg tablet   No Yes   Sig: TAKE 1 TABLET BY MOUTH EVERY DAY   ibuprofen (MOTRIN) 800 mg tablet   No Yes   Sig: Take 1 tablet (800 mg total) by mouth daily as needed for moderate pain (take with food)   methocarbamol (ROBAXIN) 750 mg tablet Not Taking at Unknown time  No No   Sig: Take 1 tablet (750 mg total) by mouth daily as needed for muscle spasms (Pt states he takes on tablet at bedtime )   Patient not taking: Reported on 2021   naproxen (NAPROSYN) 500 mg tablet   No No   Sig: Take 1 tablet (500 mg total) by mouth 2 (two) times a day with meals for 5 days   ondansetron (ZOFRAN-ODT) 4 mg disintegrating tablet Not Taking at Unknown time  No No   Sig: Take 1 tablet (4 mg total) by mouth every 8 (eight) hours as needed for nausea for up to 10 doses   Patient not taking: Reported on 2021   tadalafil (CIALIS) 20 MG tablet   No Yes   Sig: Take 1 tablet (20 mg total) by mouth as needed for erectile dysfunction      Facility-Administered Medications: None       Past Medical History:   Diagnosis Date    AVM (arteriovenous malformation)     Back pain     BPH without urinary obstruction     last assessed 18    Disc disorder     Herniated    Hemorrhoids     Hydronephrosis     Kidney disease     Renal calculi     Renal colic     Ureter colic        Past Surgical History:   Procedure Laterality Date    BRAIN SURGERY      COLONOSCOPY      CYSTOSCOPY      w/removal of object, last assessed 18    CYSTOSCOPY      w/removal of ureteral calculus last assessed 18    CYSTOSCOPY KIDNEY W/ URETERAL GUIDE WIRE      last assessed 18    CYSTOSCOPY W/ URETERAL STENT PLACEMENT last assessed 01/16/18    [place] RETROGRADE PYELOGRAM  7/9/2020   [address]    HERNIA REPAIR      KIDNEY SURGERY      removal of kidney stone in 2013 at [address] W/LITHOTRIPSY &INDWELL STENT INSRT Right 12/13/2018    Procedure: CYSTOSCOPY URETEROSCOPY  RETROGRADE PYELOGRAM AND INSERTION RIGHT STENT URETERAL;  Surgeon: Juana Tim MD;  Location: [facility];  Service: Urology    NC CYSTO/URETERO W/LITHOTRIPSY &INDWELL STENT INSRT Left 7/9/2020    Procedure: CYSTOSCOPY URETEROSCOPY WITH LITHOTRIPSY HOLMIUM LASER, RETROGRADE PYELOGRAM AND INSERTION STENT URETERAL;  Surgeon: Juana Tim MD;  Location[facility];  Service: Urology    TOOTH EXTRACTION         Family History   Problem Relation Age of Onset    Stroke Mother     Cancer Father     Diabetes Father     Prostate cancer Father     Heart disease Father     Hypertension Father      I have reviewed and agree with the history as documented  E-Cigarette/Vaping    E-Cigarette Use Never User      E-Cigarette/Vaping Substances    Nicotine No     THC No     CBD No     Flavoring No      Social History     Tobacco Use    Smoking status: Never Smoker    Smokeless tobacco: Never Used   Substance Use Topics    Alcohol use: Not Currently     Comment: rare    Drug use: No        Review of Systems   Constitutional: Positive for fatigue  Negative for chills and fever  HENT: Positive for congestion and sore throat  Negative for rhinorrhea and trouble swallowing  Respiratory: Positive for cough  Negative for shortness of breath  Cardiovascular: Negative for chest pain and palpitations  Gastrointestinal: Positive for diarrhea and nausea  Negative for abdominal pain, blood in stool and vomiting  Genitourinary: Negative for dysuria and hematuria  Musculoskeletal: Positive for myalgias  Negative for arthralgias, gait problem, neck pain and neck stiffness  Skin: Negative for rash  Neurological: Positive for weakness (generalized)  Negative for dizziness, light-headedness, numbness and headaches  All other systems reviewed and are negative  Physical Exam  ED Triage Vitals [01/16/21 1620]   Temperature Pulse Respirations Blood Pressure SpO2   99 5 °F (37 5 °C) (!) 113 (!) 26 157/90 97 %      Temp Source Heart Rate Source Patient Position - Orthostatic VS BP Location FiO2 (%)   Oral Monitor Lying Right arm --      Pain Score       4             Orthostatic Vital Signs  Vitals:    01/16/21 1800 01/16/21 1900 01/16/21 2000 01/16/21 2042   BP: 129/77 137/79 141/80 130/74   Pulse: 98 100 98 99   Patient Position - Orthostatic VS:  Lying  Lying       Physical Exam  Constitutional:       General: He is not in acute distress  Appearance: He is well-developed  He is not diaphoretic  HENT:      Head: Normocephalic and atraumatic  Right Ear: External ear normal       Left Ear: External ear normal       Mouth/Throat:      Mouth: Mucous membranes are moist    Eyes:      Conjunctiva/sclera: Conjunctivae normal       Pupils: Pupils are equal, round, and reactive to light  Neck:      Musculoskeletal: Normal range of motion and neck supple  No spinous process tenderness or muscular tenderness  Cardiovascular:      Rate and Rhythm: Regular rhythm  Tachycardia present  Heart sounds: Normal heart sounds  No murmur  No friction rub  No gallop  Pulmonary:      Effort: Pulmonary effort is normal  No respiratory distress  Breath sounds: Normal breath sounds  No wheezing, rhonchi or rales  Comments: satting 97% on RA  Abdominal:      General: Bowel sounds are normal  There is no distension  Palpations: Abdomen is soft  Tenderness: There is no abdominal tenderness  Musculoskeletal: Normal range of motion  Lymphadenopathy:      Cervical: No cervical adenopathy  Skin:     General: Skin is warm and dry     Neurological:      Mental Status: He is alert and oriented to person, place, and time  Cranial Nerves: No cranial nerve deficit  Sensory: No sensory deficit           ED Medications  Medications   sodium chloride 0 9 % bolus 500 mL (0 mL Intravenous Stopped 1/16/21 1827)   potassium chloride (K-DUR,KLOR-CON) CR tablet 60 mEq (60 mEq Oral Given 1/16/21 1839)   magnesium sulfate 2 g/50 mL IVPB (premix) 2 g (0 g Intravenous Stopped 1/16/21 2041)   potassium chloride 20 mEq IVPB (premix) (0 mEq Intravenous Stopped 1/16/21 2043)       Diagnostic Studies  Results Reviewed     Procedure Component Value Units Date/Time    Comprehensive metabolic panel [[id number]]  (Abnormal) Collected: 01/16/21 1713    Lab Status: Final result Specimen: Blood from Arm, Left Updated: 01/16/21 1745     Sodium 137 mmol/L      Potassium 2 8 mmol/L      Chloride 98 mmol/L      CO2 27 mmol/L      ANION GAP 12 mmol/L      BUN 11 mg/dL      Creatinine 0 96 mg/dL      Glucose 129 mg/dL      Calcium 8 8 mg/dL      Corrected Calcium 9 7 mg/dL      AST 23 U/L      ALT 29 U/L      Alkaline Phosphatase 38 U/L      Total Protein 7 6 g/dL      Albumin 2 9 g/dL      Total Bilirubin 0 44 mg/dL      eGFR 85 ml/min/1 73sq m     Narrative:      [place] guidelines for Chronic Kidney Disease (CKD):     Stage 1 with normal or high GFR (GFR > 90 mL/min/1 73 square meters)    Stage 2 Mild CKD (GFR = 60-89 mL/min/1 73 square meters)    Stage 3A Moderate CKD (GFR = 45-59 mL/min/1 73 square meters)    Stage 3B Moderate CKD (GFR = 30-44 mL/min/1 73 square meters)    Stage 4 Severe CKD (GFR = 15-29 mL/min/1 73 square meters)    Stage 5 End Stage CKD (GFR <15 mL/min/1 73 square meters)  Note: GFR calculation is accurate only with a steady state creatinine    CBC and differential [[id number]]  (Abnormal) Collected: 01/16/21 1713    Lab Status: Final result Specimen: Blood from Arm, Left Updated: 01/16/21 1719     WBC 8 05 Thousand/uL      RBC 4 58 Million/uL      Hemoglobin 14 0 g/dL Hematocrit 41 3 %      MCV 90 fL      MCH 30 6 pg      MCHC 33 9 g/dL      RDW 13 1 %      MPV 9 1 fL      Platelets 056 Thousands/uL      nRBC 0 /100 WBCs      Neutrophils Relative 74 %      Immat GRANS % 1 %      Lymphocytes Relative 13 %      Monocytes Relative 12 %      Eosinophils Relative 0 %      Basophils Relative 0 %      Neutrophils Absolute 5 89 Thousands/µL      Immature Grans Absolute 0 06 Thousand/uL      Lymphocytes Absolute 1 06 Thousands/µL      Monocytes Absolute 0 99 Thousand/µL      Eosinophils Absolute 0 03 Thousand/µL      Basophils Absolute 0 02 Thousands/µL                  No orders to display         Procedures  Procedures      ED Course  ED Course as of Jan 16 2253   Sat Jan 16, 2021   1745 Will replete via PO and IV and also give IV mag   Potassium(!): 2 8       SBIRT 22yo+      Most Recent Value   SBIRT (23 yo +)   In order to provide better care to our patients, we are screening all of our patients for alcohol and drug use  Would it be okay to ask you these screening questions? Yes Filed at: 01/16/2021 1631   Initial Alcohol Screen: US AUDIT-C    1  How often do you have a drink containing alcohol? 1 Filed at: 01/16/2021 1631   2  How many drinks containing alcohol do you have on a typical day you are drinking? 1 Filed at: 01/16/2021 1631   3a  Male UNDER 65: How often do you have five or more drinks on one occasion? 0 Filed at: 01/16/2021 1631   Audit-C Score  2 Filed at: 01/16/2021 1631   BOB: How many times in the past year have you    Used an illegal drug or used a prescription medication for non-medical reasons? Never Filed at: 01/16/2021 1631                MDM  62 yo male with PMH elevated BMI, recent positive COVID test, presenting with concern for cough, chills, fatigue, generalized weakness, body aches, and decreased PO intake  Pt is not hypoxic in the ED, though has some mild tachycardia   Will obtain CBC, CMP to evaluate for leukocytosis, anemia, electrolyte abnormality, renal dysfunction  Will treat with 500mL IV fluid bolus  Pt will likely be discharged home with strict return precautions, PCP follow up, and prescription for phenergan with codeine  Disposition  Final diagnoses:   COVID-19   Hypokalemia     Time reflects when diagnosis was documented in both MDM as applicable and the Disposition within this note     Time User Action Codes Description Comment    1/16/2021  5:19 PM Sonya Blount Add [U07 1] COVID-19     1/16/2021  8:34 PM Sonya Blount Add [E87 6] Hypokalemia       ED Disposition     ED Disposition Condition Date/Time Comment    Discharge Stable Sat Jan 16, 2021  8:34 PM Wu Forte discharge to home/self care  Follow-up Information     Follow up With Specialties Details Why [facility], DO Family Medicine Schedule an appointment as soon as possible for a visit in 1 week for recheck [address] [place] [address]   [phone]            Discharge Medication List as of 1/16/2021  8:35 PM      CONTINUE these medications which have CHANGED    Details   promethazine-codeine (PHENERGAN WITH CODEINE) 6 25-10 mg/5 mL syrup Take 2 5 mL by mouth every 6 (six) hours as needed for cough for up to 10 days, Starting Sat 1/16/2021, Until Tue 1/26/2021, Normal         CONTINUE these medications which have NOT CHANGED    Details   azithromycin (ZITHROMAX) 250 mg tablet Take 2 tablets today then 1 tablet daily x 4 days, Normal      benzonatate (TESSALON PERLES) 100 mg capsule Take 1 capsule (100 mg total) by mouth 3 (three) times a day as needed for cough, Starting Thu 1/14/2021, Normal      fluticasone (FLONASE) 50 mcg/act nasal spray 2 sprays into each nostril daily, Starting Fri 1/15/2021, Normal      hydrochlorothiazide (HYDRODIURIL) 25 mg tablet TAKE 1 TABLET BY MOUTH EVERY DAY, Normal      ibuprofen (MOTRIN) 800 mg tablet Take 1 tablet (800 mg total) by mouth daily as needed for moderate pain (take with food), Starting Wed 10/28/2020, Normal      tadalafil (CIALIS) 20 MG tablet Take 1 tablet (20 mg total) by mouth as needed for erectile dysfunction, Starting Mon 6/15/2020, Print      albuterol (Ventolin HFA) 90 mcg/act inhaler Inhale 2 puffs every 6 (six) hours as needed for wheezing, Starting Mon 1/11/2021, Normal      Ascorbic Acid (vitamin C) 1000 MG tablet Take 1 tablet (1,000 mg total) by mouth 2 (two) times a day for 10 days, Starting Mon 1/4/2021, Until Thu 1/14/2021, Normal      cholecalciferol (VITAMIN D3) 1,000 units tablet Take 1 tablet (1,000 Units total) by mouth daily for 10 days, Starting Mon 1/4/2021, Until Thu 1/14/2021, Normal      famotidine (PEPCID) 10 mg tablet Take 10 mg by mouth Taken as needed, Historical Med      fluorouracil (EFUDEX) 5 % cream PLEASE SEE ATTACHED FOR DETAILED DIRECTIONS, Historical Med      methocarbamol (ROBAXIN) 750 mg tablet Take 1 tablet (750 mg total) by mouth daily as needed for muscle spasms (Pt states he takes on tablet at bedtime ), Starting Thu 6/25/2020, Normal      Multiple Vitamin (multivitamin) tablet Take 1 tablet by mouth daily for 10 days, Starting Mon 1/4/2021, Until Thu 1/14/2021, Normal      naproxen (NAPROSYN) 500 mg tablet Take 1 tablet (500 mg total) by mouth 2 (two) times a day with meals for 5 days, Starting Mon 9/7/2020, Until Sat 9/12/2020, Print      ondansetron (ZOFRAN-ODT) 4 mg disintegrating tablet Take 1 tablet (4 mg total) by mouth every 8 (eight) hours as needed for nausea for up to 10 doses, Starting Tue 1/12/2021, Print           No discharge procedures on file  PDMP Review       Value Time User    PDMP Reviewed  Yes 12/28/2020 10:11 AM Tia Ashley DO           ED Provider  Attending physically available and evaluated Lizzy Monterroso I managed the patient along with the ED Attending      Electronically Signed by         Raven Suarez MD  01/16/21 5859

## 2021-01-17 NOTE — DISCHARGE INSTRUCTIONS
Return to the emergency department for persistent vomiting with inability to eat or drink, increased difficulty breathing, oxygen saturation <90%, severe worsening of pain, any other new or concerning symptoms

## 2021-01-18 ENCOUNTER — TELEMEDICINE (OUTPATIENT)
Dept: FAMILY MEDICINE CLINIC | Facility: CLINIC | Age: 61
End: 2021-01-18
Payer: COMMERCIAL

## 2021-01-18 DIAGNOSIS — U07.1 COVID-19 VIRUS INFECTION: ICD-10-CM

## 2021-01-18 DIAGNOSIS — E87.6 HYPOKALEMIA: Primary | ICD-10-CM

## 2021-01-18 PROCEDURE — 99214 OFFICE O/P EST MOD 30 MIN: CPT | Performed by: NURSE PRACTITIONER

## 2021-01-18 NOTE — PROGRESS NOTES
COVID-19 Virtual Visit     Assessment/Plan:    Problem List Items Addressed This Visit     None      Visit Diagnoses     Hypokalemia    -  Primary    Relevant Orders    Basic metabolic panel         Disposition:     I referred patient to the Emergency Department at: [facility] [place] [address]  ED  I recommended continued isolation until at least 24 hours have passed since recovery defined as resolution of fever without the use of fever-reducing medications AND improvement in COVID symptoms AND 10 days have passed since onset of symptoms (or 10 days have passed since date of first positive viral diagnostic test for asymptomatic patients)  Complete antibiotic  Will need 20 day isolation at this time due to severity of covid 19  Day 20 will be 1/23  Stay well hydrated with electrolytes  Recheck potassium over weekend/ early next week  Monitor temperature  Recheck Thursday/Wednesday  Continue to rest/ gradually resume normal activities  Recheck  Xray in a 3-4 weeks  Please call the office if you are experiencing any worsening of symptoms or no symptom improvement  I have spent 30 minutes directly with the patient  Greater than 50% of this time was spent in counseling/coordination of care regarding: prognosis, risks and benefits of treatment options, instructions for management, patient and family education, risk factor reductions and impressions        Encounter provider Elena Chinchilla, 10 [facility]  Provider located at [address] [place] [phone]    Recent Visits  Date Type Provider Dept   01/15/21 Telephone Enedina Scott   01/14/21 Telephone Allyson Lopez, [facility]   [facility] Total [facility]   01/12/21 Telemedicine Elena Chinchilla, [facility] Total 5460 [facility]   01/11/21 Telephone Elena Chinchilla, [address] [place] [address] Total 129 [facility] recent visits within past 7 days and meeting all other requirements     Today's Visits  Date Type Provider Dept   01/18/21 Telemedicine ION [facility]mann Pg Total 129 [facility] today's visits and meeting all other requirements     Future Appointments  No visits were found meeting these conditions  Showing future appointments within next 150 days and meeting all other requirements      This virtual check-in was done via "[employer] and patient was informed that this is a secure, HIPAA-compliant platform  He agrees to proceed  Patient agrees to participate in a virtual check in via telephone or video visit instead of presenting to the office to address urgent/immediate medical needs  Patient is aware this is a billable service  After connecting through [facility], the patient was identified by name and date of birth  Jose A Lopez was informed that this was a telemedicine visit and that the exam was being conducted confidentially over secure lines  My office door was closed  No one else was in the room  Jose A Lopez acknowledged consent and understanding of privacy and security of the telemedicine visit  I informed the patient that I have reviewed his record in Epic and presented the opportunity for him to ask any questions regarding the visit today  The patient agreed to participate  Subjective:   Jose A Lopez is a 63 y o  male who has been screened for COVID-19  Symptom change since last report: improving  Patient's symptoms include chills (in the evening at times), fatigue, nasal congestion (worse at night), cough (improved) and myalgias (mild)  Patient denies fever, malaise, rhinorrhea, sore throat, anosmia, loss of taste, shortness of breath, chest tightness, abdominal pain, nausea, vomiting, diarrhea and headaches  Josh Clayton has been staying home and has isolated themselves in his home   He is taking care to not share personal items and is cleaning all surfaces that are touched often, like counters, tabletops, and doorknobs using household cleaning sprays or wipes  He is wearing a mask when he leaves his room  Date of symptom onset: 1/3/2021    He thinks today he feels mildly better and feels like he's slowly improving or turning a corner  He feels he has more good moments than bad  Coughing has improved in frequency and severity  Still feels weak and tired  Still has poor appetite, still pushing himself to eat  No fevers that he's aware of  He has been trying to drink sports drinks, water, and apple juice  He has finished the vitamins  Has not needed use of the inhaler  He is using the Flonase and [employer]  Today is last day of antibiotic       Lab Results   Component Value Date    SARSCOV2 Detected (A) 01/04/2021     Past Medical History:   Diagnosis Date    AVM (arteriovenous malformation)     Back pain     BPH without urinary obstruction     last assessed 01/16/18    Disc disorder     Herniated    Hemorrhoids     Hydronephrosis     Kidney disease     Renal calculi     Renal colic     Ureter colic      Past Surgical History:   Procedure Laterality Date    BRAIN SURGERY      COLONOSCOPY      CYSTOSCOPY      w/removal of object, last assessed 01/16/18    CYSTOSCOPY      w/removal of ureteral calculus last assessed 01/16/18    CYSTOSCOPY KIDNEY W/ URETERAL GUIDE WIRE      last assessed 01/16/18    CYSTOSCOPY W/ URETERAL STENT PLACEMENT      last assessed 01/16/18    FL RETROGRADE PYELOGRAM  7/9/[address]   [address]    HERNIA REPAIR      KIDNEY SURGERY      [facility] STENT INSRT Right 12/13/2018    Procedure: CYSTOSCOPY URETEROSCOPY  RETROGRADE PYELOGRAM AND INSERTION RIGHT STENT URETERAL;  Surgeon: Jose Younger MD;  [facility];  Service: Urology    RI CYSTO/URETERO W/LITHOTRIPSY &INDWELL STENT INSRT Left 7/9/2020    Procedure: CYSTOSCOPY URETEROSCOPY WITH LITHOTRIPSY HOLMIUM LASER, RETROGRADE PYELOGRAM AND INSERTION STENT URETERAL;  Surgeon: Nuria Danielle MD;  [facility];  Service: Urology    TOOTH EXTRACTION       Current Outpatient Medications   Medication Sig Dispense Refill    albuterol (Ventolin HFA) 90 mcg/act inhaler Inhale 2 puffs every 6 (six) hours as needed for wheezing (Patient not taking: Reported on 1/16/2021) 18 g 0    Ascorbic Acid (vitamin C) 1000 MG tablet Take 1 tablet (1,000 mg total) by mouth 2 (two) times a day for 10 days 20 tablet 0    azithromycin (ZITHROMAX) 250 mg tablet Take 2 tablets today then 1 tablet daily x 4 days 6 tablet 0    benzonatate (TESSALON PERLES) 100 mg capsule Take 1 capsule (100 mg total) by mouth 3 (three) times a day as needed for cough 20 capsule 0    cholecalciferol (VITAMIN D3) 1,000 units tablet Take 1 tablet (1,000 Units total) by mouth daily for 10 days 10 tablet 0    famotidine (PEPCID) 10 mg tablet Take 10 mg by mouth Taken as needed      fluorouracil (EFUDEX) 5 % cream PLEASE SEE ATTACHED FOR DETAILED DIRECTIONS      fluticasone (FLONASE) 50 mcg/act nasal spray 2 sprays into each nostril daily 1 Bottle 0    hydrochlorothiazide (HYDRODIURIL) 25 mg tablet TAKE 1 TABLET BY MOUTH EVERY DAY 30 tablet 5    ibuprofen (MOTRIN) 800 mg tablet Take 1 tablet (800 mg total) by mouth daily as needed for moderate pain (take with food) 30 tablet 0    methocarbamol (ROBAXIN) 750 mg tablet Take 1 tablet (750 mg total) by mouth daily as needed for muscle spasms (Pt states he takes on tablet at bedtime ) (Patient not taking: Reported on 1/16/2021) 30 tablet 0    Multiple Vitamin (multivitamin) tablet Take 1 tablet by mouth daily for 10 days 10 tablet 0    naproxen (NAPROSYN) 500 mg tablet Take 1 tablet (500 mg total) by mouth 2 (two) times a day with meals for 5 days 10 tablet 0    ondansetron (ZOFRAN-ODT) 4 mg disintegrating tablet Take 1 tablet (4 mg total) by mouth every 8 (eight) hours as needed for nausea for up to 10 doses (Patient not taking: Reported on 1/16/2021) 10 tablet 0    promethazine-codeine (PHENERGAN WITH CODEINE) 6 25-10 mg/5 mL syrup Take 2 5 mL by mouth every 6 (six) hours as needed for cough for up to 10 days 118 mL 0    tadalafil (CIALIS) 20 MG tablet Take 1 tablet (20 mg total) by mouth as needed for erectile dysfunction 10 tablet 3     No current facility-administered medications for this visit  Allergies   Allergen Reactions    Morphine Other (See Comments)    Other      Pt states that any narcotic makes him feel sick    Oxycodone-Acetaminophen Nausea Only, Vomiting and GI Intolerance    Seasonal Ic  [Cholestatin]      Other reaction(s): Nasal Congestion, Sinus Pain       Review of Systems   Constitutional: Positive for chills (in the evening at times) and fatigue  Negative for fever  HENT: Positive for congestion (worse at night)  Negative for postnasal drip, rhinorrhea and sore throat  Respiratory: Positive for cough (improved)  Negative for chest tightness and shortness of breath  Gastrointestinal: Negative for abdominal pain, diarrhea, nausea and vomiting  Genitourinary:        Decreased urine output   Musculoskeletal: Positive for myalgias (mild)  Neurological: Negative for dizziness, light-headedness and headaches  Objective: There were no vitals filed for this visit  Physical Exam  Constitutional:       Appearance: Normal appearance  He is not ill-appearing or diaphoretic  HENT:      Head: Normocephalic and atraumatic  Eyes:      General: No scleral icterus  Pulmonary:      Effort: Pulmonary effort is normal  No respiratory distress  Skin:     Coloration: Skin is not pale  Neurological:      Mental Status: He is oriented to person, place, and time  He is lethargic     Psychiatric:         Mood and Affect: Mood normal        VIRTUAL VISIT DISCLAIMER    Lizzy Monterroso acknowledges that he has consented to an online visit or consultation  He understands that the online visit is based solely on information provided by him, and that, in the absence of a face-to-face physical evaluation by the physician, the diagnosis he receives is both limited and provisional in terms of accuracy and completeness  This is not intended to replace a full medical face-to-face evaluation by the physician  Ángel Steve understands and accepts these terms

## 2021-01-21 ENCOUNTER — TELEMEDICINE (OUTPATIENT)
Dept: FAMILY MEDICINE CLINIC | Facility: CLINIC | Age: 61
End: 2021-01-21
Payer: COMMERCIAL

## 2021-01-21 DIAGNOSIS — U07.1 COVID-19 VIRUS INFECTION: Primary | ICD-10-CM

## 2021-01-21 PROCEDURE — 1036F TOBACCO NON-USER: CPT | Performed by: NURSE PRACTITIONER

## 2021-01-21 PROCEDURE — 99214 OFFICE O/P EST MOD 30 MIN: CPT | Performed by: NURSE PRACTITIONER

## 2021-01-21 NOTE — PROGRESS NOTES
COVID-19 Virtual Visit     Assessment/Plan:    Problem List Items Addressed This Visit     None         Disposition:     I referred patient to the Emergency Department at: [facility]  ED  I recommended continued isolation until at least 24 hours have passed since recovery defined as resolution of fever without the use of fever-reducing medications AND improvement in COVID symptoms AND 10 days have passed since onset of symptoms (or 10 days have passed since date of first positive viral diagnostic test for asymptomatic patients)  Will need 20 day isolation at this time due to severity of covid 19  Day 20 will be 1/23  Stay well hydrated with electrolytes  Recheck potassium over weekend/ early next week  Monitor temperature  Continue to rest/ gradually resume normal activities  Recheck  Xray in a 3-4 weeks  Please call the office if you are experiencing any worsening of symptoms or no symptom improvement  I have spent 30 minutes directly with the patient  Greater than 50% of this time was spent in counseling/coordination of care regarding: prognosis, risks and benefits of treatment options, instructions for management, patient and family education, risk factor reductions and impressions  Encounter provider [facility], [facility]  Provider located at [address] [place] [phone]    Recent Visits  Date Type Provider Dept   01/18/21 [facility] 20, 1021 [facility] Total 5460 [facility]   01/15/21 Telephone [facility], 10[facility] Total 5460 [facility]   01/14/21 Telephone Cyndy Delarosa, 16[facility] Total 5460 [facility]   01/14/21 Telemedicine [facility], 10[facility] Total 5460 [facility]   Showing recent visits within past 7 days and meeting all other requirements     Future Appointments  No visits were found meeting these conditions     Showing future appointments within next 150 days and meeting all other requirements      This virtual check-in was done via [employer] and patient was informed that this is a secure, HIPAA-compliant platform  He agrees to proceed  Patient agrees to participate in a virtual check in via telephone or video visit instead of presenting to the office to address urgent/immediate medical needs  Patient is aware this is a billable service  After connecting through [facility], the patient was identified by name and date of birth  Carolina Castellanos was informed that this was a telemedicine visit and that the exam was being conducted confidentially over secure lines  My office door was closed  No one else was in the room  Carolina Castellanos acknowledged consent and understanding of privacy and security of the telemedicine visit  I informed the patient that I have reviewed his record in Epic and presented the opportunity for him to ask any questions regarding the visit today  The patient agreed to participate  Subjective:   Carolina Castellanos is a 57 y o  male who has been screened for COVID-19  Symptom change since last report: resolving  Patient's symptoms include chills, fatigue, cough (improved) and shortness of breath (on exertion)  Patient denies fever, malaise, congestion (worse at nightj), rhinorrhea, sore throat, anosmia, loss of taste, chest tightness, abdominal pain, nausea, vomiting, diarrhea, myalgias and headaches  Kavin Christensen has been staying home and has isolated themselves in his home  He is taking care to not share personal items and is cleaning all surfaces that are touched often, like counters, tabletops, and doorknobs using household cleaning sprays or wipes  He is wearing a mask when he leaves his room  Date of symptom onset: 1/3/2021    He thinks today he feels mildly better and feels like he's slowly improving  Each day he is getting a little better  Still has the cough  Still has shortness of breath on exertion  He completed the antibiotic  He is using the Flonase   The last few nights has been taking tylenol PM at bedtime which has been helpful       Lab Results   Component Value Date    SARSCOV2 Detected (A) 01/04/2021     Past Medical History:   Diagnosis Date    AVM (arteriovenous malformation)     Back pain     BPH without urinary obstruction     last assessed 01/16/18    Disc disorder     Herniated    Hemorrhoids     Hydronephrosis     Kidney disease     Renal calculi     Renal colic     Ureter colic      Past Surgical History:   Procedure Laterality Date    BRAIN SURGERY      COLONOSCOPY      CYSTOSCOPY      w/removal of object, last assessed 01/16/18    CYSTOSCOPY      w/removal of ureteral calculus last assessed 01/16/18    CYSTOSCOPY KIDNEY W/ URETERAL GUIDE WIRE      last assessed 01/16/18    CYSTOSCOPY W/ URETERAL STENT PLACEMENT      last assessed 01/16/18    FL RETROGRADE PYELOGRAM  7/9/2020   [address]    HERNIA REPAIR      KIDNEY SURGERY      removal of kidney stone in 2013 at [address] W/LITHOTRIPSY &INDWELL STENT INSRT Right 12/13/2018    Procedure: CYSTOSCOPY URETEROSCOPY  RETROGRADE PYELOGRAM AND INSERTION RIGHT STENT URETERAL;  Surgeon: Brie Morrissey MD;  [facility];  Service: Urology    [place] CYSTO/URETERO W/LITHOTRIPSY &INDWELL STENT INSRT Left 7/9/2020    Procedure: CYSTOSCOPY URETEROSCOPY WITH LITHOTRIPSY HOLMIUM LASER, RETROGRADE PYELOGRAM AND INSERTION STENT URETERAL;  Surgeon: Brie Morrissey MD;  [facility];  Service: Urology    TOOTH EXTRACTION       Current Outpatient Medications   Medication Sig Dispense Refill    albuterol (Ventolin HFA) 90 mcg/act inhaler Inhale 2 puffs every 6 (six) hours as needed for wheezing (Patient not taking: Reported on 1/16/2021) 18 g 0    Ascorbic Acid (vitamin C) 1000 MG tablet Take 1 tablet (1,000 mg total) by mouth 2 (two) times a day for 10 days 20 tablet 0    benzonatate (TESSALON PERLES) 100 mg capsule Take 1 capsule (100 mg total) by mouth 3 (three) times a day as needed for cough 20 capsule 0    cholecalciferol (VITAMIN D3) 1,000 units tablet Take 1 tablet (1,000 Units total) by mouth daily for 10 days 10 tablet 0    famotidine (PEPCID) 10 mg tablet Take 10 mg by mouth Taken as needed      fluorouracil (EFUDEX) 5 % cream PLEASE SEE ATTACHED FOR DETAILED DIRECTIONS      fluticasone (FLONASE) 50 mcg/act nasal spray 2 sprays into each nostril daily 1 Bottle 0    hydrochlorothiazide (HYDRODIURIL) 25 mg tablet TAKE 1 TABLET BY MOUTH EVERY DAY 30 tablet 5    ibuprofen (MOTRIN) 800 mg tablet Take 1 tablet (800 mg total) by mouth daily as needed for moderate pain (take with food) 30 tablet 0    methocarbamol (ROBAXIN) 750 mg tablet Take 1 tablet (750 mg total) by mouth daily as needed for muscle spasms (Pt states he takes on tablet at bedtime ) (Patient not taking: Reported on 1/16/2021) 30 tablet 0    Multiple Vitamin (multivitamin) tablet Take 1 tablet by mouth daily for 10 days 10 tablet 0    naproxen (NAPROSYN) 500 mg tablet Take 1 tablet (500 mg total) by mouth 2 (two) times a day with meals for 5 days 10 tablet 0    ondansetron (ZOFRAN-ODT) 4 mg disintegrating tablet Take 1 tablet (4 mg total) by mouth every 8 (eight) hours as needed for nausea for up to 10 doses (Patient not taking: Reported on 1/16/2021) 10 tablet 0    promethazine-codeine (PHENERGAN WITH CODEINE) 6 25-10 mg/5 mL syrup Take 2 5 mL by mouth every 6 (six) hours as needed for cough for up to 10 days 118 mL 0    tadalafil (CIALIS) 20 MG tablet Take 1 tablet (20 mg total) by mouth as needed for erectile dysfunction 10 tablet 3     No current facility-administered medications for this visit        Allergies   Allergen Reactions    Morphine Other (See Comments)    Other      Pt states that any narcotic makes him feel sick    Oxycodone-Acetaminophen Nausea Only, Vomiting and GI Intolerance    Seasonal Ic  [Cholestatin]      Other reaction(s): Nasal Congestion, Sinus Pain       Review of Systems   Constitutional: Positive for chills and fatigue  Negative for fever  HENT: Negative for congestion (worse at nightj), postnasal drip, rhinorrhea and sore throat  Respiratory: Positive for cough (improved) and shortness of breath (on exertion)  Negative for chest tightness  Gastrointestinal: Negative for abdominal pain, diarrhea, nausea and vomiting  Genitourinary:        Decreased urine output   Musculoskeletal: Negative for myalgias  Neurological: Negative for dizziness, light-headedness and headaches  Objective: There were no vitals filed for this visit  Physical Exam  Constitutional:       Appearance: Normal appearance  He is not ill-appearing or diaphoretic  HENT:      Head: Normocephalic and atraumatic  Eyes:      General: No scleral icterus  Pulmonary:      Effort: Pulmonary effort is normal  No respiratory distress  Skin:     Coloration: Skin is not pale  Neurological:      Mental Status: He is oriented to person, place, and time  He is lethargic  Psychiatric:         Mood and Affect: Mood normal        VIRTUAL VISIT DISCLAIMER    Dawson Dubon acknowledges that he has consented to an online visit or consultation  He understands that the online visit is based solely on information provided by him, and that, in the absence of a face-to-face physical evaluation by the physician, the diagnosis he receives is both limited and provisional in terms of accuracy and completeness  This is not intended to replace a full medical face-to-face evaluation by the physician  Dawson Dubon understands and accepts these terms

## 2021-01-25 ENCOUNTER — LAB (OUTPATIENT)
Dept: LAB | Facility: MEDICAL CENTER | Age: 61
End: 2021-01-25
Payer: COMMERCIAL

## 2021-01-25 DIAGNOSIS — E87.6 HYPOKALEMIA: ICD-10-CM

## 2021-01-25 LAB
ANION GAP SERPL CALCULATED.3IONS-SCNC: 2 MMOL/L (ref 4–13)
BUN SERPL-MCNC: 15 MG/DL (ref 5–25)
CALCIUM SERPL-MCNC: 9.6 MG/DL (ref 8.3–10.1)
CHLORIDE SERPL-SCNC: 103 MMOL/L (ref 100–108)
CO2 SERPL-SCNC: 34 MMOL/L (ref 21–32)
CREAT SERPL-MCNC: 0.98 MG/DL (ref 0.6–1.3)
GFR SERPL CREATININE-BSD FRML MDRD: 82 ML/MIN/1.73SQ M
GLUCOSE SERPL-MCNC: 86 MG/DL (ref 65–140)
POTASSIUM SERPL-SCNC: 3.4 MMOL/L (ref 3.5–5.3)
SODIUM SERPL-SCNC: 139 MMOL/L (ref 136–145)
T4 FREE SERPL-MCNC: 0.85 NG/DL (ref 0.76–1.46)

## 2021-01-25 PROCEDURE — 36415 COLL VENOUS BLD VENIPUNCTURE: CPT

## 2021-01-25 PROCEDURE — 84439 ASSAY OF FREE THYROXINE: CPT | Performed by: FAMILY MEDICINE

## 2021-01-25 PROCEDURE — 80048 BASIC METABOLIC PNL TOTAL CA: CPT

## 2021-02-02 DIAGNOSIS — U07.1 COVID-19: ICD-10-CM

## 2021-02-04 RX ORDER — ALBUTEROL SULFATE 90 UG/1
AEROSOL, METERED RESPIRATORY (INHALATION)
Qty: 8.5 INHALER | OUTPATIENT
Start: 2021-02-04

## 2021-02-08 ENCOUNTER — TELEMEDICINE (OUTPATIENT)
Dept: FAMILY MEDICINE CLINIC | Facility: CLINIC | Age: 61
End: 2021-02-08
Payer: COMMERCIAL

## 2021-02-08 ENCOUNTER — TELEPHONE (OUTPATIENT)
Dept: FAMILY MEDICINE CLINIC | Facility: CLINIC | Age: 61
End: 2021-02-08

## 2021-02-08 ENCOUNTER — APPOINTMENT (OUTPATIENT)
Dept: RADIOLOGY | Facility: MEDICAL CENTER | Age: 61
End: 2021-02-08
Payer: COMMERCIAL

## 2021-02-08 DIAGNOSIS — U07.1 COVID-19 VIRUS INFECTION: ICD-10-CM

## 2021-02-08 DIAGNOSIS — J32.9 SINUSITIS, UNSPECIFIED CHRONICITY, UNSPECIFIED LOCATION: ICD-10-CM

## 2021-02-08 DIAGNOSIS — R51.9 NONINTRACTABLE HEADACHE, UNSPECIFIED CHRONICITY PATTERN, UNSPECIFIED HEADACHE TYPE: Primary | ICD-10-CM

## 2021-02-08 DIAGNOSIS — J12.82 PNEUMONIA DUE TO COVID-19 VIRUS: Primary | ICD-10-CM

## 2021-02-08 DIAGNOSIS — U07.1 PNEUMONIA DUE TO COVID-19 VIRUS: Primary | ICD-10-CM

## 2021-02-08 PROCEDURE — 99213 OFFICE O/P EST LOW 20 MIN: CPT | Performed by: FAMILY MEDICINE

## 2021-02-08 PROCEDURE — 71046 X-RAY EXAM CHEST 2 VIEWS: CPT

## 2021-02-08 RX ORDER — CEFDINIR 300 MG/1
300 CAPSULE ORAL EVERY 12 HOURS SCHEDULED
Qty: 14 CAPSULE | Refills: 0 | Status: SHIPPED | OUTPATIENT
Start: 2021-02-08 | End: 2021-02-15

## 2021-02-08 NOTE — PATIENT INSTRUCTIONS
NFF:[phone]  follow up on COVID 19 last several days have had headaches that are new , pt had  COVID 19 in [place] no known ecposure since then      Left temple headache and took tylenol or excedrin which helps and had resolved COVID  Possible sinus related and sneezing more and runny nose  Sensitivity to light and has had this in past  Used Flonase and stopped using it recently  Lungs feel better after covid and is going for f-up cxray and feels slightly fatigued and not yet 100 % back  Start abx as directed  Claritin 10 mg once daily prn  May use Mucinex D prn congestion  Call if any problems or not better, consider ENT consult if not better or neurology consult for headaches

## 2021-02-08 NOTE — PROGRESS NOTES
Virtual Regular Visit      Assessment/Plan:    Problem List Items Addressed This Visit     None      Visit Diagnoses     Nonintractable headache, unspecified chronicity pattern, unspecified headache type    -  Primary    Sinusitis, unspecified chronicity, unspecified location        Relevant Medications    cefdinir (OMNICEF) 300 mg capsule               Reason for visit is   Chief Complaint   Patient presents with    Virtual Regular Visit        Encounter provider Hernando Boss DO    Provider located at [address] [place] [id number]      Recent Visits  No visits were found meeting these conditions  Showing recent visits within past 7 days and meeting all other requirements     Today's Visits  Date Type Provider Dept   02/08/21 Telemedicine Hernando Boss DO [facility] today's visits and meeting all other requirements     Future Appointments  No visits were found meeting these conditions  Showing future appointments within next 150 days and meeting all other requirements        The patient was identified by name and date of birth  Leopold Lister was informed that this is a telemedicine visit and that the visit is being conducted through [facility] and patient was informed that this is a secure, HIPAA-compliant platform  He agrees to proceed     My office door was closed  No one else was in the room  He acknowledged consent and understanding of privacy and security of the video platform  The patient has agreed to participate and understands they can discontinue the visit at any time  Patient is aware this is a billable service  Subjective  Leopold Lister is a 57 y o  male [phone]  follow up on COVID 19 last several days have had headaches that are new , pt had  COVID 19 in [facility] no known ecposure since then         [phone]  follow up on COVID 19 last several days have had headaches that are new , pt had  COVID 19 in [place] no known ecposure since then  No other complaints, stopped Flonaserecently and symptoms started with runny nose and sneezing, thinks it may be sinus related          Past Medical History:   Diagnosis Date    AVM (arteriovenous malformation)     Back pain     BPH without urinary obstruction     last assessed 01/16/18    Disc disorder     Herniated    Hemorrhoids     Hydronephrosis     Kidney disease     Renal calculi     Renal colic     Ureter colic        Past Surgical History:   Procedure Laterality Date    BRAIN SURGERY      COLONOSCOPY      CYSTOSCOPY      w/removal of object, last assessed 01/16/18    CYSTOSCOPY      w/removal of ureteral calculus last assessed 01/16/18    CYSTOSCOPY KIDNEY W/ URETERAL GUIDE WIRE      last assessed 01/16/18    CYSTOSCOPY W/ URETERAL STENT PLACEMENT      last assessed 01/16/18    [place] RETROGRADE PYELOGRAM  7/9/2020   [address]    HERNIA REPAIR      KIDNEY SURGERY      removal of kidney stone in 2013 at [address] W/LITHOTRIPSY &INDWELL STENT INSRT Right 12/13/2018    Procedure: CYSTOSCOPY URETEROSCOPY  RETROGRADE PYELOGRAM AND INSERTION RIGHT STENT URETERAL;  Surgeon: Miles Vaz MD;  Location: [facility];  Service: Urology    [place] CYSTO/URETERO W/LITHOTRIPSY &INDWELL STENT INSRT Left 7/9/2020    Procedure: CYSTOSCOPY URETEROSCOPY WITH LITHOTRIPSY HOLMIUM LASER, RETROGRADE PYELOGRAM AND INSERTION STENT URETERAL;  Surgeon: Miles Vaz MD;  Location[facility];  Service: Urology    TOOTH EXTRACTION         Current Outpatient Medications   Medication Sig Dispense Refill    albuterol (Ventolin HFA) 90 mcg/act inhaler Inhale 2 puffs every 6 (six) hours as needed for wheezing (Patient not taking: Reported on 1/16/2021) 18 g 0    Ascorbic Acid (vitamin C) 1000 MG tablet Take 1 tablet (1,000 mg total) by mouth 2 (two) times a day for 10 days 20 tablet 0    benzonatate (TESSALON PERLES) 100 mg capsule Take 1 capsule (100 mg total) by mouth 3 (three) times a day as needed for cough 20 capsule 0    cefdinir (OMNICEF) 300 mg capsule Take 1 capsule (300 mg total) by mouth every 12 (twelve) hours for 7 days 14 capsule 0    cholecalciferol (VITAMIN D3) 1,000 units tablet Take 1 tablet (1,000 Units total) by mouth daily for 10 days 10 tablet 0    famotidine (PEPCID) 10 mg tablet Take 10 mg by mouth Taken as needed      fluorouracil (EFUDEX) 5 % cream PLEASE SEE ATTACHED FOR DETAILED DIRECTIONS      fluticasone (FLONASE) 50 mcg/act nasal spray 2 sprays into each nostril daily 1 Bottle 0    hydrochlorothiazide (HYDRODIURIL) 25 mg tablet TAKE 1 TABLET BY MOUTH EVERY DAY 30 tablet 5    ibuprofen (MOTRIN) 800 mg tablet Take 1 tablet (800 mg total) by mouth daily as needed for moderate pain (take with food) 30 tablet 0    methocarbamol (ROBAXIN) 750 mg tablet Take 1 tablet (750 mg total) by mouth daily as needed for muscle spasms (Pt states he takes on tablet at bedtime ) (Patient not taking: Reported on 1/16/2021) 30 tablet 0    Multiple Vitamin (multivitamin) tablet Take 1 tablet by mouth daily for 10 days 10 tablet 0    naproxen (NAPROSYN) 500 mg tablet Take 1 tablet (500 mg total) by mouth 2 (two) times a day with meals for 5 days 10 tablet 0    ondansetron (ZOFRAN-ODT) 4 mg disintegrating tablet Take 1 tablet (4 mg total) by mouth every 8 (eight) hours as needed for nausea for up to 10 doses (Patient not taking: Reported on 1/16/2021) 10 tablet 0    tadalafil (CIALIS) 20 MG tablet Take 1 tablet (20 mg total) by mouth as needed for erectile dysfunction 10 tablet 3     No current facility-administered medications for this visit           Allergies   Allergen Reactions    Morphine Other (See Comments)    Other      Pt states that any narcotic makes him feel sick    Oxycodone-Acetaminophen Nausea Only, Vomiting and GI Intolerance    Seasonal Ic  [Cholestatin]      Other reaction(s): Nasal Congestion, Sinus Pain       Review of Systems   Constitutional: Negative  Negative for fatigue and fever  HENT: Positive for rhinorrhea and sneezing  Eyes: Negative  Respiratory: Positive for cough (mild)  Cardiovascular: Negative  Gastrointestinal: Negative  Endocrine: Negative  Genitourinary: Negative  Musculoskeletal: Negative  Skin: Negative  Allergic/Immunologic: Negative  Neurological: Positive for headaches (left temporal, orbital)  Hematological: Negative  Psychiatric/Behavioral: Negative  Video Exam    There were no vitals filed for this visit  Physical Exam  Constitutional:       Appearance: Normal appearance  HENT:      Head: Normocephalic and atraumatic  Eyes:      Conjunctiva/sclera: Conjunctivae normal    Pulmonary:      Effort: Pulmonary effort is normal  No respiratory distress  Skin:     Coloration: Skin is not pale  Neurological:      General: No focal deficit present  Mental Status: He is alert and oriented to person, place, and time  Psychiatric:         Mood and Affect: Mood normal          Behavior: Behavior normal          Thought Content: Thought content normal          Judgment: Judgment normal           I spent 20 minutes directly with the patient during this visit    Patient Instructions   [id number]:[phone]  follow up on COVID 19 last several days have had headaches that are new , pt had  COVID 19 in [facility] no known ecposure since then      Left temple headache and took tylenol or excedrin which helps and had resolved COVID  Possible sinus related and sneezing more and runny nose  Sensitivity to light and has had this in past  Used Flonase and stopped using it recently  Lungs feel better after covid and is going for f-up cxray and feels slightly fatigued and not yet 100 % back  Start abx as directed  Claritin 10 mg once daily prn  May use Mucinex D prn congestion   Call if any problems or not better, consider ENT consult if not better or neurology consult for headaches  VIRTUAL VISIT DISCLAIMER    Berkley Powell acknowledges that he has consented to an online visit or consultation  He understands that the online visit is based solely on information provided by him, and that, in the absence of a face-to-face physical evaluation by the physician, the diagnosis he receives is both limited and provisional in terms of accuracy and completeness  This is not intended to replace a full medical face-to-face evaluation by the physician  Berkley Powell understands and accepts these terms

## 2021-02-08 NOTE — TELEPHONE ENCOUNTER
Michelle Steward from Radiology Room called today- she stated that there were immediate findings in his chest XR from today  Please advise as Willy Cortez is not here today  Thank you

## 2021-02-09 DIAGNOSIS — U07.1 PNEUMONIA DUE TO COVID-19 VIRUS: Primary | ICD-10-CM

## 2021-02-09 DIAGNOSIS — J12.82 PNEUMONIA DUE TO COVID-19 VIRUS: Primary | ICD-10-CM

## 2021-02-09 NOTE — TELEPHONE ENCOUNTER
Per verbal conversation with Veronica Redd pt called in earlier this morning and was given number to call  Pt needs to be seen sooner  I will call to request pt be seen sooner  I called [facility] spoke to Abril Carter  Requested pt please be seen sooner for pneumonia due to COVID-19  No earlier availability with Marcelino Partida in [facility]  Pt would need to be agreeable to travel  I will call pt to make sure that is ok

## 2021-02-09 NOTE — TELEPHONE ENCOUNTER
Pt aware I have a call out to [facility] have not yet received a call that is why I have not yet contacted him

## 2021-02-09 NOTE — TELEPHONE ENCOUNTER
Left a detailed message for Lara Morton consent ok informing him we are trying to get him in sooner to see [facility]  I requested pt please return the office's call to travel to [place] and or see a different doctor

## 2021-02-09 NOTE — TELEPHONE ENCOUNTER
I called Mindy Dela Cruz he was informed that we are trying to get him in sooner  Pt is agreeable to traveling to [place] if  this is the only option  Pt would prefer [place] if possible

## 2021-02-09 NOTE — TELEPHONE ENCOUNTER
Please schedule patient sooner with Pulmonology  Dx: Pneumonia due to COVID, per Dr Jena Person  Thank you

## 2021-02-09 NOTE — TELEPHONE ENCOUNTER
[facility] called the office while I was on another line assisting a patient and call unfortunately got disconected  It appears that pt has an appointment scheduled for 02/10/21 with [facility]  I will mkae sure pt is aware

## 2021-02-10 ENCOUNTER — OFFICE VISIT (OUTPATIENT)
Dept: PULMONOLOGY | Facility: CLINIC | Age: 61
End: 2021-02-10
Payer: COMMERCIAL

## 2021-02-10 VITALS
TEMPERATURE: 98 F | OXYGEN SATURATION: 98 % | HEIGHT: 69 IN | WEIGHT: 234 LBS | SYSTOLIC BLOOD PRESSURE: 126 MMHG | BODY MASS INDEX: 34.66 KG/M2 | DIASTOLIC BLOOD PRESSURE: 80 MMHG | RESPIRATION RATE: 18 BRPM | HEART RATE: 84 BPM

## 2021-02-10 DIAGNOSIS — R09.82 POSTNASAL DRIP: ICD-10-CM

## 2021-02-10 DIAGNOSIS — M79.605 PAIN OF LEFT LOWER EXTREMITY: Primary | ICD-10-CM

## 2021-02-10 DIAGNOSIS — J12.82 PNEUMONIA DUE TO COVID-19 VIRUS: ICD-10-CM

## 2021-02-10 DIAGNOSIS — U07.1 COVID-19: ICD-10-CM

## 2021-02-10 DIAGNOSIS — Z86.16 HISTORY OF COVID-19: ICD-10-CM

## 2021-02-10 DIAGNOSIS — U07.1 PNEUMONIA DUE TO COVID-19 VIRUS: ICD-10-CM

## 2021-02-10 PROBLEM — G44.89 OTHER HEADACHE SYNDROME: Status: ACTIVE | Noted: 2021-02-10

## 2021-02-10 PROCEDURE — 99215 OFFICE O/P EST HI 40 MIN: CPT | Performed by: INTERNAL MEDICINE

## 2021-02-10 PROCEDURE — 3074F SYST BP LT 130 MM HG: CPT | Performed by: INTERNAL MEDICINE

## 2021-02-10 PROCEDURE — 3079F DIAST BP 80-89 MM HG: CPT | Performed by: INTERNAL MEDICINE

## 2021-02-10 PROCEDURE — 3008F BODY MASS INDEX DOCD: CPT | Performed by: FAMILY MEDICINE

## 2021-02-10 NOTE — ASSESSMENT & PLAN NOTE
Patient has history of COVID-19 pneumonia diagnosed on January 4, 2021  He did not require hospital admission but was seen in the emergency room twice for IV fluids and electrolyte repletion  Since that time his symptoms have greatly improved  He still does have some residual nonproductive cough and also notices he becomes more fatigued easily  Additionally has had left-sided headaches since diagnosis of COVID  I reviewed the patient's history and chest imaging including the past 2 chest x-rays and CT of the abdomen  I suspect that the ground-glass opacities seen on the most recent chest x-ray  Are representative of previous COVID infection  Furthermore ground-glass opacities were identified at the bases of the lungs on the CT of the abdomen performed on January 12, 2020  I explained to the patient that radiographic improvement often lacks behind clinical improvement  He does not have any current clinical sequelae suggestive of acute COVID-19 infection or bacterial infection  However I did ask him to complete his course of antibiotics since they were prescribed by his primary care physician for possible to diagnoses (pneumonia and sinus infection )  I will plan on repeating chest x-ray in 2 months  Will have him come back to the office in that time for review

## 2021-02-10 NOTE — ASSESSMENT & PLAN NOTE
Since diagnosis of COVID patient has had left-sided headache that he describes is migrainous and associated with light sensitivity  I recommended that he talk to his primary care physician  Also would reach out to [facility] where he receives his follow-up for his AVM  I doubt that it is related to that processes but feels if he should have further discussion with his neurologist   In the meantime  Continue over-the-counter migraine  Medication  I recommended heating pad for the back of the neck for tension type quality of the headache

## 2021-02-10 NOTE — ASSESSMENT & PLAN NOTE
Since diagnosis COVID has had some left anterolateral leg pain  No swelling noted on examination  No  Painful palpation  Will order D-dimer    If elevated will need lower extremity ultrasound

## 2021-02-10 NOTE — ASSESSMENT & PLAN NOTE
Patient has chronic postnasal drip that is likely aggravating his nonproductive cough  Continue Flonase  Given sinus rinse kit today  Recommended that he use it with distilled water

## 2021-02-10 NOTE — PROGRESS NOTES
Pulmonary Consultation   Nhi Phelan 57 y o  male MRN: [id number]  2/10/2021      Assessment:    COVID-19    Patient has history of COVID-19 pneumonia diagnosed on January 4, 2021  He did not require hospital admission but was seen in the emergency room twice for IV fluids and electrolyte repletion  Since that time his symptoms have greatly improved  He still does have some residual nonproductive cough and also notices he becomes more fatigued easily  Additionally has had left-sided headaches since diagnosis of COVID  I reviewed the patient's history and chest imaging including the past 2 chest x-rays and CT of the abdomen  I suspect that the ground-glass opacities seen on the most recent chest x-ray  Are representative of previous COVID infection  Furthermore ground-glass opacities were identified at the bases of the lungs on the CT of the abdomen performed on January 12, 2020  I explained to the patient that radiographic improvement often lacks behind clinical improvement  He does not have any current clinical sequelae suggestive of acute COVID-19 infection or bacterial infection  However I did ask him to complete his course of antibiotics since they were prescribed by his primary care physician for possible to diagnoses (pneumonia and sinus infection )  I will plan on repeating chest x-ray in 2 months  Will have him come back to the office in that time for review  Postnasal drip   Patient has chronic postnasal drip that is likely aggravating his nonproductive cough  Continue Flonase  Given sinus rinse kit today  Recommended that he use it with distilled water  Other headache syndrome   Since diagnosis of COVID patient has had left-sided headache that he describes is migrainous and associated with light sensitivity  I recommended that he talk to his primary care physician  Also would reach out to [facility] where he receives his follow-up for his AVM    I doubt that it is related to that processes but feels if he should have further discussion with his neurologist   In the meantime  Continue over-the-counter migraine  Medication  I recommended heating pad for the back of the neck for tension type quality of the headache  Pain of left lower extremity   Since diagnosis COVID has had some left anterolateral leg pain  No swelling noted on examination  No  Painful palpation  Will order D-dimer  If elevated will need lower extremity ultrasound      Plan:    Diagnoses and all orders for this visit:    Pain of left lower extremity  -     D-dimer, quantitative; Future    History of COVID-19  -     XR chest pa & lateral; Future    Postnasal drip    COVID-19        Return in about 2 months (around 4/10/2021)  History of Present Illness   HPI:  Sanket Palacios is a 57 y o  male with  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 (s/p glue procedure at [facility]; radiation, radiosurgery), chronic back pain, herniated disc, allergic rhinitis, nephrolithiasis,  presents to the Pulmonary office for follow-up evaluation after being diagnosed with COVID in 19 pneumonia on January 4, 2021  Initial symptoms: sinus pain, stuffiness, cough, fever, body aches, weakness, fatigue, nausea, diarrhea  Patient's wife was also sick with covid  He did not require admission  He was seen in ED twice during that time and had IVF and electrolyte repletion  Symptoms started to improve after 3 weeks  CXR from 1/11/2021 was normal      Still has slight cough and gets fatigued more easily  Also reports that has had left-sided headaches that seem to surround his left eye and migrate distally along the temporal area  Also has some tension type headaches involving his neck  Reports prior to Gian he did not have headaches  He has had some left leg pain on the anterolateral aspect  No significant calf tenderness  Denies any injury/ trauma  He has not had leg swelling        He was recently prescribed Ceftin by his primary care physician after chest x-ray was performed on February 8th and showed ground-glass opacities  Ceftin was prescribed to treat possible pneumonia and sinusitis  However he has not had any new fevers, chills sweats or productive cough  Prior to diagnosis COVID he did not have any respiratory issues  He is a lifelong nonsmoker  No significant occupational hazards  Review of Systems   Constitutional: Negative for activity change, chills, fatigue and fever  HENT: Positive for postnasal drip  Negative for congestion, nosebleeds, rhinorrhea, sinus pressure and sore throat  Eyes: Negative for discharge, redness and itching  Respiratory: Positive for cough  Negative for choking, chest tightness, shortness of breath, wheezing and stridor  Cardiovascular: Negative for chest pain, palpitations and leg swelling  Gastrointestinal: Negative for blood in stool  Genitourinary: Negative for difficulty urinating and dysuria  Musculoskeletal: Negative for arthralgias, joint swelling and myalgias  Skin: Negative for color change and rash  Neurological: Positive for headaches  Negative for light-headedness  Light sensitivity, headache around Left eye   Hematological: Negative for adenopathy         Historical Information   Past Medical History:   Diagnosis Date    AVM (arteriovenous malformation)     Back pain     BPH without urinary obstruction     last assessed 01/16/18    Disc disorder     Herniated    Hemorrhoids     Hydronephrosis     Kidney disease     Renal calculi     Renal colic     Ureter colic      Past Surgical History:   Procedure Laterality Date    BRAIN SURGERY      COLONOSCOPY      CYSTOSCOPY      w/removal of object, last assessed 01/16/18    CYSTOSCOPY      w/removal of ureteral calculus last assessed 01/16/18    CYSTOSCOPY KIDNEY W/ URETERAL GUIDE WIRE      last assessed 01/16/18    CYSTOSCOPY W/ URETERAL STENT PLACEMENT      last assessed 01/16/18    [place] RETROGRADE PYELOGRAM  7/9/2020   [address]    HERNIA REPAIR      KIDNEY SURGERY      removal of kidney stone in 2013 at [address] W/LITHOTRIPSY &INDWELL STENT INSRT Right 12/13/2018    Procedure: CYSTOSCOPY URETEROSCOPY  RETROGRADE PYELOGRAM AND INSERTION RIGHT STENT URETERAL;  Surgeon: Kanwal Urrutia MD;  [facility];  Service: Urology    [place] CYSTO/URETERO W/LITHOTRIPSY &INDWELL STENT INSRT Left 7/9/2020    Procedure: CYSTOSCOPY URETEROSCOPY WITH LITHOTRIPSY HOLMIUM LASER, RETROGRADE PYELOGRAM AND INSERTION STENT URETERAL;  Surgeon: Kanwal Urrutia MD;  [facility];  Service: Urology    TOOTH EXTRACTION       Family History   Problem Relation Age of Onset    Stroke Mother     Cancer Father     Diabetes Father     Prostate cancer Father     Heart disease Father     Hypertension Father        Social History   Places lived: [place], [place], [place], [place], [place]  Occupation: former DJ; currently works for [employer]  Tobacco: lifelong nonsmoker  Alcohol: rarely  Illicits:  None   Hobbies: collecting [employer], yard sales   Pets: watches step son's dogs  Travel: none recently     Meds/Allergies     Current Outpatient Medications:     cefdinir (OMNICEF) 300 mg capsule, Take 1 capsule (300 mg total) by mouth every 12 (twelve) hours for 7 days, Disp: 14 capsule, Rfl: 0    famotidine (PEPCID) 10 mg tablet, Take 10 mg by mouth Taken as needed, Disp: , Rfl:     fluorouracil (EFUDEX) 5 % cream, PLEASE SEE ATTACHED FOR DETAILED DIRECTIONS, Disp: , Rfl:     fluticasone (FLONASE) 50 mcg/act nasal spray, 2 sprays into each nostril daily, Disp: 1 Bottle, Rfl: 0    hydrochlorothiazide (HYDRODIURIL) 25 mg tablet, TAKE 1 TABLET BY MOUTH EVERY DAY, Disp: 30 tablet, Rfl: 5    ibuprofen (MOTRIN) 800 mg tablet, Take 1 tablet (800 mg total) by mouth daily as needed for moderate pain (take with food), Disp: 30 tablet, Rfl: 0    methocarbamol (ROBAXIN) 750 mg tablet, Take 1 tablet (750 mg total) by mouth daily as needed for muscle spasms (Pt states he takes on tablet at bedtime ), Disp: 30 tablet, Rfl: 0    albuterol (Ventolin HFA) 90 mcg/act inhaler, Inhale 2 puffs every 6 (six) hours as needed for wheezing (Patient not taking: Reported on 1/16/2021), Disp: 18 g, Rfl: 0    Ascorbic Acid (vitamin C) 1000 MG tablet, Take 1 tablet (1,000 mg total) by mouth 2 (two) times a day for 10 days, Disp: 20 tablet, Rfl: 0    benzonatate (TESSALON PERLES) 100 mg capsule, Take 1 capsule (100 mg total) by mouth 3 (three) times a day as needed for cough (Patient not taking: Reported on 2/10/2021), Disp: 20 capsule, Rfl: 0    cholecalciferol (VITAMIN D3) 1,000 units tablet, Take 1 tablet (1,000 Units total) by mouth daily for 10 days, Disp: 10 tablet, Rfl: 0    Multiple Vitamin (multivitamin) tablet, Take 1 tablet by mouth daily for 10 days, Disp: 10 tablet, Rfl: 0    naproxen (NAPROSYN) 500 mg tablet, Take 1 tablet (500 mg total) by mouth 2 (two) times a day with meals for 5 days, Disp: 10 tablet, Rfl: 0    ondansetron (ZOFRAN-ODT) 4 mg disintegrating tablet, Take 1 tablet (4 mg total) by mouth every 8 (eight) hours as needed for nausea for up to 10 doses (Patient not taking: Reported on 1/16/2021), Disp: 10 tablet, Rfl: 0    tadalafil (CIALIS) 20 MG tablet, Take 1 tablet (20 mg total) by mouth as needed for erectile dysfunction, Disp: 10 tablet, Rfl: 3  Allergies   Allergen Reactions    Morphine Other (See Comments)    Other      Pt states that any narcotic makes him feel sick    Oxycodone-Acetaminophen Nausea Only, Vomiting and GI Intolerance    Seasonal Ic  [Cholestatin]      Other reaction(s): Nasal Congestion, Sinus Pain       Vitals: Blood pressure 126/80, pulse 84, temperature 98 °F (36 7 °C), resp  rate 18, height 5' 9" (1 753 m), weight 106 kg (234 lb), SpO2 98 %  Body mass index is 34 56 kg/m²   Oxygen Therapy  SpO2: 98 %  Oxygen Therapy: None (Room air)      Physical Exam  Physical Exam  Vitals signs reviewed  Constitutional:       General: He is not in acute distress  Appearance: Normal appearance  He is well-developed  He is obese  He is not ill-appearing, toxic-appearing or diaphoretic  HENT:      Head: Normocephalic and atraumatic  Right Ear: External ear normal       Left Ear: External ear normal       Nose: Nose normal       Mouth/Throat:      Pharynx: No oropharyngeal exudate  Eyes:      Conjunctiva/sclera: Conjunctivae normal       Pupils: Pupils are equal, round, and reactive to light  Neck:      Musculoskeletal: Normal range of motion  Trachea: No tracheal deviation  Cardiovascular:      Rate and Rhythm: Normal rate and regular rhythm  Heart sounds: Normal heart sounds  No murmur  No friction rub  No gallop  Pulmonary:      Effort: Pulmonary effort is normal       Breath sounds: Normal breath sounds  No stridor  No wheezing or rales  Lymphadenopathy:      Head:      Right side of head: No submental, submandibular, preauricular or posterior auricular adenopathy  Left side of head: No submental, submandibular, preauricular or posterior auricular adenopathy  Cervical: No cervical adenopathy  Skin:     General: Skin is warm and dry  Neurological:      Mental Status: He is alert and oriented to person, place, and time  Labs: I have personally reviewed pertinent lab results    Lab Results   Component Value Date    WBC 8 05 01/16/2021    HGB 14 0 01/16/2021    HCT 41 3 01/16/2021    MCV 90 01/16/2021     01/16/2021     Lab Results   Component Value Date    CALCIUM 9 6 01/25/2021    K 3 4 (L) 01/25/2021    CO2 34 (H) 01/25/2021     01/25/2021    BUN 15 01/25/2021    CREATININE 0 98 01/25/2021     No results found for: IGE  Lab Results   Component Value Date    ALT 29 01/16/2021    AST 23 01/16/2021    ALKPHOS 38 (L) 01/16/2021       Imaging and other studies: I have personally reviewed pertinent reports  and I have personally reviewed pertinent films in PACS       CT abdomen January 12, 2021   I reviewed the lung bases of the lungs that showed peripheral ground-glass opacities  Chest x-ray February 2021     IMPRESSION:     Patchy bilateral groundglass airspace opacities consistent with pneumonia, increased since previous  In the appropriate clinical setting, the findings are compatible with Covid 19 pneumonia        CT chest with PE protocol [facility] 2019  Report was reviewed  Images not available for my review  Findings:    Pulmonary Arteries: There is adequate opacification of the pulmonary arteries  No filling defect within the pulmonary arteries to indicate pulmonary embolism  Lungs: Mild dependent atelectasis is noted in the lower lobes  The lungs are  otherwise clear  No focal airspace opacity  Pleura: Normal   Heart: Normal   Mediastinum/Aorta: Normal   Lymph nodes: Normal   Chest Wall: Normal     Upper abdomen/Other: The visualized portions of the upper abdomen are within  normal limits  Bones: Mild degenerative changes are present in the spine  No acute osseous  abnormality  IMPRESSION:  Impression:  No evidence of pulmonary embolism or other acute abnormality in the chest       Pulmonary function testing:   None on file    EKG, Pathology, and Other Studies: I have personally reviewed pertinent reports  and I have personally reviewed pertinent films in PACS      EKG January 16, 2021  Sinus tachycardia  Nonspecific ST abnormality  Abnormal ECG    YUNG Shaw    [facility]

## 2021-02-11 ENCOUNTER — LAB (OUTPATIENT)
Dept: LAB | Facility: MEDICAL CENTER | Age: 61
End: 2021-02-11
Payer: COMMERCIAL

## 2021-02-11 DIAGNOSIS — M79.605 PAIN OF LEFT LOWER EXTREMITY: ICD-10-CM

## 2021-02-11 LAB — D DIMER PPP FEU-MCNC: 1.16 UG/ML FEU

## 2021-02-11 PROCEDURE — 36415 COLL VENOUS BLD VENIPUNCTURE: CPT

## 2021-02-11 PROCEDURE — 85379 FIBRIN DEGRADATION QUANT: CPT

## 2021-02-17 ENCOUNTER — TELEPHONE (OUTPATIENT)
Dept: PULMONOLOGY | Facility: CLINIC | Age: 61
End: 2021-02-17

## 2021-02-17 DIAGNOSIS — M79.605 PAIN OF LEFT LOWER EXTREMITY: Primary | ICD-10-CM

## 2021-02-18 NOTE — TELEPHONE ENCOUNTER
Given mildly elevated D-dimer 1 16  Will order left lower extremity Doppler,  Patient agreeable to calling in scheduling this test we will call with test results

## 2021-02-19 ENCOUNTER — HOSPITAL ENCOUNTER (OUTPATIENT)
Dept: NON INVASIVE DIAGNOSTICS | Facility: CLINIC | Age: 61
Discharge: HOME | End: 2021-02-19
Payer: COMMERCIAL

## 2021-02-19 ENCOUNTER — TELEPHONE (OUTPATIENT)
Dept: PULMONOLOGY | Facility: HOSPITAL | Age: 61
End: 2021-02-19

## 2021-02-19 DIAGNOSIS — M79.605 PAIN OF LEFT LOWER EXTREMITY: ICD-10-CM

## 2021-02-19 DIAGNOSIS — Z86.16 HISTORY OF COVID-19: Primary | ICD-10-CM

## 2021-02-19 PROCEDURE — 93971 EXTREMITY STUDY: CPT | Performed by: SURGERY

## 2021-02-19 PROCEDURE — 93971 EXTREMITY STUDY: CPT

## 2021-02-22 ENCOUNTER — TELEMEDICINE (OUTPATIENT)
Dept: FAMILY MEDICINE CLINIC | Facility: CLINIC | Age: 61
End: 2021-02-22
Payer: COMMERCIAL

## 2021-02-22 ENCOUNTER — LAB (OUTPATIENT)
Dept: LAB | Facility: MEDICAL CENTER | Age: 61
End: 2021-02-22
Payer: COMMERCIAL

## 2021-02-22 DIAGNOSIS — R51.9 NONINTRACTABLE HEADACHE, UNSPECIFIED CHRONICITY PATTERN, UNSPECIFIED HEADACHE TYPE: Primary | ICD-10-CM

## 2021-02-22 DIAGNOSIS — Z86.16 HISTORY OF COVID-19: ICD-10-CM

## 2021-02-22 LAB — D DIMER PPP FEU-MCNC: 0.82 UG/ML FEU

## 2021-02-22 PROCEDURE — 99213 OFFICE O/P EST LOW 20 MIN: CPT | Performed by: FAMILY MEDICINE

## 2021-02-22 PROCEDURE — 36415 COLL VENOUS BLD VENIPUNCTURE: CPT

## 2021-02-22 PROCEDURE — 85379 FIBRIN DEGRADATION QUANT: CPT

## 2021-02-22 PROCEDURE — 1036F TOBACCO NON-USER: CPT | Performed by: FAMILY MEDICINE

## 2021-02-22 RX ORDER — SUMATRIPTAN 25 MG/1
25 TABLET, FILM COATED ORAL ONCE AS NEEDED
Qty: 9 TABLET | Refills: 0 | Status: SHIPPED | OUTPATIENT
Start: 2021-02-22 | End: 2021-04-23

## 2021-02-22 NOTE — PATIENT INSTRUCTIONS
dox/[phone] headaches continue but better, left sided temple area and did rec to try to wean off Excedrin and will try Imitrex 25 mg daily prn and recheck in 2 weeks  F-up with pulmonary as directed as well for cxray f-up  He will also see his neurologist and get EMG as directed as well  He should notify him of his current headaches as well and he did notify his doctors in [place] who saw him for his AVM and these new headaches s/p covid

## 2021-02-22 NOTE — PROGRESS NOTES
Virtual Regular Visit  It was my intent to perform this visit via video technology but the patient was not able to do a video connection so the visit was completed via audio telephone only  Assessment/Plan:    Problem List Items Addressed This Visit     None      Visit Diagnoses     Nonintractable headache, unspecified chronicity pattern, unspecified headache type    -  Primary    Relevant Medications    SUMAtriptan (IMITREX) 25 mg tablet    Other Relevant Orders    Ambulatory referral to Neurology    Ambulatory referral to Neurology               Reason for visit is   Chief Complaint   Patient presents with    Virtual Regular Visit        Encounter provider Philly Guzman DO    Provider located at [address] [place] [id number]      Recent Visits  No visits were found meeting these conditions  Showing recent visits within past 7 days and meeting all other requirements     Today's Visits  Date Type Provider Dept   02/22/21 Telemedicine Philly Guzman DO [facility] today's visits and meeting all other requirements     Future Appointments  No visits were found meeting these conditions  Showing future appointments within next 150 days and meeting all other requirements        The patient was identified by name and date of birth  Phillip Chan was informed that this is a telemedicine visit and that the visit is being conducted through telephone  My office door was closed  No one else was in the room  He acknowledged consent and understanding of privacy and security of the video platform  The patient has agreed to participate and understands they can discontinue the visit at any time  Patient is aware this is a billable service  Subjective  Phillip Chan is a 57 y o  male dox/[phone] headaches        dox/[phone] headaches, they are still present but better and takes Excedrin and Tylenol   He has seen pulmonary as directed for breathing issues which have gotten better, and neurology for EMG He now has recent headaches  He also has told his doctors in [place] re the headaches with hx of AVM  The headaches are on left temple and left eye  Sensitivity to light as well when watching tv          Past Medical History:   Diagnosis Date    AVM (arteriovenous malformation)     Back pain     BPH without urinary obstruction     last assessed 01/16/18    Disc disorder     Herniated    Hemorrhoids     Hydronephrosis     Kidney disease     Renal calculi     Renal colic     Ureter colic        Past Surgical History:   Procedure Laterality Date    BRAIN SURGERY      COLONOSCOPY      CYSTOSCOPY      w/removal of object, last assessed 01/16/18    CYSTOSCOPY      w/removal of ureteral calculus last assessed 01/16/18    CYSTOSCOPY KIDNEY W/ URETERAL GUIDE WIRE      last assessed 01/16/18    CYSTOSCOPY W/ URETERAL STENT PLACEMENT      last assessed 01/16/18    FL RETROGRADE PYELOGRAM  7/9/2020   [address]    HERNIA REPAIR      KIDNEY SURGERY      removal of kidney stone in 2013 at [address] W/LITHOTRIPSY &INDWELL STENT INSRT Right 12/13/2018    Procedure: CYSTOSCOPY URETEROSCOPY  RETROGRADE PYELOGRAM AND INSERTION RIGHT STENT URETERAL;  Surgeon: Scott Dover MD;  [facility];  Service: Urology    [place] CYSTO/URETERO W/LITHOTRIPSY &INDWELL STENT INSRT Left 7/9/2020    Procedure: CYSTOSCOPY URETEROSCOPY WITH LITHOTRIPSY HOLMIUM LASER, RETROGRADE PYELOGRAM AND INSERTION STENT URETERAL;  Surgeon: Scott Dover MD;  [facility];  Service: Urology    TOOTH EXTRACTION         Current Outpatient Medications   Medication Sig Dispense Refill    albuterol (Ventolin HFA) 90 mcg/act inhaler Inhale 2 puffs every 6 (six) hours as needed for wheezing (Patient not taking: Reported on 1/16/2021) 18 g 0    Ascorbic Acid (vitamin C) 1000 MG tablet Take 1 tablet (1,000 mg total) by mouth 2 (two) times a day for 10 days 20 tablet 0    benzonatate (TESSALON PERLES) 100 mg capsule Take 1 capsule (100 mg total) by mouth 3 (three) times a day as needed for cough (Patient not taking: Reported on 2/10/2021) 20 capsule 0    cholecalciferol (VITAMIN D3) 1,000 units tablet Take 1 tablet (1,000 Units total) by mouth daily for 10 days 10 tablet 0    famotidine (PEPCID) 10 mg tablet Take 10 mg by mouth Taken as needed      fluorouracil (EFUDEX) 5 % cream PLEASE SEE ATTACHED FOR DETAILED DIRECTIONS      fluticasone (FLONASE) 50 mcg/act nasal spray 2 sprays into each nostril daily 1 Bottle 0    hydrochlorothiazide (HYDRODIURIL) 25 mg tablet TAKE 1 TABLET BY MOUTH EVERY DAY 30 tablet 5    ibuprofen (MOTRIN) 800 mg tablet Take 1 tablet (800 mg total) by mouth daily as needed for moderate pain (take with food) 30 tablet 0    methocarbamol (ROBAXIN) 750 mg tablet Take 1 tablet (750 mg total) by mouth daily as needed for muscle spasms (Pt states he takes on tablet at bedtime ) 30 tablet 0    Multiple Vitamin (multivitamin) tablet Take 1 tablet by mouth daily for 10 days 10 tablet 0    naproxen (NAPROSYN) 500 mg tablet Take 1 tablet (500 mg total) by mouth 2 (two) times a day with meals for 5 days 10 tablet 0    ondansetron (ZOFRAN-ODT) 4 mg disintegrating tablet Take 1 tablet (4 mg total) by mouth every 8 (eight) hours as needed for nausea for up to 10 doses (Patient not taking: Reported on 1/16/2021) 10 tablet 0    SUMAtriptan (IMITREX) 25 mg tablet Take 1 tablet (25 mg total) by mouth once as needed for migraine for up to 1 dose 9 tablet 0    tadalafil (CIALIS) 20 MG tablet Take 1 tablet (20 mg total) by mouth as needed for erectile dysfunction 10 tablet 3     No current facility-administered medications for this visit           Allergies   Allergen Reactions    Morphine Other (See Comments)    Other      Pt states that any narcotic makes him feel sick    Oxycodone-Acetaminophen Nausea Only, Vomiting and GI Intolerance    Seasonal Ic  [Cholestatin]      Other reaction(s): Nasal Congestion, Sinus Pain       Review of Systems   Constitutional: Negative  HENT: Negative  Eyes: Negative  Respiratory: Negative  Cardiovascular: Negative  Gastrointestinal: Negative  Endocrine: Negative  Genitourinary: Negative  Musculoskeletal: Negative  Skin: Negative  Allergic/Immunologic: Negative  Neurological: Positive for headaches  Hematological: Negative  Psychiatric/Behavioral: Negative  Video Exam    There were no vitals filed for this visit  Physical Exam  Constitutional:       Appearance: He is well-developed  HENT:      Head: Normocephalic and atraumatic  Eyes:      Conjunctiva/sclera: Conjunctivae normal    Pulmonary:      Effort: Pulmonary effort is normal  No respiratory distress  Skin:     Coloration: Skin is not pale  Neurological:      General: No focal deficit present  Mental Status: He is alert and oriented to person, place, and time  Deep Tendon Reflexes: Reflexes are normal and symmetric  Psychiatric:         Mood and Affect: Mood normal          Behavior: Behavior normal          Thought Content: Thought content normal          Judgment: Judgment normal           I spent 20 minutes directly with the patient during this visit    Patient Instructions   dox/[phone] headaches continue but better, left sided temple area and did rec to try to wean off Excedrin and will try Imitrex 25 mg daily prn and recheck in 2 weeks  F-up with pulmonary as directed as well for cxray f-up  He will also see his neurologist and get EMG as directed as well  He should notify him of his current headaches as well and he did notify his doctors in [place] who saw him for his AVM and these new headaches s/p covid         VIRTUAL VISIT DISCLAIMER    Yue Olvera acknowledges that he has consented to an online visit or consultation  He understands that the online visit is based solely on information provided by him, and that, in the absence of a face-to-face physical evaluation by the physician, the diagnosis he receives is both limited and provisional in terms of accuracy and completeness  This is not intended to replace a full medical face-to-face evaluation by the physician  Efren Wolfe understands and accepts these terms

## 2021-02-23 NOTE — TELEPHONE ENCOUNTER
I called Loni Denney with most recent ddimer results that show a decreased  LE doppler negative  Suspect that his calf pain is msk as he has had it even before covid  Recommended heating pad, massage, and not over exerting the muscle

## 2021-03-10 DIAGNOSIS — Z23 ENCOUNTER FOR IMMUNIZATION: ICD-10-CM

## 2021-03-16 ENCOUNTER — TELEPHONE (OUTPATIENT)
Dept: FAMILY MEDICINE CLINIC | Facility: CLINIC | Age: 61
End: 2021-03-16

## 2021-03-16 DIAGNOSIS — F41.9 ANXIETY: Primary | ICD-10-CM

## 2021-03-16 RX ORDER — ALPRAZOLAM 0.25 MG/1
0.25 TABLET ORAL
Qty: 1 TABLET | Refills: 0 | Status: SHIPPED | OUTPATIENT
Start: 2021-03-16 | End: 2022-08-01 | Stop reason: SDUPTHER

## 2021-03-16 NOTE — TELEPHONE ENCOUNTER
Patient would like a RX for Xanax for a brain MRI      Patient uses [facility]

## 2021-04-14 ENCOUNTER — APPOINTMENT (OUTPATIENT)
Dept: RADIOLOGY | Facility: MEDICAL CENTER | Age: 61
End: 2021-04-14
Payer: COMMERCIAL

## 2021-04-14 DIAGNOSIS — J12.82 PNEUMONIA DUE TO COVID-19 VIRUS: ICD-10-CM

## 2021-04-14 DIAGNOSIS — U07.1 PNEUMONIA DUE TO COVID-19 VIRUS: ICD-10-CM

## 2021-04-14 PROCEDURE — 71046 X-RAY EXAM CHEST 2 VIEWS: CPT

## 2021-04-20 NOTE — PROGRESS NOTES
Pulmonary Follow-up   Rosa Negro 57 y o  male MRN: [id number]  4/21/2021      Assessment:    COVID-19    History of COVID diagnosed in January  Since that time he has made a complete recovery  He no longer has any more respiratory symptoms  His most recent chest x-ray was normal   Does not require any specific pulmonary follow-up at this time  I am happy to see him back in the office if he does develop any new respiratory issues  Plan:    Diagnoses and all orders for this visit:    Obesity (BMI 30-39  9)    COVID-19    Other orders  -     traMADol (ULTRAM) 50 mg tablet; Take 50 mg by mouth        No follow-ups on file  History of Present Illness   HPI:  Rosa Negro is a 57 y o  male with  History  arterio venous malformation (s/p glue procedure at [facility]; radiation, radiosurgery), chronic back pain, herniated disc, allergic rhinitis, nephrolithiasis, seen in the pulmonary office las month after being diagnosed with COVID in 19 pneumonia on January 4, 2021  Initial symptoms: sinus pain, stuffiness, cough, fever, body aches, weakness, fatigue, nausea, diarrhea  Patient's wife was also sick with covid  He did not require admission  He was seen in ED twice during that time and had IVF and electrolyte repletion  Symptoms started to improve after 3 weeks  CXR from 1/11/2021 was normal      During his last appointment he reported left leg pain on the anterolateral aspect of the leg  DDimer was sent to assess for any evdidence of DVT and was normal  During that appointment also reported he was being treated for possible pneumonia/sinusitis (ceftin)  He was referred for repeat CXR  He was given a sinus rinse kit and prescribed flonase  CXR from 4/14 was normal        Still has slight cough and gets fatigued more easily  Also reports that has had left-sided headaches that seem to surround his left eye and migrate distally along the temporal area    Also has some tension type headaches involving his neck   Reports prior to COVID he did not have headaches  He has had some left leg pain on the anterolateral aspect  No significant calf tenderness  Denies any injury/ trauma  He has not had leg swelling  He was recently prescribed Ceftin by his primary care physician after chest x-ray was performed on February 8th and showed ground-glass opacities  Ceftin was prescribed to treat possible pneumonia and sinusitis  However he has not had any new fevers, chills sweats or productive cough  Prior to diagnosis COVID he did not have any respiratory issues  He is a lifelong nonsmoker  No significant occupational hazards  Here today for routine f/u  F/U CXR was normal  States he is doing well overall  He is no longer having headaches  Has followed up with [facility] for AVM  Angiogram is pending on 4/29  States he is doing well from respiratory standpoint--no more sob or cough  Has received 2nd Pfzier covid vaccine  Does not have any new infectious symptoms  No complaints today  Review of Systems   Constitutional: Negative for chills, fatigue and fever  HENT: Negative for congestion, nosebleeds, postnasal drip, rhinorrhea, sinus pressure and sore throat  Eyes: Positive for photophobia  Negative for discharge, redness and itching  Very mild sensitivity to light   Respiratory: Negative for cough, choking, chest tightness, shortness of breath, wheezing and stridor  Cardiovascular: Negative for chest pain, palpitations and leg swelling  Gastrointestinal: Negative for blood in stool  Genitourinary: Negative for difficulty urinating and dysuria  Musculoskeletal: Negative for arthralgias, joint swelling and myalgias  Skin: Negative for color change and rash  Neurological: Negative for light-headedness and headaches  Hematological: Negative for adenopathy         Historical Information   Past Medical History:   Diagnosis Date    AVM (arteriovenous malformation)     Back pain  BPH without urinary obstruction     last assessed 01/16/18    Disc disorder     Herniated    Hemorrhoids     Hydronephrosis     Hypertension     Kidney disease     Renal calculi     Renal colic     Ureter colic      Past Surgical History:   Procedure Laterality Date    BRAIN SURGERY      COLONOSCOPY      CYSTOSCOPY      w/removal of object, last assessed 01/16/18    CYSTOSCOPY      w/removal of ureteral calculus last assessed 01/16/18    CYSTOSCOPY KIDNEY W/ URETERAL GUIDE WIRE      last assessed 01/16/18    CYSTOSCOPY W/ URETERAL STENT PLACEMENT      last assessed 01/16/18    FL RETROGRADE PYELOGRAM  7/9/2020   [address]    HERNIA REPAIR      KIDNEY SURGERY      removal of kidney stone in 2013 at [address] &INDWELL STENT INSRT Right 12/13/2018    Procedure: CYSTOSCOPY URETEROSCOPY  RETROGRADE PYELOGRAM AND INSERTION RIGHT STENT URETERAL;  Surgeon: Sandre Skiff, MD;  Location: [facility];  Service: Urology    MO CYSTO/URETERO W/LITHOTRIPSY &INDWELL STENT INSRT Left 7/9/2020    Procedure: CYSTOSCOPY URETEROSCOPY WITH LITHOTRIPSY HOLMIUM LASER, RETROGRADE PYELOGRAM AND INSERTION STENT URETERAL;  Surgeon: Sandre Skiff, MD;  [facility];  Service: Urology    TOOTH EXTRACTION       Family History   Problem Relation Age of Onset    Stroke Mother     Cancer Father     Diabetes Father     Prostate cancer Father     Heart disease Father     Hypertension Father        Social History   Places lived: [place], [place], [place], [place], [place]  Occupation: former [employer]; currently works for [email or web address] and [employer]  Tobacco: lifelong nonsmoker  Alcohol: rarely  Illicits:  None   Hobbies: collecting antiques, yard sales   Pets: watches step son's dogs  Travel: none recently     Meds/Allergies     Current Outpatient Medications:     ALPRAZolam (XANAX) 0 25 mg tablet, Take 1 tablet (0 25 mg total) by mouth daily at bedtime as needed for anxiety Take 1 hour for anxiety prior to MRI, Disp: 1 tablet, Rfl: 0    famotidine (PEPCID) 10 mg tablet, Take 10 mg by mouth Taken as needed, Disp: , Rfl:     fluorouracil (EFUDEX) 5 % cream, PLEASE SEE ATTACHED FOR DETAILED DIRECTIONS, Disp: , Rfl:     fluticasone (FLONASE) 50 mcg/act nasal spray, 2 sprays into each nostril daily, Disp: 1 Bottle, Rfl: 0    hydrochlorothiazide (HYDRODIURIL) 25 mg tablet, TAKE 1 TABLET BY MOUTH EVERY DAY, Disp: 30 tablet, Rfl: 5    ibuprofen (MOTRIN) 800 mg tablet, Take 1 tablet (800 mg total) by mouth daily as needed for moderate pain (take with food), Disp: 30 tablet, Rfl: 0    methocarbamol (ROBAXIN) 750 mg tablet, Take 1 tablet (750 mg total) by mouth daily as needed for muscle spasms (Pt states he takes on tablet at bedtime ), Disp: 30 tablet, Rfl: 0    naproxen (NAPROSYN) 500 mg tablet, Take 1 tablet (500 mg total) by mouth 2 (two) times a day with meals for 5 days, Disp: 10 tablet, Rfl: 0    tadalafil (CIALIS) 20 MG tablet, Take 1 tablet (20 mg total) by mouth as needed for erectile dysfunction, Disp: 10 tablet, Rfl: 3    traMADol (ULTRAM) 50 mg tablet, Take 50 mg by mouth, Disp: , Rfl:     albuterol (Ventolin HFA) 90 mcg/act inhaler, Inhale 2 puffs every 6 (six) hours as needed for wheezing (Patient not taking: Reported on 1/16/2021), Disp: 18 g, Rfl: 0    Ascorbic Acid (vitamin C) 1000 MG tablet, Take 1 tablet (1,000 mg total) by mouth 2 (two) times a day for 10 days (Patient not taking: Reported on 4/21/2021), Disp: 20 tablet, Rfl: 0    benzonatate (TESSALON PERLES) 100 mg capsule, Take 1 capsule (100 mg total) by mouth 3 (three) times a day as needed for cough (Patient not taking: Reported on 2/10/2021), Disp: 20 capsule, Rfl: 0    cholecalciferol (VITAMIN D3) 1,000 units tablet, Take 1 tablet (1,000 Units total) by mouth daily for 10 days (Patient not taking: Reported on 4/21/2021), Disp: 10 tablet, Rfl: 0    Multiple Vitamin (multivitamin) tablet, Take 1 tablet by mouth daily for 10 days (Patient not taking: Reported on 4/21/2021), Disp: 10 tablet, Rfl: 0    ondansetron (ZOFRAN-ODT) 4 mg disintegrating tablet, Take 1 tablet (4 mg total) by mouth every 8 (eight) hours as needed for nausea for up to 10 doses (Patient not taking: Reported on 1/16/2021), Disp: 10 tablet, Rfl: 0    SUMAtriptan (IMITREX) 25 mg tablet, Take 1 tablet (25 mg total) by mouth once as needed for migraine for up to 1 dose (Patient not taking: Reported on 4/21/2021), Disp: 9 tablet, Rfl: 0  Allergies   Allergen Reactions    Morphine Other (See Comments)    Other      Pt states that any narcotic makes him feel sick    Oxycodone-Acetaminophen Nausea Only, Vomiting and GI Intolerance    Seasonal Ic  [Cholestatin]      Other reaction(s): Nasal Congestion, Sinus Pain       Vitals: Blood pressure 130/90, pulse 80, temperature 97 5 °F (36 4 °C), temperature source Tympanic, resp  rate 18, height 5' 9" (1 753 m), weight 107 kg (236 lb), SpO2 95 %  Body mass index is 34 85 kg/m²  Oxygen Therapy  SpO2: 95 %  Oxygen Therapy: None (Room air)      Physical Exam  Physical Exam  Vitals signs reviewed  Constitutional:       General: He is not in acute distress  Appearance: He is well-developed  He is not ill-appearing, toxic-appearing or diaphoretic  HENT:      Head: Normocephalic and atraumatic  Right Ear: External ear normal       Left Ear: External ear normal       Nose: Nose normal       Mouth/Throat:      Pharynx: No oropharyngeal exudate  Eyes:      General: No scleral icterus  Left eye: No discharge  Conjunctiva/sclera: Conjunctivae normal       Pupils: Pupils are equal, round, and reactive to light  Neck:      Musculoskeletal: Normal range of motion  Trachea: No tracheal deviation  Cardiovascular:      Rate and Rhythm: Normal rate and regular rhythm  Heart sounds: Normal heart sounds  No murmur  No friction rub  No gallop      Pulmonary: Effort: Pulmonary effort is normal       Breath sounds: Normal breath sounds  No stridor  No wheezing or rales  Musculoskeletal:      Right lower leg: No edema  Left lower leg: No edema  Lymphadenopathy:      Head:      Right side of head: No submental, submandibular, preauricular or posterior auricular adenopathy  Left side of head: No submental, submandibular, preauricular or posterior auricular adenopathy  Cervical: No cervical adenopathy  Skin:     General: Skin is warm and dry  Neurological:      Mental Status: He is alert and oriented to person, place, and time  Labs: I have personally reviewed pertinent lab results  Lab Results   Component Value Date    WBC 8 05 01/16/2021    HGB 14 0 01/16/2021    HCT 41 3 01/16/2021    MCV 90 01/16/2021     01/16/2021     Lab Results   Component Value Date    CALCIUM 9 6 01/25/2021    K 3 4 (L) 01/25/2021    CO2 34 (H) 01/25/2021     01/25/2021    BUN 15 01/25/2021    CREATININE 0 98 01/25/2021     No results found for: IGE  Lab Results   Component Value Date    ALT 29 01/16/2021    AST 23 01/16/2021    ALKPHOS 38 (L) 01/16/2021       Imaging and other studies: I have personally reviewed pertinent reports  and I have personally reviewed pertinent films in PACS          CXR 4/14/2021     FINDINGS:     Cardiomediastinal silhouette appears unremarkable      No airspace consolidation, pneumothorax, pulmonary edema, or pleural effusion       Osseous structures appear within normal limits for patient age      IMPRESSION:     No radiographic evidence of acute intrathoracic process                   CT abdomen January 12, 2021   I reviewed the lung bases of the lungs that showed peripheral ground-glass opacities  Chest x-ray February 2021     IMPRESSION:     Patchy bilateral groundglass airspace opacities consistent with pneumonia, increased since previous    In the appropriate clinical setting, the findings are compatible with Covid 19 pneumonia        CT chest with PE protocol [facility] 2019  Report was reviewed  Images not available for my review  Findings:    Pulmonary Arteries: There is adequate opacification of the pulmonary arteries  No filling defect within the pulmonary arteries to indicate pulmonary embolism  Lungs: Mild dependent atelectasis is noted in the lower lobes  The lungs are  otherwise clear  No focal airspace opacity  Pleura: Normal   Heart: Normal   Mediastinum/Aorta: Normal   Lymph nodes: Normal   Chest Wall: Normal     Upper abdomen/Other: The visualized portions of the upper abdomen are within  normal limits  Bones: Mild degenerative changes are present in the spine  No acute osseous  abnormality  IMPRESSION:  Impression:  No evidence of pulmonary embolism or other acute abnormality in the chest       Pulmonary function testing:   None on file    EKG, Pathology, and Other Studies: I have personally reviewed pertinent reports  and I have personally reviewed pertinent films in PACS      EKG January 16, 2021  Sinus tachycardia  Nonspecific ST abnormality  Abnormal ECG    Karyle Pate, M D [facility]

## 2021-04-21 ENCOUNTER — OFFICE VISIT (OUTPATIENT)
Dept: PULMONOLOGY | Facility: CLINIC | Age: 61
End: 2021-04-21
Payer: COMMERCIAL

## 2021-04-21 ENCOUNTER — TELEPHONE (OUTPATIENT)
Dept: FAMILY MEDICINE CLINIC | Facility: CLINIC | Age: 61
End: 2021-04-21

## 2021-04-21 VITALS
HEART RATE: 80 BPM | SYSTOLIC BLOOD PRESSURE: 130 MMHG | OXYGEN SATURATION: 95 % | WEIGHT: 236 LBS | RESPIRATION RATE: 18 BRPM | TEMPERATURE: 97.5 F | BODY MASS INDEX: 34.96 KG/M2 | DIASTOLIC BLOOD PRESSURE: 90 MMHG | HEIGHT: 69 IN

## 2021-04-21 DIAGNOSIS — U07.1 COVID-19: ICD-10-CM

## 2021-04-21 DIAGNOSIS — E66.9 OBESITY (BMI 30-39.9): Primary | ICD-10-CM

## 2021-04-21 PROCEDURE — 99214 OFFICE O/P EST MOD 30 MIN: CPT | Performed by: INTERNAL MEDICINE

## 2021-04-21 RX ORDER — TRAMADOL HYDROCHLORIDE 50 MG/1
50 TABLET ORAL AS NEEDED
COMMUNITY
Start: 2021-04-20 | End: 2022-04-20

## 2021-04-21 NOTE — TELEPHONE ENCOUNTER
Ace Kim called the office for a pre op clearance 04/29/21 noris at [facility] in [address] pt needs an ekg, hek nows he needs pat's kind unknown, anesthesia unknown   pt's surgeon was to fax us paper work  Pt was advised that at this time we have not  yet received anything  I suggested pt to please call his surgeon to advised them that he has na appointment we need to know what pat's he needs anthesia and we have not yet received a form  Pt will call us later to see if we have receive anything then will call surgeon  Pt advised we can do ekg  We do not do labs in the office  Pt advised we prefer pt's to get pats' prior to pre op clearance

## 2021-04-21 NOTE — ASSESSMENT & PLAN NOTE
History of COVID diagnosed in January  Since that time he has made a complete recovery  He no longer has any more respiratory symptoms  His most recent chest x-ray was normal   Does not require any specific pulmonary follow-up at this time  I am happy to see him back in the office if he does develop any new respiratory issues  Mom returning call.    Message confirmed with caller.     Call connected to [facility] Peds Triage Queue.  Routed to Provider's clinical pool.

## 2021-04-21 NOTE — TELEPHONE ENCOUNTER
Pt called back inquiring if we have received his pre op paper work  from his surgeon  There is nothing in Dr Álvarez's bin , or pre op folder next to Adrian's work station  Nothing in epic scanned in  Pt is going to contact his surgeon

## 2021-04-22 NOTE — TELEPHONE ENCOUNTER
Pt called the office inquiring if we have received his paperwork  I presently do not see that anything was received  I requested pt please call back with the surgeon's  office number so that we may  call  I informed pt it can take 24 hours for us to receive a fax

## 2021-04-23 ENCOUNTER — OFFICE VISIT (OUTPATIENT)
Dept: FAMILY MEDICINE CLINIC | Facility: CLINIC | Age: 61
End: 2021-04-23
Payer: COMMERCIAL

## 2021-04-23 ENCOUNTER — APPOINTMENT (OUTPATIENT)
Dept: LAB | Facility: CLINIC | Age: 61
End: 2021-04-23
Payer: COMMERCIAL

## 2021-04-23 VITALS
SYSTOLIC BLOOD PRESSURE: 130 MMHG | WEIGHT: 237 LBS | DIASTOLIC BLOOD PRESSURE: 82 MMHG | HEART RATE: 80 BPM | OXYGEN SATURATION: 96 % | RESPIRATION RATE: 16 BRPM | BODY MASS INDEX: 35.1 KG/M2 | HEIGHT: 69 IN | TEMPERATURE: 97.1 F

## 2021-04-23 DIAGNOSIS — Q28.2 AVM (ARTERIOVENOUS MALFORMATION) BRAIN: ICD-10-CM

## 2021-04-23 DIAGNOSIS — K21.9 GASTROESOPHAGEAL REFLUX DISEASE, UNSPECIFIED WHETHER ESOPHAGITIS PRESENT: ICD-10-CM

## 2021-04-23 DIAGNOSIS — Z01.818 PREOP GENERAL PHYSICAL EXAM: ICD-10-CM

## 2021-04-23 DIAGNOSIS — I10 ESSENTIAL HYPERTENSION: ICD-10-CM

## 2021-04-23 DIAGNOSIS — F41.9 ANXIETY: ICD-10-CM

## 2021-04-23 DIAGNOSIS — Z20.822 ENCOUNTER FOR LABORATORY TESTING FOR COVID-19 VIRUS: Primary | ICD-10-CM

## 2021-04-23 DIAGNOSIS — E66.9 OBESITY (BMI 30-39.9): ICD-10-CM

## 2021-04-23 LAB
ANION GAP SERPL CALCULATED.3IONS-SCNC: 4 MMOL/L (ref 4–13)
APTT PPP: 27 SECONDS (ref 23–37)
BASOPHILS # BLD AUTO: 0.07 THOUSANDS/ΜL (ref 0–0.1)
BASOPHILS NFR BLD AUTO: 1 % (ref 0–1)
BILIRUB UR QL STRIP: NEGATIVE
BUN SERPL-MCNC: 22 MG/DL (ref 5–25)
CALCIUM SERPL-MCNC: 9.1 MG/DL (ref 8.3–10.1)
CHLORIDE SERPL-SCNC: 106 MMOL/L (ref 100–108)
CLARITY UR: CLEAR
CO2 SERPL-SCNC: 31 MMOL/L (ref 21–32)
COLOR UR: YELLOW
CREAT SERPL-MCNC: 1.23 MG/DL (ref 0.6–1.3)
EOSINOPHIL # BLD AUTO: 0.13 THOUSAND/ΜL (ref 0–0.61)
EOSINOPHIL NFR BLD AUTO: 1 % (ref 0–6)
ERYTHROCYTE [DISTWIDTH] IN BLOOD BY AUTOMATED COUNT: 13.7 % (ref 11.6–15.1)
GFR SERPL CREATININE-BSD FRML MDRD: 63 ML/MIN/1.73SQ M
GLUCOSE SERPL-MCNC: 104 MG/DL (ref 65–140)
GLUCOSE UR STRIP-MCNC: NEGATIVE MG/DL
HCT VFR BLD AUTO: 45.7 % (ref 36.5–49.3)
HGB BLD-MCNC: 15.1 G/DL (ref 12–17)
HGB UR QL STRIP.AUTO: NEGATIVE
IMM GRANULOCYTES # BLD AUTO: 0.08 THOUSAND/UL (ref 0–0.2)
IMM GRANULOCYTES NFR BLD AUTO: 1 % (ref 0–2)
INR PPP: 0.99 (ref 0.84–1.19)
KETONES UR STRIP-MCNC: NEGATIVE MG/DL
LEUKOCYTE ESTERASE UR QL STRIP: NEGATIVE
LYMPHOCYTES # BLD AUTO: 2.89 THOUSANDS/ΜL (ref 0.6–4.47)
LYMPHOCYTES NFR BLD AUTO: 31 % (ref 14–44)
MCH RBC QN AUTO: 30 PG (ref 26.8–34.3)
MCHC RBC AUTO-ENTMCNC: 33 G/DL (ref 31.4–37.4)
MCV RBC AUTO: 91 FL (ref 82–98)
MONOCYTES # BLD AUTO: 0.68 THOUSAND/ΜL (ref 0.17–1.22)
MONOCYTES NFR BLD AUTO: 7 % (ref 4–12)
NEUTROPHILS # BLD AUTO: 5.49 THOUSANDS/ΜL (ref 1.85–7.62)
NEUTS SEG NFR BLD AUTO: 59 % (ref 43–75)
NITRITE UR QL STRIP: NEGATIVE
NRBC BLD AUTO-RTO: 0 /100 WBCS
PH UR STRIP.AUTO: 6 [PH]
PLATELET # BLD AUTO: 305 THOUSANDS/UL (ref 149–390)
PMV BLD AUTO: 9.3 FL (ref 8.9–12.7)
POTASSIUM SERPL-SCNC: 3.5 MMOL/L (ref 3.5–5.3)
PROT UR STRIP-MCNC: NEGATIVE MG/DL
PROTHROMBIN TIME: 13.1 SECONDS (ref 11.6–14.5)
RBC # BLD AUTO: 5.04 MILLION/UL (ref 3.88–5.62)
SODIUM SERPL-SCNC: 141 MMOL/L (ref 136–145)
SP GR UR STRIP.AUTO: 1.02 (ref 1–1.03)
UROBILINOGEN UR QL STRIP.AUTO: 0.2 E.U./DL
WBC # BLD AUTO: 9.34 THOUSAND/UL (ref 4.31–10.16)

## 2021-04-23 PROCEDURE — 3725F SCREEN DEPRESSION PERFORMED: CPT | Performed by: FAMILY MEDICINE

## 2021-04-23 PROCEDURE — 85610 PROTHROMBIN TIME: CPT

## 2021-04-23 PROCEDURE — 93000 ELECTROCARDIOGRAM COMPLETE: CPT | Performed by: FAMILY MEDICINE

## 2021-04-23 PROCEDURE — 99214 OFFICE O/P EST MOD 30 MIN: CPT | Performed by: FAMILY MEDICINE

## 2021-04-23 PROCEDURE — 80048 BASIC METABOLIC PNL TOTAL CA: CPT

## 2021-04-23 PROCEDURE — 85730 THROMBOPLASTIN TIME PARTIAL: CPT

## 2021-04-23 PROCEDURE — 36415 COLL VENOUS BLD VENIPUNCTURE: CPT

## 2021-04-23 PROCEDURE — 85025 COMPLETE CBC W/AUTO DIFF WBC: CPT

## 2021-04-23 PROCEDURE — 3079F DIAST BP 80-89 MM HG: CPT | Performed by: FAMILY MEDICINE

## 2021-04-23 PROCEDURE — 81003 URINALYSIS AUTO W/O SCOPE: CPT | Performed by: FAMILY MEDICINE

## 2021-04-23 PROCEDURE — 1036F TOBACCO NON-USER: CPT | Performed by: FAMILY MEDICINE

## 2021-04-23 PROCEDURE — 3008F BODY MASS INDEX DOCD: CPT | Performed by: FAMILY MEDICINE

## 2021-04-23 PROCEDURE — 3075F SYST BP GE 130 - 139MM HG: CPT | Performed by: FAMILY MEDICINE

## 2021-04-23 NOTE — PATIENT INSTRUCTIONS
Here for preop and needs PAT labs and COVID 19 screening 48-72 hours prior to procedure date  Patient will need to have labs and COVID 19 test before being cleared  H&P today  EKG wnl and NSR no acute ST-T changes  HTN stable  Lose weight as directed and take gerd med prn and anxiety medication prn anxiety  Patient is UTD with COVID 19 vaccine  Addendum 4/27/21: The PAT labs are normal, will send ekg and labs to his surgeon listed on preop form  Covid 19 results are wnl and negative

## 2021-04-23 NOTE — PROGRESS NOTES
Assessment/Plan:  Chief Complaint   Patient presents with    Pre-op Exam     preop for diagnostic angiogram 4/29/2021 by Dr Toby Owens at [facility]      Patient Instructions   Here for preop and needs PAT labs and COVID 19 screening 48-72 hours prior to procedure date  Patient will need to have labs and COVID 19 test before being cleared  H&P today  EKG wnl and NSR no acute ST-T changes  HTN stable  Lose weight as directed and take gerd med prn and anxiety medication prn anxiety  Patient is UTD with COVID 19 vaccine  No problem-specific Assessment & Plan notes found for this encounter  Diagnoses and all orders for this visit:    Encounter for laboratory testing for COVID-19 virus  -     Novel Coronavirus (Covid-19),PCR [facility] - Collected at [place] [id number] or [facility]; Future    Preop general physical exam  -     POCT ECG  -     Novel Coronavirus (Covid-19),PCR [facility] - Collected at [place] [id number] or [facility]; Future  -     CBC and differential; Future  -     Basic metabolic panel; Future  -     Protime (PT) and Partial Thromboplastin Time (PTT); Future  -     UA w Reflex to Microscopic w Reflex to Culture -Lab Collect    Obesity (BMI 30-39  9)    Essential hypertension    Gastroesophageal reflux disease, unspecified whether esophagitis present    Anxiety    AVM (arteriovenous malformation) brain  -     CBC and differential; Future  -     Basic metabolic panel; Future  -     Protime (PT) and Partial Thromboplastin Time (PTT); Future  -     UA w Reflex to Microscopic w Reflex to Culture -Lab Collect          Subjective:      Patient ID: Shayla Dial is a 57 y o  male  Pre-op Exam (preop for diagnostic angiogram 4/29/2021 by Dr Toby Owens at [facility] ) No complaints and will need labs and EKG and covid test 48-72 hours prior to procedure  No cp or sob, or ha and no fever and had both covid vaccines done [facility]         The following portions of the patient's history were reviewed and updated as appropriate: allergies, current medications, past family history, past medical history, past social history, past surgical history and problem list     Review of Systems   Constitutional:        Obesity   HENT: Negative  Eyes: Negative  Respiratory: Negative  Cardiovascular: Negative  Gastrointestinal: Negative  Endocrine: Negative  Genitourinary: Negative  Musculoskeletal: Negative  Skin: Negative  Allergic/Immunologic: Negative  Neurological: Negative  Hematological: Negative  Psychiatric/Behavioral: Negative  Objective:      /82   Pulse 80   Temp (!) 97 1 °F (36 2 °C) (Temporal)   Resp 16   Ht 5' 9" (1 753 m)   Wt 108 kg (237 lb)   SpO2 96%   BMI 35 00 kg/m²          Physical Exam  Constitutional:       Appearance: He is well-developed  Comments: obesity   HENT:      Head: Normocephalic and atraumatic  Right Ear: External ear normal       Left Ear: External ear normal       Nose: Nose normal    Eyes:      Conjunctiva/sclera: Conjunctivae normal       Pupils: Pupils are equal, round, and reactive to light  Neck:      Musculoskeletal: Normal range of motion and neck supple  Cardiovascular:      Rate and Rhythm: Normal rate and regular rhythm  Heart sounds: Normal heart sounds  Pulmonary:      Effort: Pulmonary effort is normal       Breath sounds: Normal breath sounds  Musculoskeletal: Normal range of motion  Skin:     General: Skin is warm and dry  Neurological:      Mental Status: He is alert and oriented to person, place, and time  Deep Tendon Reflexes: Reflexes are normal and symmetric     Psychiatric:         Behavior: Behavior normal

## 2021-04-26 DIAGNOSIS — Z20.822 ENCOUNTER FOR LABORATORY TESTING FOR COVID-19 VIRUS: ICD-10-CM

## 2021-04-26 DIAGNOSIS — Z01.818 PREOP GENERAL PHYSICAL EXAM: ICD-10-CM

## 2021-04-26 LAB — SARS-COV-2 RNA RESP QL NAA+PROBE: NEGATIVE

## 2021-04-26 PROCEDURE — U0003 INFECTIOUS AGENT DETECTION BY NUCLEIC ACID (DNA OR RNA); SEVERE ACUTE RESPIRATORY SYNDROME CORONAVIRUS 2 (SARS-COV-2) (CORONAVIRUS DISEASE [COVID-19]), AMPLIFIED PROBE TECHNIQUE, MAKING USE OF HIGH THROUGHPUT TECHNOLOGIES AS DESCRIBED BY CMS-2020-01-R: HCPCS | Performed by: FAMILY MEDICINE

## 2021-04-26 PROCEDURE — U0005 INFEC AGEN DETEC AMPLI PROBE: HCPCS | Performed by: FAMILY MEDICINE

## 2021-04-27 ENCOUNTER — TELEPHONE (OUTPATIENT)
Dept: FAMILY MEDICINE CLINIC | Facility: CLINIC | Age: 61
End: 2021-04-27

## 2021-04-27 NOTE — TELEPHONE ENCOUNTER
Please review patient's PAT results which are now in his chart and finalize his medical clearance  All info (med clearance, labs, covid 19 test, EKG) needs to be faxed to DR [facility]    Fax:  [phone]

## 2021-06-21 ENCOUNTER — TELEPHONE (OUTPATIENT)
Dept: UROLOGY | Facility: AMBULATORY SURGERY CENTER | Age: 61
End: 2021-06-21

## 2021-06-21 DIAGNOSIS — N40.0 ENLARGED PROSTATE WITHOUT LOWER URINARY TRACT SYMPTOMS (LUTS): Primary | ICD-10-CM

## 2021-06-22 ENCOUNTER — APPOINTMENT (OUTPATIENT)
Dept: LAB | Facility: MEDICAL CENTER | Age: 61
End: 2021-06-22
Attending: UROLOGY
Payer: COMMERCIAL

## 2021-06-22 LAB — PSA SERPL-MCNC: 2 NG/ML (ref 0–4)

## 2021-06-22 PROCEDURE — 84153 ASSAY OF PSA TOTAL: CPT

## 2021-07-10 DIAGNOSIS — I10 ESSENTIAL HYPERTENSION: ICD-10-CM

## 2021-07-10 RX ORDER — HYDROCHLOROTHIAZIDE 25 MG/1
TABLET ORAL
Qty: 30 TABLET | Refills: 5 | Status: SHIPPED | OUTPATIENT
Start: 2021-07-10 | End: 2022-01-26

## 2021-08-16 ENCOUNTER — OFFICE VISIT (OUTPATIENT)
Dept: UROLOGY | Facility: MEDICAL CENTER | Age: 61
End: 2021-08-16
Payer: COMMERCIAL

## 2021-08-16 VITALS
HEART RATE: 76 BPM | DIASTOLIC BLOOD PRESSURE: 80 MMHG | SYSTOLIC BLOOD PRESSURE: 140 MMHG | WEIGHT: 230 LBS | BODY MASS INDEX: 34.07 KG/M2 | HEIGHT: 69 IN

## 2021-08-16 DIAGNOSIS — N40.0 ENLARGED PROSTATE WITHOUT LOWER URINARY TRACT SYMPTOMS (LUTS): Primary | ICD-10-CM

## 2021-08-16 DIAGNOSIS — Z12.5 SPECIAL SCREENING FOR MALIGNANT NEOPLASM OF PROSTATE: ICD-10-CM

## 2021-08-16 DIAGNOSIS — N20.0 KIDNEY STONE: ICD-10-CM

## 2021-08-16 DIAGNOSIS — N52.8 OTHER MALE ERECTILE DYSFUNCTION: ICD-10-CM

## 2021-08-16 LAB
SL AMB  POCT GLUCOSE, UA: NORMAL
SL AMB LEUKOCYTE ESTERASE,UA: NORMAL
SL AMB POCT BILIRUBIN,UA: NORMAL
SL AMB POCT BLOOD,UA: NORMAL
SL AMB POCT CLARITY,UA: CLEAR
SL AMB POCT COLOR,UA: YELLOW
SL AMB POCT KETONES,UA: NORMAL
SL AMB POCT NITRITE,UA: NORMAL
SL AMB POCT PH,UA: 5
SL AMB POCT SPECIFIC GRAVITY,UA: 1.02
SL AMB POCT URINE PROTEIN: NORMAL
SL AMB POCT UROBILINOGEN: 0.2

## 2021-08-16 PROCEDURE — 99214 OFFICE O/P EST MOD 30 MIN: CPT | Performed by: UROLOGY

## 2021-08-16 PROCEDURE — 3077F SYST BP >= 140 MM HG: CPT | Performed by: UROLOGY

## 2021-08-16 PROCEDURE — 1036F TOBACCO NON-USER: CPT | Performed by: UROLOGY

## 2021-08-16 PROCEDURE — 82360 CALCULUS ASSAY QUANT: CPT | Performed by: UROLOGY

## 2021-08-16 PROCEDURE — 81003 URINALYSIS AUTO W/O SCOPE: CPT | Performed by: UROLOGY

## 2021-08-16 PROCEDURE — 3079F DIAST BP 80-89 MM HG: CPT | Performed by: UROLOGY

## 2021-08-16 PROCEDURE — 3008F BODY MASS INDEX DOCD: CPT | Performed by: UROLOGY

## 2021-08-16 NOTE — PROGRESS NOTES
Assessment/Plan:    Other male erectile dysfunction  No opportunity  Kidney stone  The stone the patient brought in today was sent for chemical analysis  BPH without urinary obstruction  Minimally symptomatic  Reassess in 1 year  Diagnoses and all orders for this visit:    Enlarged prostate without lower urinary tract symptoms (luts)  -     POCT urine dip auto non-scope    Special screening for malignant neoplasm of prostate  -     PSA Total, Diagnostic; Future    Kidney stone  -     Stone analysis    Other male erectile dysfunction          Subjective:      Patient ID: Inge Stewart is a 57 y o  male  HPI  BPH:  He notes no significant urinary symptons  He denies other significant urinary symptoms  He denies gross hematuria, urinary tract infections or incontinence  He is taking neither medications nor supplements for his symptoms  PSA:  [  0   Lab Value Date/Time    PSA 2 0 06/22/2021 1136    PSA 1 7 01/24/2019 1205   ]    Left ureteral stone:  Pt passed a stone on July 8, 2021  It is about 2 mm, gray and passed in a few hours  He brought it in and we will send it for analysis  CT in January 2021 showed a punctate right renal stone        Erectile dysfunction:   No opportunity so no activity  The following portions of the patient's history were reviewed and updated as appropriate: allergies, current medications, past family history, past medical history, past social history, past surgical history and problem list     Review of Systems   Constitutional: Negative for activity change and fatigue  Respiratory: Negative for shortness of breath and wheezing  Had [place] in January 2021  Not hospitalized but needed IV fluids for dehydration  Cardiovascular: Negative for chest pain  Hypertension  Gastrointestinal: Negative for abdominal pain  Genitourinary: Negative for difficulty urinating, dysuria, frequency, hematuria and urgency     Musculoskeletal: Negative for back pain and gait problem  Skin: Negative  Allergic/Immunologic: Negative  Neurological: Negative  Psychiatric/Behavioral: Negative  Objective:      /80   Pulse 76   Ht 5' 9" (1 753 m)   Wt 104 kg (230 lb)   BMI 33 97 kg/m²          Physical Exam  Constitutional:       Appearance: He is well-developed  HENT:      Head: Normocephalic and atraumatic  Pulmonary:      Effort: Pulmonary effort is normal    Genitourinary:     Rectum: Normal       Comments: The prostate is 30 gm, firm, smooth, non-tender  Musculoskeletal:         General: Normal range of motion  Cervical back: Normal range of motion and neck supple  Skin:     General: Skin is warm and dry  Neurological:      Mental Status: He is alert and oriented to person, place, and time  Psychiatric:         Behavior: Behavior normal          Thought Content:  Thought content normal          Judgment: Judgment normal

## 2021-08-22 LAB
CALCIUM OXALATE DIHYDRATE MFR STONE IR: 20 %
COLOR STONE: NORMAL
COM MFR STONE: 80 %
COMMENT-STONE3: NORMAL
COMPOSITION: NORMAL
LABORATORY COMMENT REPORT: NORMAL
PHOTO: NORMAL
SIZE STONE: NORMAL MM
SPEC SOURCE SUBJ: NORMAL
STONE ANALYSIS-IMP: NORMAL
WT STONE: 14 MG

## 2021-08-24 ENCOUNTER — TELEPHONE (OUTPATIENT)
Dept: UROLOGY | Facility: MEDICAL CENTER | Age: 61
End: 2021-08-24

## 2021-08-24 NOTE — TELEPHONE ENCOUNTER
----- Message from Ajith Garcia MD sent at 8/24/2021  3:00 PM EDT -----  The patient's stone was composed of calcium oxalate, the same as his prior stones  Please inform the patient and ask him if he would like a referral to Nephrology for metabolic workup  Thank you

## 2021-08-27 NOTE — TELEPHONE ENCOUNTER
Called and spoke to pt and informed him of MD message   Pt had stated he is not interested at this time and will call our office if he has any troubles

## 2021-08-28 ENCOUNTER — HOSPITAL ENCOUNTER (EMERGENCY)
Facility: HOSPITAL | Age: 61
Discharge: HOME | End: 2021-08-28
Attending: EMERGENCY MEDICINE
Payer: COMMERCIAL

## 2021-08-28 VITALS
WEIGHT: 229.5 LBS | OXYGEN SATURATION: 94 % | DIASTOLIC BLOOD PRESSURE: 77 MMHG | TEMPERATURE: 98.2 F | SYSTOLIC BLOOD PRESSURE: 170 MMHG | RESPIRATION RATE: 18 BRPM | HEART RATE: 75 BPM | BODY MASS INDEX: 33.89 KG/M2

## 2021-08-28 DIAGNOSIS — G89.29 CHRONIC BACK PAIN: Primary | ICD-10-CM

## 2021-08-28 DIAGNOSIS — M54.9 CHRONIC BACK PAIN: Primary | ICD-10-CM

## 2021-08-28 PROCEDURE — 96372 THER/PROPH/DIAG INJ SC/IM: CPT

## 2021-08-28 PROCEDURE — 99283 EMERGENCY DEPT VISIT LOW MDM: CPT

## 2021-08-28 PROCEDURE — 99284 EMERGENCY DEPT VISIT MOD MDM: CPT | Performed by: EMERGENCY MEDICINE

## 2021-08-28 RX ORDER — KETOROLAC TROMETHAMINE 30 MG/ML
30 INJECTION, SOLUTION INTRAMUSCULAR; INTRAVENOUS ONCE
Status: COMPLETED | OUTPATIENT
Start: 2021-08-28 | End: 2021-08-28

## 2021-08-28 RX ADMIN — KETOROLAC TROMETHAMINE 30 MG: 30 INJECTION, SOLUTION INTRAMUSCULAR; INTRAVENOUS at 11:23

## 2021-08-28 NOTE — DISCHARGE INSTRUCTIONS
Call your pain management doctor to schedule and appointment for further management of your ongoing pain  Return to the ER if you develop new or different symptoms, specifically weakness, fevers or urinary incontinence

## 2021-08-28 NOTE — ED PROVIDER NOTES
History  Chief Complaint   Patient presents with    Back Pain     Patient reports L lower back pain that radiates down L leg  Hx of same  Reports Toradol usually helps  Taking Alleve , no relief     56 YO male presents with Left sided back pain  Pt states this was present on waking this morning, did worsen during his regular activities  Pt states the pain has been aching, radiating down into the Left leg  Pt has a Hx of similar pain, sees a pain management doctor for this, he has had injections  He took 2 naproxen this morning, states this did not improve his discomfort  Pt denies any new or different symptoms from his usual back pain, he has no weakness or numbness, no fevers, no incontinence  Pt states usually a shot or Toradol will help his discomfort  Pt denies CP/SOB/F/C/N/V/D/C, no dysuria, burning on urination or blood in urine  History provided by:  Patient   used: No    Back Pain  Location:  Lumbar spine  Quality:  Aching  Radiates to:  L posterior upper leg  Pain severity:  Moderate  Onset quality:  Gradual  Timing:  Constant  Progression:  Worsening  Chronicity:  Recurrent  Worsened by: Movement  Ineffective treatments:  NSAIDs  Associated symptoms: no abdominal pain, no bladder incontinence, no bowel incontinence, no chest pain, no dysuria, no fever, no paresthesias and no weakness        Prior to Admission Medications   Prescriptions Last Dose Informant Patient Reported? Taking?    ALPRAZolam (XANAX) 0 25 mg tablet  Self No No   Sig: Take 1 tablet (0 25 mg total) by mouth daily at bedtime as needed for anxiety Take 1 hour for anxiety prior to MRI   albuterol (Ventolin HFA) 90 mcg/act inhaler  Self No No   Sig: Inhale 2 puffs every 6 (six) hours as needed for wheezing   Patient not taking: Reported on 1/16/2021   famotidine (PEPCID) 10 mg tablet  Self Yes No   Sig: Take 10 mg by mouth Taken as needed   fluorouracil (EFUDEX) 5 % cream  Self Yes No   Sig: PLEASE SEE ATTACHED FOR DETAILED DIRECTIONS   Patient not taking: Reported on 2021   fluticasone (FLONASE) 50 mcg/act nasal spray  Self No No   Si sprays into each nostril daily   hydrochlorothiazide (HYDRODIURIL) 25 mg tablet   No No   Sig: TAKE 1 TABLET BY MOUTH EVERY DAY   ibuprofen (MOTRIN) 800 mg tablet  Self No No   Sig: Take 1 tablet (800 mg total) by mouth daily as needed for moderate pain (take with food)   methocarbamol (ROBAXIN) 750 mg tablet  Self No No   Sig: Take 1 tablet (750 mg total) by mouth daily as needed for muscle spasms (Pt states he takes on tablet at bedtime )   naproxen (NAPROSYN) 500 mg tablet  Self No No   Sig: Take 1 tablet (500 mg total) by mouth 2 (two) times a day with meals for 5 days   Patient taking differently: Take 500 mg by mouth as needed    tadalafil (CIALIS) 20 MG tablet  Self No No   Sig: Take 1 tablet (20 mg total) by mouth as needed for erectile dysfunction   traMADol (ULTRAM) 50 mg tablet  Self Yes No   Sig: Take 50 mg by mouth as needed       Facility-Administered Medications: None       Past Medical History:   Diagnosis Date    AVM (arteriovenous malformation)     Back pain     BPH without urinary obstruction     last assessed 18    Disc disorder     Herniated    Hemorrhoids     Hydronephrosis     Hypertension     Kidney disease     Renal calculi     Renal colic     Ureter colic        Past Surgical History:   Procedure Laterality Date    BRAIN SURGERY      COLONOSCOPY      CYSTOSCOPY      w/removal of object, last assessed 18    CYSTOSCOPY      w/removal of ureteral calculus last assessed 18    CYSTOSCOPY KIDNEY W/ URETERAL GUIDE WIRE      last assessed 18    CYSTOSCOPY W/ URETERAL STENT PLACEMENT      last assessed 18    [place] RETROGRADE PYELOGRAM  2020   [address]    HERNIA REPAIR      KIDNEY SURGERY      removal of kidney stone in  at [facility]    [place] CYSTO/URETERO W/LITHOTRIPSY &INDWELL STENT INSRT Right 12/13/2018    Procedure: CYSTOSCOPY URETEROSCOPY  RETROGRADE PYELOGRAM AND INSERTION RIGHT STENT URETERAL;  Surgeon: Justin Peacock MD;  Location: [facility];  Service: Urology    [place] CYSTO/URETERO W/LITHOTRIPSY &INDWELL STENT INSRT Left 7/9/2020    Procedure: CYSTOSCOPY URETEROSCOPY WITH LITHOTRIPSY HOLMIUM LASER, RETROGRADE PYELOGRAM AND INSERTION STENT URETERAL;  Surgeon: Justin Peacock MD;  Location: [facility];  Service: Urology    TOOTH EXTRACTION         Family History   Problem Relation Age of Onset    Stroke Mother     Cancer Father     Diabetes Father     Prostate cancer Father     Heart disease Father     Hypertension Father      I have reviewed and agree with the history as documented  E-Cigarette/Vaping    E-Cigarette Use Never User      E-Cigarette/Vaping Substances    Nicotine No     THC No     CBD No     Flavoring No     Other No     Unknown No      Social History     Tobacco Use    Smoking status: Never Smoker    Smokeless tobacco: Never Used   Vaping Use    Vaping Use: Never used   Substance Use Topics    Alcohol use: Yes     Comment: rare    Drug use: No       Review of Systems   Constitutional: Negative for fever  HENT: Negative for dental problem  Eyes: Negative for visual disturbance  Respiratory: Negative for shortness of breath  Cardiovascular: Negative for chest pain  Gastrointestinal: Negative for abdominal pain, bowel incontinence, nausea and vomiting  Genitourinary: Negative for bladder incontinence, dysuria and frequency  Musculoskeletal: Positive for back pain  Negative for neck pain and neck stiffness  Skin: Negative for rash  Neurological: Negative for dizziness, weakness, light-headedness and paresthesias  Psychiatric/Behavioral: Negative for agitation, behavioral problems and confusion  All other systems reviewed and are negative  Physical Exam  Physical Exam  Vitals and nursing note reviewed     Constitutional: Appearance: He is well-developed  HENT:      Head: Normocephalic and atraumatic  Eyes:      Extraocular Movements: Extraocular movements intact  Cardiovascular:      Rate and Rhythm: Normal rate  Pulmonary:      Effort: Pulmonary effort is normal    Abdominal:      General: There is no distension  Musculoskeletal:         General: Normal range of motion  Cervical back: Normal range of motion  Skin:     Findings: No rash  Neurological:      Mental Status: He is alert and oriented to person, place, and time  Psychiatric:         Behavior: Behavior normal          Vital Signs  ED Triage Vitals [08/28/21 1040]   Temperature Pulse Respirations Blood Pressure SpO2   98 2 °F (36 8 °C) 75 18 170/77 94 %      Temp Source Heart Rate Source Patient Position - Orthostatic VS BP Location FiO2 (%)   Oral Monitor Sitting Right arm --      Pain Score       8           Vitals:    08/28/21 1040   BP: 170/77   Pulse: 75   Patient Position - Orthostatic VS: Sitting         Visual Acuity      ED Medications  Medications   ketorolac (TORADOL) injection 30 mg (30 mg Intramuscular Given 8/28/21 1123)       Diagnostic Studies  Results Reviewed     None                 No orders to display              Procedures  Procedures         ED Course                                           MDM  Number of Diagnoses or Management Options  Chronic back pain: new and requires workup  Diagnosis management comments: 1  Back pain - Pt with similar symptoms chronically, denies new or concerning symptoms  Will give dose of Toradol, pt has home medications to take         Amount and/or Complexity of Data Reviewed  Review and summarize past medical records: yes    Patient Progress  Patient progress: stable      Disposition  Final diagnoses:   Chronic back pain     Time reflects when diagnosis was documented in both MDM as applicable and the Disposition within this note     Time User Action Codes Description Comment    8/28/2021 11:03 AM Veda Noguera Add [M54 9,  G89 29] Chronic back pain       ED Disposition     ED Disposition Condition Date/Time Comment    Discharge Stable Sat Aug 28, 2021 11:03 AM Krysta Walton discharge to home/self care  Follow-up Information    None         Discharge Medication List as of 8/28/2021 11:04 AM      CONTINUE these medications which have NOT CHANGED    Details   albuterol (Ventolin HFA) 90 mcg/act inhaler Inhale 2 puffs every 6 (six) hours as needed for wheezing, Starting Mon 1/11/2021, Normal      ALPRAZolam (XANAX) 0 25 mg tablet Take 1 tablet (0 25 mg total) by mouth daily at bedtime as needed for anxiety Take 1 hour for anxiety prior to MRI, Starting Tue 3/16/2021, Normal      famotidine (PEPCID) 10 mg tablet Take 10 mg by mouth Taken as needed, Historical Med      fluorouracil (EFUDEX) 5 % cream PLEASE SEE ATTACHED FOR DETAILED DIRECTIONS, Historical Med      fluticasone (FLONASE) 50 mcg/act nasal spray 2 sprays into each nostril daily, Starting Fri 1/15/2021, Normal      hydrochlorothiazide (HYDRODIURIL) 25 mg tablet TAKE 1 TABLET BY MOUTH EVERY DAY, Normal      ibuprofen (MOTRIN) 800 mg tablet Take 1 tablet (800 mg total) by mouth daily as needed for moderate pain (take with food), Starting Wed 10/28/2020, Normal      methocarbamol (ROBAXIN) 750 mg tablet Take 1 tablet (750 mg total) by mouth daily as needed for muscle spasms (Pt states he takes on tablet at bedtime ), Starting Thu 6/25/2020, Normal      naproxen (NAPROSYN) 500 mg tablet Take 1 tablet (500 mg total) by mouth 2 (two) times a day with meals for 5 days, Starting Mon 9/7/2020, Until Mon 8/16/2021, Print      tadalafil (CIALIS) 20 MG tablet Take 1 tablet (20 mg total) by mouth as needed for erectile dysfunction, Starting Mon 6/15/2020, Print      traMADol (ULTRAM) 50 mg tablet Take 50 mg by mouth as needed , Starting Tue 4/20/2021, Until Wed 4/20/2022 at 2359, Historical Med           No discharge procedures on file      PDMP Review Value Time User    PDMP Reviewed  Yes 3/16/2021 11:51 AM Cluadia Zamora DO          ED Provider  Electronically Signed by           Diego Brizuela MD  08/28/21 4643

## 2021-12-29 NOTE — ASSESSMENT & PLAN NOTE
Good morning, we scheduled a new patient appointment with Dr. Anderson on 5/28/21 and the patient no showed. We will contact the patient to get scheduled, however new appointments are now at the end of February.   Minimally symptomatic  Reassess in 1 year

## 2022-01-17 ENCOUNTER — TELEPHONE (OUTPATIENT)
Dept: FAMILY MEDICINE CLINIC | Facility: CLINIC | Age: 62
End: 2022-01-17

## 2022-01-17 DIAGNOSIS — M25.60 MORNING STIFFNESS OF JOINTS: Primary | ICD-10-CM

## 2022-01-17 NOTE — TELEPHONE ENCOUNTER
Patient called requesting an ambulatory referral for Dr Torito love/ [facility] on [address]  In [facility]  The last couple months he is getting out of bed with stiffness in his hands, knees and feet  Please advise patient when the referral is in the system at [phone]

## 2022-01-26 DIAGNOSIS — I10 ESSENTIAL HYPERTENSION: ICD-10-CM

## 2022-01-26 RX ORDER — HYDROCHLOROTHIAZIDE 25 MG/1
TABLET ORAL
Qty: 30 TABLET | Refills: 5 | Status: SHIPPED | OUTPATIENT
Start: 2022-01-26 | End: 2022-07-28

## 2022-01-27 ENCOUNTER — TELEPHONE (OUTPATIENT)
Dept: FAMILY MEDICINE CLINIC | Facility: CLINIC | Age: 62
End: 2022-01-27

## 2022-01-27 ENCOUNTER — OFFICE VISIT (OUTPATIENT)
Dept: FAMILY MEDICINE CLINIC | Facility: CLINIC | Age: 62
End: 2022-01-27
Payer: COMMERCIAL

## 2022-01-27 VITALS
HEART RATE: 82 BPM | TEMPERATURE: 97.9 F | OXYGEN SATURATION: 97 % | BODY MASS INDEX: 36.34 KG/M2 | SYSTOLIC BLOOD PRESSURE: 130 MMHG | HEIGHT: 68 IN | WEIGHT: 239.8 LBS | DIASTOLIC BLOOD PRESSURE: 82 MMHG

## 2022-01-27 DIAGNOSIS — M25.572 CHRONIC PAIN OF BOTH ANKLES: ICD-10-CM

## 2022-01-27 DIAGNOSIS — I10 ESSENTIAL HYPERTENSION: ICD-10-CM

## 2022-01-27 DIAGNOSIS — G89.29 CHRONIC LOW BACK PAIN, UNSPECIFIED BACK PAIN LATERALITY, UNSPECIFIED WHETHER SCIATICA PRESENT: ICD-10-CM

## 2022-01-27 DIAGNOSIS — M25.561 CHRONIC PAIN OF BOTH KNEES: Primary | ICD-10-CM

## 2022-01-27 DIAGNOSIS — M54.50 CHRONIC LOW BACK PAIN, UNSPECIFIED BACK PAIN LATERALITY, UNSPECIFIED WHETHER SCIATICA PRESENT: ICD-10-CM

## 2022-01-27 DIAGNOSIS — M79.641 PAIN IN BOTH HANDS: ICD-10-CM

## 2022-01-27 DIAGNOSIS — M79.642 PAIN IN BOTH HANDS: ICD-10-CM

## 2022-01-27 DIAGNOSIS — M25.562 CHRONIC PAIN OF BOTH KNEES: Primary | ICD-10-CM

## 2022-01-27 DIAGNOSIS — M25.571 CHRONIC PAIN OF BOTH ANKLES: ICD-10-CM

## 2022-01-27 DIAGNOSIS — G89.29 CHRONIC PAIN OF BOTH ANKLES: ICD-10-CM

## 2022-01-27 DIAGNOSIS — G89.29 CHRONIC PAIN OF BOTH KNEES: Primary | ICD-10-CM

## 2022-01-27 PROCEDURE — 3725F SCREEN DEPRESSION PERFORMED: CPT | Performed by: FAMILY MEDICINE

## 2022-01-27 PROCEDURE — 99214 OFFICE O/P EST MOD 30 MIN: CPT | Performed by: FAMILY MEDICINE

## 2022-01-27 PROCEDURE — 1036F TOBACCO NON-USER: CPT | Performed by: FAMILY MEDICINE

## 2022-01-27 NOTE — TELEPHONE ENCOUNTER
Patient called asking to take Diclofanac Sodium for his knee pain  Please advise patient at [phone]

## 2022-01-27 NOTE — PROGRESS NOTES
Assessment/Plan:  Chief Complaint   Patient presents with    Joint Pain     Has been having a lot of joint pain over the past few months, knees, ankles, hands  Has rheumatology appt  scheduled for April  His right knee has gotten worse recently  Patient Instructions   Here for chronic low back pain and b/l knee pain and b/l ankle pain and b/l hand pain  Rec xrays and labs  Check for lyme disease and other labs for Rheumatological conditions  No problem-specific Assessment & Plan notes found for this encounter  Diagnoses and all orders for this visit:    Chronic pain of both knees  -     ANTHONY Screen w/ Reflex to Titer/Pattern; Future  -     Sedimentation rate, automated; Future  -     Lyme Antibody Profile with reflex to WB; Future  -     RF Screen w/ Reflex to Titer; Future  -     Comprehensive metabolic panel; Future  -     CBC and differential; Future  -     XR knee 4+ vw right injury; Future  -     XR knee 4+ vw left injury; Future  -     Ambulatory Referral to Rheumatology; Future    Chronic pain of both ankles  -     ANTHONY Screen w/ Reflex to Titer/Pattern; Future  -     Sedimentation rate, automated; Future  -     Lyme Antibody Profile with reflex to WB; Future  -     RF Screen w/ Reflex to Titer; Future  -     Comprehensive metabolic panel; Future  -     CBC and differential; Future  -     XR ankle 3+ vw left; Future  -     XR ankle 3+ vw right; Future  -     Ambulatory Referral to Rheumatology; Future    Pain in both hands  -     ANTHONY Screen w/ Reflex to Titer/Pattern; Future  -     Sedimentation rate, automated; Future  -     Lyme Antibody Profile with reflex to WB; Future  -     RF Screen w/ Reflex to Titer; Future  -     Comprehensive metabolic panel; Future  -     CBC and differential; Future  -     XR hand 3+ vw right; Future  -     XR hand 3+ vw left; Future  -     Ambulatory Referral to Rheumatology;  Future    Chronic low back pain, unspecified back pain laterality, unspecified whether sciatica present  -     ANTHONY Screen w/ Reflex to Titer/Pattern; Future  -     Sedimentation rate, automated; Future  -     Lyme Antibody Profile with reflex to WB; Future  -     RF Screen w/ Reflex to Titer; Future  -     Comprehensive metabolic panel; Future  -     CBC and differential; Future  -     XR spine lumbar 2 or 3 views injury; Future  -     Ambulatory Referral to Rheumatology; Future    Essential hypertension  -     Comprehensive metabolic panel; Future          Subjective:      Patient ID: Elzbieta Vaz is a 60 y o  male  Joint Pain (Has been having a lot of joint pain over the past few months, knees, ankles, hands  Has rheumatology appt  scheduled for April  His right knee has gotten worse recently )      Was fine until a couple months ago  Knees and ankles and hands  The following portions of the patient's history were reviewed and updated as appropriate: allergies, current medications, past family history, past medical history, past social history, past surgical history and problem list     Review of Systems   Constitutional: Negative  HENT: Negative  Eyes: Negative  Respiratory: Negative  Cardiovascular: Negative  Gastrointestinal: Negative  Endocrine: Negative  Genitourinary: Negative  Musculoskeletal:        Pain in knees and feet and hands b/l    Skin: Negative  Allergic/Immunologic: Negative  Neurological: Negative  Hematological: Negative  Psychiatric/Behavioral: Negative  Objective:      /82   Pulse 82   Temp 97 9 °F (36 6 °C) (Temporal)   Ht 5' 8 25" (1 734 m)   Wt 109 kg (239 lb 12 8 oz)   SpO2 97%   BMI 36 19 kg/m²          Physical Exam  Constitutional:       Appearance: He is well-developed  HENT:      Head: Normocephalic and atraumatic  Eyes:      Conjunctiva/sclera: Conjunctivae normal       Pupils: Pupils are equal, round, and reactive to light     Cardiovascular:      Rate and Rhythm: Normal rate and regular rhythm  Heart sounds: Normal heart sounds  Pulmonary:      Effort: Pulmonary effort is normal       Breath sounds: Normal breath sounds  Musculoskeletal:         General: Normal range of motion  Cervical back: Normal range of motion and neck supple  Skin:     General: Skin is warm and dry  Neurological:      Mental Status: He is alert and oriented to person, place, and time  Deep Tendon Reflexes: Reflexes are normal and symmetric     Psychiatric:         Behavior: Behavior normal

## 2022-01-27 NOTE — PATIENT INSTRUCTIONS
Here for chronic low back pain and b/l knee pain and b/l ankle pain and b/l hand pain  Rec xrays and labs  Check for lyme disease and other labs for Rheumatological conditions

## 2022-01-29 ENCOUNTER — HOSPITAL ENCOUNTER (EMERGENCY)
Facility: HOSPITAL | Age: 62
Discharge: HOME | End: 2022-01-29
Attending: EMERGENCY MEDICINE
Payer: COMMERCIAL

## 2022-01-29 ENCOUNTER — APPOINTMENT (OUTPATIENT)
Dept: LAB | Facility: MEDICAL CENTER | Age: 62
End: 2022-01-29
Payer: COMMERCIAL

## 2022-01-29 ENCOUNTER — APPOINTMENT (OUTPATIENT)
Dept: RADIOLOGY | Facility: MEDICAL CENTER | Age: 62
End: 2022-01-29
Payer: COMMERCIAL

## 2022-01-29 VITALS
OXYGEN SATURATION: 100 % | DIASTOLIC BLOOD PRESSURE: 76 MMHG | RESPIRATION RATE: 18 BRPM | SYSTOLIC BLOOD PRESSURE: 145 MMHG | TEMPERATURE: 97.9 F | HEART RATE: 90 BPM

## 2022-01-29 DIAGNOSIS — G89.29 CHRONIC PAIN OF BOTH KNEES: ICD-10-CM

## 2022-01-29 DIAGNOSIS — M25.561 CHRONIC PAIN OF BOTH KNEES: ICD-10-CM

## 2022-01-29 DIAGNOSIS — M79.641 PAIN IN BOTH HANDS: ICD-10-CM

## 2022-01-29 DIAGNOSIS — M25.572 CHRONIC PAIN OF BOTH ANKLES: ICD-10-CM

## 2022-01-29 DIAGNOSIS — M25.571 CHRONIC PAIN OF BOTH ANKLES: ICD-10-CM

## 2022-01-29 DIAGNOSIS — G89.29 CHRONIC PAIN OF BOTH ANKLES: ICD-10-CM

## 2022-01-29 DIAGNOSIS — I10 ESSENTIAL HYPERTENSION: ICD-10-CM

## 2022-01-29 DIAGNOSIS — M54.50 CHRONIC LOW BACK PAIN, UNSPECIFIED BACK PAIN LATERALITY, UNSPECIFIED WHETHER SCIATICA PRESENT: ICD-10-CM

## 2022-01-29 DIAGNOSIS — M25.562 CHRONIC PAIN OF BOTH KNEES: ICD-10-CM

## 2022-01-29 DIAGNOSIS — G89.29 CHRONIC LOW BACK PAIN, UNSPECIFIED BACK PAIN LATERALITY, UNSPECIFIED WHETHER SCIATICA PRESENT: ICD-10-CM

## 2022-01-29 DIAGNOSIS — S83.91XA RIGHT KNEE SPRAIN: Primary | ICD-10-CM

## 2022-01-29 DIAGNOSIS — M79.642 PAIN IN BOTH HANDS: ICD-10-CM

## 2022-01-29 LAB
ALBUMIN SERPL BCP-MCNC: 3.9 G/DL (ref 3.5–5)
ALP SERPL-CCNC: 47 U/L (ref 46–116)
ALT SERPL W P-5'-P-CCNC: 35 U/L (ref 12–78)
ANION GAP SERPL CALCULATED.3IONS-SCNC: 4 MMOL/L (ref 4–13)
AST SERPL W P-5'-P-CCNC: 21 U/L (ref 5–45)
BASOPHILS # BLD AUTO: 0.06 THOUSANDS/ΜL (ref 0–0.1)
BASOPHILS NFR BLD AUTO: 1 % (ref 0–1)
BILIRUB SERPL-MCNC: 0.95 MG/DL (ref 0.2–1)
BUN SERPL-MCNC: 19 MG/DL (ref 5–25)
CALCIUM SERPL-MCNC: 9.5 MG/DL (ref 8.3–10.1)
CHLORIDE SERPL-SCNC: 104 MMOL/L (ref 100–108)
CO2 SERPL-SCNC: 32 MMOL/L (ref 21–32)
CREAT SERPL-MCNC: 1.05 MG/DL (ref 0.6–1.3)
EOSINOPHIL # BLD AUTO: 0.16 THOUSAND/ΜL (ref 0–0.61)
EOSINOPHIL NFR BLD AUTO: 2 % (ref 0–6)
ERYTHROCYTE [DISTWIDTH] IN BLOOD BY AUTOMATED COUNT: 13.2 % (ref 11.6–15.1)
ERYTHROCYTE [SEDIMENTATION RATE] IN BLOOD: 12 MM/HOUR (ref 0–19)
GFR SERPL CREATININE-BSD FRML MDRD: 75 ML/MIN/1.73SQ M
GLUCOSE SERPL-MCNC: 90 MG/DL (ref 65–140)
HCT VFR BLD AUTO: 46.2 % (ref 36.5–49.3)
HGB BLD-MCNC: 15.1 G/DL (ref 12–17)
IMM GRANULOCYTES # BLD AUTO: 0.03 THOUSAND/UL (ref 0–0.2)
IMM GRANULOCYTES NFR BLD AUTO: 0 % (ref 0–2)
LYMPHOCYTES # BLD AUTO: 2.11 THOUSANDS/ΜL (ref 0.6–4.47)
LYMPHOCYTES NFR BLD AUTO: 23 % (ref 14–44)
MCH RBC QN AUTO: 30.3 PG (ref 26.8–34.3)
MCHC RBC AUTO-ENTMCNC: 32.7 G/DL (ref 31.4–37.4)
MCV RBC AUTO: 93 FL (ref 82–98)
MONOCYTES # BLD AUTO: 0.87 THOUSAND/ΜL (ref 0.17–1.22)
MONOCYTES NFR BLD AUTO: 10 % (ref 4–12)
NEUTROPHILS # BLD AUTO: 5.94 THOUSANDS/ΜL (ref 1.85–7.62)
NEUTS SEG NFR BLD AUTO: 64 % (ref 43–75)
NRBC BLD AUTO-RTO: 0 /100 WBCS
PLATELET # BLD AUTO: 267 THOUSANDS/UL (ref 149–390)
PMV BLD AUTO: 9.5 FL (ref 8.9–12.7)
POTASSIUM SERPL-SCNC: 3.8 MMOL/L (ref 3.5–5.3)
PROT SERPL-MCNC: 7.7 G/DL (ref 6.4–8.2)
RBC # BLD AUTO: 4.98 MILLION/UL (ref 3.88–5.62)
SODIUM SERPL-SCNC: 140 MMOL/L (ref 136–145)
WBC # BLD AUTO: 9.17 THOUSAND/UL (ref 4.31–10.16)

## 2022-01-29 PROCEDURE — 99283 EMERGENCY DEPT VISIT LOW MDM: CPT

## 2022-01-29 PROCEDURE — 72100 X-RAY EXAM L-S SPINE 2/3 VWS: CPT

## 2022-01-29 PROCEDURE — 85652 RBC SED RATE AUTOMATED: CPT

## 2022-01-29 PROCEDURE — 80053 COMPREHEN METABOLIC PANEL: CPT

## 2022-01-29 PROCEDURE — 99284 EMERGENCY DEPT VISIT MOD MDM: CPT | Performed by: EMERGENCY MEDICINE

## 2022-01-29 PROCEDURE — 86038 ANTINUCLEAR ANTIBODIES: CPT

## 2022-01-29 PROCEDURE — 86430 RHEUMATOID FACTOR TEST QUAL: CPT

## 2022-01-29 PROCEDURE — 73130 X-RAY EXAM OF HAND: CPT

## 2022-01-29 PROCEDURE — 85025 COMPLETE CBC W/AUTO DIFF WBC: CPT

## 2022-01-29 PROCEDURE — 36415 COLL VENOUS BLD VENIPUNCTURE: CPT

## 2022-01-29 PROCEDURE — 86618 LYME DISEASE ANTIBODY: CPT

## 2022-01-29 PROCEDURE — 73610 X-RAY EXAM OF ANKLE: CPT

## 2022-01-29 PROCEDURE — 73564 X-RAY EXAM KNEE 4 OR MORE: CPT

## 2022-01-29 RX ORDER — LIDOCAINE 50 MG/G
1 PATCH TOPICAL ONCE
Status: DISCONTINUED | OUTPATIENT
Start: 2022-01-29 | End: 2022-01-29 | Stop reason: HOSPADM

## 2022-01-29 NOTE — ED PROVIDER NOTES
History  Chief Complaint   Patient presents with    Knee Pain     patient was seen earlier and sent for xrays of knee  after xrays patient went to take step in house and felt something pop  59 yo M presents for evaluation of one month of R knee pain which he is seeing his pcp for  Pain started gradually, is currently a 3/10, and is an 8/10 at its worst  Worse with movement/bearing weight  Minimal improvement with motrin  Pt had xray today  Pt is concerned because he felt a pop going up the steps  No catching/clicking/instability  History provided by:  Patient and medical records  Knee Pain  Associated symptoms: no fatigue and no fever        Prior to Admission Medications   Prescriptions Last Dose Informant Patient Reported? Taking?    ALPRAZolam (XANAX) 0 25 mg tablet  Self No No   Sig: Take 1 tablet (0 25 mg total) by mouth daily at bedtime as needed for anxiety Take 1 hour for anxiety prior to MRI   diclofenac sodium (VOLTAREN) 50 mg EC tablet   No No   Sig: Take 1 tablet (50 mg total) by mouth daily as needed (severe pain) Take with food   famotidine (PEPCID) 10 mg tablet  Self Yes No   Sig: Take 10 mg by mouth Taken as needed   hydrochlorothiazide (HYDRODIURIL) 25 mg tablet   No No   Sig: TAKE 1 TABLET BY MOUTH EVERY DAY   methocarbamol (ROBAXIN) 750 mg tablet  Self No No   Sig: Take 1 tablet (750 mg total) by mouth daily as needed for muscle spasms (Pt states he takes on tablet at bedtime )   tadalafil (CIALIS) 20 MG tablet  Self No No   Sig: Take 1 tablet (20 mg total) by mouth as needed for erectile dysfunction   traMADol (ULTRAM) 50 mg tablet  Self Yes No   Sig: Take 50 mg by mouth as needed       Facility-Administered Medications: None       Past Medical History:   Diagnosis Date    AVM (arteriovenous malformation)     Back pain     BPH without urinary obstruction     last assessed 01/16/18    Disc disorder     Herniated    Hemorrhoids     Hydronephrosis     Hypertension     Kidney disease     Renal calculi     Renal colic     Ureter colic        Past Surgical History:   Procedure Laterality Date    BRAIN SURGERY      COLONOSCOPY      CYSTOSCOPY      w/removal of object, last assessed 01/16/18    CYSTOSCOPY      w/removal of ureteral calculus last assessed 01/16/18    CYSTOSCOPY KIDNEY W/ URETERAL GUIDE WIRE      last assessed 01/16/18    CYSTOSCOPY W/ URETERAL STENT PLACEMENT      last assessed 01/16/18    FL RETROGRADE PYELOGRAM  7/9/2020   [address]    HERNIA REPAIR      KIDNEY SURGERY      removal of kidney stone in 2013 at [address] W/LITHOTRIPSY &INDWELL STENT INSRT Right 12/13/2018    Procedure: CYSTOSCOPY URETEROSCOPY  RETROGRADE PYELOGRAM AND INSERTION RIGHT STENT URETERAL;  Surgeon: Rolando Alamo MD;  [facility];  Service: Urology    DE CYSTO/URETERO W/LITHOTRIPSY &INDWELL STENT INSRT Left 7/9/2020    Procedure: CYSTOSCOPY URETEROSCOPY WITH LITHOTRIPSY HOLMIUM LASER, RETROGRADE PYELOGRAM AND INSERTION STENT URETERAL;  Surgeon: Rolando Alamo MD;  [facility];  Service: Urology    TOOTH EXTRACTION         Family History   Problem Relation Age of Onset    Stroke Mother     Cancer Father     Diabetes Father     Prostate cancer Father     Heart disease Father     Hypertension Father      I have reviewed and agree with the history as documented  E-Cigarette/Vaping    E-Cigarette Use Never User      E-Cigarette/Vaping Substances    Nicotine No     THC No     CBD No     Flavoring No     Other No     Unknown No      Social History     Tobacco Use    Smoking status: Never Smoker    Smokeless tobacco: Never Used   Vaping Use    Vaping Use: Never used   Substance Use Topics    Alcohol use: Yes     Comment: rare    Drug use: No       Review of Systems   Constitutional: Negative for chills, diaphoresis, fatigue and fever     HENT: Negative for congestion, ear pain, rhinorrhea, sinus pressure, sneezing, sore throat and trouble swallowing  Eyes: Negative for pain and visual disturbance  Respiratory: Negative for cough, chest tightness, shortness of breath, wheezing and stridor  Cardiovascular: Negative for chest pain, palpitations and leg swelling  Gastrointestinal: Negative for abdominal pain, blood in stool, constipation, diarrhea, nausea and vomiting  Endocrine: Negative for polydipsia, polyphagia and polyuria  Genitourinary: Negative for decreased urine volume, dysuria, flank pain, frequency, hematuria and urgency  Musculoskeletal: Negative for arthralgias and myalgias  Skin: Negative for color change and rash  Neurological: Negative for dizziness, seizures, light-headedness, numbness and headaches  Hematological: Negative for adenopathy  Psychiatric/Behavioral: The patient is not nervous/anxious  Physical Exam  Physical Exam  Constitutional:       Appearance: He is well-developed  HENT:      Head: Normocephalic and atraumatic  Right Ear: External ear normal       Left Ear: External ear normal    Eyes:      General: No scleral icterus  Extraocular Movements: Extraocular movements intact  Neck:      Vascular: No JVD  Trachea: No tracheal deviation  Cardiovascular:      Rate and Rhythm: Normal rate and regular rhythm  Pulses: Normal pulses  Heart sounds: Normal heart sounds  Pulmonary:      Effort: Pulmonary effort is normal  No respiratory distress  Abdominal:      General: There is no distension  Musculoskeletal:         General: No deformity  Normal range of motion  Cervical back: Normal range of motion  No rigidity  Comments: R hip/ankle exam wnl  R knee with out effusion, swelling, redness, warmth, pt ttp  FROM in knee with pain  NO defomrity  No pain/laxity with valgus/varus stress, neg ant/post drawers sign  NVI RLE  Skin:     Coloration: Skin is not pale  Findings: No erythema or rash  Neurological:      Mental Status: He is alert and oriented to person, place, and time  Psychiatric:         Behavior: Behavior normal          Vital Signs  ED Triage Vitals   Temperature Pulse Respirations Blood Pressure SpO2   01/29/22 1547 01/29/22 1545 01/29/22 1545 01/29/22 1547 01/29/22 1545   97 9 °F (36 6 °C) 90 18 145/76 100 %      Temp Source Heart Rate Source Patient Position - Orthostatic VS BP Location FiO2 (%)   01/29/22 1547 -- 01/29/22 1545 01/29/22 1545 --   Oral  Sitting Right arm       Pain Score       --                  Vitals:    01/29/22 1545 01/29/22 1547   BP:  145/76   Pulse: 90    Patient Position - Orthostatic VS: Sitting          Visual Acuity      ED Medications  Medications   lidocaine (LIDODERM) 5 % patch 1 patch (1 patch Topical Not Given 1/29/22 1701)       Diagnostic Studies  Results Reviewed     None                 No orders to display              Procedures  Procedures         ED Course                               SBIRT 20yo+      Most Recent Value   SBIRT (23 yo +)    In order to provide better care to our patients, we are screening all of our patients for alcohol and drug use  Would it be okay to ask you these screening questions? No Filed at: 01/29/2022 1622                    [facility]  Number of Diagnoses or Management Options  Right knee sprain  Diagnosis management comments: R knee pain with neg xray earlier today no hx/exam findings to suggest infectious etiology/gout-will do knee immobilizer, crutches, ortho f/u      Disposition  Final diagnoses:   Right knee sprain     Time reflects when diagnosis was documented in both MDM as applicable and the Disposition within this note     Time User Action Codes Description Comment    1/29/2022  4:45 PM Elke Ching Add [[id number]] Right knee sprain       ED Disposition     ED Disposition Condition Date/Time Comment    Discharge Stable Sat Jan 29, 2022  4:45 PM Marcelino Wheeler discharge to home/self care              Follow-up Information     Follow up With Specialties Details Why Contact Info Additional [address] [facility] [place] [facility] Schedule an appointment as soon as possible for a visit in 2 days  [address]  [place] [address] [facility] [id number] [address] [facility], [address], [address] [facility], [place], [place], [address] [phone]          Discharge Medication List as of 1/29/2022  4:46 PM      START taking these medications    Details   Diclofenac Sodium (VOLTAREN) 1 % Apply 2 g topically 4 (four) times a day, Starting Sat 1/29/2022, Normal         CONTINUE these medications which have NOT CHANGED    Details   ALPRAZolam (XANAX) 0 25 mg tablet Take 1 tablet (0 25 mg total) by mouth daily at bedtime as needed for anxiety Take 1 hour for anxiety prior to MRI, Starting Tue 3/16/2021, Normal      diclofenac sodium (VOLTAREN) 50 mg EC tablet Take 1 tablet (50 mg total) by mouth daily as needed (severe pain) Take with food, Starting Thu 1/27/2022, Normal      famotidine (PEPCID) 10 mg tablet Take 10 mg by mouth Taken as needed, Historical Med      hydrochlorothiazide (HYDRODIURIL) 25 mg tablet TAKE 1 TABLET BY MOUTH EVERY DAY, Normal      methocarbamol (ROBAXIN) 750 mg tablet Take 1 tablet (750 mg total) by mouth daily as needed for muscle spasms (Pt states he takes on tablet at bedtime ), Starting Thu 6/25/2020, Normal      tadalafil (CIALIS) 20 MG tablet Take 1 tablet (20 mg total) by mouth as needed for erectile dysfunction, Starting Mon 6/15/2020, Print      traMADol (ULTRAM) 50 mg tablet Take 50 mg by mouth as needed , Starting Tue 4/20/2021, Until Wed 4/20/2022 at 2359, Historical Med             No discharge procedures on file      PDMP Review       Value Time User    PDMP Reviewed  Yes 3/16/2021 11:51 AM Edwardo Perez DO          ED Provider  Electronically Signed by           Estefany Esquivel MD  01/29/22 7854

## 2022-01-31 ENCOUNTER — APPOINTMENT (OUTPATIENT)
Dept: RADIOLOGY | Facility: MEDICAL CENTER | Age: 62
End: 2022-01-31
Payer: COMMERCIAL

## 2022-01-31 ENCOUNTER — OFFICE VISIT (OUTPATIENT)
Dept: OBGYN CLINIC | Facility: MEDICAL CENTER | Age: 62
End: 2022-01-31
Payer: COMMERCIAL

## 2022-01-31 ENCOUNTER — TELEPHONE (OUTPATIENT)
Dept: FAMILY MEDICINE CLINIC | Facility: CLINIC | Age: 62
End: 2022-01-31

## 2022-01-31 VITALS
HEIGHT: 68 IN | BODY MASS INDEX: 36.34 KG/M2 | HEART RATE: 66 BPM | DIASTOLIC BLOOD PRESSURE: 84 MMHG | TEMPERATURE: 97.5 F | SYSTOLIC BLOOD PRESSURE: 158 MMHG | WEIGHT: 239.8 LBS

## 2022-01-31 DIAGNOSIS — S83.104A DISLOCATION OF RIGHT KNEE, INITIAL ENCOUNTER: ICD-10-CM

## 2022-01-31 DIAGNOSIS — M25.461 EFFUSION OF RIGHT KNEE: ICD-10-CM

## 2022-01-31 DIAGNOSIS — M23.91 INTERNAL DERANGEMENT OF RIGHT KNEE: ICD-10-CM

## 2022-01-31 DIAGNOSIS — Z01.89 ENCOUNTER FOR LOWER EXTREMITY COMPARISON IMAGING STUDY: Primary | ICD-10-CM

## 2022-01-31 DIAGNOSIS — M17.11 PRIMARY OSTEOARTHRITIS OF RIGHT KNEE: ICD-10-CM

## 2022-01-31 DIAGNOSIS — S83.104A DISLOCATION OF RIGHT KNEE, INITIAL ENCOUNTER: Primary | ICD-10-CM

## 2022-01-31 DIAGNOSIS — M17.12 ARTHRITIS OF LEFT KNEE: ICD-10-CM

## 2022-01-31 LAB
RHEUMATOID FACT SER QL LA: NEGATIVE
RYE IGE QN: NEGATIVE

## 2022-01-31 PROCEDURE — 20610 DRAIN/INJ JOINT/BURSA W/O US: CPT | Performed by: ORTHOPAEDIC SURGERY

## 2022-01-31 PROCEDURE — 3008F BODY MASS INDEX DOCD: CPT | Performed by: FAMILY MEDICINE

## 2022-01-31 PROCEDURE — 73564 X-RAY EXAM KNEE 4 OR MORE: CPT

## 2022-01-31 PROCEDURE — 99204 OFFICE O/P NEW MOD 45 MIN: CPT | Performed by: ORTHOPAEDIC SURGERY

## 2022-01-31 RX ORDER — TRIAMCINOLONE ACETONIDE 40 MG/ML
40 INJECTION, SUSPENSION INTRA-ARTICULAR; INTRAMUSCULAR
Status: COMPLETED | OUTPATIENT
Start: 2022-01-31 | End: 2022-01-31

## 2022-01-31 RX ADMIN — TRIAMCINOLONE ACETONIDE 40 MG: 40 INJECTION, SUSPENSION INTRA-ARTICULAR; INTRAMUSCULAR at 16:43

## 2022-01-31 NOTE — PROGRESS NOTES
Assessment/Plan     1  Encounter for lower extremity comparison imaging study    2  Effusion of right knee    3  Primary osteoarthritis of right knee    4  Internal derangement of right knee      Orders Placed This Encounter   Procedures    Large joint arthrocentesis: R knee    XR knee 4+ vw right injury    XR knee 4+ vw left injury     Patient has mild degenerative changes throughout the knee  Mild effusion with medial joint line tenderness, suspicious for medial meniscal tear, negative medial Vladimir on exam, negative patella apprehension  Patient opted to have CSI today  Received steroid injection today  Patient knows to ice and avoid strenuous activity for 1-2 days if needed  Continue with hinged knee brace  See patient back in 6 weeks, if pain continues or mechanical symptoms start or worsen then call and we can place order for MRI to evaluate for medial meniscal tear  He can use tylenol 1000mg every 8 hrs, 3000mg max, voltaren gel prescription sent to phx    Return in about 6 weeks (around 3/14/2022) for Recheck  I answered all of the patient's questions during the visit and provided education of the patient's condition during the visit  The patient verbalized understanding of the information given and agrees with the plan  This note was dictated using [employer] software  It may contain errors including improperly dictated words  Please contact physician directly for any questions  History of Present Illness   Chief complaint:   Chief Complaint   Patient presents with    Right Knee - Pain       HPI: Lazara Chiang is a 60 y o  male that c/o right knee pain, generalized joint pain that started 4 weeks ago  Then pain worsened 2 weeks ago, started to limp  Seen in ED 1/29/22 after going up single step straight on, felt a pop with immediate pain  Difficulty walking  He didn't notice any swelling  ER doc was thinking patella subluxation, dislocation with self relocation    He was given immobilizer and crutches  He has transition to hinged knee brace with SPC  Pain is better, still walking billy  Has sense of instability, no locking  No previous issues with his knee or surgery  ROS:    See HPI for musculoskeletal review     All other systems reviewed are negative     Historical Information   Past Medical History:   Diagnosis Date    AVM (arteriovenous malformation)     Back pain     BPH without urinary obstruction     last assessed 01/16/18    Disc disorder     Herniated    Hemorrhoids     Hydronephrosis     Hypertension     Kidney disease     Renal calculi     Renal colic     Ureter colic      Past Surgical History:   Procedure Laterality Date    BRAIN SURGERY      COLONOSCOPY      CYSTOSCOPY      w/removal of object, last assessed 01/16/18    CYSTOSCOPY      w/removal of ureteral calculus last assessed 01/16/18    CYSTOSCOPY KIDNEY W/ URETERAL GUIDE WIRE      last assessed 01/16/18    CYSTOSCOPY W/ URETERAL STENT PLACEMENT      last assessed 01/16/18    FL RETROGRADE [address]YELOGRAM  7/9/[address]   [address]    HERNIA REPAIR      KIDNEY SURGERY      removal of kidney stone in 2013 at [address] W/LITHOTRIPSY &INDWELL STENT INSRT Right 12/13/2018    Procedure: CYSTOSCOPY URETEROSCOPY  RETROGRADE PYELOGRAM AND INSERTION RIGHT STENT URETERAL;  Surgeon: Marah Molina MD;  [facility];  Service: Urology    DE CYSTO/URETERO W/LITHOTRIPSY &INDWELL STENT INSRT Left 7/9/2020    Procedure: CYSTOSCOPY URETEROSCOPY WITH LITHOTRIPSY HOLMIUM LASER, RETROGRADE PYELOGRAM AND INSERTION STENT URETERAL;  Surgeon: Marah Molina MD;  [facility];  Service: Urology    TOOTH EXTRACTION       Social History   Social History     Substance and Sexual Activity   Alcohol Use Yes    Comment: rare     Social History     Substance and Sexual Activity   Drug Use No     Social History     Tobacco Use   Smoking Status Never Smoker Smokeless Tobacco Never Used     Family History:   Family History   Problem Relation Age of Onset    Stroke Mother     Cancer Father     Diabetes Father     Prostate cancer Father     Heart disease Father     Hypertension Father        Meds/Allergies   (Not in a hospital admission)    Allergies   Allergen Reactions    Morphine Other (See Comments)    Other      Pt states that any narcotic makes him feel sick    Oxycodone-Acetaminophen Nausea Only, Vomiting and GI Intolerance    Seasonal Ic  [Cholestatin]      Other reaction(s): Nasal Congestion, Sinus Pain       Objective   Vitals: Blood pressure 158/84, pulse 66, temperature 97 5 °F (36 4 °C), height 5' 8 25" (1 734 m), weight 109 kg (239 lb 12 8 oz)  ,Body mass index is 36 19 kg/m²  PE:  AAOx 3  WDWN  Hearing intact, no drainage from eyes  Regular rate  no audible wheezing  no abdominal distension  LE compartments soft, skin intact  EHL/AT/GS intact, sensation grossly intact L4, L5, S1, palpable pedal pulse    rightknee:    Appearance:  no swelling   No bruising  no obvious joint deformity   mild effusion  Palpation/Tenderness:  +TTP over medial joint line, no TTP over lateral joint line or over patella/patellar tendon  Active Range of Motion:  AROM: 0-115, passive 0-120  Special Tests:  Medial Vladimir's Test:  Negative   Lateral Vladimir's Test:  Negative  Apley's compression test:  Negative  Lachman's Test:  negative  Anterior Drawer Test:  negative  Valgus Stress Test:  negative  Varus Stress Test:  negative     No ipsilateral hip pain with ROM    Imaging Studies: I have personally reviewed pertinent films in PACSxr right knee demonstrates mild degenerative changes throughout with small spurring superior patella    Large joint arthrocentesis: R knee  Universal Protocol:  Consent: Verbal consent obtained    Risks and benefits: risks, benefits and alternatives were discussed  Consent given by: patient  Patient understanding: patient states understanding of the procedure being performed  Site marked: the operative site was marked  Patient identity confirmed: verbally with patient    Supporting Documentation  Indications: pain   Procedure Details  Location: knee - R knee  Preparation: Patient was prepped and draped in the usual sterile fashion  Needle size: 22 G  Ultrasound guidance: no  Approach: anterolateral  Medications administered: 40 mg triamcinolone acetonide 40 mg/mL (3 ml bupivacaine marcaine   25% intra articular )    Patient tolerance: patient tolerated the procedure well with no immediate complications  Dressing:  Sterile dressing applied          Scribe Attestation    I,:  Juanpablo Grayson am acting as a scribe while in the presence of the attending physician :       I,:  Yani Purdy DO personally performed the services described in this documentation    as scribed in my presence :

## 2022-01-31 NOTE — TELEPHONE ENCOUNTER
PT is requesting a referral to go over and see orthopedics or [facility]  She dislocated his RIGHT knee over the weekend  He is looking to get this done today  Please advise

## 2022-02-01 LAB — B BURGDOR IGG+IGM SER-ACNC: 20

## 2022-02-14 ENCOUNTER — TELEPHONE (OUTPATIENT)
Dept: OBGYN CLINIC | Facility: HOSPITAL | Age: 62
End: 2022-02-14

## 2022-02-14 ENCOUNTER — TELEPHONE (OUTPATIENT)
Dept: OBGYN CLINIC | Facility: MEDICAL CENTER | Age: 62
End: 2022-02-14

## 2022-02-14 NOTE — TELEPHONE ENCOUNTER
Patient sees Dr Flores Jones  Patient is calling in stating that he is still having pain in the right knee and it is going down to his foot along with the back  The patient was advised if he is still having pain to contact the office and we would get him scheduled for an MRI please advise             Call back# [phone]

## 2022-02-14 NOTE — TELEPHONE ENCOUNTER
I returned call to patient with the scheduling number for MRI  Asked that he call us with date and time so we can authorize it  Explained this is a 2 week length of time to obtain precertification

## 2022-02-17 NOTE — TELEPHONE ENCOUNTER
Patient will be having his MRI done at [facility] March 8 6:00am   Please call him once Louie is obtained    [phone]  Thank you

## 2022-02-17 NOTE — TELEPHONE ENCOUNTER
Left a voice mail for the patient advising him that his MRI is authorized and valid until 4/3/22  Also reminded him to keep his follow up appointment to go over the results

## 2022-03-08 ENCOUNTER — HOSPITAL ENCOUNTER (OUTPATIENT)
Dept: MRI IMAGING | Facility: HOSPITAL | Age: 62
Discharge: HOME | End: 2022-03-08
Payer: COMMERCIAL

## 2022-03-08 DIAGNOSIS — M23.91 INTERNAL DERANGEMENT OF RIGHT KNEE: ICD-10-CM

## 2022-03-08 PROCEDURE — 73721 MRI JNT OF LWR EXTRE W/O DYE: CPT

## 2022-03-08 PROCEDURE — G1004 CDSM NDSC: HCPCS

## 2022-03-25 ENCOUNTER — OFFICE VISIT (OUTPATIENT)
Dept: OBGYN CLINIC | Facility: MEDICAL CENTER | Age: 62
End: 2022-03-25
Payer: COMMERCIAL

## 2022-03-25 VITALS
DIASTOLIC BLOOD PRESSURE: 75 MMHG | HEIGHT: 69 IN | HEART RATE: 65 BPM | WEIGHT: 214.2 LBS | SYSTOLIC BLOOD PRESSURE: 120 MMHG | BODY MASS INDEX: 31.73 KG/M2

## 2022-03-25 DIAGNOSIS — M84.361D STRESS FRACTURE OF RIGHT TIBIA WITH ROUTINE HEALING, SUBSEQUENT ENCOUNTER: Primary | ICD-10-CM

## 2022-03-25 DIAGNOSIS — S83.242D TEAR OF MEDIAL MENISCUS OF LEFT KNEE, CURRENT, UNSPECIFIED TEAR TYPE, SUBSEQUENT ENCOUNTER: ICD-10-CM

## 2022-03-25 PROCEDURE — 3074F SYST BP LT 130 MM HG: CPT | Performed by: ORTHOPAEDIC SURGERY

## 2022-03-25 PROCEDURE — 3008F BODY MASS INDEX DOCD: CPT | Performed by: ORTHOPAEDIC SURGERY

## 2022-03-25 PROCEDURE — 3078F DIAST BP <80 MM HG: CPT | Performed by: ORTHOPAEDIC SURGERY

## 2022-03-25 PROCEDURE — 1036F TOBACCO NON-USER: CPT | Performed by: ORTHOPAEDIC SURGERY

## 2022-03-25 PROCEDURE — 99214 OFFICE O/P EST MOD 30 MIN: CPT | Performed by: ORTHOPAEDIC SURGERY

## 2022-03-25 NOTE — PROGRESS NOTES
Assessment/Plan:  1  Stress fracture of right tibia with routine healing, subsequent encounter    2  Tear of medial meniscus of left knee, current, unspecified tear type, subsequent encounter      No orders of the defined types were placed in this encounter  · Limit weight bearing for 6 weeks, patient has crutches at home  · Continue to maintain ROM  · Discussed that if symptoms persist he may need to consider a right knee arthroscopy for which I will refer the patient to Dr Estiven Velez or Dr Mccullough Sic  Can also consider steroid injection again first     · Patient will check in with the office to let us know how he is doing in april    Return for appt   I answered all of the patient's questions during the visit and provided education of the patient's condition during the visit  The patient verbalized understanding of the information given and agrees with the plan  This note was dictated using [employer] software  It may contain errors including improperly dictated words  Please contact physician directly for any questions  Subjective   Chief Complaint:   Chief Complaint   Patient presents with    Right Knee - Follow-up       Gelacio White is a 60 y o  male who presents for follow up for right knee pain  He reports pain started [facility] January after having a sharp pain step up  He was provided with a CS injection at that time which provided minimal relief  He call the office 2/14/22 at which time an MRI was ordered  He presents today to discuss the findings  Today he has reports posterior knee radiating to the calf, medial    Patient reports the knee is feeling better since initial onset in Jan 2022    Review of Systems  ROS:    See HPI for musculoskeletal review     All other systems reviewed are negative     History:  Past Medical History:   Diagnosis Date    AVM (arteriovenous malformation)     Back pain     BPH without urinary obstruction     last assessed 01/16/18    Disc disorder     Herniated  Hemorrhoids     Hydronephrosis     Hypertension     Kidney disease     Renal calculi     Renal colic     Ureter colic      Past Surgical History:   Procedure Laterality Date    BRAIN SURGERY      COLONOSCOPY      CYSTOSCOPY      w/removal of object, last assessed 01/16/18    CYSTOSCOPY      w/removal of ureteral calculus last assessed 01/16/18    CYSTOSCOPY KIDNEY W/ URETERAL GUIDE WIRE      last assessed 01/16/18    CYSTOSCOPY W/ URETERAL STENT PLACEMENT      last assessed 01/16/18    FL RETROGRADE PYELOGRAM  7/9/[address]   [address]    HERNIA REPAIR      KIDNEY SURGERY      removal of kidney stone in 2013 at [address] W/LITHOTRIPSY &INDWELL STENT INSRT Right 12/13/2018    Procedure: CYSTOSCOPY URETEROSCOPY  RETROGRADE PYELOGRAM AND INSERTION RIGHT STENT URETERAL;  Surgeon: Jayne Epley, MD;  [facility];  Service: Urology    [place] CYSTO/URETERO W/LITHOTRIPSY &INDWELL STENT INSRT Left 7/9/2020    Procedure: CYSTOSCOPY URETEROSCOPY WITH LITHOTRIPSY HOLMIUM LASER, RETROGRADE PYELOGRAM AND INSERTION STENT URETERAL;  Surgeon: Jayne Epley, MD;  [facility];  Service: Urology    TOOTH EXTRACTION       Social History   Social History     Substance and Sexual Activity   Alcohol Use Yes    Comment: rare     Social History     Substance and Sexual Activity   Drug Use No     Social History     Tobacco Use   Smoking Status Never Smoker   Smokeless Tobacco Never Used     Family History:   Family History   Problem Relation Age of Onset    Stroke Mother     Cancer Father     Diabetes Father     Prostate cancer Father     Heart disease Father     Hypertension Father        Meds/Allergies   (Not in a hospital admission)    Allergies   Allergen Reactions    Morphine Other (See Comments)    Other      Pt states that any narcotic makes him feel sick    Oxycodone-Acetaminophen Nausea Only, Vomiting and GI Intolerance    Seasonal Ic [Cholestatin]      Other reaction(s): Nasal Congestion, Sinus Pain          Objective     /75   Pulse 65   Ht 5' 9" (1 753 m)   Wt 97 2 kg (214 lb 3 2 oz)   BMI 31 63 kg/m²      PE:  AAOx 3  WDWN  Hearing intact, no drainage from eyes  no audible wheezing  no abdominal distension  LE compartments soft, skin intact    Ortho Exam:  right Knee:   No erythema  no swelling  no effusion  no warmth  AROM: 0-110 PROM 0-115  Stable to varus/valgus stress  TTP Medial tibial plateau    Imaging Studies: I have personally reviewed pertinent films in PACS   MRI right knee demonstrates a small non-displaced subchondral medial tibial plateau fracture, posterior root radial tear medial meniscus, small baker's cyst        Scribe Attestation    I,:  Sabrina Rizzo am acting as a scribe while in the presence of the attending physician :       I,:  Elizabeth Mcfadden, DO personally performed the services described in this documentation    as scribed in my presence :

## 2022-04-28 DIAGNOSIS — M62.830 MUSCLE SPASM OF BACK: ICD-10-CM

## 2022-04-28 RX ORDER — METHOCARBAMOL 750 MG/1
750 TABLET, FILM COATED ORAL DAILY PRN
Qty: 30 TABLET | Refills: 0 | Status: SHIPPED | OUTPATIENT
Start: 2022-04-28 | End: 2022-06-13 | Stop reason: SDUPTHER

## 2022-05-14 ENCOUNTER — OFFICE VISIT (OUTPATIENT)
Dept: URGENT CARE | Facility: MEDICAL CENTER | Age: 62
End: 2022-05-14
Payer: COMMERCIAL

## 2022-05-14 VITALS
OXYGEN SATURATION: 97 % | BODY MASS INDEX: 30.27 KG/M2 | HEART RATE: 72 BPM | TEMPERATURE: 97.8 F | SYSTOLIC BLOOD PRESSURE: 144 MMHG | DIASTOLIC BLOOD PRESSURE: 75 MMHG | WEIGHT: 205 LBS | RESPIRATION RATE: 20 BRPM

## 2022-05-14 DIAGNOSIS — U07.1 COVID-19: Primary | ICD-10-CM

## 2022-05-14 PROCEDURE — 99213 OFFICE O/P EST LOW 20 MIN: CPT | Performed by: PHYSICIAN ASSISTANT

## 2022-05-14 NOTE — PROGRESS NOTES
[id number] [employer]        NAME: Hilaria Brunson is a 60 y o  male  : 1960    MRN: [id number]  DATE: May 14, 2022  TIME: 9:02 AM    Assessment and Plan   COVID-19 [U07 1]  1  COVID-19  nirmatrelvir & ritonavir (Paxlovid) tablet therapy pack         Patient Instructions     Increase fluids  Discussed Paklovid and patient wants to try  Follow up with PCP in 3-5 days  Proceed to  ER if symptoms worsen  Chief Complaint     Chief Complaint   Patient presents with    COVID-19     Patient states he took a a home test yesterday and tested +  Symptoms started Wednesday night  He has sore throat, coughing sneezing, stuffy nose         History of Present Illness       Patient has had COVID in , had had a COVID vaccine and boosted x1  He also had long haul COVID lasting for several months  He has 2 days of symptoms  He tested positive for COVID-19 at home  URI   This is a new problem  The problem has been gradually worsening  There has been no fever  Associated symptoms include congestion, coughing, headaches and rhinorrhea  Pertinent negatives include no abdominal pain, chest pain, diarrhea, dysuria, ear pain, joint pain, joint swelling, neck pain, plugged ear sensation, rash, sinus pain, sneezing, sore throat, swollen glands, vomiting or wheezing  Review of Systems   Review of Systems   HENT: Positive for congestion and rhinorrhea  Negative for ear pain, sinus pain, sneezing and sore throat  Respiratory: Positive for cough  Negative for wheezing  Cardiovascular: Negative for chest pain  Gastrointestinal: Negative for abdominal pain, diarrhea and vomiting  Genitourinary: Negative for dysuria  Musculoskeletal: Negative for joint pain and neck pain  Skin: Negative for rash  Neurological: Positive for headaches           Current Medications       Current Outpatient Medications:     nirmatrelvir & ritonavir (Paxlovid) tablet therapy pack, Take 3 tablets by mouth in the morning and 3 tablets in the evening  Do all this for 5 days   Take 2 nirmatrelvir tablets + 1 ritonavir tablet together per dose , Disp: 30 tablet, Rfl: 0    ALPRAZolam (XANAX) 0 25 mg tablet, Take 1 tablet (0 25 mg total) by mouth daily at bedtime as needed for anxiety Take 1 hour for anxiety prior to MRI, Disp: 1 tablet, Rfl: 0    Diclofenac Sodium (VOLTAREN) 1 %, Apply 2 g topically 4 (four) times a day, Disp: 150 g, Rfl: 0    Diclofenac Sodium (VOLTAREN) 1 %, Apply 2 g topically 4 (four) times a day as needed (knee pain), Disp: 350 g, Rfl: 0    diclofenac sodium (VOLTAREN) 50 mg EC tablet, Take 1 tablet (50 mg total) by mouth daily as needed (severe pain) Take with food (Patient not taking: Reported on 3/25/2022 ), Disp: 20 tablet, Rfl: 0    famotidine (PEPCID) 10 mg tablet, Take 10 mg by mouth Taken as needed, Disp: , Rfl:     hydrochlorothiazide (HYDRODIURIL) 25 mg tablet, TAKE 1 TABLET BY MOUTH EVERY DAY, Disp: 30 tablet, Rfl: 5    methocarbamol (ROBAXIN) 750 mg tablet, Take 1 tablet (750 mg total) by mouth daily as needed for muscle spasms (Pt states he takes on tablet at bedtime ), Disp: 30 tablet, Rfl: 0    tadalafil (CIALIS) 20 MG tablet, Take 1 tablet (20 mg total) by mouth as needed for erectile dysfunction, Disp: 10 tablet, Rfl: 3    Current Allergies     Allergies as of 05/14/2022 - Reviewed 05/14/2022   Allergen Reaction Noted    Morphine Other (See Comments) 02/26/2018    Other  12/22/2018    Oxycodone-acetaminophen Nausea Only, Vomiting, and GI Intolerance 01/08/2018    Seasonal ic  [cholestatin]  07/07/2016            The following portions of the patient's history were reviewed and updated as appropriate: allergies, current medications, past family history, past medical history, past social history, past surgical history and problem list      Past Medical History:   Diagnosis Date    AVM (arteriovenous malformation)     Back pain     BPH without urinary obstruction     last assessed 01/16/18  Disc disorder     Herniated    Hemorrhoids     Hydronephrosis     Hypertension     Kidney disease     Renal calculi     Renal colic     Ureter colic        Past Surgical History:   Procedure Laterality Date    BRAIN SURGERY      COLONOSCOPY      CYSTOSCOPY      w/removal of object, last assessed 01/16/18    CYSTOSCOPY      w/removal of ureteral calculus last assessed 01/16/18    CYSTOSCOPY KIDNEY W/ URETERAL GUIDE WIRE      last assessed 01/16/18    CYSTOSCOPY W/ URETERAL STENT PLACEMENT      last assessed 01/16/18    [place] RETROGRADE PYELOGRAM  7/9/2020   [address]    HERNIA REPAIR      KIDNEY SURGERY      removal of kidney stone in 2013 at [address] W/LITHOTRIPSY &INDWELL STENT INSRT Right 12/13/2018    Procedure: CYSTOSCOPY URETEROSCOPY  RETROGRADE PYELOGRAM AND INSERTION RIGHT STENT URETERAL;  Surgeon: Glen Miranda MD;  Location: [facility];  Service: Urology    [place] CYSTO/URETERO W/LITHOTRIPSY &INDWELL STENT INSRT Left 7/9/2020    Procedure: CYSTOSCOPY URETEROSCOPY WITH LITHOTRIPSY HOLMIUM LASER, RETROGRADE PYELOGRAM AND INSERTION STENT URETERAL;  Surgeon: Glen Miranda MD;  Location[facility];  Service: Urology    TOOTH EXTRACTION         Family History   Problem Relation Age of Onset    Stroke Mother     Cancer Father     Diabetes Father     Prostate cancer Father     Heart disease Father     Hypertension Father          Medications have been verified  Objective   /75   Pulse 72   Temp 97 8 °F (36 6 °C)   Resp 20   Wt 93 kg (205 lb)   SpO2 97%   BMI 30 27 kg/m²   No LMP for male patient  Physical Exam     Physical Exam  Vitals and nursing note reviewed  Constitutional:       Appearance: Normal appearance  He is obese     HENT:      Right Ear: Tympanic membrane, ear canal and external ear normal       Left Ear: Tympanic membrane, ear canal and external ear normal       Nose: Congestion and rhinorrhea present  Mouth/Throat:      Mouth: Mucous membranes are moist    Cardiovascular:      Rate and Rhythm: Normal rate and regular rhythm  Pulses: Normal pulses  Heart sounds: Normal heart sounds  Pulmonary:      Effort: Pulmonary effort is normal       Breath sounds: Normal breath sounds  Lymphadenopathy:      Cervical: No cervical adenopathy  Neurological:      Mental Status: He is alert

## 2022-06-02 ENCOUNTER — TELEPHONE (OUTPATIENT)
Dept: FAMILY MEDICINE CLINIC | Facility: CLINIC | Age: 62
End: 2022-06-02

## 2022-06-02 NOTE — TELEPHONE ENCOUNTER
Mayra Ibarra called the office to schedule an appointment for lower abdominal discomfort / pain for 2 weeks  Pt rates pain as a 3 on pain scale of 1-10  pt has had some constipation  Denies diarrhea, ,nausea, vomiting  No sob no active chest pain no left arm pain  No fever no flu like symptoms  Pt was offered the next available appointment which was 06/03/22 ,but did not work with his schedule  Pt is scheduled to come in on Monday 06/06/22 to see John Berry  Pt advised to please call the office is symptoms worsen  Pt expressed verbal understanding

## 2022-06-07 ENCOUNTER — OFFICE VISIT (OUTPATIENT)
Dept: FAMILY MEDICINE CLINIC | Facility: CLINIC | Age: 62
End: 2022-06-07
Payer: COMMERCIAL

## 2022-06-07 VITALS
RESPIRATION RATE: 16 BRPM | HEART RATE: 82 BPM | OXYGEN SATURATION: 97 % | WEIGHT: 211.2 LBS | DIASTOLIC BLOOD PRESSURE: 82 MMHG | HEIGHT: 69 IN | BODY MASS INDEX: 31.28 KG/M2 | SYSTOLIC BLOOD PRESSURE: 132 MMHG | TEMPERATURE: 96.9 F

## 2022-06-07 DIAGNOSIS — K40.90 LEFT INGUINAL HERNIA: Primary | ICD-10-CM

## 2022-06-07 DIAGNOSIS — I10 ESSENTIAL HYPERTENSION: ICD-10-CM

## 2022-06-07 DIAGNOSIS — E66.9 OBESITY (BMI 30-39.9): ICD-10-CM

## 2022-06-07 PROCEDURE — 99214 OFFICE O/P EST MOD 30 MIN: CPT | Performed by: FAMILY MEDICINE

## 2022-06-07 RX ORDER — TRAMADOL HYDROCHLORIDE 50 MG/1
TABLET ORAL
COMMUNITY
Start: 2022-06-03

## 2022-06-07 NOTE — PROGRESS NOTES
BMI Counseling: Body mass index is 31 19 kg/m²  The BMI is above normal  Nutrition recommendations include reducing portion sizes, decreasing overall calorie intake, 3-5 servings of fruits/vegetables daily, reducing fast food intake, consuming healthier snacks and decreasing soda and/or juice intake  Exercise recommendations include exercising 3-5 times per week

## 2022-06-07 NOTE — PROGRESS NOTES
Assessment/Plan:  Chief Complaint   Patient presents with    Abdominal Pain     Pt is here for pain and pressure in the lower left side abdominal area / groin area times 2 weeks      Patient Instructions   Here for left inguinal hernia and rec general surgery consult  Avoid straining and heavy lifting  Take BP med as directed and also lose weight as directed  Call if any problems  Denies rectal bleeding and urinary complaints  No problem-specific Assessment & Plan notes found for this encounter  Diagnoses and all orders for this visit:    Left inguinal hernia  -     Ambulatory Referral to General Surgery; Future    Essential hypertension    Obesity (BMI 30-39  9)    Other orders  -     traMADol (ULTRAM) 50 mg tablet; TAKE 1 TABLET (50 MG TOTAL) BY MOUTH DAILY AS NEEDED FOR MODERATE PAIN (PAIN SCORE 4-6)  Subjective:      Patient ID: Nelwyn Shun is a 60 y o  male  Abdominal Pain (Pt is here for pain and pressure in the lower left side abdominal area / groin area times 2 weeks ) no fever and no rectal bleeding and did start feeling pressure and pulling  The following portions of the patient's history were reviewed and updated as appropriate: allergies, current medications, past family history, past medical history, past social history, past surgical history and problem list     Review of Systems   Constitutional: Negative  HENT: Negative  Eyes: Negative  Respiratory: Negative  Cardiovascular: Negative  Gastrointestinal:        Left groin pressure and discomfort   Endocrine: Negative  Genitourinary: Negative  Musculoskeletal: Negative  Skin: Negative  Allergic/Immunologic: Negative  Neurological: Negative  Hematological: Negative  Psychiatric/Behavioral: Negative            Objective:      /82   Pulse 82   Temp (!) 96 9 °F (36 1 °C) (Temporal)   Resp 16   Ht 5' 9" (1 753 m)   Wt 95 8 kg (211 lb 3 2 oz)   SpO2 97%   BMI 31 19 kg/m² Physical Exam  Constitutional:       Appearance: He is well-developed  HENT:      Head: Normocephalic and atraumatic  Right Ear: External ear normal       Left Ear: External ear normal       Nose: Nose normal    Eyes:      Conjunctiva/sclera: Conjunctivae normal       Pupils: Pupils are equal, round, and reactive to light  Cardiovascular:      Rate and Rhythm: Normal rate and regular rhythm  Heart sounds: Normal heart sounds  Pulmonary:      Effort: Pulmonary effort is normal       Breath sounds: Normal breath sounds  Abdominal:      General: Abdomen is flat  Bowel sounds are normal       Palpations: Abdomen is soft  Hernia: A hernia is present  Hernia is present in the left inguinal area  Musculoskeletal:         General: Normal range of motion  Cervical back: Normal range of motion and neck supple  Skin:     General: Skin is warm and dry  Neurological:      Mental Status: He is alert and oriented to person, place, and time  Deep Tendon Reflexes: Reflexes are normal and symmetric     Psychiatric:         Behavior: Behavior normal

## 2022-06-07 NOTE — PATIENT INSTRUCTIONS
Here for left inguinal hernia and rec general surgery consult  Avoid straining and heavy lifting  Take BP med as directed and also lose weight as directed  Call if any problems  Denies rectal bleeding and urinary complaints  no

## 2022-06-08 ENCOUNTER — TELEPHONE (OUTPATIENT)
Dept: FAMILY MEDICINE CLINIC | Facility: CLINIC | Age: 62
End: 2022-06-08

## 2022-06-08 NOTE — TELEPHONE ENCOUNTER
Patient is calling today  He is asking if we please have someone else to refer him to in regards to general surgery? He cannot see anyone in Dr Sarina Varela office anytime soon  Please advise

## 2022-06-13 DIAGNOSIS — M62.830 MUSCLE SPASM OF BACK: ICD-10-CM

## 2022-06-13 RX ORDER — METHOCARBAMOL 750 MG/1
750 TABLET, FILM COATED ORAL DAILY PRN
Qty: 30 TABLET | Refills: 0 | Status: SHIPPED | OUTPATIENT
Start: 2022-06-13 | End: 2022-08-01 | Stop reason: SDUPTHER

## 2022-06-22 ENCOUNTER — CONSULT (OUTPATIENT)
Dept: SURGERY | Facility: CLINIC | Age: 62
End: 2022-06-22
Payer: COMMERCIAL

## 2022-06-22 VITALS
HEIGHT: 69 IN | DIASTOLIC BLOOD PRESSURE: 88 MMHG | SYSTOLIC BLOOD PRESSURE: 138 MMHG | HEART RATE: 71 BPM | OXYGEN SATURATION: 98 % | RESPIRATION RATE: 20 BRPM | BODY MASS INDEX: 31.7 KG/M2 | WEIGHT: 214 LBS

## 2022-06-22 DIAGNOSIS — E66.9 OBESITY (BMI 30-39.9): ICD-10-CM

## 2022-06-22 DIAGNOSIS — K40.90 LEFT INGUINAL HERNIA: Primary | ICD-10-CM

## 2022-06-22 PROCEDURE — 3075F SYST BP GE 130 - 139MM HG: CPT | Performed by: SURGERY

## 2022-06-22 PROCEDURE — 99203 OFFICE O/P NEW LOW 30 MIN: CPT | Performed by: SURGERY

## 2022-06-22 PROCEDURE — 3079F DIAST BP 80-89 MM HG: CPT | Performed by: SURGERY

## 2022-06-22 PROCEDURE — 3008F BODY MASS INDEX DOCD: CPT | Performed by: SURGERY

## 2022-06-22 PROCEDURE — 1036F TOBACCO NON-USER: CPT | Performed by: SURGERY

## 2022-06-22 NOTE — PROGRESS NOTES
Assessment/Plan:    Left inguinal hernia    He has a reducible left inguinal hernia on physical exam   I reviewed with him and showed him a CT scan from 2021 that showed a fat containing left inguinal hernia  Believe that the hernia is larger than seen on CT scan a year ago  However has been there for some time  He is now becoming symptomatic and aware this  I recommend proceeding with robotic repair of his left inguinal hernia  He had an open umbilical hernia repair with mesh and I will avoid this area for entry into the abdominal cavity  He is going to determine based on his work schedule the best time for repair  He will either schedule this in the next month or so or follow-up again in the fall at the end of his busy season for evaluation  Diagnoses and all orders for this visit:    Obesity (BMI 30-39  9)    Left inguinal hernia  -     Ambulatory Referral to General Surgery          Subjective:      Patient ID: Whitley Henry is a 60 y o  male  Mr Tran Valdez   Is a 79-year-old gentleman here for evaluation of left inguinal pain and likely inguinal hernia  He states a few weeks or months ago he started having discomfort in his left groin  He states it can be worse with straining having moved his bowels  He denies nausea vomiting fevers or chills  He does not report specific event that started this  He does not specifically notice a lump in the groin  The following portions of the patient's history were reviewed and updated as appropriate: allergies, current medications, past family history, past medical history, past social history, past surgical history and problem list     Review of Systems   Constitutional: Negative for chills and fever  HENT: Negative for ear pain and sore throat  Eyes: Negative for pain and visual disturbance  Respiratory: Negative for cough and shortness of breath  Cardiovascular: Negative for chest pain and palpitations     Gastrointestinal: Negative for abdominal pain and vomiting  Genitourinary: Negative for dysuria and hematuria  Musculoskeletal: Negative for arthralgias and back pain  Skin: Negative for color change and rash  Neurological: Negative for seizures and syncope  Psychiatric/Behavioral: Negative for agitation and behavioral problems  All other systems reviewed and are negative  Objective:      /88 (BP Location: Left arm, Patient Position: Sitting, Cuff Size: Adult)   Pulse 71   Resp 20   Ht 5' 9" (1 753 m)   Wt 97 1 kg (214 lb)   SpO2 98%   BMI 31 60 kg/m²          Physical Exam  Vitals and nursing note reviewed  Constitutional:       General: He is not in acute distress  Appearance: He is well-developed  He is not diaphoretic  HENT:      Head: Normocephalic and atraumatic  Eyes:      Pupils: Pupils are equal, round, and reactive to light  Cardiovascular:      Rate and Rhythm: Normal rate and regular rhythm  Heart sounds: Normal heart sounds  No murmur heard  Pulmonary:      Effort: Pulmonary effort is normal  No respiratory distress  Breath sounds: Normal breath sounds  No wheezing  Abdominal:      General: Bowel sounds are normal       Palpations: Abdomen is soft  Hernia: A hernia is present  Comments:  Previous umbilical hernia repair no recurrence but firm scarring and umbilicus  Diastasis recti  Reducible left inguinal hernia   Musculoskeletal:         General: Normal range of motion  Cervical back: Normal range of motion and neck supple  Skin:     General: Skin is warm and dry  Neurological:      Mental Status: He is alert and oriented to person, place, and time     Psychiatric:         Behavior: Behavior normal

## 2022-06-23 NOTE — ASSESSMENT & PLAN NOTE
He has a reducible left inguinal hernia on physical exam   I reviewed with him and showed him a CT scan from 2021 that showed a fat containing left inguinal hernia  Believe that the hernia is larger than seen on CT scan a year ago  However has been there for some time  He is now becoming symptomatic and aware this  I recommend proceeding with robotic repair of his left inguinal hernia  He had an open umbilical hernia repair with mesh and I will avoid this area for entry into the abdominal cavity  He is going to determine based on his work schedule the best time for repair  He will either schedule this in the next month or so or follow-up again in the fall at the end of his busy season for evaluation

## 2022-06-27 RX ORDER — SODIUM CHLORIDE, SODIUM LACTATE, POTASSIUM CHLORIDE, CALCIUM CHLORIDE 600; 310; 30; 20 MG/100ML; MG/100ML; MG/100ML; MG/100ML
125 INJECTION, SOLUTION INTRAVENOUS CONTINUOUS
OUTPATIENT
Start: 2022-08-04

## 2022-07-11 ENCOUNTER — APPOINTMENT (OUTPATIENT)
Dept: LAB | Facility: MEDICAL CENTER | Age: 62
End: 2022-07-11
Payer: COMMERCIAL

## 2022-07-11 DIAGNOSIS — Z12.5 SPECIAL SCREENING FOR MALIGNANT NEOPLASM OF PROSTATE: ICD-10-CM

## 2022-07-11 LAB — PSA SERPL-MCNC: 2.2 NG/ML (ref 0–4)

## 2022-07-11 PROCEDURE — 84153 ASSAY OF PSA TOTAL: CPT

## 2022-07-14 ENCOUNTER — OFFICE VISIT (OUTPATIENT)
Dept: UROLOGY | Facility: MEDICAL CENTER | Age: 62
End: 2022-07-14
Payer: COMMERCIAL

## 2022-07-14 VITALS
SYSTOLIC BLOOD PRESSURE: 134 MMHG | WEIGHT: 205 LBS | HEIGHT: 69 IN | BODY MASS INDEX: 30.36 KG/M2 | DIASTOLIC BLOOD PRESSURE: 86 MMHG

## 2022-07-14 DIAGNOSIS — N13.8 BENIGN PROSTATIC HYPERPLASIA WITH URINARY OBSTRUCTION: Primary | ICD-10-CM

## 2022-07-14 DIAGNOSIS — N20.0 NEPHROLITHIASIS: ICD-10-CM

## 2022-07-14 DIAGNOSIS — N40.1 BENIGN PROSTATIC HYPERPLASIA WITH URINARY OBSTRUCTION: Primary | ICD-10-CM

## 2022-07-14 PROBLEM — N40.0 ENLARGED PROSTATE WITHOUT LOWER URINARY TRACT SYMPTOMS (LUTS): Status: RESOLVED | Noted: 2019-03-25 | Resolved: 2022-07-14

## 2022-07-14 PROCEDURE — 3079F DIAST BP 80-89 MM HG: CPT | Performed by: UROLOGY

## 2022-07-14 PROCEDURE — 99214 OFFICE O/P EST MOD 30 MIN: CPT | Performed by: UROLOGY

## 2022-07-14 PROCEDURE — 3075F SYST BP GE 130 - 139MM HG: CPT | Performed by: UROLOGY

## 2022-07-14 NOTE — ASSESSMENT & PLAN NOTE
AUA symptom score is 5  He is pleased with his voiding pattern  PSA was 2 2 in July 11, 2022  We will continue to follow with yearly PSA testing  We will follow his voiding pattern with watchful waiting  He will return in 1 year

## 2022-07-14 NOTE — ASSESSMENT & PLAN NOTE
A 3 mm right lower pole was noted on CT scan in January 2021  He remains asymptomatic  We will check a KUB prior to his next visit

## 2022-07-19 ENCOUNTER — CLINICAL SUPPORT (OUTPATIENT)
Dept: URGENT CARE | Facility: MEDICAL CENTER | Age: 62
End: 2022-07-19
Payer: COMMERCIAL

## 2022-07-19 ENCOUNTER — APPOINTMENT (OUTPATIENT)
Dept: LAB | Facility: MEDICAL CENTER | Age: 62
End: 2022-07-19
Payer: COMMERCIAL

## 2022-07-19 DIAGNOSIS — K40.90 LEFT INGUINAL HERNIA: ICD-10-CM

## 2022-07-19 LAB
ALBUMIN SERPL BCP-MCNC: 3.5 G/DL (ref 3.5–5)
ALP SERPL-CCNC: 44 U/L (ref 46–116)
ALT SERPL W P-5'-P-CCNC: 27 U/L (ref 12–78)
ANION GAP SERPL CALCULATED.3IONS-SCNC: 4 MMOL/L (ref 4–13)
AST SERPL W P-5'-P-CCNC: 20 U/L (ref 5–45)
ATRIAL RATE: 63 BPM
BASOPHILS # BLD AUTO: 0.04 THOUSANDS/ΜL (ref 0–0.1)
BASOPHILS NFR BLD AUTO: 0 % (ref 0–1)
BILIRUB SERPL-MCNC: 0.46 MG/DL (ref 0.2–1)
BUN SERPL-MCNC: 23 MG/DL (ref 5–25)
CALCIUM SERPL-MCNC: 9 MG/DL (ref 8.3–10.1)
CHLORIDE SERPL-SCNC: 107 MMOL/L (ref 96–108)
CO2 SERPL-SCNC: 33 MMOL/L (ref 21–32)
CREAT SERPL-MCNC: 0.9 MG/DL (ref 0.6–1.3)
EOSINOPHIL # BLD AUTO: 0.2 THOUSAND/ΜL (ref 0–0.61)
EOSINOPHIL NFR BLD AUTO: 2 % (ref 0–6)
ERYTHROCYTE [DISTWIDTH] IN BLOOD BY AUTOMATED COUNT: 13.3 % (ref 11.6–15.1)
GFR SERPL CREATININE-BSD FRML MDRD: 90 ML/MIN/1.73SQ M
GLUCOSE P FAST SERPL-MCNC: 103 MG/DL (ref 65–99)
HCT VFR BLD AUTO: 43.3 % (ref 36.5–49.3)
HGB BLD-MCNC: 14.1 G/DL (ref 12–17)
IMM GRANULOCYTES # BLD AUTO: 0.04 THOUSAND/UL (ref 0–0.2)
IMM GRANULOCYTES NFR BLD AUTO: 0 % (ref 0–2)
LYMPHOCYTES # BLD AUTO: 2.15 THOUSANDS/ΜL (ref 0.6–4.47)
LYMPHOCYTES NFR BLD AUTO: 22 % (ref 14–44)
MCH RBC QN AUTO: 30.9 PG (ref 26.8–34.3)
MCHC RBC AUTO-ENTMCNC: 32.6 G/DL (ref 31.4–37.4)
MCV RBC AUTO: 95 FL (ref 82–98)
MONOCYTES # BLD AUTO: 0.88 THOUSAND/ΜL (ref 0.17–1.22)
MONOCYTES NFR BLD AUTO: 9 % (ref 4–12)
NEUTROPHILS # BLD AUTO: 6.61 THOUSANDS/ΜL (ref 1.85–7.62)
NEUTS SEG NFR BLD AUTO: 67 % (ref 43–75)
NRBC BLD AUTO-RTO: 0 /100 WBCS
P AXIS: 45 DEGREES
PLATELET # BLD AUTO: 259 THOUSANDS/UL (ref 149–390)
PMV BLD AUTO: 9.7 FL (ref 8.9–12.7)
POTASSIUM SERPL-SCNC: 3.8 MMOL/L (ref 3.5–5.3)
PR INTERVAL: 156 MS
PROT SERPL-MCNC: 6.9 G/DL (ref 6.4–8.4)
QRS AXIS: -11 DEGREES
QRSD INTERVAL: 94 MS
QT INTERVAL: 426 MS
QTC INTERVAL: 435 MS
RBC # BLD AUTO: 4.56 MILLION/UL (ref 3.88–5.62)
SODIUM SERPL-SCNC: 144 MMOL/L (ref 135–147)
T WAVE AXIS: 18 DEGREES
VENTRICULAR RATE: 63 BPM
WBC # BLD AUTO: 9.92 THOUSAND/UL (ref 4.31–10.16)

## 2022-07-19 PROCEDURE — 93010 ELECTROCARDIOGRAM REPORT: CPT | Performed by: INTERNAL MEDICINE

## 2022-07-19 PROCEDURE — 93005 ELECTROCARDIOGRAM TRACING: CPT

## 2022-07-19 PROCEDURE — 36415 COLL VENOUS BLD VENIPUNCTURE: CPT

## 2022-07-19 PROCEDURE — 80053 COMPREHEN METABOLIC PANEL: CPT

## 2022-07-19 PROCEDURE — 85025 COMPLETE CBC W/AUTO DIFF WBC: CPT

## 2022-07-28 DIAGNOSIS — I10 ESSENTIAL HYPERTENSION: ICD-10-CM

## 2022-07-28 RX ORDER — ACETAMINOPHEN 500 MG
500-1000 TABLET ORAL EVERY 6 HOURS PRN
COMMUNITY

## 2022-07-28 RX ORDER — HYDROCHLOROTHIAZIDE 25 MG/1
TABLET ORAL
Qty: 30 TABLET | Refills: 5 | Status: SHIPPED | OUTPATIENT
Start: 2022-07-28

## 2022-07-28 RX ORDER — IBUPROFEN 200 MG
200-800 TABLET ORAL EVERY 6 HOURS PRN
COMMUNITY

## 2022-07-28 NOTE — PRE-PROCEDURE INSTRUCTIONS
Pre-Surgery Instructions:   Medication Instructions    famotidine (PEPCID) 10 mg tablet Uses PRN- OK to take day of surgery    hydrochlorothiazide (HYDRODIURIL) 25 mg tablet Hold day of surgery   methocarbamol (ROBAXIN) 750 mg tablet Hold day of surgery   NON FORMULARY Hold day of surgery   tadalafil (CIALIS) 20 MG tablet Hold day of surgery      traMADol (ULTRAM) 50 mg tablet Uses PRN- OK to take day of surgery

## 2022-08-01 DIAGNOSIS — M62.830 MUSCLE SPASM OF BACK: ICD-10-CM

## 2022-08-01 DIAGNOSIS — F41.9 ANXIETY: ICD-10-CM

## 2022-08-01 RX ORDER — ALPRAZOLAM 0.25 MG/1
0.25 TABLET ORAL
Qty: 20 TABLET | Refills: 0 | Status: SHIPPED | OUTPATIENT
Start: 2022-08-01 | End: 2022-08-19 | Stop reason: SDUPTHER

## 2022-08-01 RX ORDER — METHOCARBAMOL 750 MG/1
TABLET, FILM COATED ORAL
Qty: 30 TABLET | Refills: 0 | Status: SHIPPED | OUTPATIENT
Start: 2022-08-01 | End: 2022-09-28 | Stop reason: SDUPTHER

## 2022-08-03 ENCOUNTER — ANESTHESIA EVENT (OUTPATIENT)
Dept: PERIOP | Facility: HOSPITAL | Age: 62
End: 2022-08-03
Payer: COMMERCIAL

## 2022-08-03 RX ORDER — ONDANSETRON 2 MG/ML
4 INJECTION INTRAMUSCULAR; INTRAVENOUS ONCE AS NEEDED
Status: CANCELLED | OUTPATIENT
Start: 2022-08-03

## 2022-08-04 ENCOUNTER — HOSPITAL ENCOUNTER (OUTPATIENT)
Facility: HOSPITAL | Age: 62
Setting detail: OUTPATIENT SURGERY
Discharge: HOME | End: 2022-08-04
Attending: SURGERY | Admitting: SURGERY
Payer: COMMERCIAL

## 2022-08-04 ENCOUNTER — ANESTHESIA (OUTPATIENT)
Dept: PERIOP | Facility: HOSPITAL | Age: 62
End: 2022-08-04
Payer: COMMERCIAL

## 2022-08-04 VITALS
RESPIRATION RATE: 16 BRPM | SYSTOLIC BLOOD PRESSURE: 113 MMHG | DIASTOLIC BLOOD PRESSURE: 69 MMHG | BODY MASS INDEX: 31.05 KG/M2 | OXYGEN SATURATION: 95 % | WEIGHT: 209.66 LBS | TEMPERATURE: 97.6 F | HEIGHT: 69 IN | HEART RATE: 56 BPM

## 2022-08-04 DIAGNOSIS — K40.90 LEFT INGUINAL HERNIA: ICD-10-CM

## 2022-08-04 PROBLEM — M26.622 ARTHRALGIA OF LEFT TEMPOROMANDIBULAR JOINT: Status: ACTIVE | Noted: 2022-08-04

## 2022-08-04 PROBLEM — R11.2 PONV (POSTOPERATIVE NAUSEA AND VOMITING): Status: ACTIVE | Noted: 2022-08-04

## 2022-08-04 PROBLEM — Z98.890 PONV (POSTOPERATIVE NAUSEA AND VOMITING): Status: ACTIVE | Noted: 2022-08-04

## 2022-08-04 PROCEDURE — C1781 MESH (IMPLANTABLE): HCPCS | Performed by: SURGERY

## 2022-08-04 PROCEDURE — 88302 TISSUE EXAM BY PATHOLOGIST: CPT | Performed by: STUDENT IN AN ORGANIZED HEALTH CARE EDUCATION/TRAINING PROGRAM

## 2022-08-04 PROCEDURE — NC001 PR NO CHARGE: Performed by: SURGERY

## 2022-08-04 PROCEDURE — 49650 LAP ING HERNIA REPAIR INIT: CPT | Performed by: SURGERY

## 2022-08-04 PROCEDURE — S2900 ROBOTIC SURGICAL SYSTEM: HCPCS | Performed by: SURGERY

## 2022-08-04 DEVICE — 3DMAX™ MID ANATOMICAL MESH, XL, LEFT, 5” X 7”, 12 X 17 CM
Type: IMPLANTABLE DEVICE | Site: INGUINAL | Status: FUNCTIONAL
Brand: 3DMAX™ MID ANATOMICAL MESH

## 2022-08-04 RX ORDER — SODIUM CHLORIDE, SODIUM LACTATE, POTASSIUM CHLORIDE, CALCIUM CHLORIDE 600; 310; 30; 20 MG/100ML; MG/100ML; MG/100ML; MG/100ML
INJECTION, SOLUTION INTRAVENOUS CONTINUOUS PRN
Status: DISCONTINUED | OUTPATIENT
Start: 2022-08-04 | End: 2022-08-04

## 2022-08-04 RX ORDER — CEFAZOLIN SODIUM 2 G/50ML
2000 SOLUTION INTRAVENOUS ONCE
Status: COMPLETED | OUTPATIENT
Start: 2022-08-04 | End: 2022-08-04

## 2022-08-04 RX ORDER — ACETAMINOPHEN 325 MG/1
975 TABLET ORAL ONCE
Status: COMPLETED | OUTPATIENT
Start: 2022-08-04 | End: 2022-08-04

## 2022-08-04 RX ORDER — PROPOFOL 10 MG/ML
INJECTION, EMULSION INTRAVENOUS AS NEEDED
Status: DISCONTINUED | OUTPATIENT
Start: 2022-08-04 | End: 2022-08-04

## 2022-08-04 RX ORDER — KETAMINE HCL IN NACL, ISO-OSM 100MG/10ML
SYRINGE (ML) INJECTION AS NEEDED
Status: DISCONTINUED | OUTPATIENT
Start: 2022-08-04 | End: 2022-08-04

## 2022-08-04 RX ORDER — DEXAMETHASONE SODIUM PHOSPHATE 10 MG/ML
INJECTION, SOLUTION INTRAMUSCULAR; INTRAVENOUS AS NEEDED
Status: DISCONTINUED | OUTPATIENT
Start: 2022-08-04 | End: 2022-08-04

## 2022-08-04 RX ORDER — ACETAMINOPHEN 325 MG/1
975 TABLET ORAL EVERY 6 HOURS SCHEDULED
Status: DISCONTINUED | OUTPATIENT
Start: 2022-08-04 | End: 2022-08-04 | Stop reason: HOSPADM

## 2022-08-04 RX ORDER — PROMETHAZINE HYDROCHLORIDE 25 MG/ML
INJECTION, SOLUTION INTRAMUSCULAR; INTRAVENOUS AS NEEDED
Status: DISCONTINUED | OUTPATIENT
Start: 2022-08-04 | End: 2022-08-04

## 2022-08-04 RX ORDER — ONDANSETRON 2 MG/ML
4 INJECTION INTRAMUSCULAR; INTRAVENOUS EVERY 6 HOURS PRN
Status: DISCONTINUED | OUTPATIENT
Start: 2022-08-04 | End: 2022-08-04 | Stop reason: HOSPADM

## 2022-08-04 RX ORDER — FENTANYL CITRATE/PF 50 MCG/ML
25 SYRINGE (ML) INJECTION
Status: DISCONTINUED | OUTPATIENT
Start: 2022-08-04 | End: 2022-08-04 | Stop reason: HOSPADM

## 2022-08-04 RX ORDER — BUPIVACAINE HYDROCHLORIDE 2.5 MG/ML
INJECTION, SOLUTION EPIDURAL; INFILTRATION; INTRACAUDAL AS NEEDED
Status: DISCONTINUED | OUTPATIENT
Start: 2022-08-04 | End: 2022-08-04 | Stop reason: HOSPADM

## 2022-08-04 RX ORDER — DEXMEDETOMIDINE HYDROCHLORIDE 100 UG/ML
INJECTION, SOLUTION INTRAVENOUS AS NEEDED
Status: DISCONTINUED | OUTPATIENT
Start: 2022-08-04 | End: 2022-08-04

## 2022-08-04 RX ORDER — SODIUM CHLORIDE 9 MG/ML
125 INJECTION, SOLUTION INTRAVENOUS CONTINUOUS
Status: DISCONTINUED | OUTPATIENT
Start: 2022-08-04 | End: 2022-08-04 | Stop reason: HOSPADM

## 2022-08-04 RX ORDER — ONDANSETRON 2 MG/ML
INJECTION INTRAMUSCULAR; INTRAVENOUS AS NEEDED
Status: DISCONTINUED | OUTPATIENT
Start: 2022-08-04 | End: 2022-08-04

## 2022-08-04 RX ORDER — PROMETHAZINE HYDROCHLORIDE 25 MG/ML
12.5 INJECTION, SOLUTION INTRAMUSCULAR; INTRAVENOUS ONCE
Status: DISCONTINUED | OUTPATIENT
Start: 2022-08-04 | End: 2022-08-04 | Stop reason: HOSPADM

## 2022-08-04 RX ORDER — ACETAMINOPHEN 325 MG/1
975 TABLET ORAL EVERY 6 HOURS SCHEDULED
Status: DISCONTINUED | OUTPATIENT
Start: 2022-08-04 | End: 2022-08-04

## 2022-08-04 RX ORDER — SCOLOPAMINE TRANSDERMAL SYSTEM 1 MG/1
1 PATCH, EXTENDED RELEASE TRANSDERMAL ONCE AS NEEDED
Status: DISCONTINUED | OUTPATIENT
Start: 2022-08-04 | End: 2022-08-04 | Stop reason: HOSPADM

## 2022-08-04 RX ORDER — PROPOFOL 10 MG/ML
INJECTION, EMULSION INTRAVENOUS CONTINUOUS PRN
Status: DISCONTINUED | OUTPATIENT
Start: 2022-08-04 | End: 2022-08-04

## 2022-08-04 RX ORDER — ROCURONIUM BROMIDE 10 MG/ML
INJECTION, SOLUTION INTRAVENOUS AS NEEDED
Status: DISCONTINUED | OUTPATIENT
Start: 2022-08-04 | End: 2022-08-04

## 2022-08-04 RX ORDER — KETOROLAC TROMETHAMINE 30 MG/ML
INJECTION, SOLUTION INTRAMUSCULAR; INTRAVENOUS AS NEEDED
Status: DISCONTINUED | OUTPATIENT
Start: 2022-08-04 | End: 2022-08-04

## 2022-08-04 RX ORDER — MIDAZOLAM HYDROCHLORIDE 2 MG/2ML
INJECTION, SOLUTION INTRAMUSCULAR; INTRAVENOUS AS NEEDED
Status: DISCONTINUED | OUTPATIENT
Start: 2022-08-04 | End: 2022-08-04

## 2022-08-04 RX ADMIN — CEFAZOLIN SODIUM 2000 MG: 2 SOLUTION INTRAVENOUS at 09:59

## 2022-08-04 RX ADMIN — SODIUM CHLORIDE, SODIUM LACTATE, POTASSIUM CHLORIDE, AND CALCIUM CHLORIDE: .6; .31; .03; .02 INJECTION, SOLUTION INTRAVENOUS at 10:15

## 2022-08-04 RX ADMIN — MIDAZOLAM 2 MG: 1 INJECTION INTRAMUSCULAR; INTRAVENOUS at 10:00

## 2022-08-04 RX ADMIN — DEXAMETHASONE SODIUM PHOSPHATE 5 MG: 10 INJECTION INTRAMUSCULAR; INTRAVENOUS at 10:06

## 2022-08-04 RX ADMIN — DEXMEDETOMIDINE HCL 8 MCG: 100 INJECTION INTRAVENOUS at 10:25

## 2022-08-04 RX ADMIN — ACETAMINOPHEN 975 MG: 325 TABLET, FILM COATED ORAL at 14:46

## 2022-08-04 RX ADMIN — PROPOFOL 150 MCG/KG/MIN: 10 INJECTION, EMULSION INTRAVENOUS at 10:06

## 2022-08-04 RX ADMIN — DEXMEDETOMIDINE HCL 12 MCG: 100 INJECTION INTRAVENOUS at 11:52

## 2022-08-04 RX ADMIN — DEXMEDETOMIDINE HCL 8 MCG: 100 INJECTION INTRAVENOUS at 10:10

## 2022-08-04 RX ADMIN — DEXMEDETOMIDINE HCL 8 MCG: 100 INJECTION INTRAVENOUS at 11:00

## 2022-08-04 RX ADMIN — ROCURONIUM BROMIDE 20 MG: 50 INJECTION, SOLUTION INTRAVENOUS at 11:07

## 2022-08-04 RX ADMIN — DEXMEDETOMIDINE HCL 8 MCG: 100 INJECTION INTRAVENOUS at 11:05

## 2022-08-04 RX ADMIN — SCOPALAMINE 1 PATCH: 1 PATCH, EXTENDED RELEASE TRANSDERMAL at 09:39

## 2022-08-04 RX ADMIN — Medication 20 MG: at 10:41

## 2022-08-04 RX ADMIN — SODIUM CHLORIDE 125 ML/HR: 0.9 INJECTION, SOLUTION INTRAVENOUS at 09:43

## 2022-08-04 RX ADMIN — DEXMEDETOMIDINE HCL 8 MCG: 100 INJECTION INTRAVENOUS at 10:42

## 2022-08-04 RX ADMIN — DEXMEDETOMIDINE HCL 8 MCG: 100 INJECTION INTRAVENOUS at 11:12

## 2022-08-04 RX ADMIN — DEXMEDETOMIDINE HCL 8 MCG: 100 INJECTION INTRAVENOUS at 10:06

## 2022-08-04 RX ADMIN — DEXMEDETOMIDINE HCL 8 MCG: 100 INJECTION INTRAVENOUS at 11:49

## 2022-08-04 RX ADMIN — LIDOCAINE HYDROCHLORIDE 100 MG: 20 INJECTION INTRAVENOUS at 10:06

## 2022-08-04 RX ADMIN — ONDANSETRON 8 MG: 2 INJECTION INTRAMUSCULAR; INTRAVENOUS at 12:23

## 2022-08-04 RX ADMIN — PROPOFOL 200 MG: 10 INJECTION, EMULSION INTRAVENOUS at 10:06

## 2022-08-04 RX ADMIN — KETOROLAC TROMETHAMINE 30 MG: 30 INJECTION, SOLUTION INTRAMUSCULAR at 12:23

## 2022-08-04 RX ADMIN — Medication 30 MG: at 10:06

## 2022-08-04 RX ADMIN — ROCURONIUM BROMIDE 80 MG: 50 INJECTION, SOLUTION INTRAVENOUS at 10:06

## 2022-08-04 RX ADMIN — SUGAMMADEX 200 MG: 100 INJECTION, SOLUTION INTRAVENOUS at 12:31

## 2022-08-04 RX ADMIN — DEXMEDETOMIDINE HCL 8 MCG: 100 INJECTION INTRAVENOUS at 10:15

## 2022-08-04 RX ADMIN — DEXMEDETOMIDINE HCL 8 MCG: 100 INJECTION INTRAVENOUS at 10:20

## 2022-08-04 RX ADMIN — PROMETHAZINE HYDROCHLORIDE 6.25 MG: 25 INJECTION INTRAMUSCULAR; INTRAVENOUS at 10:06

## 2022-08-04 RX ADMIN — DEXMEDETOMIDINE HCL 8 MCG: 100 INJECTION INTRAVENOUS at 11:09

## 2022-08-04 NOTE — ANESTHESIA PREPROCEDURE EVALUATION
Procedure:  REPAIR HERNIA INGUINAL LAPAROSCOPIC W/ ROBOTICS (Left Groin)    Relevant Problems   ANESTHESIA   (+) PONV (postoperative nausea and vomiting)      CARDIO   (+) Chest pain   (+) Essential hypertension   (+) Hyperlipidemia      GI/HEPATIC   (+) Esophageal reflux   (+) Hepatic steatosis      /RENAL   (+) CHASIDY (acute kidney injury) ([facility] [address] )   (+) Benign prostatic hyperplasia with urinary obstruction   (+) Hydronephrosis with urinary obstruction due to ureteral calculus   (+) Nephrolithiasis      MUSCULOSKELETAL   (+) Backache   (+) Bilateral low back pain with left-sided sciatica   (+) DDD (degenerative disc disease), lumbar   (+) Lateral epicondylitis      NEURO/PSYCH   (+) Anxiety   (+) History of colonic polyps   (+) Left-sided headache   (+) Other headache syndrome      Musculoskeletal and Integument   (+) Arthralgia of left temporomandibular joint      Other   (+) Abnormal EKG   (+) Obesity (BMI 30-39  9)        Physical Exam    Airway    Mallampati score: III  TM Distance: >3 FB  Neck ROM: full     Dental   No notable dental hx     Cardiovascular  Rate: normal, Cardiovascular exam normal    Pulmonary  Pulmonary exam normal Breath sounds clear to auscultation,     Other Findings        Anesthesia Plan  ASA Score- 3     Anesthesia Type- general with ASA Monitors  Additional Monitors:   Airway Plan: ETT  Comment: Intolerant of "all narcotics"--says he tolerated [place]  SCOP patch ordered          Plan Factors-    Patient summary reviewed  Patient is not a current smoker  Patient not instructed to abstain from smoking on day of procedure  Patient did not smoke on day of surgery  Obstructive sleep apnea risk education given perioperatively  Induction-     Postoperative Plan-   Planned trial extubation    Informed Consent- Anesthetic plan and risks discussed with patient and spouse

## 2022-08-04 NOTE — DISCHARGE INSTRUCTIONS
Fritz Anguiano Instructions     Dr Wilson Cantor MD, [facility]     [phone]          1  General: You will feel pulling sensations around the wound or funny aches and pains around the incisions  This is normal  Even minor surgery is a change in your body and this is your bodys way of reacting to it  If you have had abdominal surgery, it may help to support the incision with a small pillow or blanket for comfort when moving or coughing  2  Wound care:  Okay to shower  The glue will fall off over the next week or 2  Use ice for at least the 1st 48 hours  Do not use for longer than 20 minutes at a time  Use 3 times per day  3  Water: You may shower over the wounds  Do not bathe or use a pool or hot tub until cleared by the physician  If you were discharged with a drain, make sure drain site is covered with plastic wrap before showering  4  Activity: You may go up and down stairs, walk as much as you are comfortable, but walk at least 3 times each day  If you have had abdominal surgery, do not lift anything heavier than 20 pounds for at least 4 weeks  5  Diet: You may resume a regular diet  If you had a same-day surgery or overnight stay surgery, you may wish to eat lightly for a few days: soups, crackers, and sandwiches  You may resume a regular diet when ready  6  Medications: Resume all of your previous medications, unless told otherwise by the doctor  Avoid aspirin products for 2-3 days after the date of surgery  You may, at that time, began to take them again  Use Tylenol and Ibuprofen for pain control  You may alternate these medications every 3 hours  For example: you may take Tylenol at noon, Ibuprofen at 3:00 p m , and Tylenol again at 6:00 p m , etc  You should use ice to assist with pain control as above  You do not need to take narcotic pain medication unless you are having significant pain     If you were prescribed a narcotic pain medication containing Tylenol, such as Percocet or Norco, do not use supplemental Tylenol  7  Driving: You will need someone to drive you home on the day of surgery or discharge  Do not drive or make any important decisions while on narcotic pain medication or 24 hours and after anesthesia or sedation for surgery  Generally, you may drive when your off all narcotic pain medications and you are comfortable  8  Upset Stomach: You may take Maalox, Tums, or similar items for an upset stomach  If your narcotic pain medication causes an upset stomach, do not take it on an empty stomach  Try taking it with at least some crackers or toast       9  Constipation: Patients often experience constipation after surgery  You may take over-the-counter medication for this, such as Metamucil, Senokot, Dulcolax, milk of magnesia, etc  You may take a suppository unless you have had anorectal surgery such as a procedure on your hemorrhoids  If you experience significant nausea or vomiting after abdominal surgery, call the office before trying any of these medications  10  Call the office: If you are experiencing any of the following: fevers above 101 5°, significant nausea or vomiting, if the wound develops drainage and/or there is excessive redness around the wound, or if you have significant diarrhea or other worsening symptoms  11  Pain: You may be given a prescription for pain medication  This will be sent to your pharmacy prior to discharge  12  Sexual Activity: You may resume sexual activity when you feel ready and comfortable and your incision is sealed and healed without apparent infection risk  13  Urination: If you have not urinated in 6 hours, go directly to the ER for evaluation for urinary retention  14  Follow-up in 2 weeks            **READ ONLY IF YOU HAVE BEEN DISCHARGED WITH A URINARY CATHETER**    Henry Insertion for Post-Op Urinary Retention        - A prescription for Flomax will be sent to your pharmacy  This should be taken daily while the urinary catheter remains in place  You will not be given a prescription for Flomax if your prostate has been removed  If you are already taking Flomax, continue the medication as prescribed  - We will send a message to the urology group who will contact you within the next 48 hours with further instructions and to schedule an appointment for voiding trial and catheter removal   The urinary catheter will remain in place for approximately 1 week  If you are not contacted within the next 48 hours please call our office to assist with scheduling your follow-up       - If you have your own urologist, you should contact your physician the day after discharge for instructions and to schedule a voiding trial and catheter removal

## 2022-08-04 NOTE — ANESTHESIA POSTPROCEDURE EVALUATION
Post-Op Assessment Note    CV Status:  Stable    Pain management: adequate     Mental Status:  Awake   Hydration Status:  Stable   PONV Controlled:  None   Airway Patency:  Positional obstruction and adequate      Post Op Vitals Reviewed: Yes      Staff: Anesthesiologist         No complications documented      BP      Temp      Pulse     Resp      SpO2      /72   Pulse 63   Temp 97 6 °F (36 4 °C)   Resp 14   Ht 5' 9" (1 753 m)   Wt 95 1 kg (209 lb 10 5 oz)   SpO2 96%   BMI 30 96 kg/m²

## 2022-08-04 NOTE — H&P
History & Physical    Levon Parsons    60 y o   male  [id number]  Renan Greene MD  Date: August 4, 2022    Assessment:  Patient Active Problem List   Diagnosis    Nephrolithiasis    AVM (arteriovenous malformation) brain    Hyperlipidemia    Obesity (BMI 30-39  9)    Snoring    Bilateral low back pain with left-sided sciatica    Backache    Essential hypertension    Ulnar nerve abnormality    Tinnitus of both ears    Periumbilical hernia    Other male erectile dysfunction    Lung nodule    Mass of breast    DDD (degenerative disc disease), lumbar    Left inguinal hernia    Lateral epicondylitis    Irritable bowel syndrome    Hydronephrosis with urinary obstruction due to ureteral calculus    History of colonic polyps    Hepatic steatosis    Left-sided headache    Esophageal reflux    Elevated blood-pressure reading without diagnosis of hypertension    Constipation    Colitis    Cerebrovascular dural venous malformation    Brain aneurysm    Benign prostatic hyperplasia with urinary obstruction    Anxiety    Radiculopathy, lumbar region    Chest pain    Lumbar sprain    Strain of thoracic region    Abnormal EKG    Allergic rhinitis    CHASIDY (acute kidney injury) (HCC)    COVID-19    Postnasal drip    Other headache syndrome    Pain of left lower extremity    PONV (postoperative nausea and vomiting)    Arthralgia of left temporomandibular joint     Plan:  Levon Parsons is scheduled for robotic left inguinal hernia repair with mesh      HPI    Historical Information   Past Medical History:   Diagnosis Date    AVM (arteriovenous malformation)     BPH without urinary obstruction     last assessed 01/16/18    Chronic pain disorder     low back and neck    COVID     Jan 2021 and 5/14/22    Headache     occ    Hemorrhoids     Hydronephrosis     Hypertension     Kidney disease     Left inguinal hernia     Lumbar herniated disc     PONV (postoperative nausea and vomiting)     Renal calculi     Renal colic     TMJ (dislocation of temporomandibular joint)     Ureter colic      Past Surgical History:   Procedure Laterality Date    BRAIN SURGERY      COLONOSCOPY      CYSTOSCOPY      w/removal of object, last assessed 01/16/18    CYSTOSCOPY      w/removal of ureteral calculus last assessed 01/16/18    CYSTOSCOPY KIDNEY W/ URETERAL GUIDE WIRE      last assessed 01/16/18    CYSTOSCOPY W/ URETERAL STENT PLACEMENT      last assessed 01/16/18    FL RETROGRADE PYELOGRAM  7/9/2020   [address]    HERNIA REPAIR      KIDNEY SURGERY      removal of kidney stone in 2013 at [address] &INDWELL STENT INSRT Right 12/13/2018    Procedure: CYSTOSCOPY URETEROSCOPY  RETROGRADE PYELOGRAM AND INSERTION RIGHT STENT URETERAL;  Surgeon: Cedrick Jay MD;  [facility];  Service: Urology    ID CYSTO/URETERO W/LITHOTRIPSY &INDWELL STENT INSRT Left 7/9/2020    Procedure: CYSTOSCOPY URETEROSCOPY WITH LITHOTRIPSY HOLMIUM LASER, RETROGRADE PYELOGRAM AND INSERTION STENT URETERAL;  Surgeon: Cedrick Jay MD;  [facility];  Service: Urology    TOOTH EXTRACTION       Social History   Social History     Substance and Sexual Activity   Alcohol Use Yes    Comment: rare     Social History     Substance and Sexual Activity   Drug Use No     Social History     Tobacco Use   Smoking Status Never Smoker   Smokeless Tobacco Never Used     Family History   Problem Relation Age of Onset    Stroke Mother     Cancer Father     Diabetes Father     Prostate cancer Father     Heart disease Father     Hypertension Father         Meds/Allergies   Allergies   Allergen Reactions    Morphine Other (See Comments)    Other      Pt states that any narcotic makes him feel sick    Oxycodone-Acetaminophen Nausea Only, Vomiting and GI Intolerance    Seasonal Ic  [Cholestatin]      Other reaction(s): Nasal Congestion, Sinus Pain Current Facility-Administered Medications:     scopolamine (TRANSDERM-SCOP) 1 mg/3 days TD 72 hr patch 1 patch, 1 patch, Transdermal, Once PRN, Patricia Power DO    sodium chloride 0 9 % infusion, 125 mL/hr, Intravenous, Continuous, Velvet Beard MD    Review of Systems    Vitals:    08/04/22 0833   BP: 158/77   Pulse: 66   Resp: 14   Temp: 98 1 °F (36 7 °C)   SpO2: 98%     Physical Exam  GEN: NAD, A+OX3   HEENT: Normocephalic, atraumatic,   NECK: Supple, trachea midline,   CARDIAC: regular rate & rhythm, S1 & S2 normal    LUNGS: Clear to auscultation, No Wheeze, Rales, or Rhonchi  ABDOMEN:  Previous umbilical hernia repair  Left inguinal hernia  EXTREMITIES: No evidence of cyanosis, clubbing or edema  Pulses +2 B/L LE  NEURO: CN II-XII intact grossly, No sensory or motor deficits    Lab Results: I have personally reviewed pertinent lab results  Imaging: I have personally reviewed pertinent reports  EKG, Pathology, and Other Studies: I have personally reviewed pertinent reports      Lab Results   Component Value Date    CALCIUM 9 0 07/19/2022    K 3 8 07/19/2022    CO2 33 (H) 07/19/2022     07/19/2022    BUN 23 07/19/2022    CREATININE 0 90 07/19/2022     Lab Results   Component Value Date    WBC 9 92 07/19/2022    HGB 14 1 07/19/2022    HCT 43 3 07/19/2022    MCV 95 07/19/2022     07/19/2022     Lab Results   Component Value Date    ALT 27 07/19/2022    AST 20 07/19/2022    ALKPHOS 44 (L) 07/19/2022

## 2022-08-04 NOTE — OP NOTE
OPERATIVE REPORT  PATIENT NAME: Shayla Dial    :  1960  MRN: [id number]  Pt Location: [facility] 08    SURGERY DATE: 2022    Surgeon(s) and Role:     * Camilo Whitney MD - Primary     * Itz Bowman PA-C - Assisting     * Sam Bronson MD - Assisting    Preop Diagnosis:  Left inguinal hernia [K40 90]    Post-Op Diagnosis Codes:     * Left inguinal hernia [K40 90]    Procedure(s) (LRB):  REPAIR HERNIA INGUINAL LAPAROSCOPIC W/ ROBOTICS (Left)    Specimen(s):  ID Type Source Tests Collected by Time Destination   1 : Hernia Sac Left Inguinal Tissue Hernia Sac, Left Inguinal TISSUE EXAM Camilo Whitney MD 2022 1219        Estimated Blood Loss:   Minimal    Drains:  Urethral Catheter Latex 16 Fr  (Active)   Number of days: 0       Ureteral Drain/Stent Right ureter 4 7 Fr  (Active)   Number of days: 1330       Anesthesia Type:   General    Operative Indications:  Left inguinal hernia [K40 90]      Operative Findings:  With midline adhesions to previous hernia mesh at umbilicus  Left indirect inguinal hernia    Complications:   None    Procedure and Technique:  The patient was seen again in the Holding Room  The risks, benefits, complications, treatment options, and expected outcomes were discussed with the patient  The possibilities of reaction to medication, pulmonary aspiration, perforation of viscus, bleeding, postoperative short or long term nerve entrapment causing pain,recurrent infection, the need for additional procedures, and development of a complication requiring transfusion or further operation were discussed with the patient and/or family  There was concurrence with the proposed plan, and informed consent was obtained  The site of surgery was properly noted/marked  The patient was taken to the Operating Room, identified as Shayla Dial and the procedure verified as hernia repair  A Time Out was held and the above information confirmed      The patient was prepped and draped in a sterile fashion  A timeout was again performed  Local anesthesia was used in the incision  An umbilical incision was made  Dissection carried out to the fascia which was grasped with Kocher's and elevated  The fascia was incised an 8 mm trocar was placed in under direct visualization  The abdomen was inflated and the camera was placed in    Two8 mm trocars were placed lateral to rectus muscle approximately at the level of the umbilicus  At this point the patient was placed into Trendelenburg position and the robot was docked and the instruments were placed in  The patient was noted to have left inguinal hernia   The peritoneum was incised from the medial umbilical fold out laterally past the internal ring on the left  Next the direct space was mobilized by exposing Jeremy's ligament all the way along its length to the pubic tubercle  If a direct hernia defect was seen this was dissected and reduced  If there was indirect hernia sac this was carefully mobilized off the cord structures with care to avoid injury to the gonadal vessels or spermatic cord  The remainder of the inferior flap was created  At this point  Bard 3D max mesh was selected and placed into the preperitoneal space  The mesh was deployed and placed in the appropriate position  The edge of the peritoneum was well below the edge of the mesh  The mesh secured with a 2-0 Vicryl suture at Jeremy's  The peritoneum was then sutured closed with the V lock suture  Now the repair was complete the robot was undocked  The umbilical trocor site was closed with an  0-vicryl using a suture Passer  The wound was closed in multiple layers using 3-0 Vicryl sutures and the skin closed using a 4-0 Monocryl subcuticular stitch  The wound was dressed  The patient was anatomically correct at the end of the procedure  The patient tolerated the procedure in good condition      Instrument, sponge, and needle counts were correct prior to closure and at the conclusion of the case  The PA was essential for assistance with abdominal access and docking the robot as well as retraction and suturing  This text is generated with voice recognition software  There may be translation, syntax,  or grammatical errors  If you have any questions, please contact the dictating provider          I was present for the entire procedure    Patient Disposition:  PACU       SIGNATURE: Lashawn Madison MD  DATE: August 4, 2022  TIME: 12:24 PM

## 2022-08-07 ENCOUNTER — APPOINTMENT (EMERGENCY)
Dept: CT IMAGING | Facility: HOSPITAL | Age: 62
End: 2022-08-07
Payer: COMMERCIAL

## 2022-08-07 ENCOUNTER — HOSPITAL ENCOUNTER (EMERGENCY)
Facility: HOSPITAL | Age: 62
Discharge: HOME | End: 2022-08-07
Attending: EMERGENCY MEDICINE
Payer: COMMERCIAL

## 2022-08-07 VITALS
RESPIRATION RATE: 16 BRPM | DIASTOLIC BLOOD PRESSURE: 95 MMHG | TEMPERATURE: 98.6 F | OXYGEN SATURATION: 100 % | SYSTOLIC BLOOD PRESSURE: 161 MMHG | HEART RATE: 76 BPM | WEIGHT: 212.52 LBS | BODY MASS INDEX: 31.38 KG/M2

## 2022-08-07 DIAGNOSIS — G89.18 POST-OP PAIN: ICD-10-CM

## 2022-08-07 DIAGNOSIS — K59.00 CONSTIPATION: Primary | ICD-10-CM

## 2022-08-07 PROCEDURE — 99284 EMERGENCY DEPT VISIT MOD MDM: CPT | Performed by: PHYSICIAN ASSISTANT

## 2022-08-07 PROCEDURE — 99283 EMERGENCY DEPT VISIT LOW MDM: CPT

## 2022-08-07 PROCEDURE — G1004 CDSM NDSC: HCPCS

## 2022-08-07 PROCEDURE — 74176 CT ABD & PELVIS W/O CONTRAST: CPT

## 2022-08-07 RX ORDER — SODIUM PHOSPHATE, DIBASIC AND SODIUM PHOSPHATE, MONOBASIC 7; 19 G/133ML; G/133ML
1 ENEMA RECTAL ONCE
Status: DISCONTINUED | OUTPATIENT
Start: 2022-08-07 | End: 2022-08-07 | Stop reason: HOSPADM

## 2022-08-07 RX ORDER — POLYETHYLENE GLYCOL 3350 17 G/17G
17 POWDER, FOR SOLUTION ORAL ONCE
Status: DISCONTINUED | OUTPATIENT
Start: 2022-08-07 | End: 2022-08-07 | Stop reason: HOSPADM

## 2022-08-07 NOTE — ED PROVIDER NOTES
History  Chief Complaint   Patient presents with    Constipation     Pt had surgery on Thursday and have not been able to have a bowel movement  Pt stated he tried stool softeners, fiber, and dulcolax suppository with no relief  Patient is a 61 y/o male hx of AVM, BPH, HTN, presenting to the ED for evaluation of abdominal pain and constipation  Patient had laparoscopic left hernia repair on 08/04/2022  Patient states on Wednesday he had his last bowel movement  Has history of constipation issues  Patient states he has tried stool softener, fiber and add Dulcolax suppository today all without relief  Patient with generalized abdominal pain and pressure  Patient has been taking any narcotics for pain relief  Patient denies any fevers, chills, nausea, vomiting, urinary retention  Prior to Admission Medications   Prescriptions Last Dose Informant Patient Reported? Taking?    ALPRAZolam (XANAX) 0 25 mg tablet   No No   Sig: Take 1 tablet (0 25 mg total) by mouth daily at bedtime as needed for anxiety Take 1 hour for anxiety prior to MRI   Diclofenac Sodium (VOLTAREN) 1 %   No No   Sig: Apply 2 g topically 4 (four) times a day   Patient not taking: No sig reported   Diclofenac Sodium (VOLTAREN) 1 %   No No   Sig: Apply 2 g topically 4 (four) times a day as needed (knee pain)   Patient not taking: No sig reported   NON FORMULARY   Yes No   Sig: daily as needed Medical marijuana   acetaminophen (TYLENOL) 500 mg tablet   Yes No   Sig: Take 500-1,000 mg by mouth every 6 (six) hours as needed for mild pain   diclofenac sodium (VOLTAREN) 50 mg EC tablet   No No   Sig: Take 1 tablet (50 mg total) by mouth daily as needed (severe pain) Take with food   Patient not taking: No sig reported   famotidine (PEPCID) 10 mg tablet  Self Yes No   Sig: Take 10 mg by mouth Taken as needed   hydrochlorothiazide (HYDRODIURIL) 25 mg tablet   No No   Sig: TAKE 1 TABLET BY MOUTH EVERY DAY   ibuprofen (MOTRIN) 200 mg tablet Yes No   Sig: Take 200-800 mg by mouth every 6 (six) hours as needed for mild pain   methocarbamol (ROBAXIN) 750 mg tablet   No No   Sig: ONE PO at hs   tadalafil (CIALIS) 20 MG tablet  Self No No   Sig: Take 1 tablet (20 mg total) by mouth as needed for erectile dysfunction   traMADol (ULTRAM) 50 mg tablet   Yes No   Sig: TAKE 1 TABLET (50 MG TOTAL) BY MOUTH DAILY AS NEEDED FOR MODERATE PAIN (PAIN SCORE 4-6)        Facility-Administered Medications: None       Past Medical History:   Diagnosis Date    AVM (arteriovenous malformation)     BPH without urinary obstruction     last assessed 01/16/18    Chronic pain disorder     low back and neck    COVID     Jan 2021 and 5/14/22    Headache     occ    Hemorrhoids     Hydronephrosis     Hypertension     Kidney disease     Left inguinal hernia     Lumbar herniated disc     PONV (postoperative nausea and vomiting)     Renal calculi     Renal colic     TMJ (dislocation of temporomandibular joint)     Ureter colic        Past Surgical History:   Procedure Laterality Date    BRAIN SURGERY      COLONOSCOPY      CYSTOSCOPY      w/removal of object, last assessed 01/16/18    CYSTOSCOPY      w/removal of ureteral calculus last assessed 01/16/18    CYSTOSCOPY KIDNEY W/ URETERAL GUIDE WIRE      last assessed 01/16/18    CYSTOSCOPY W/ URETERAL STENT PLACEMENT      last assessed 01/16/18    FL RETROGRADE PYELOGRAM  7/9[address]    HERNIA REPAIR      KIDNEY SURGERY      removal of kidney stone in 2013 at [address] [facility] &[facility] STENT INSRT Right 12/13/2018    Procedure: CYSTOSCOPY URETEROSCOPY  RETROGRADE PYELOGRAM AND INSERTION RIGHT STENT URETERAL;  Surgeon: Cari Young MD;  [facility];  Service: Urology    MI CYSTO/URETERO W/LITHOTRIPSY &INDWELL STENT INSRT Left 7/9/2020    Procedure: CYSTOSCOPY URETEROSCOPY WITH LITHOTRIPSY HOLMIUM LASER, RETROGRADE PYELOGRAM AND INSERTION STENT URETERAL;  Surgeon: Carlie Pagan MD;  [facility];  Service: Urology    TOOTH EXTRACTION         Family History   Problem Relation Age of Onset    Stroke Mother     Cancer Father     Diabetes Father     Prostate cancer Father     Heart disease Father     Hypertension Father      I have reviewed and agree with the history as documented  E-Cigarette/Vaping    E-Cigarette Use Never User      E-Cigarette/Vaping Substances    Nicotine No     THC No     CBD No     Flavoring No     Other No     Unknown No      Social History     Tobacco Use    Smoking status: Never Smoker    Smokeless tobacco: Never Used   Vaping Use    Vaping Use: Never used   Substance Use Topics    Alcohol use: Yes     Comment: rare    Drug use: No       Review of Systems   Constitutional: Negative for chills and fever  HENT: Negative for congestion, ear pain and sore throat  Eyes: Negative for visual disturbance  Respiratory: Negative for cough and shortness of breath  Cardiovascular: Negative for chest pain  Gastrointestinal: Positive for abdominal pain and constipation  Negative for diarrhea, nausea and vomiting  Genitourinary: Negative for dysuria and hematuria  Musculoskeletal: Negative for back pain and neck pain  Skin: Negative for rash  Neurological: Negative for dizziness, speech difficulty, weakness and headaches  Psychiatric/Behavioral: Negative for confusion  Physical Exam  Physical Exam  Constitutional:       General: He is not in acute distress  Appearance: He is well-developed  He is not ill-appearing, toxic-appearing or diaphoretic  HENT:      Head: Normocephalic and atraumatic  Right Ear: External ear normal       Left Ear: External ear normal       Nose: Nose normal       Mouth/Throat:      Lips: Pink  Mouth: Mucous membranes are moist    Eyes:      Extraocular Movements: Extraocular movements intact        Conjunctiva/sclera: Conjunctivae normal  Cardiovascular:      Rate and Rhythm: Normal rate and regular rhythm  Pulmonary:      Effort: Pulmonary effort is normal       Breath sounds: Normal breath sounds  No decreased breath sounds, wheezing, rhonchi or rales  Abdominal:      General: A surgical scar is present  There is no distension  Palpations: Abdomen is soft  Tenderness: There is generalized abdominal tenderness  There is no guarding or rebound  Musculoskeletal:      Cervical back: Normal range of motion and neck supple  Skin:     General: Skin is warm  Capillary Refill: Capillary refill takes less than 2 seconds  Coloration: Skin is not jaundiced or pale  Findings: No rash  Neurological:      Mental Status: He is alert and oriented to person, place, and time  Psychiatric:         Mood and Affect: Mood and affect normal          Speech: Speech normal          Vital Signs  ED Triage Vitals [08/07/22 1439]   Temperature Pulse Respirations Blood Pressure SpO2   98 6 °F (37 °C) 76 16 161/95 100 %      Temp Source Heart Rate Source Patient Position - Orthostatic VS BP Location FiO2 (%)   Oral Monitor Sitting Right arm --      Pain Score       --           Vitals:    08/07/22 1439   BP: 161/95   Pulse: 76   Patient Position - Orthostatic VS: Sitting         Visual Acuity      ED Medications  Medications   polyethylene glycol (MIRALAX) packet 17 g (0 g Oral Hold 8/7/22 1658)   sodium phosphate-biphosphate (FLEET) enema 1 enema (0 enemas Rectal Hold 8/7/22 1659)       Diagnostic Studies  Results Reviewed     None                 CT abdomen pelvis wo contrast   Final Result by Collins Ha MD (08/07 1610)      Fat-containing left inguinal hernia identified with foci of gas and soft tissue stranding present presumably related to history of recent inguinal hernia repair  Associated infiltrative changes within the fat in the left lower quadrant adjacent hernia    site also presumably postoperative in etiology  Colonic diverticulosis  No evidence of bowel obstruction  Gas identified within the urinary bladder, correlate for iatrogenic etiology  Workstation performed: [id number]                    Procedures  Procedures         ED Course  ED Course as of 08/07/22 1716   Brigida West Aug 07, 2022   1636 TT to General Surgery   1636 Surgery agrees with plan for miralax and enema for constipation  1710 Patient requesting to perform enema/miralax at home                               SBIRT 22yo+    Flowsheet Row Most Recent Value   SBIRT (23 yo +)    In order to provide better care to our patients, we are screening all of our patients for alcohol and drug use  Would it be okay to ask you these screening questions? No Filed at: 08/07/2022 1518                    MDM  Number of Diagnoses or Management Options  Constipation  Post-op pain  Diagnosis management comments: Patient is a 61 y/o male hx of AVM, BPH, HTN, presenting to the ED for evaluation of abdominal pain and constipation  Patient afebrile  Surgical wounds healing well  CT a/p shows Fat-containing left inguinal hernia identified with foci of gas and soft tissue stranding present presumably related to history of recent inguinal hernia repair  Associated infiltrative changes within the fat in the left lower quadrant adjacent hernia   site also presumably postoperative in etiology  Colonic diverticulosis  No evidence of bowel obstruction  Gas identified within the urinary bladder, correlate for iatrogenic etiology      Discussed with general surgery resident, agree with plan for miralax and enema treatment for constipation  F/u as outpatient    Patient verbalizes understanding and agrees with plan  The management plan was discussed in detail with the patient at bedside and all questions were answered  Prior to discharge, I provided both verbal and written instructions   I discussed with the patient the signs and symptoms for which to return to the emergency department  All questions were answered and patient was comfortable with the plan of care and discharged to home  The patient agrees to return to the Emergency Department for concerns and/or progression of illness  Disposition  Final diagnoses:   Constipation   Post-op pain     Time reflects when diagnosis was documented in both MDM as applicable and the Disposition within this note     Time User Action Codes Description Comment    8/7/2022  5:09 PM Sia Hock Add [K59 00] Constipation     8/7/2022  5:09 PM Sia Hock Add [G89 18] Post-op pain       ED Disposition     ED Disposition   Discharge    Condition   Stable    Date/Time   Sun Aug 7, 2022  5:09 PM    Comment   Ileene Coop discharge to home/self care  Follow-up Information     Follow up With Specialties Details Why [facility],  Family Medicine   [address] [place] [address]  [phone]            Patient's Medications   Discharge Prescriptions    No medications on file       No discharge procedures on file      PDMP Review       Value Time User    PDMP Reviewed  Yes 8/1/2022  9:01 PM Hua Montano DO          ED Provider  Electronically Signed by           Baldo Aguila PA-C  08/07/22 6237

## 2022-08-07 NOTE — DISCHARGE INSTRUCTIONS
ABDOMEN     LOWER CHEST:  No clinically significant abnormality identified in the visualized lower chest      LIVER/BILIARY TREE:  One or more subcentimeter sharply circumscribed low-density hepatic lesion(s) are noted, too small to accurately characterize, but statistically most likely to represent subcentimeter hepatic cysts  No suspicious solid hepatic   lesion is identified  Hepatic contours are normal   No biliary dilatation  GALLBLADDER:  No calcified gallstones  No pericholecystic inflammatory change  SPLEEN:  Unremarkable  PANCREAS:  Unremarkable  ADRENAL GLANDS:  Unremarkable  KIDNEYS/URETERS:  One or more simple renal cyst(s) is noted  Otherwise unremarkable kidneys  No hydronephrosis  STOMACH AND BOWEL:  There is colonic diverticulosis without evidence of acute diverticulitis  APPENDIX:  No findings to suggest appendicitis  ABDOMINOPELVIC CAVITY:  No ascites  No pneumoperitoneum  No lymphadenopathy  VESSELS:  Unremarkable for patient's age  PELVIS     REPRODUCTIVE ORGANS:  Unremarkable for patient's age  URINARY BLADDER:  Gas is present within the bladder  ABDOMINAL WALL/INGUINAL REGIONS:  Fat-containing left inguinal hernia is identified with foci of gas and soft tissue stranding present  This is presumably related to history of recent inguinal hernia repair  There is associated infiltrative changes   within the fat the left lower quadrant adjacent to the hernia site also presumably postoperative in etiology  Scattered foci of postoperative gas are seen within the anterior abdominal wall  OSSEOUS STRUCTURES:  No acute fracture or destructive osseous lesion  IMPRESSION:     Fat-containing left inguinal hernia identified with foci of gas and soft tissue stranding present presumably related to history of recent inguinal hernia repair    Associated infiltrative changes within the fat in the left lower quadrant adjacent hernia   site also presumably postoperative in etiology  Colonic diverticulosis  No evidence of bowel obstruction  Gas identified within the urinary bladder, correlate for iatrogenic etiology

## 2022-08-07 NOTE — ED NOTES
RN entered room with medications  Pt states, "Can I just do this at home? I want my wife there in the comfort of my own home and would rather that over doing it here"  ED provider notified and medications held at this time       Jill Benoit RN  08/07/22 9060

## 2022-08-17 ENCOUNTER — OFFICE VISIT (OUTPATIENT)
Dept: SURGERY | Facility: CLINIC | Age: 62
End: 2022-08-17

## 2022-08-17 VITALS — WEIGHT: 210 LBS | TEMPERATURE: 95.9 F | HEIGHT: 69 IN | BODY MASS INDEX: 31.1 KG/M2 | RESPIRATION RATE: 16 BRPM

## 2022-08-17 DIAGNOSIS — Z87.19 STATUS POST LAPAROSCOPIC HERNIA REPAIR: Primary | ICD-10-CM

## 2022-08-17 DIAGNOSIS — Z98.890 STATUS POST LAPAROSCOPIC HERNIA REPAIR: Primary | ICD-10-CM

## 2022-08-17 PROCEDURE — 99024 POSTOP FOLLOW-UP VISIT: CPT | Performed by: SURGERY

## 2022-08-17 NOTE — PROGRESS NOTES
Assessment/Plan:    Status post laparoscopic hernia repair   Overall is doing okay  He is slowly improving  Still with discomfort in the left groin  Had difficulty with his bowels initially but are improving  He is 2 weeks lifting restrictions  He will follow-up in 2 weeks for final evaluation  Diagnoses and all orders for this visit:    Status post laparoscopic hernia repair          Subjective:      Patient ID: Gwendolyn Clark is a 60 y o  male  Mr Maite Kennedy   Is a 78-year-old gentleman here for evaluation of left inguinal pain and likely inguinal hernia  He states a few weeks or months ago he started having discomfort in his left groin  He states it can be worse with straining having moved his bowels  He denies nausea vomiting fevers or chills  He does not report specific event that started this  He does not specifically notice a lump in the groin  08/17/2022 he is now 2 weeks status post robotic repair his left inguinal hernia  Unfortunately postoperatively he had difficulty with constipation was seen emergency department couple days later  Since then bowels are starting to return back to normal but still not consistent  He also occasionally feels that he is retaining urine but mostly is also improving  He still in pain left groin and some swelling there  Denies nausea vomiting fevers or chills      The following portions of the patient's history were reviewed and updated as appropriate: allergies, current medications, past family history, past medical history, past social history, past surgical history and problem list     Review of Systems      Objective:      Temp (!) 95 9 °F (35 5 °C)   Resp 16   Ht 5' 9" (1 753 m)   Wt 95 3 kg (210 lb)   BMI 31 01 kg/m²          Physical Exam  Abdominal:      Comments:  Incisions healing well  Some moisture trapped under the glue at the midline incision was mild excoriation  Recommend cover with dry gauze    Left groin there is seroma tracking down the cord consistent postoperatively

## 2022-08-17 NOTE — ASSESSMENT & PLAN NOTE
Overall is doing okay  He is slowly improving  Still with discomfort in the left groin  Had difficulty with his bowels initially but are improving  He is 2 weeks lifting restrictions  He will follow-up in 2 weeks for final evaluation

## 2022-08-19 DIAGNOSIS — F41.9 ANXIETY: ICD-10-CM

## 2022-08-22 RX ORDER — ALPRAZOLAM 0.25 MG/1
0.25 TABLET ORAL
Qty: 20 TABLET | Refills: 0 | Status: SHIPPED | OUTPATIENT
Start: 2022-08-22 | End: 2022-09-20 | Stop reason: SDUPTHER

## 2022-08-30 ENCOUNTER — OFFICE VISIT (OUTPATIENT)
Dept: SURGERY | Facility: CLINIC | Age: 62
End: 2022-08-30

## 2022-08-30 VITALS — WEIGHT: 211.4 LBS | TEMPERATURE: 97.5 F | HEIGHT: 69 IN | BODY MASS INDEX: 31.31 KG/M2

## 2022-08-30 DIAGNOSIS — Z98.890 S/P LEFT INGUINAL HERNIA REPAIR: Primary | ICD-10-CM

## 2022-08-30 DIAGNOSIS — Z87.19 S/P LEFT INGUINAL HERNIA REPAIR: Primary | ICD-10-CM

## 2022-08-30 PROBLEM — Z09 POSTOP CHECK: Status: ACTIVE | Noted: 2022-08-30

## 2022-08-30 PROBLEM — Z09 POSTOP CHECK: Status: RESOLVED | Noted: 2022-08-30 | Resolved: 2022-08-30

## 2022-08-30 PROCEDURE — 99024 POSTOP FOLLOW-UP VISIT: CPT | Performed by: SURGERY

## 2022-08-30 NOTE — ASSESSMENT & PLAN NOTE
Patient has healed well from his left inguinal hernia repair  He can return to no lifting restrictions and patient will call if any residual pain or discomfort in the next 4-6 weeks

## 2022-08-30 NOTE — PROGRESS NOTES
Assessment/Plan:    Postop check  Patient has healed well from his left inguinal hernia repair  He can return to no lifting restrictions and patient will call if any residual pain or discomfort in the next 4-6 weeks  Diagnoses and all orders for this visit:    S/P left inguinal hernia repair          Subjective:      Patient ID: Joanna Morillo is a 60 y o  male  Mr Duane Marin   Is a 58-year-old gentleman here for evaluation of left inguinal pain and likely inguinal hernia  He states a few weeks or months ago he started having discomfort in his left groin  He states it can be worse with straining having moved his bowels  He denies nausea vomiting fevers or chills  He does not report specific event that started this  He does not specifically notice a lump in the groin  08/17/2022 he is now 2 weeks status post robotic repair his left inguinal hernia  Unfortunately postoperatively he had difficulty with constipation was seen emergency department couple days later  Since then bowels are starting to return back to normal but still not consistent  He also occasionally feels that he is retaining urine but mostly is also improving  He still in pain left groin and some swelling there  Denies nausea vomiting fevers or chills    8/30/22: he is now here almost 4 weeks status post robotic repair his left inguinal hernia  Unfortunately postoperatively he had difficulty with constipation was seen emergency department couple days later  Since his visit with Dr Polly Ruiz on 8/17/22 he states he has had no issues with urination or bowel movements  The swelling has subsided in the left groin and very minimal tenderness  Umbilial incision has healed    Denies nausea vomiting fevers or chills      The following portions of the patient's history were reviewed and updated as appropriate: allergies, current medications, past family history, past medical history, past social history, past surgical history and problem list     Review of Systems      Objective:      Temp 97 5 °F (36 4 °C)   Ht 5' 9" (1 753 m)   Wt 95 9 kg (211 lb 6 4 oz)   BMI 31 22 kg/m²          Physical Exam  Abdominal:      Comments:  Incisions are all healed  Left groin normal with no swelling

## 2022-09-20 DIAGNOSIS — F41.9 ANXIETY: ICD-10-CM

## 2022-09-20 RX ORDER — ALPRAZOLAM 0.25 MG/1
0.25 TABLET ORAL
Qty: 20 TABLET | Refills: 0 | Status: SHIPPED | OUTPATIENT
Start: 2022-09-20

## 2022-09-26 DIAGNOSIS — M54.50 CHRONIC LOW BACK PAIN, UNSPECIFIED BACK PAIN LATERALITY, UNSPECIFIED WHETHER SCIATICA PRESENT: Primary | ICD-10-CM

## 2022-09-26 DIAGNOSIS — G89.29 CHRONIC LOW BACK PAIN, UNSPECIFIED BACK PAIN LATERALITY, UNSPECIFIED WHETHER SCIATICA PRESENT: Primary | ICD-10-CM

## 2022-09-26 RX ORDER — TRAMADOL HYDROCHLORIDE 50 MG/1
50 TABLET ORAL DAILY PRN
Qty: 14 TABLET | Refills: 0 | Status: SHIPPED | OUTPATIENT
Start: 2022-09-26

## 2022-09-28 DIAGNOSIS — M62.830 MUSCLE SPASM OF BACK: ICD-10-CM

## 2022-09-28 RX ORDER — METHOCARBAMOL 750 MG/1
TABLET, FILM COATED ORAL
Qty: 30 TABLET | Refills: 0 | Status: SHIPPED | OUTPATIENT
Start: 2022-09-28

## 2022-11-07 ENCOUNTER — OFFICE VISIT (OUTPATIENT)
Dept: FAMILY MEDICINE CLINIC | Facility: CLINIC | Age: 62
End: 2022-11-07

## 2022-11-07 VITALS
WEIGHT: 218.6 LBS | HEIGHT: 68 IN | OXYGEN SATURATION: 98 % | TEMPERATURE: 97.3 F | RESPIRATION RATE: 16 BRPM | BODY MASS INDEX: 33.13 KG/M2 | SYSTOLIC BLOOD PRESSURE: 128 MMHG | HEART RATE: 72 BPM | DIASTOLIC BLOOD PRESSURE: 80 MMHG

## 2022-11-07 DIAGNOSIS — I10 ESSENTIAL HYPERTENSION: ICD-10-CM

## 2022-11-07 DIAGNOSIS — F41.9 ANXIETY: ICD-10-CM

## 2022-11-07 DIAGNOSIS — C43.4 MELANOMA OF SCALP (HCC): ICD-10-CM

## 2022-11-07 DIAGNOSIS — R51.9 NONINTRACTABLE HEADACHE, UNSPECIFIED CHRONICITY PATTERN, UNSPECIFIED HEADACHE TYPE: Primary | ICD-10-CM

## 2022-11-07 RX ORDER — COVID-19 ANTIGEN TEST
KIT MISCELLANEOUS
COMMUNITY

## 2022-11-07 RX ORDER — ALPRAZOLAM 0.25 MG/1
0.25 TABLET ORAL
Qty: 20 TABLET | Refills: 1 | Status: SHIPPED | OUTPATIENT
Start: 2022-11-07

## 2022-11-07 NOTE — PATIENT INSTRUCTIONS
Here for s/p Mohs and has had headache, may use Tylenol or advil prn pain and will f-up on 11/17/22 with dermatologist as well and will call if any active bleeding or signs of infection  Refilled anxiety med and also BP stable  Take all meds as directed and f-up with dermatology in 10 days for s/p Mohs surgery and hx of melanoma of the scalp  Use sunscreen and monitor for changes in the skin

## 2022-11-07 NOTE — PROGRESS NOTES
Name: Rhae Closs      : 1960      MRN: [id number]  Encounter Provider: Teto Galarza DO  Encounter Date: 2022   Encounter department: [address] [place] [address]  Chief Complaint   Patient presents with   • Hair/Scalp Problem     Look at scalp, and has a headache since his surgery pt had surgery a week and a half ago  Patient Instructions   Here for s/p Mohs and has had headache, may use Tylenol or advil prn pain and will f-up on 22 with dermatologist as well and will call if any active bleeding or signs of infection  Refilled anxiety med and also BP stable  Take all meds as directed and f-up with dermatology in 10 days for s/p Mohs surgery and hx of melanoma of the scalp  Use sunscreen and monitor for changes in the skin  Assessment & Plan     1  Nonintractable headache, unspecified chronicity pattern, unspecified headache type    2  Melanoma of scalp [facility])  Comments:  Mohs procedure done to remove this 2 weeks this Wednesday    3  Anxiety  -     ALPRAZolam (XANAX) 0 25 mg tablet; Take 1 tablet (0 25 mg total) by mouth daily at bedtime as needed for anxiety    4  Essential hypertension           Subjective     Hair/Scalp Problem (Look at scalp, and has a headache since his surgery pt had surgery a week and a half ago ) Uses vaseline to surgical site from Mohs surgery 2 weeks ago  Denies fever  The sutures are intact and no active bleeding  Review of Systems   Constitutional: Negative  HENT: Negative  Eyes: Negative  Respiratory: Negative  Cardiovascular: Negative  Gastrointestinal: Negative  Endocrine: Negative  Genitourinary: Negative  Musculoskeletal: Negative  Skin:        Healing suture sight on top of head  No current active bleeding  Allergic/Immunologic: Negative  Neurological: Negative  Hematological: Negative  Psychiatric/Behavioral: Negative          Past Medical History:   Diagnosis Date   • AVM (arteriovenous malformation)    • BPH without urinary obstruction     last assessed 01/16/18   • Chronic pain disorder     low back and neck   • COVID     Jan 2021 and 5/14/22   • Headache     occ   • Hemorrhoids    • Hydronephrosis    • Hypertension    • Kidney disease    • Left inguinal hernia    • Lumbar herniated disc    • PONV (postoperative nausea and vomiting)    • Renal calculi    • Renal colic    • TMJ (dislocation of temporomandibular joint)    • Ureter colic      Past Surgical History:   Procedure Laterality Date   • BRAIN SURGERY     • COLONOSCOPY     • CYSTOSCOPY      w/removal of object, last assessed 01/16/18   • CYSTOSCOPY      w/removal of ureteral calculus last assessed 01/16/18   • CYSTOSCOPY KIDNEY W/ URETERAL GUIDE WIRE      last assessed 01/16/18   • CYSTOSCOPY W/ URETERAL STENT PLACEMENT      last assessed 01/16/18   • FL RETROGRADE PYELOGRAM  7/9/2020   • [address] [place] [address]   • HERNIA REPAIR     • KIDNEY SURGERY      removal of kidney stone in 2013 at [facility]   • WI CYSTO/URETERO W/LITHOTRIPSY &INDWELL STENT INSRT Right 12/13/2018    Procedure: CYSTOSCOPY URETEROSCOPY  RETROGRADE PYELOGRAM AND INSERTION RIGHT STENT URETERAL;  Surgeon: Thalia Steen MD;  [facility];  Service: Urology   • WI CYSTO/URETERO W/LITHOTRIPSY &INDWELL STENT INSRT Left 7/9/2020    Procedure: CYSTOSCOPY URETEROSCOPY WITH LITHOTRIPSY HOLMIUM LASER, RETROGRADE PYELOGRAM AND INSERTION STENT URETERAL;  Surgeon: Thalia Steen MD;  [facility];  Service: Urology   • WI Jose Juan Stramarlae 19 Left 8/4/2022    Procedure: REPAIR HERNIA INGUINAL LAPAROSCOPIC W/ ROBOTICS;  Surgeon: Madison Mallory MD;  [facility];  Service: General   • TOOTH EXTRACTION       Family History   Problem Relation Age of Onset   • Stroke Mother    • Cancer Father    • Diabetes Father    • Prostate cancer Father    • Heart disease Father    • Hypertension Father      Social History     Socioeconomic History   • Marital status: /Civil Union     Spouse name: None   • Number of children: 1   • Years of education: completed college   • Highest education level: None   Occupational History   • Occupation: Antiques   Tobacco Use   • Smoking status: Never Smoker   • Smokeless tobacco: Never Used   Vaping Use   • Vaping Use: Never used   Substance and Sexual Activity   • Alcohol use: Yes     Comment: rare   • Drug use: No   • Sexual activity: Yes   Other Topics Concern   • None   Social History Narrative    Lives with spouse    No caffeine use     Social Determinants of Health     Financial Resource Strain: Not on file   Food Insecurity: Not on file   Transportation Needs: Not on file   Physical Activity: Not on file   Stress: Not on file   Social Connections: Not on file   Intimate Partner Violence: Not on file   Housing Stability: Not on file     Current Outpatient Medications on File Prior to Visit   Medication Sig   • acetaminophen (TYLENOL) 500 mg tablet Take 500-1,000 mg by mouth every 6 (six) hours as needed for mild pain   • famotidine (PEPCID) 10 mg tablet Take 10 mg by mouth Taken as needed   • hydrochlorothiazide (HYDRODIURIL) 25 mg tablet TAKE 1 TABLET BY MOUTH EVERY DAY   • ibuprofen (MOTRIN) 200 mg tablet Take 200-800 mg by mouth every 6 (six) hours as needed for mild pain   • methocarbamol (ROBAXIN) 750 mg tablet ONE PO at hs   • Naproxen Sodium 220 MG CAPS Take by mouth   • NON FORMULARY daily as needed Medical marijuana   • tadalafil (CIALIS) 20 MG tablet Take 1 tablet (20 mg total) by mouth as needed for erectile dysfunction   • traMADol (ULTRAM) 50 mg tablet Take 1 tablet (50 mg total) by mouth daily as needed for moderate pain   • [DISCONTINUED] ALPRAZolam (XANAX) 0 25 mg tablet Take 1 tablet (0 25 mg total) by mouth daily at bedtime as needed for anxiety   • Diclofenac Sodium (VOLTAREN) 1 % Apply 2 g topically 4 (four) times a day (Patient not taking: No sig reported)   • Diclofenac Sodium (VOLTAREN) 1 % Apply 2 g topically 4 (four) times a day as needed (knee pain) (Patient not taking: No sig reported)   • diclofenac sodium (VOLTAREN) 50 mg EC tablet Take 1 tablet (50 mg total) by mouth daily as needed (severe pain) Take with food (Patient not taking: No sig reported)     Allergies   Allergen Reactions   • Morphine Other (See Comments)   • Other      Pt states that any narcotic makes him feel sick   • Oxycodone-Acetaminophen Nausea Only, Vomiting and GI Intolerance   • Pollen Extract Allergic Rhinitis     patient states he has seasonal environmental allergies, not sure exactly what triggers reaction     Immunization History   Administered Date(s) Administered   • COVID-19 PFIZER VACCINE 0 3 ML IM 04/14/2021   • Influenza, seasonal, injectable 12/04/2003       Objective     /80 (BP Location: Left arm, Patient Position: Sitting, Cuff Size: Adult)   Pulse 72   Temp (!) 97 3 °F (36 3 °C) (Temporal)   Resp 16   Ht 5' 8" (1 727 m)   Wt 99 2 kg (218 lb 9 6 oz)   SpO2 98%   BMI 33 24 kg/m²     Physical Exam  Constitutional:       Appearance: He is well-developed  HENT:      Head: Normocephalic and atraumatic  Eyes:      Conjunctiva/sclera: Conjunctivae normal       Pupils: Pupils are equal, round, and reactive to light  Cardiovascular:      Rate and Rhythm: Normal rate and regular rhythm  Heart sounds: Normal heart sounds  Pulmonary:      Effort: Pulmonary effort is normal       Breath sounds: Normal breath sounds  Musculoskeletal:         General: Normal range of motion  Cervical back: Normal range of motion and neck supple  Skin:     General: Skin is warm and dry  Comments: Healing scalp from mohs surgery, no active bleeding and no active infection seen  Neurological:      Mental Status: He is alert and oriented to person, place, and time  Deep Tendon Reflexes: Reflexes are normal and symmetric     Psychiatric:         Behavior: Behavior normal        Darina Schaumann Prisca Penaloza

## 2022-11-21 DIAGNOSIS — M62.830 MUSCLE SPASM OF BACK: ICD-10-CM

## 2022-11-21 RX ORDER — METHOCARBAMOL 750 MG/1
TABLET, FILM COATED ORAL
Qty: 30 TABLET | Refills: 0 | Status: SHIPPED | OUTPATIENT
Start: 2022-11-21

## 2022-12-13 DIAGNOSIS — F41.9 ANXIETY: ICD-10-CM

## 2022-12-13 RX ORDER — ALPRAZOLAM 0.25 MG/1
0.25 TABLET ORAL
Qty: 20 TABLET | Refills: 1 | Status: SHIPPED | OUTPATIENT
Start: 2022-12-13

## 2022-12-15 DIAGNOSIS — M62.830 MUSCLE SPASM OF BACK: ICD-10-CM

## 2022-12-15 RX ORDER — METHOCARBAMOL 750 MG/1
TABLET, FILM COATED ORAL
Qty: 30 TABLET | Refills: 0 | Status: SHIPPED | OUTPATIENT
Start: 2022-12-15

## 2023-02-07 DIAGNOSIS — M62.830 MUSCLE SPASM OF BACK: ICD-10-CM

## 2023-02-07 DIAGNOSIS — F41.9 ANXIETY: ICD-10-CM

## 2023-02-07 RX ORDER — METHOCARBAMOL 750 MG/1
TABLET, FILM COATED ORAL
Qty: 30 TABLET | Refills: 0 | Status: SHIPPED | OUTPATIENT
Start: 2023-02-07

## 2023-02-07 RX ORDER — ALPRAZOLAM 0.25 MG/1
0.25 TABLET ORAL
Qty: 20 TABLET | Refills: 1 | Status: SHIPPED | OUTPATIENT
Start: 2023-02-07

## 2023-03-07 DIAGNOSIS — M62.830 MUSCLE SPASM OF BACK: ICD-10-CM

## 2023-03-07 RX ORDER — METHOCARBAMOL 750 MG/1
TABLET, FILM COATED ORAL
Qty: 30 TABLET | Refills: 0 | Status: SHIPPED | OUTPATIENT
Start: 2023-03-07

## 2023-03-10 RX ORDER — FAMOTIDINE 20 MG/1
TABLET, FILM COATED ORAL
COMMUNITY
Start: 2022-10-26

## 2023-03-10 RX ORDER — CALCIPOTRIENE 50 UG/G
CREAM TOPICAL
COMMUNITY
Start: 2022-12-26 | End: 2023-03-15

## 2023-03-13 ENCOUNTER — TELEPHONE (OUTPATIENT)
Dept: OTHER | Facility: OTHER | Age: 63
End: 2023-03-13

## 2023-03-15 ENCOUNTER — OFFICE VISIT (OUTPATIENT)
Dept: FAMILY MEDICINE CLINIC | Facility: CLINIC | Age: 63
End: 2023-03-15

## 2023-03-15 VITALS
SYSTOLIC BLOOD PRESSURE: 138 MMHG | RESPIRATION RATE: 16 BRPM | TEMPERATURE: 96.3 F | WEIGHT: 232.4 LBS | DIASTOLIC BLOOD PRESSURE: 88 MMHG | HEIGHT: 68 IN | OXYGEN SATURATION: 99 % | HEART RATE: 60 BPM | BODY MASS INDEX: 35.22 KG/M2

## 2023-03-15 DIAGNOSIS — K21.9 GASTROESOPHAGEAL REFLUX DISEASE, UNSPECIFIED WHETHER ESOPHAGITIS PRESENT: ICD-10-CM

## 2023-03-15 DIAGNOSIS — M25.50 ARTHRALGIA, UNSPECIFIED JOINT: ICD-10-CM

## 2023-03-15 DIAGNOSIS — M25.60 MORNING STIFFNESS OF JOINTS: ICD-10-CM

## 2023-03-15 DIAGNOSIS — Z00.00 HEALTH CARE MAINTENANCE: Primary | ICD-10-CM

## 2023-03-15 DIAGNOSIS — E66.9 OBESITY (BMI 30-39.9): ICD-10-CM

## 2023-03-15 DIAGNOSIS — Z13.220 SCREENING FOR HYPERLIPIDEMIA: ICD-10-CM

## 2023-03-15 DIAGNOSIS — I10 ESSENTIAL HYPERTENSION: ICD-10-CM

## 2023-03-15 DIAGNOSIS — F41.9 ANXIETY: ICD-10-CM

## 2023-03-15 DIAGNOSIS — J02.9 SORE THROAT: ICD-10-CM

## 2023-03-15 DIAGNOSIS — N52.9 ERECTILE DYSFUNCTION, UNSPECIFIED ERECTILE DYSFUNCTION TYPE: ICD-10-CM

## 2023-03-15 RX ORDER — AZITHROMYCIN 250 MG/1
TABLET, FILM COATED ORAL
Qty: 6 TABLET | Refills: 0 | Status: SHIPPED | OUTPATIENT
Start: 2023-03-15 | End: 2023-03-20

## 2023-03-15 NOTE — PROGRESS NOTES
Name: Glory Tsai      : 1960      MRN: [id number]  Encounter Provider: Tanner Archibald DO  Encounter Date: 3/15/2023   Encounter department: [address] [place] [address]  Chief Complaint   Patient presents with   • Physical Exam   • Cold Like Symptoms     Pt states he has has been having sore throat, runny nose denies fever      Patient Instructions   Here for general  PE and needs updated labs and labs for hx of arthralgia in hands and knees  Consult Rheumatology  Rec diet and exercise and lose weight to get BMI lower than 25  F-up with Urology for prostate exams and ED  Gerd stable  Anxiety stable  BP stable and recheck BP in 6 months  Take abx and Dimetapp DM cold and cough prn cold symptoms  Tylenol prn pain  Assessment & Plan     1  Health care maintenance  -     Comprehensive metabolic panel; Future  -     CBC and differential; Future  -     Lipid Panel with Direct LDL reflex; Future    2  Sore throat  -     azithromycin (Zithromax) 250 mg tablet; Take 2 tablets (500 mg total) by mouth daily for 1 day, THEN 1 tablet (250 mg total) daily for 4 days  3  Essential hypertension  -     Comprehensive metabolic panel; Future    4  Obesity (BMI 30-39 9)  -     Comprehensive metabolic panel; Future  -     Lipid Panel with Direct LDL reflex; Future    5  Anxiety  -     Comprehensive metabolic panel; Future  -     CBC and differential; Future    6  Gastroesophageal reflux disease, unspecified whether esophagitis present  -     CBC and differential; Future    7  Arthralgia, unspecified joint  -     Ambulatory Referral to Rheumatology; Future  -     Antinuclear Antibodies (ANTHONY), IFA; Future  -     RF Screen w/ Reflex to Titer; Future  -     Sedimentation rate, automated; Future  -     Comprehensive metabolic panel; Future  -     CBC and differential; Future    8  Erectile dysfunction, unspecified erectile dysfunction type    9   Morning stiffness of joints  -     Ambulatory Referral to Rheumatology; Future  -     Antinuclear Antibodies (ANTHONY), IFA; Future  -     RF Screen w/ Reflex to Titer; Future  -     Sedimentation rate, automated; Future  -     Comprehensive metabolic panel; Future  -     CBC and differential; Future    10  Screening for hyperlipidemia  -     Comprehensive metabolic panel; Future  -     Lipid Panel with Direct LDL reflex; Future           Subjective      Physical Exam  Cold Like Symptoms (Pt states he has has been having sore throat, runny nose denies fever )    Gained weight and needs updated labs  Not much exercise  Some arthritis in hands and knees and is stiff in am  Patient needs to update labs and would like to see rheumatology for arthritis complaints in hands and knees  No tick bites recently  Review of Systems   Constitutional: Negative  HENT: Negative  Eyes: Negative  Respiratory: Negative  Cardiovascular: Negative  Gastrointestinal: Negative  Endocrine: Negative  Genitourinary: Negative  Musculoskeletal:        Arthritic and stiffness and soreness in hands and feet in the am   Skin: Negative  Allergic/Immunologic: Negative  Neurological: Negative  Hematological: Negative  Psychiatric/Behavioral: Negative          Current Outpatient Medications on File Prior to Visit   Medication Sig   • acetaminophen (TYLENOL) 500 mg tablet Take 500-1,000 mg by mouth every 6 (six) hours as needed for mild pain   • ALPRAZolam (XANAX) 0 25 mg tablet Take 1 tablet (0 25 mg total) by mouth daily at bedtime as needed for anxiety   • famotidine (PEPCID) 10 mg tablet Take 10 mg by mouth Taken as needed   • famotidine (PEPCID) 20 mg tablet   Start: 10/26/22 8:49:00 EDT   • hydrochlorothiazide (HYDRODIURIL) 25 mg tablet TAKE 1 TABLET BY MOUTH EVERY DAY   • ibuprofen (MOTRIN) 200 mg tablet Take 200-800 mg by mouth every 6 (six) hours as needed for mild pain   • methocarbamol (ROBAXIN) 750 mg tablet ONE PO at hs   • Naproxen Sodium 220 MG CAPS Take by mouth   • NON FORMULARY daily as needed Medical marijuana   • tadalafil (CIALIS) 20 MG tablet Take 1 tablet (20 mg total) by mouth as needed for erectile dysfunction   • traMADol (ULTRAM) 50 mg tablet Take 1 tablet (50 mg total) by mouth daily as needed for moderate pain   • [DISCONTINUED] calcipotriene (DOVONEX) 0 005 % cream MIX EQUAL PARTS WITH FLUOROURACIL AND APPLY ONCE DAILY AS DIRECTED (Patient not taking: Reported on 3/15/2023)   • [DISCONTINUED] Diclofenac Sodium (VOLTAREN) 1 % Apply 2 g topically 4 (four) times a day (Patient not taking: Reported on 6/7/2022)   • [DISCONTINUED] Diclofenac Sodium (VOLTAREN) 1 % Apply 2 g topically 4 (four) times a day as needed (knee pain) (Patient not taking: Reported on 6/7/2022)   • [DISCONTINUED] diclofenac sodium (VOLTAREN) 50 mg EC tablet Take 1 tablet (50 mg total) by mouth daily as needed (severe pain) Take with food (Patient not taking: Reported on 3/25/2022)       Objective     /88 (BP Location: Left arm, Patient Position: Sitting, Cuff Size: Large)   Pulse 60   Temp (!) 96 3 °F (35 7 °C) (Temporal)   Resp 16   Ht 5' 8" (1 727 m)   Wt 105 kg (232 lb 6 4 oz)   SpO2 99%   BMI 35 34 kg/m²     Physical Exam  Constitutional:       Appearance: He is well-developed  He is obese  HENT:      Head: Normocephalic and atraumatic  Right Ear: External ear normal       Left Ear: External ear normal       Nose: Nose normal    Eyes:      Conjunctiva/sclera: Conjunctivae normal       Pupils: Pupils are equal, round, and reactive to light  Cardiovascular:      Rate and Rhythm: Normal rate and regular rhythm  Pulses: Normal pulses  Heart sounds: Normal heart sounds  Pulmonary:      Effort: Pulmonary effort is normal       Breath sounds: Normal breath sounds  Abdominal:      General: Abdomen is flat  Bowel sounds are normal       Palpations: Abdomen is soft     Genitourinary:     Penis: Normal        Testes: Normal       Comments: Sees urology for prostate  Musculoskeletal:         General: Normal range of motion  Cervical back: Normal range of motion and neck supple  Skin:     General: Skin is warm and dry  Capillary Refill: Capillary refill takes less than 2 seconds  Neurological:      General: No focal deficit present  Mental Status: He is alert and oriented to person, place, and time  Deep Tendon Reflexes: Reflexes are normal and symmetric  Psychiatric:         Mood and Affect: Mood normal          Behavior: Behavior normal          Thought Content:  Thought content normal          Judgment: Judgment normal        Pinky Stager, DO

## 2023-03-15 NOTE — PATIENT INSTRUCTIONS
Here for general  PE and needs updated labs and labs for hx of arthralgia in hands and knees  Consult Rheumatology  Rec diet and exercise and lose weight to get BMI lower than 25  F-up with Urology for prostate exams and ED  Gerd stable  Anxiety stable  BP stable and recheck BP in 6 months  Take abx and Dimetapp DM cold and cough prn cold symptoms  Tylenol prn pain

## 2023-03-16 ENCOUNTER — APPOINTMENT (OUTPATIENT)
Dept: LAB | Facility: MEDICAL CENTER | Age: 63
End: 2023-03-16

## 2023-03-16 DIAGNOSIS — E66.9 OBESITY (BMI 30-39.9): ICD-10-CM

## 2023-03-16 DIAGNOSIS — K21.9 GASTROESOPHAGEAL REFLUX DISEASE, UNSPECIFIED WHETHER ESOPHAGITIS PRESENT: ICD-10-CM

## 2023-03-16 DIAGNOSIS — M25.60 MORNING STIFFNESS OF JOINTS: ICD-10-CM

## 2023-03-16 DIAGNOSIS — I10 ESSENTIAL HYPERTENSION: ICD-10-CM

## 2023-03-16 DIAGNOSIS — F41.9 ANXIETY: ICD-10-CM

## 2023-03-16 DIAGNOSIS — M25.50 ARTHRALGIA, UNSPECIFIED JOINT: ICD-10-CM

## 2023-03-16 DIAGNOSIS — Z00.00 HEALTH CARE MAINTENANCE: ICD-10-CM

## 2023-03-16 DIAGNOSIS — Z13.220 SCREENING FOR HYPERLIPIDEMIA: ICD-10-CM

## 2023-03-16 LAB
ALBUMIN SERPL BCP-MCNC: 3.7 G/DL (ref 3.5–5)
ALP SERPL-CCNC: 41 U/L (ref 46–116)
ALT SERPL W P-5'-P-CCNC: 29 U/L (ref 12–78)
ANION GAP SERPL CALCULATED.3IONS-SCNC: 6 MMOL/L (ref 4–13)
AST SERPL W P-5'-P-CCNC: 22 U/L (ref 5–45)
BASOPHILS # BLD AUTO: 0.07 THOUSANDS/ÂΜL (ref 0–0.1)
BASOPHILS NFR BLD AUTO: 1 % (ref 0–1)
BILIRUB SERPL-MCNC: 0.45 MG/DL (ref 0.2–1)
BUN SERPL-MCNC: 21 MG/DL (ref 5–25)
CALCIUM SERPL-MCNC: 9.7 MG/DL (ref 8.3–10.1)
CHLORIDE SERPL-SCNC: 107 MMOL/L (ref 96–108)
CHOLEST SERPL-MCNC: 219 MG/DL
CO2 SERPL-SCNC: 27 MMOL/L (ref 21–32)
CREAT SERPL-MCNC: 0.95 MG/DL (ref 0.6–1.3)
EOSINOPHIL # BLD AUTO: 0.28 THOUSAND/ÂΜL (ref 0–0.61)
EOSINOPHIL NFR BLD AUTO: 3 % (ref 0–6)
ERYTHROCYTE [DISTWIDTH] IN BLOOD BY AUTOMATED COUNT: 13.2 % (ref 11.6–15.1)
ERYTHROCYTE [SEDIMENTATION RATE] IN BLOOD: 24 MM/HOUR (ref 0–19)
GFR SERPL CREATININE-BSD FRML MDRD: 84 ML/MIN/1.73SQ M
GLUCOSE P FAST SERPL-MCNC: 91 MG/DL (ref 65–99)
HCT VFR BLD AUTO: 45.5 % (ref 36.5–49.3)
HDLC SERPL-MCNC: 62 MG/DL
HGB BLD-MCNC: 15 G/DL (ref 12–17)
IMM GRANULOCYTES # BLD AUTO: 0.05 THOUSAND/UL (ref 0–0.2)
IMM GRANULOCYTES NFR BLD AUTO: 1 % (ref 0–2)
LDLC SERPL CALC-MCNC: 133 MG/DL (ref 0–100)
LYMPHOCYTES # BLD AUTO: 2.05 THOUSANDS/ÂΜL (ref 0.6–4.47)
LYMPHOCYTES NFR BLD AUTO: 23 % (ref 14–44)
MCH RBC QN AUTO: 30.1 PG (ref 26.8–34.3)
MCHC RBC AUTO-ENTMCNC: 33 G/DL (ref 31.4–37.4)
MCV RBC AUTO: 91 FL (ref 82–98)
MONOCYTES # BLD AUTO: 1.01 THOUSAND/ÂΜL (ref 0.17–1.22)
MONOCYTES NFR BLD AUTO: 12 % (ref 4–12)
NEUTROPHILS # BLD AUTO: 5.33 THOUSANDS/ÂΜL (ref 1.85–7.62)
NEUTS SEG NFR BLD AUTO: 60 % (ref 43–75)
NRBC BLD AUTO-RTO: 0 /100 WBCS
PLATELET # BLD AUTO: 241 THOUSANDS/UL (ref 149–390)
PMV BLD AUTO: 9.3 FL (ref 8.9–12.7)
POTASSIUM SERPL-SCNC: 3.8 MMOL/L (ref 3.5–5.3)
PROT SERPL-MCNC: 7.4 G/DL (ref 6.4–8.4)
RBC # BLD AUTO: 4.98 MILLION/UL (ref 3.88–5.62)
RHEUMATOID FACT SER QL LA: NEGATIVE
SODIUM SERPL-SCNC: 140 MMOL/L (ref 135–147)
TRIGL SERPL-MCNC: 122 MG/DL
WBC # BLD AUTO: 8.79 THOUSAND/UL (ref 4.31–10.16)

## 2023-03-17 LAB — ANA TITR SER IF: NEGATIVE {TITER}

## 2023-03-20 ENCOUNTER — TELEPHONE (OUTPATIENT)
Dept: FAMILY MEDICINE CLINIC | Facility: CLINIC | Age: 63
End: 2023-03-20

## 2023-03-20 NOTE — TELEPHONE ENCOUNTER
Patient is calling about his lab work from 3/16/2023, can you please review? Additionally, patient wants to make you aware he will be seeing Dr Clyde Watts with [facility] next NP appointment was not till January 2024         Patient requested lab work to be faxed to [facility] attention Dr Clyde Watts Appointment 3/31 Fax #[phone] (lab results already faxed over)

## 2023-04-26 DIAGNOSIS — I10 ESSENTIAL HYPERTENSION: ICD-10-CM

## 2023-04-26 RX ORDER — HYDROCHLOROTHIAZIDE 25 MG/1
25 TABLET ORAL DAILY
Qty: 30 TABLET | Refills: 1 | Status: SHIPPED | OUTPATIENT
Start: 2023-04-26

## 2023-05-16 ENCOUNTER — OFFICE VISIT (OUTPATIENT)
Dept: BARIATRICS | Facility: CLINIC | Age: 63
End: 2023-05-16

## 2023-05-16 VITALS
HEIGHT: 68 IN | WEIGHT: 224 LBS | HEART RATE: 70 BPM | RESPIRATION RATE: 16 BRPM | SYSTOLIC BLOOD PRESSURE: 136 MMHG | BODY MASS INDEX: 33.95 KG/M2 | DIASTOLIC BLOOD PRESSURE: 76 MMHG

## 2023-05-16 DIAGNOSIS — E66.9 OBESITY, CLASS I, BMI 30-34.9: Primary | ICD-10-CM

## 2023-05-16 DIAGNOSIS — I10 ESSENTIAL HYPERTENSION: ICD-10-CM

## 2023-05-16 PROBLEM — E66.811 OBESITY, CLASS I, BMI 30-34.9: Status: ACTIVE | Noted: 2019-04-29

## 2023-05-16 NOTE — ASSESSMENT & PLAN NOTE
-Discussed options of HealthyCORE-Intensive Lifestyle Intervention Program, Very Low Calorie Diet-VLCD and Conservative Program and the role of weight loss medications  Due to elevated uric acid would avoid VLCD  -Initial weight loss goal of 5-10% weight loss for improved health  -Labs reviewed from 5/3/23 all within acceptable limits except elevated uric acid   - STOP BANG-6/8-declined sleep medicine referral at this time    -Patient is interested in pursuing either conservative or healthy core program  -medication options were discussed today and due to tramadol use would avoid contrave and phentermine  He does have renal stone history and would not use topamax either  Discussed option of GLP-1 RA or wellbutrin and he would like to consider his options  --Calorie goals, sample menu, portion size guidelines, and food logging reviewed with the patient  Goals:  -Food log (ie ) www myfitnesspal com,sparkpeople  com,loseit com,calorieking  com,etc  -1700 calories  -No sugary beverages  At least 64oz of water daily   -Discussed starting a regular exercise routine    He plans on going the gym with his wife and recommend 3 times a week       Initial Weight:224  Goal Weight:170

## 2023-05-16 NOTE — PROGRESS NOTES
Assessment/Plan:    Obesity, Class I, BMI 30-34 9  -Discussed options of HealthyCORE-Intensive Lifestyle Intervention Program, Very Low Calorie Diet-VLCD and Conservative Program and the role of weight loss medications  Due to elevated uric acid would avoid VLCD  -Initial weight loss goal of 5-10% weight loss for improved health  -Labs reviewed from 5/3/23 all within acceptable limits except elevated uric acid   - STOP BANG-6/8-declined sleep medicine referral at this time    -Patient is interested in pursuing either conservative or healthy core program  -medication options were discussed today and due to tramadol use would avoid contrave and phentermine  He does have renal stone history and would not use topamax either  Discussed option of GLP-1 RA or wellbutrin and he would like to consider his options  --Calorie goals, sample menu, portion size guidelines, and food logging reviewed with the patient  Goals:  -Food log (ie ) www myfitnesspal com,sparkpeople  com,loseit com,calorieking  com,etc  -1700 calories  -No sugary beverages  At least 64oz of water daily   -Discussed starting a regular exercise routine  He plans on going the gym with his wife and recommend 3 times a week       Initial Weight:224  Goal Weight:170          Essential hypertension  On hctz      Follow up to be scheduled after he reviews medical plans        Diagnoses and all orders for this visit:    Obesity, Class I, BMI 30-34 9    Essential hypertension          Subjective:   Chief Complaint   Patient presents with   • Consult     MWM consult; waist 44 5in; goal wt 170; stop bang 6-8       Patient ID: Sonu Khoury  is a 58 y o  male with excess weight/obesity here to pursue weight management      Past Medical History:   Diagnosis Date   • AVM (arteriovenous malformation)    • BPH without urinary obstruction     last assessed 01/16/18   • Chronic pain disorder     low back and neck   • COVID     Jan 2021 and 5/14/22   • Headache     occ • Hemorrhoids    • Hydronephrosis    • Hypertension    • Kidney disease    • Left inguinal hernia    • Lumbar herniated disc    • PONV (postoperative nausea and vomiting)    • Renal calculi    • Renal colic    • TMJ (dislocation of temporomandibular joint)    • Ureter colic        HPI: Here for MWM consult    He does well w with weight loss but has a hard time keeping the weight off long term  He does find he eats more carbs and when cutting back on this it has helped with weight loss  Most recently cut back in portion size and carbs and lost approximately 35 lb  Obesity/Excess Weight:  Severity: class I  Onset:  life    Modifiers: Diet and Exercise  Contributing factors: Poor Food Choices-portion size and carbohydrates  Associated symptoms: comorbid conditions and increased joint pain-feels the joint pain could be more aging related      Goals: 170s  Hydration:water about 2-3 bottles, lemonaid low marina a few a week, milk w/cereal  Alcohol: occasional  Exercise:walking the dog sometimes  Occupation:  Sleep:about 5-6 hours a night  He had a sleep study years ago and was negative  Dining out/takeout:mostly eats at home, a few times a month    Colonoscopy-Completed    Diet recall:  B:yogurt, fruit, cheesestick if going out, home -2 eggs, FF cottage cheese, mini naan, pineapple  L:making own [place] spring roll wrap  D:fish/meat, vegetable, carb      The following portions of the patient's history were reviewed and updated as appropriate:   He  has a past medical history of AVM (arteriovenous malformation), BPH without urinary obstruction, Chronic pain disorder, COVID, Headache, Hemorrhoids, Hydronephrosis, Hypertension, Kidney disease, Left inguinal hernia, Lumbar herniated disc, PONV (postoperative nausea and vomiting), Renal calculi, Renal colic, TMJ (dislocation of temporomandibular joint), and Ureter colic    He   Patient Active Problem List    Diagnosis Date Noted   • Status post laparoscopic hernia repair 08/17/2022   • PONV (postoperative nausea and vomiting) 08/04/2022   • Arthralgia of left temporomandibular joint 08/04/2022   • Postnasal drip 02/10/2021   • Other headache syndrome 02/10/2021   • Pain of left lower extremity 02/10/2021   • COVID-19 01/08/2021   • CHASIDY (acute kidney injury) ([facility] [address] ) 07/08/2020   • Ulnar nerve abnormality 06/24/2019   • Lumbar sprain 06/24/2019   • Abnormal EKG 06/24/2019   • Allergic rhinitis 06/24/2019   • Hyperlipidemia 04/29/2019   • Obesity, Class I, BMI 30-34 9 04/29/2019   • Snoring 04/29/2019   • AVM (arteriovenous malformation) brain 01/18/2019   • Nephrolithiasis 12/14/2018   • History of colonic polyps 10/23/2018   • Essential hypertension 02/26/2018   • Radiculopathy, lumbar region 02/14/2018   • Benign prostatic hyperplasia with urinary obstruction 01/03/2018   • Left-sided headache 12/11/2017   • Bilateral low back pain with left-sided sciatica 10/16/2017   • DDD (degenerative disc disease), lumbar 10/16/2017   • Constipation 12/21/2016   • Brain aneurysm 12/21/2016   • Tinnitus of both ears 07/08/2016   • Cerebrovascular dural venous malformation 15/96/9786   • Periumbilical hernia 96/15/8257   • Left inguinal hernia 06/01/2016   • Colitis 05/31/2016   • Hepatic steatosis 03/24/2016   • Lung nodule 03/15/2016   • Chest pain 03/11/2016   • Mass of breast 02/01/2016   • Lateral epicondylitis 03/03/2015   • Elevated blood-pressure reading without diagnosis of hypertension 03/03/2015   • Irritable bowel syndrome 03/19/2013   • Gastroesophageal reflux disease 03/19/2013   • Strain of thoracic region 03/19/2013   • Backache 03/04/2013   • Hydronephrosis with urinary obstruction due to ureteral calculus 03/04/2013   • Anxiety 03/04/2013   • Other male erectile dysfunction 08/10/2012     He  has a past surgical history that includes Brain surgery; Colonoscopy;  Cystoscopy kidney w/ ureteral guide wire; Cystoscopy w/ ureteral stent placement; Cystoscopy; Cystoscopy; Hernia repair (3817); Kidney surgery; Tooth extraction; Hernia repair; pr cysto/uretero w/lithotripsy &indwell stent insrt (Right, 12/13/2018); [place] retrograde pyelogram (7/9/2020); pr cysto/uretero w/lithotripsy &indwell stent insrt (Left, 7/9/2020); and pr laparoscopy surg rpr initial inguinal hernia (Left, 8/4/2022)  His family history includes Cancer in his father; Diabetes in his father; Heart disease in his father; Hypertension in his father; Prostate cancer in his father; Stroke in his mother  He  reports that he has never smoked  He has never used smokeless tobacco  He reports current alcohol use  He reports that he does not use drugs  Current Outpatient Medications   Medication Sig Dispense Refill   • acetaminophen (TYLENOL) 500 mg tablet Take 500-1,000 mg by mouth every 6 (six) hours as needed for mild pain     • ALPRAZolam (XANAX) 0 25 mg tablet Take 1 tablet (0 25 mg total) by mouth daily at bedtime as needed for anxiety 20 tablet 1   • famotidine (PEPCID) 20 mg tablet   Start: 10/26/22 8:49:00 EDT     • hydrochlorothiazide (HYDRODIURIL) 25 mg tablet Take 1 tablet (25 mg total) by mouth daily 30 tablet 1   • ibuprofen (MOTRIN) 200 mg tablet Take 200-800 mg by mouth every 6 (six) hours as needed for mild pain     • methocarbamol (ROBAXIN) 750 mg tablet ONE PO at hs 30 tablet 0   • Naproxen Sodium 220 MG CAPS Take by mouth     • NON FORMULARY daily as needed Medical marijuana     • tadalafil (CIALIS) 20 MG tablet Take 1 tablet (20 mg total) by mouth as needed for erectile dysfunction 10 tablet 3   • traMADol (ULTRAM) 50 mg tablet Take 1 tablet (50 mg total) by mouth daily as needed for moderate pain 14 tablet 0   • famotidine (PEPCID) 10 mg tablet Take 10 mg by mouth Taken as needed       No current facility-administered medications for this visit       Current Outpatient Medications on File Prior to Visit   Medication Sig   • acetaminophen (TYLENOL) 500 mg tablet Take 500-1,000 mg by mouth "every 6 (six) hours as needed for mild pain   • ALPRAZolam (XANAX) 0 25 mg tablet Take 1 tablet (0 25 mg total) by mouth daily at bedtime as needed for anxiety   • famotidine (PEPCID) 20 mg tablet   Start: 10/26/22 8:49:00 EDT   • hydrochlorothiazide (HYDRODIURIL) 25 mg tablet Take 1 tablet (25 mg total) by mouth daily   • ibuprofen (MOTRIN) 200 mg tablet Take 200-800 mg by mouth every 6 (six) hours as needed for mild pain   • methocarbamol (ROBAXIN) 750 mg tablet ONE PO at hs   • Naproxen Sodium 220 MG CAPS Take by mouth   • NON FORMULARY daily as needed Medical marijuana   • tadalafil (CIALIS) 20 MG tablet Take 1 tablet (20 mg total) by mouth as needed for erectile dysfunction   • traMADol (ULTRAM) 50 mg tablet Take 1 tablet (50 mg total) by mouth daily as needed for moderate pain   • famotidine (PEPCID) 10 mg tablet Take 10 mg by mouth Taken as needed     No current facility-administered medications on file prior to visit  He is allergic to morphine, other, oxycodone-acetaminophen, and pollen extract       Review of Systems   Constitutional: Positive for fatigue  Respiratory: Negative for shortness of breath  Cardiovascular: Negative for chest pain and palpitations  Gastrointestinal: Negative for abdominal pain, constipation and diarrhea  Endocrine: Negative for cold intolerance and heat intolerance  Genitourinary: Negative for difficulty urinating  Musculoskeletal: Positive for arthralgias  Skin: Negative for rash  Neurological: Negative for headaches  Psychiatric/Behavioral: Negative for dysphoric mood  The patient is not nervous/anxious  Objective:    /76   Pulse 70   Resp 16   Ht 5' 8\" (1 727 m)   Wt 102 kg (224 lb)   BMI 34 06 kg/m²     Physical Exam  Vitals and nursing note reviewed  Constitutional:       General: He is not in acute distress  Appearance: He is well-developed  He is obese  HENT:      Head: Normocephalic and atraumatic     Eyes:      " Conjunctiva/sclera: Conjunctivae normal    Neck:      Thyroid: No thyromegaly  Pulmonary:      Effort: Pulmonary effort is normal  No respiratory distress  Skin:     Findings: No rash (visible)  Neurological:      Mental Status: He is alert and oriented to person, place, and time     Psychiatric:         Behavior: Behavior normal

## 2023-05-16 NOTE — PATIENT INSTRUCTIONS
Try these alternative food options:  Protein shakes- Premier, Pure protein, Fairlife, Iconic  Lactose free protein shakes-  Fairlife, Ripple, Iconic, Miller  Protein bars- Quest, Pure protein  Ice cream- Amando's, Yasso, Enlightened, Halo Top   Chips- Quest protein chips, Cheese crisps   Bread- 647 wheat, Protein Keto bread, whole wheat monae bread, low carb/high protein wraps  Pasta- Chickpea pasta, Pasta zero or similar  Rice- Cauliflower rice, quinoa, wild rice, brown rice  Fruits- berries, cantaloupe, peaches, apples, oranges  Yogurt- Ratio (25g protein), Skyr, Two good, Chobani 60 marina or 100 marina, Oikos triple zero  Sugar alternatives- Stevia, Monk fruit, Swerve    Recommend 1 cup non-starchy vegetables, 1/2 cup carbohydrate( whole grains recommended), and 3-4 oz of protein (lean proteins recommended)

## 2023-06-02 ENCOUNTER — NURSE TRIAGE (OUTPATIENT)
Dept: OTHER | Facility: OTHER | Age: 63
End: 2023-06-02

## 2023-06-02 DIAGNOSIS — M62.830 MUSCLE SPASM OF BACK: ICD-10-CM

## 2023-06-02 RX ORDER — METHOCARBAMOL 750 MG/1
TABLET, FILM COATED ORAL
Qty: 30 TABLET | Refills: 0 | Status: SHIPPED | OUTPATIENT
Start: 2023-06-02 | End: 2023-08-17 | Stop reason: SDUPTHER

## 2023-06-03 NOTE — TELEPHONE ENCOUNTER
"Has chronic back issues and has Robaxin 750mg, 1 tablet PO at HS ordered for when his back acts up  He notes he was caring a bunch of heavy boxes and is now experiencing an increase in back pain  Requesting a refill for the Robaxin  On call provider notified    Reason for Disposition  • [1] Prescription refill request for ESSENTIAL medicine (i e , likelihood of harm to patient if not taken) AND [2] triager unable to refill per department policy    Answer Assessment - Initial Assessment Questions  1  DRUG NAME: \"What medicine do you need to have refilled? \"      Robaxin  2  REFILLS REMAINING: \"How many refills are remaining? \" (Note: The label on the medicine or pill bottle will show how many refills are remaining  If there are no refills remaining, then a renewal may be needed )      0  4  PRESCRIBING HCP: \"Who prescribed it? \" Reason: If prescribed by specialist, call should be referred to that group  Dr Bruno Asp  5  SYMPTOMS: \"Do you have any symptoms? \"      Back pain    Protocols used: MEDICATION REFILL AND RENEWAL CALL-ADULT-      "

## 2023-06-03 NOTE — TELEPHONE ENCOUNTER
Per on call-  Refill sent as requested      Patient notified of the above and verbalized understanding

## 2023-06-03 NOTE — TELEPHONE ENCOUNTER
"Regarding: back injury/medication request  ----- Message from Kwame Branch sent at 6/2/2023  7:20 PM EDT -----  Rani Mahajan pulled my back and im out of my muscle relaxer\"    "

## 2023-06-07 ENCOUNTER — NURSE TRIAGE (OUTPATIENT)
Dept: OTHER | Facility: OTHER | Age: 63
End: 2023-06-07

## 2023-06-07 DIAGNOSIS — F41.9 ANXIETY: ICD-10-CM

## 2023-06-07 RX ORDER — ALPRAZOLAM 0.25 MG/1
0.25 TABLET ORAL
Qty: 20 TABLET | Refills: 1 | Status: SHIPPED | OUTPATIENT
Start: 2023-06-07

## 2023-06-07 NOTE — TELEPHONE ENCOUNTER
"patient has a history of anxiety and takes xanax 0 25mg at bedtime  He notes he has been under a lot of stress at work, has been jittery and more anxious today, but the refill for the Xanax was not sent in  Can a refill be sent to the [facility] on [address]? On call provider notified    Reason for Disposition  • [1] Prescription refill request for ESSENTIAL medicine (i e , likelihood of harm to patient if not taken) AND [2] triager unable to refill per department policy    Answer Assessment - Initial Assessment Questions  1  DRUG NAME: \"What medicine do you need to have refilled? \"      Xanax  4  PRESCRIBING HCP: \"Who prescribed it? \" Reason: If prescribed by specialist, call should be referred to that group  Dr Damon Haley  5  SYMPTOMS: \"Do you have any symptoms? \"      anxiety    Protocols used: MEDICATION REFILL AND RENEWAL CALL-ADULT-      "

## 2023-06-07 NOTE — TELEPHONE ENCOUNTER
"Regarding: anxiety attack  ----- Message from Carolina Peng sent at 6/7/2023  7:25 PM EDT -----  \" I am having an anxiety  attack  \"    "

## 2023-06-08 NOTE — TELEPHONE ENCOUNTER
Per on call- will send refill a little bit later this evening  Patient was advised of the above and verbalized understanding

## 2023-06-12 DIAGNOSIS — G89.29 CHRONIC LOW BACK PAIN, UNSPECIFIED BACK PAIN LATERALITY, UNSPECIFIED WHETHER SCIATICA PRESENT: ICD-10-CM

## 2023-06-12 DIAGNOSIS — M54.50 CHRONIC LOW BACK PAIN, UNSPECIFIED BACK PAIN LATERALITY, UNSPECIFIED WHETHER SCIATICA PRESENT: ICD-10-CM

## 2023-06-12 RX ORDER — TRAMADOL HYDROCHLORIDE 50 MG/1
50 TABLET ORAL DAILY PRN
Qty: 14 TABLET | Refills: 0 | Status: SHIPPED | OUTPATIENT
Start: 2023-06-12

## 2023-06-27 ENCOUNTER — HOSPITAL ENCOUNTER (EMERGENCY)
Facility: HOSPITAL | Age: 63
Discharge: HOME | End: 2023-06-27
Attending: EMERGENCY MEDICINE | Admitting: EMERGENCY MEDICINE
Payer: COMMERCIAL

## 2023-06-27 ENCOUNTER — APPOINTMENT (EMERGENCY)
Dept: RADIOLOGY | Facility: HOSPITAL | Age: 63
End: 2023-06-27
Payer: COMMERCIAL

## 2023-06-27 ENCOUNTER — TELEPHONE (OUTPATIENT)
Dept: FAMILY MEDICINE CLINIC | Facility: CLINIC | Age: 63
End: 2023-06-27

## 2023-06-27 VITALS
HEART RATE: 67 BPM | RESPIRATION RATE: 18 BRPM | SYSTOLIC BLOOD PRESSURE: 164 MMHG | TEMPERATURE: 98.3 F | WEIGHT: 221.56 LBS | OXYGEN SATURATION: 96 % | BODY MASS INDEX: 33.69 KG/M2 | DIASTOLIC BLOOD PRESSURE: 78 MMHG

## 2023-06-27 DIAGNOSIS — R11.0 NAUSEA: ICD-10-CM

## 2023-06-27 DIAGNOSIS — R42 VERTIGO: Primary | ICD-10-CM

## 2023-06-27 DIAGNOSIS — R10.13 EPIGASTRIC PAIN: ICD-10-CM

## 2023-06-27 LAB
ALBUMIN SERPL BCP-MCNC: 4.4 G/DL (ref 3.5–5)
ALP SERPL-CCNC: 39 U/L (ref 34–104)
ALT SERPL W P-5'-P-CCNC: 17 U/L (ref 7–52)
ANION GAP SERPL CALCULATED.3IONS-SCNC: 8 MMOL/L
AST SERPL W P-5'-P-CCNC: 17 U/L (ref 13–39)
ATRIAL RATE: 67 BPM
BASOPHILS # BLD AUTO: 0.03 THOUSANDS/ÂΜL (ref 0–0.1)
BASOPHILS NFR BLD AUTO: 0 % (ref 0–1)
BILIRUB SERPL-MCNC: 0.81 MG/DL (ref 0.2–1)
BUN SERPL-MCNC: 19 MG/DL (ref 5–25)
CALCIUM SERPL-MCNC: 9.6 MG/DL (ref 8.4–10.2)
CARDIAC TROPONIN I PNL SERPL HS: 3 NG/L
CHLORIDE SERPL-SCNC: 101 MMOL/L (ref 96–108)
CO2 SERPL-SCNC: 29 MMOL/L (ref 21–32)
CREAT SERPL-MCNC: 1.03 MG/DL (ref 0.6–1.3)
EOSINOPHIL # BLD AUTO: 0.1 THOUSAND/ÂΜL (ref 0–0.61)
EOSINOPHIL NFR BLD AUTO: 1 % (ref 0–6)
ERYTHROCYTE [DISTWIDTH] IN BLOOD BY AUTOMATED COUNT: 13.2 % (ref 11.6–15.1)
GFR SERPL CREATININE-BSD FRML MDRD: 76 ML/MIN/1.73SQ M
GLUCOSE SERPL-MCNC: 102 MG/DL (ref 65–140)
HCT VFR BLD AUTO: 48.5 % (ref 36.5–49.3)
HGB BLD-MCNC: 15.9 G/DL (ref 12–17)
IMM GRANULOCYTES # BLD AUTO: 0.04 THOUSAND/UL (ref 0–0.2)
IMM GRANULOCYTES NFR BLD AUTO: 0 % (ref 0–2)
LIPASE SERPL-CCNC: 182 U/L (ref 11–82)
LYMPHOCYTES # BLD AUTO: 1.57 THOUSANDS/ÂΜL (ref 0.6–4.47)
LYMPHOCYTES NFR BLD AUTO: 16 % (ref 14–44)
MCH RBC QN AUTO: 30.2 PG (ref 26.8–34.3)
MCHC RBC AUTO-ENTMCNC: 32.8 G/DL (ref 31.4–37.4)
MCV RBC AUTO: 92 FL (ref 82–98)
MONOCYTES # BLD AUTO: 0.85 THOUSAND/ÂΜL (ref 0.17–1.22)
MONOCYTES NFR BLD AUTO: 8 % (ref 4–12)
NEUTROPHILS # BLD AUTO: 7.48 THOUSANDS/ÂΜL (ref 1.85–7.62)
NEUTS SEG NFR BLD AUTO: 75 % (ref 43–75)
NRBC BLD AUTO-RTO: 0 /100 WBCS
P AXIS: 53 DEGREES
PLATELET # BLD AUTO: 253 THOUSANDS/UL (ref 149–390)
PMV BLD AUTO: 8.8 FL (ref 8.9–12.7)
POTASSIUM SERPL-SCNC: 3.6 MMOL/L (ref 3.5–5.3)
PR INTERVAL: 162 MS
PROT SERPL-MCNC: 7.6 G/DL (ref 6.4–8.4)
QRS AXIS: -4 DEGREES
QRSD INTERVAL: 96 MS
QT INTERVAL: 398 MS
QTC INTERVAL: 420 MS
RBC # BLD AUTO: 5.27 MILLION/UL (ref 3.88–5.62)
SODIUM SERPL-SCNC: 138 MMOL/L (ref 135–147)
T WAVE AXIS: 32 DEGREES
VENTRICULAR RATE: 67 BPM
WBC # BLD AUTO: 10.07 THOUSAND/UL (ref 4.31–10.16)

## 2023-06-27 PROCEDURE — 85025 COMPLETE CBC W/AUTO DIFF WBC: CPT | Performed by: EMERGENCY MEDICINE

## 2023-06-27 PROCEDURE — 93005 ELECTROCARDIOGRAM TRACING: CPT

## 2023-06-27 PROCEDURE — 83690 ASSAY OF LIPASE: CPT

## 2023-06-27 PROCEDURE — 36415 COLL VENOUS BLD VENIPUNCTURE: CPT

## 2023-06-27 PROCEDURE — 80053 COMPREHEN METABOLIC PANEL: CPT | Performed by: EMERGENCY MEDICINE

## 2023-06-27 PROCEDURE — 71045 X-RAY EXAM CHEST 1 VIEW: CPT

## 2023-06-27 PROCEDURE — 84484 ASSAY OF TROPONIN QUANT: CPT | Performed by: EMERGENCY MEDICINE

## 2023-06-27 PROCEDURE — 93010 ELECTROCARDIOGRAM REPORT: CPT | Performed by: STUDENT IN AN ORGANIZED HEALTH CARE EDUCATION/TRAINING PROGRAM

## 2023-06-27 RX ORDER — MECLIZINE HYDROCHLORIDE 25 MG/1
25 TABLET ORAL ONCE
Status: COMPLETED | OUTPATIENT
Start: 2023-06-27 | End: 2023-06-27

## 2023-06-27 RX ORDER — ONDANSETRON 2 MG/ML
4 INJECTION INTRAMUSCULAR; INTRAVENOUS ONCE
Status: COMPLETED | OUTPATIENT
Start: 2023-06-27 | End: 2023-06-27

## 2023-06-27 RX ORDER — MECLIZINE HYDROCHLORIDE 25 MG/1
25 TABLET ORAL 3 TIMES DAILY PRN
Qty: 30 TABLET | Refills: 0 | Status: SHIPPED | OUTPATIENT
Start: 2023-06-27

## 2023-06-27 RX ADMIN — ONDANSETRON 4 MG: 2 INJECTION INTRAMUSCULAR; INTRAVENOUS at 12:10

## 2023-06-27 RX ADMIN — MECLIZINE HYDROCHLORIDE 25 MG: 25 TABLET ORAL at 12:10

## 2023-06-27 NOTE — TELEPHONE ENCOUNTER
Patient called looking for an appt with you today unfortunately you do not have anything open  He is experiencing nausea, dizziness,over all does not feel well  He wanted me to ask you if you would be able to squeeze him in  I did offer another provider but would rather see you

## 2023-06-27 NOTE — DISCHARGE INSTRUCTIONS
Was seen in the emergency department for vertigo, nausea, and abdominal discomfort  Your lab work did not show any abnormalities  Your symptoms are possibly due to a viral GI infection  A prescription for meclizine was sent to your pharmacy to help with vertigo  If your symptoms persist follow-up with your primary care provider  If your symptoms worsen please return to the emergency department

## 2023-06-27 NOTE — ED ATTENDING ATTESTATION
6/27/2023  IDejuan MD, saw and evaluated the patient  I have discussed the patient with the resident/non-physician practitioner and agree with the resident's/non-physician practitioner's findings, Plan of Care, and MDM as documented in the resident's/non-physician practitioner's note, except where noted  All available labs and Radiology studies were reviewed  I was present for key portions of any procedure(s) performed by the resident/non-physician practitioner and I was immediately available to provide assistance  At this point I agree with the current assessment done in the Emergency Department  I have conducted an independent evaluation of this patient a history and physical is as follows:  57 yo M with h/o brain AVM, c/o onset of dizziness yesterday, described as vertigo associated with nausea, no vomiting, improved today  Denies headache  No speech deficits, no focal weakness  VSS   NAD  Normal neuro exam, without lateralizing deficits  Gait normal   Abd S/NT  EKG nonischemic  CXR clear  Vertigo currently resolving without deficit concerning for cerebellar finding, or central vertigo  Treat symptomatically        ED Course         Critical Care Time  Procedures

## 2023-06-27 NOTE — ED PROVIDER NOTES
History  Chief Complaint   Patient presents with   • Dizziness     Pt reports dizziness, chills, and nausea starting yesterday  Pt reports yesterday like the room was spinning  Pt reports having taken zofran and tylenol yesterday  69-year-old male with a history of brain AVM status post embolization, hypertension, and chronic back pain who presents complaining of dizziness, nausea, and generalized abdominal discomfort that began yesterday  The patient states that throughout the day yesterday he experienced dizziness which she described as the room spinning  He states that his symptoms were made worse with movement and he was unable to get up out of the chair  He states that his vertigo has improved somewhat today but he is still experiencing lightheadedness  The patient states that he additionally experienced nausea but no vomiting  He took Zofran with some improvement in his symptoms  He also complains of feeling warm and clammy but states he has not checked his temperature  He denies headache, numbness, weakness, chest pain, shortness of breath, vomiting, diarrhea, and dysuria  Prior to Admission Medications   Prescriptions Last Dose Informant Patient Reported? Taking?    ALPRAZolam (XANAX) 0 25 mg tablet   No No   Sig: Take 1 tablet (0 25 mg total) by mouth daily at bedtime as needed for anxiety   NON FORMULARY   Yes No   Sig: daily as needed Medical marijuana   Naproxen Sodium 220 MG CAPS   Yes No   Sig: Take by mouth   acetaminophen (TYLENOL) 500 mg tablet   Yes No   Sig: Take 500-1,000 mg by mouth every 6 (six) hours as needed for mild pain   famotidine (PEPCID) 10 mg tablet  Self Yes No   Sig: Take 10 mg by mouth Taken as needed   famotidine (PEPCID) 20 mg tablet   Yes No   Sig:   Start: 10/26/22 8:49:00 EDT   hydrochlorothiazide (HYDRODIURIL) 25 mg tablet   No No   Sig: Take 1 tablet (25 mg total) by mouth daily   ibuprofen (MOTRIN) 200 mg tablet   Yes No   Sig: Take 200-800 mg by mouth every 6 (six) hours as needed for mild pain   methocarbamol (ROBAXIN) 750 mg tablet   No No   Sig: ONE PO at hs   tadalafil (CIALIS) 20 MG tablet  Self No No   Sig: Take 1 tablet (20 mg total) by mouth as needed for erectile dysfunction   traMADol (ULTRAM) 50 mg tablet   No No   Sig: Take 1 tablet (50 mg total) by mouth daily as needed for moderate pain      Facility-Administered Medications: None       Past Medical History:   Diagnosis Date   • AVM (arteriovenous malformation)    • BPH without urinary obstruction     last assessed 01/16/18   • Chronic pain disorder     low back and neck   • COVID     Jan 2021 and 5/14/22   • Headache     occ   • Hemorrhoids    • Hydronephrosis    • Hypertension    • Kidney disease    • Left inguinal hernia    • Lumbar herniated disc    • PONV (postoperative nausea and vomiting)    • Renal calculi    • Renal colic    • TMJ (dislocation of temporomandibular joint)    • Ureter colic        Past Surgical History:   Procedure Laterality Date   • BRAIN SURGERY     • COLONOSCOPY     • CYSTOSCOPY      w/removal of object, last assessed 01/16/18   • CYSTOSCOPY      w/removal of ureteral calculus last assessed 01/16/18   • CYSTOSCOPY KIDNEY W/ URETERAL GUIDE WIRE      last assessed 01/16/18   • CYSTOSCOPY W/ URETERAL STENT PLACEMENT      last assessed 01/16/18   • FL RETROGRADE PYELOGRAM  7/9/2020   • [address] [place] [address]   • HERNIA REPAIR     • KIDNEY SURGERY      removal of kidney stone in 2013 at [facility]   • OH CYSTO/URETERO W/LITHOTRIPSY &INDWELL STENT INSRT Right 12/13/2018    Procedure: CYSTOSCOPY URETEROSCOPY  RETROGRADE PYELOGRAM AND INSERTION RIGHT STENT URETERAL;  Surgeon: Ashlie Duarte MD;  [facility];  Service: Urology   • OH CYSTO/URETERO W/LITHOTRIPSY &INDWELL STENT INSRT Left 7/9/2020    Procedure: CYSTOSCOPY URETEROSCOPY WITH LITHOTRIPSY HOLMIUM LASER, RETROGRADE PYELOGRAM AND INSERTION STENT URETERAL;  Surgeon: Ashlie Duarte MD;  [facility];  Service: Urology   • PA LAPAROSCOPY SURG RPR INITIAL INGUINAL HERNIA Left 8/4/2022    Procedure: REPAIR HERNIA INGUINAL LAPAROSCOPIC W/ ROBOTICS;  Surgeon: Lacey Nichols MD;  Location[facility];  Service: General   • TOOTH EXTRACTION         Family History   Problem Relation Age of Onset   • Stroke Mother    • Cancer Father    • Diabetes Father    • Prostate cancer Father    • Heart disease Father    • Hypertension Father      I have reviewed and agree with the history as documented  E-Cigarette/Vaping   • E-Cigarette Use Current Some Day User      E-Cigarette/Vaping Substances   • Nicotine No    • THC Yes    • CBD No    • Flavoring No    • Other No    • Unknown No      Social History     Tobacco Use   • Smoking status: Never   • Smokeless tobacco: Never   Vaping Use   • Vaping Use: Some days   • Substances: THC   Substance Use Topics   • Alcohol use: Yes     Comment: rare   • Drug use: No        Review of Systems   Constitutional: Positive for appetite change, chills, diaphoresis and fever  HENT: Negative for congestion, ear pain and sore throat  Eyes: Negative for pain, redness and visual disturbance  Respiratory: Negative for cough and shortness of breath  Cardiovascular: Negative for chest pain and palpitations  Gastrointestinal: Positive for abdominal pain and nausea  Negative for diarrhea and vomiting  Genitourinary: Negative for dysuria and hematuria  Musculoskeletal: Negative for arthralgias, myalgias, neck pain and neck stiffness  Skin: Negative for color change, pallor and rash  Neurological: Positive for dizziness and light-headedness  Negative for seizures, syncope, facial asymmetry, speech difficulty, weakness, numbness and headaches  All other systems reviewed and are negative        Physical Exam  ED Triage Vitals   Temperature Pulse Respirations Blood Pressure SpO2   06/27/23 1058 06/27/23 1059 06/27/23 1058 06/27/23 1102 06/27/23 1059   98 3 °F (36 8 °C) 68 18 162/84 97 %      Temp Source Heart Rate Source Patient Position - Orthostatic VS BP Location FiO2 (%)   06/27/23 1058 06/27/23 1059 06/27/23 1058 06/27/23 1058 --   Oral Monitor Sitting Right arm       Pain Score       --                    Orthostatic Vital Signs  Vitals:    06/27/23 1059 06/27/23 1102 06/27/23 1308 06/27/23 1311   BP:  162/84  164/78   Pulse: 68  67    Patient Position - Orthostatic VS:  Lying Lying        Physical Exam  Constitutional:       General: He is not in acute distress  Appearance: Normal appearance  He is not ill-appearing or toxic-appearing  HENT:      Head: Normocephalic and atraumatic  Right Ear: Tympanic membrane, ear canal and external ear normal       Left Ear: Tympanic membrane, ear canal and external ear normal       Nose: Nose normal       Mouth/Throat:      Mouth: Mucous membranes are moist       Pharynx: Oropharynx is clear  Eyes:      General: No scleral icterus  Conjunctiva/sclera: Conjunctivae normal       Pupils: Pupils are equal, round, and reactive to light  Cardiovascular:      Rate and Rhythm: Normal rate and regular rhythm  Pulses: Normal pulses  Heart sounds: Normal heart sounds  No murmur heard  No friction rub  No gallop  Pulmonary:      Effort: Pulmonary effort is normal       Breath sounds: Normal breath sounds  No wheezing, rhonchi or rales  Abdominal:      General: Abdomen is flat  Palpations: Abdomen is soft  Tenderness: There is abdominal tenderness in the epigastric area  There is no right CVA tenderness, left CVA tenderness, guarding or rebound  Musculoskeletal:         General: No swelling or tenderness  Normal range of motion  Cervical back: Normal range of motion and neck supple  No rigidity or tenderness  Right lower leg: No edema  Left lower leg: No edema  Lymphadenopathy:      Cervical: No cervical adenopathy  Skin:     General: Skin is warm and dry        Capillary Refill: Capillary refill takes less than 2 seconds  Coloration: Skin is not jaundiced or pale  Findings: No bruising, erythema, lesion or rash  Neurological:      General: No focal deficit present  Mental Status: He is alert and oriented to person, place, and time  Cranial Nerves: No cranial nerve deficit  Sensory: No sensory deficit  Motor: No weakness        Coordination: Coordination normal       Gait: Gait normal          ED Medications  Medications   ondansetron (ZOFRAN) injection 4 mg (4 mg Intravenous Given 6/27/23 1210)   meclizine (ANTIVERT) tablet 25 mg (25 mg Oral Given 6/27/23 1210)       Diagnostic Studies  Results Reviewed     Procedure Component Value Units Date/Time    Lipase [[id number]]  (Abnormal) Collected: 06/27/23 1117    Lab Status: Final result Specimen: Blood from Arm, Right Updated: 06/27/23 1326     Lipase 182 u/L     Comprehensive metabolic panel [[id number]] Collected: 06/27/23 1117    Lab Status: Final result Specimen: Blood from Arm, Right Updated: 06/27/23 1158     Sodium 138 mmol/L      Potassium 3 6 mmol/L      Chloride 101 mmol/L      CO2 29 mmol/L      ANION GAP 8 mmol/L      BUN 19 mg/dL      Creatinine 1 03 mg/dL      Glucose 102 mg/dL      Calcium 9 6 mg/dL      AST 17 U/L      ALT 17 U/L      Alkaline Phosphatase 39 U/L      Total Protein 7 6 g/dL      Albumin 4 4 g/dL      Total Bilirubin 0 81 mg/dL      eGFR 76 ml/min/1 73sq m     Narrative:      [place] guidelines for Chronic Kidney Disease (CKD):   •  Stage 1 with normal or high GFR (GFR > 90 mL/min/1 73 square meters)  •  Stage 2 Mild CKD (GFR = 60-89 mL/min/1 73 square meters)  •  Stage 3A Moderate CKD (GFR = 45-59 mL/min/1 73 square meters)  •  Stage 3B Moderate CKD (GFR = 30-44 mL/min/1 73 square meters)  •  Stage 4 Severe CKD (GFR = 15-29 mL/min/1 73 square meters)  •  Stage 5 End Stage CKD (GFR <15 mL/min/1 73 square meters)  Note: GFR calculation is accurate only with a steady state creatinine    HS Troponin 0hr (reflex protocol) [[id number]]  (Normal) Collected: 06/27/23 1117    Lab Status: Final result Specimen: Blood from Arm, Right Updated: 06/27/23 1147     hs TnI 0hr 3 ng/L     CBC and differential [[id number]]  (Abnormal) Collected: 06/27/23 1117    Lab Status: Final result Specimen: Blood from Arm, Right Updated: 06/27/23 1125     WBC 10 07 Thousand/uL      RBC 5 27 Million/uL      Hemoglobin 15 9 g/dL      Hematocrit 48 5 %      MCV 92 fL      MCH 30 2 pg      MCHC 32 8 g/dL      RDW 13 2 %      MPV 8 8 fL      Platelets 609 Thousands/uL      nRBC 0 /100 WBCs      Neutrophils Relative 75 %      Immat GRANS % 0 %      Lymphocytes Relative 16 %      Monocytes Relative 8 %      Eosinophils Relative 1 %      Basophils Relative 0 %      Neutrophils Absolute 7 48 Thousands/µL      Immature Grans Absolute 0 04 Thousand/uL      Lymphocytes Absolute 1 57 Thousands/µL      Monocytes Absolute 0 85 Thousand/µL      Eosinophils Absolute 0 10 Thousand/µL      Basophils Absolute 0 03 Thousands/µL                  XR chest 1 view portable    (Results Pending)         Procedures  Procedures      ED Course                             SBIRT 22yo+    Flowsheet Row Most Recent Value   Initial Alcohol Screen: US AUDIT-C     1  How often do you have a drink containing alcohol? 0 Filed at: 06/27/2023 1222   2  How many drinks containing alcohol do you have on a typical day you are drinking? 0 Filed at: 06/27/2023 1222   3a  Male UNDER 65: How often do you have five or more drinks on one occasion? 0 Filed at: 06/27/2023 1222   3b  FEMALE Any Age, or MALE 65+: How often do you have 4 or more drinks on one occassion? 0 Filed at: 06/27/2023 1222   Audit-C Score 0 Filed at: 06/27/2023 1222   BOB: How many times in the past year have you    Used an illegal drug or used a prescription medication for non-medical reasons?  Never Filed at: 06/27/2023 1222                Medical Decision Making  76-year-old male with a history of brain AVM status post embolization in 2016 who presents complaining of vertigo, nausea, and generalized abdominal discomfort since yesterday  The patient states that he is not currently experiencing vertigo but complains of some lightheadedness at this time  Vitals are within the normal limits  On exam the patient is in no acute distress, cranial nerves II through XII are intact, strength and sensation is intact throughout all extremities, coordination is intact, normal gait, tympanic membrane's are flat and nonerythematous bilaterally, neck is supple, heart is regular rate and rhythm, lungs are clear to auscultation bilaterally, abdomen is soft and nontender, no lower extremity edema  Suspect possible viral syndrome  Given that the patient vertigo has improved and is only intermittent at this time suspicion is low for central causes of vertigo  Will order EKG and troponin to rule out dysrhythmia  Will order CBC to rule out anemia and leukocytosis  Will order CMP to rule out electrolyte abnormality  Will order lipase to rule out pancreatitis  We will treat the patient symptomatically with Zofran and meclizine  EKG rate 67, sinus rhythm, normal axis, normal intervals, no ST elevation or depression  Troponin is 3  No repeat is needed as the patient is not having chest pain  CBC and CMP are without actionable derangements  Lipase is mildly elevated at 182 but not 3 times the upper limit of normal   Chest x-ray was ordered by nursing staff, but shows no acute cardiopulmonary disease  The patient reports significant improvement in his symptoms after treatment  The patient states that he has Zofran at home  A prescription for meclizine is sent to the patient's pharmacy  Return precautions are given and the patient is discharged  Amount and/or Complexity of Data Reviewed  Labs: ordered  Radiology: ordered  Risk  Prescription drug management              Disposition  Final diagnoses:   Vertigo   Nausea   Epigastric pain     Time reflects when diagnosis was documented in both MDM as applicable and the Disposition within this note     Time User Action Codes Description Comment    6/27/2023  1:17 PM Jay Rang A Add [R42] Vertigo     6/27/2023  1:17 PM Jay Rang A Add [R11 0] Nausea     6/27/2023  1:17 PM Rosemary Brash Add [R10 13] Epigastric pain       ED Disposition     ED Disposition   Discharge    Condition   Stable    Date/Time   Tue Jun 27, 2023  1:37 PM    Comment   Meghna Perera discharge to home/self care  Follow-up Information     Follow up With Specialties Details Why Contact Info Additional [address] [facility] Emergency Department Emergency Medicine Go to  If symptoms worsen [facility] [id number]  [address] [facility] Emergency Department, [address] [place], [place], [address]  Family Medicine Schedule an appointment as soon as possible for a visit  As needed [address]  [phone]             Patient's Medications   Discharge Prescriptions    MECLIZINE (ANTIVERT) 25 MG TABLET    Take 1 tablet (25 mg total) by mouth 3 (three) times a day as needed for dizziness       Start Date: 6/27/2023 End Date: --       Order Dose: 25 mg       Quantity: 30 tablet    Refills: 0     No discharge procedures on file  PDMP Review       Value Time User    PDMP Reviewed  Yes 6/12/2023  1:09 PM Darrell Queen DO           ED Provider  Attending physically available and evaluated Meghna Perera I managed the patient along with the ED Attending      Electronically Signed by         Moy Mack DO  06/27/23 4052

## 2023-06-29 DIAGNOSIS — I10 ESSENTIAL HYPERTENSION: ICD-10-CM

## 2023-06-29 RX ORDER — HYDROCHLOROTHIAZIDE 25 MG/1
25 TABLET ORAL DAILY
Qty: 30 TABLET | Refills: 2 | Status: SHIPPED | OUTPATIENT
Start: 2023-06-29

## 2023-07-10 ENCOUNTER — TELEPHONE (OUTPATIENT)
Dept: UROLOGY | Facility: MEDICAL CENTER | Age: 63
End: 2023-07-10

## 2023-07-10 NOTE — TELEPHONE ENCOUNTER
Spoke with pt and scheduled for 8-15-23 with dr Chris De Oliveira; pt was advised he will need psa and urine done prior to visit.

## 2023-07-13 ENCOUNTER — OFFICE VISIT (OUTPATIENT)
Dept: FAMILY MEDICINE CLINIC | Facility: CLINIC | Age: 63
End: 2023-07-13
Payer: COMMERCIAL

## 2023-07-13 ENCOUNTER — APPOINTMENT (OUTPATIENT)
Dept: RADIOLOGY | Facility: MEDICAL CENTER | Age: 63
End: 2023-07-13
Payer: COMMERCIAL

## 2023-07-13 VITALS
OXYGEN SATURATION: 97 % | BODY MASS INDEX: 33.13 KG/M2 | SYSTOLIC BLOOD PRESSURE: 116 MMHG | WEIGHT: 218.6 LBS | HEART RATE: 71 BPM | HEIGHT: 68 IN | TEMPERATURE: 97.5 F | DIASTOLIC BLOOD PRESSURE: 68 MMHG

## 2023-07-13 DIAGNOSIS — R20.2 TINGLING OF LEFT UPPER EXTREMITY: ICD-10-CM

## 2023-07-13 DIAGNOSIS — M54.12 CERVICAL RADICULOPATHY: ICD-10-CM

## 2023-07-13 DIAGNOSIS — E66.9 OBESITY (BMI 30-39.9): ICD-10-CM

## 2023-07-13 DIAGNOSIS — I10 ESSENTIAL HYPERTENSION: Primary | ICD-10-CM

## 2023-07-13 PROCEDURE — 72040 X-RAY EXAM NECK SPINE 2-3 VW: CPT

## 2023-07-13 PROCEDURE — 99214 OFFICE O/P EST MOD 30 MIN: CPT | Performed by: FAMILY MEDICINE

## 2023-07-13 RX ORDER — CELECOXIB 200 MG/1
200 CAPSULE ORAL 2 TIMES DAILY
COMMUNITY
Start: 2023-05-16

## 2023-07-13 NOTE — PROGRESS NOTES
Name: Hoda Whitmore      : 1960      MRN: [id number]  Encounter Provider: Kim Mcfarland DO  Encounter Date: 2023   Encounter department: [address] [place] [address]  Chief Complaint   Patient presents with   • Tingling     Tingling in left arm , been going on for about month , comes and goes no chest pain no nausea      Patient Instructions   Here for possible cervical radiculopathy and left arm tingling with hx of documented degenerative changes in   the cervical spine, with disc space narrowing and disc osteophyte complexes at C5-C6 and C6-C7, indenting the ventral thecal sac in  on a thoracic MRI. Recheck cervical spine xray and consult orthopedics. Assessment & Plan     1. Essential hypertension    2. Cervical radiculopathy    3. Tingling of left upper extremity    4. Obesity (BMI 30-39. 9)           Subjective      Tingling (Tingling in left arm , been going on for about month , comes and goes no chest pain no nausea ). Previously in ER and no heart etiology and stated vertigo and given meclizine and something for nausea. Has had several weeks of tingling left arm. It travels down left arm, intermittently, currently not there. It occurs 1-2 times per day for 5 seconds. There is hx of  degenerative changes in the cervical spine, with disc space narrowing and disc osteophyte complexes at C5-C6 and C6-C7, indenting the ventral thecal sac. This was done in 20 MRI thoracic spine. Review of Systems   Constitutional: Negative. HENT: Negative. Eyes: Negative. Respiratory: Negative. Cardiovascular: Negative. Gastrointestinal: Negative. Endocrine: Negative. Genitourinary: Negative. Musculoskeletal: Negative. Skin: Negative. Allergic/Immunologic: Negative. Neurological:        Left forearm tingling intermittently  About 5 seconds which happens about once a daily   Hematological: Negative. Psychiatric/Behavioral: Negative. Current Outpatient Medications on File Prior to Visit   Medication Sig   • acetaminophen (TYLENOL) 500 mg tablet Take 500-1,000 mg by mouth every 6 (six) hours as needed for mild pain   • ALPRAZolam (XANAX) 0.25 mg tablet Take 1 tablet (0.25 mg total) by mouth daily at bedtime as needed for anxiety   • celecoxib (CeleBREX) 200 mg capsule Take 200 mg by mouth 2 (two) times a day   • famotidine (PEPCID) 10 mg tablet Take 10 mg by mouth Taken as needed   • famotidine (PEPCID) 20 mg tablet   Start: 10/26/22 8:49:00 EDT   • hydrochlorothiazide (HYDRODIURIL) 25 mg tablet Take 1 tablet (25 mg total) by mouth daily   • ibuprofen (MOTRIN) 200 mg tablet Take 200-800 mg by mouth every 6 (six) hours as needed for mild pain   • methocarbamol (ROBAXIN) 750 mg tablet ONE PO at hs   • Naproxen Sodium 220 MG CAPS Take by mouth   • NON FORMULARY daily as needed Medical marijuana   • tadalafil (CIALIS) 20 MG tablet Take 1 tablet (20 mg total) by mouth as needed for erectile dysfunction   • traMADol (ULTRAM) 50 mg tablet Take 1 tablet (50 mg total) by mouth daily as needed for moderate pain   • [DISCONTINUED] meclizine (ANTIVERT) 25 mg tablet Take 1 tablet (25 mg total) by mouth 3 (three) times a day as needed for dizziness (Patient not taking: Reported on 7/13/2023)       Objective     /68   Pulse 71   Temp 97.5 °F (36.4 °C) (Temporal)   Ht 5' 8" (1.727 m)   Wt 99.2 kg (218 lb 9.6 oz)   SpO2 97%   BMI 33.24 kg/m²     Physical Exam  Constitutional:       Appearance: He is well-developed. He is obese. HENT:      Head: Normocephalic and atraumatic. Eyes:      Conjunctiva/sclera: Conjunctivae normal.      Pupils: Pupils are equal, round, and reactive to light. Cardiovascular:      Rate and Rhythm: Normal rate and regular rhythm. Pulses: Normal pulses. Heart sounds: Normal heart sounds. Pulmonary:      Effort: Pulmonary effort is normal.      Breath sounds: Normal breath sounds.    Musculoskeletal: General: Normal range of motion. Cervical back: Normal range of motion and neck supple. Skin:     General: Skin is warm and dry. Capillary Refill: Capillary refill takes less than 2 seconds. Neurological:      General: No focal deficit present. Mental Status: He is alert and oriented to person, place, and time. Mental status is at baseline. Deep Tendon Reflexes: Reflexes are normal and symmetric. Comments: During office visit there was a tingling in left forearm no precipitating event associated - occurred while sitting. Psychiatric:         Mood and Affect: Mood normal.         Behavior: Behavior normal.         Thought Content:  Thought content normal.         Judgment: Judgment normal.       Alexander Montanez,

## 2023-07-13 NOTE — PATIENT INSTRUCTIONS
Here for possible cervical radiculopathy and left arm tingling with hx of documented degenerative changes in   the cervical spine, with disc space narrowing and disc osteophyte complexes at C5-C6 and C6-C7, indenting the ventral thecal sac in 2020 on a thoracic MRI. Recheck cervical spine xray and consult orthopedics.

## 2023-08-08 ENCOUNTER — APPOINTMENT (OUTPATIENT)
Dept: RADIOLOGY | Facility: MEDICAL CENTER | Age: 63
End: 2023-08-08
Attending: UROLOGY
Payer: COMMERCIAL

## 2023-08-08 ENCOUNTER — APPOINTMENT (OUTPATIENT)
Dept: LAB | Facility: MEDICAL CENTER | Age: 63
End: 2023-08-08
Attending: UROLOGY
Payer: COMMERCIAL

## 2023-08-08 DIAGNOSIS — N20.0 NEPHROLITHIASIS: ICD-10-CM

## 2023-08-08 DIAGNOSIS — N40.1 BENIGN PROSTATIC HYPERPLASIA WITH URINARY OBSTRUCTION: ICD-10-CM

## 2023-08-08 DIAGNOSIS — N13.8 BENIGN PROSTATIC HYPERPLASIA WITH URINARY OBSTRUCTION: ICD-10-CM

## 2023-08-08 LAB
BACTERIA UR QL AUTO: ABNORMAL /HPF
BILIRUB UR QL STRIP: NEGATIVE
CLARITY UR: ABNORMAL
COLOR UR: YELLOW
GLUCOSE UR STRIP-MCNC: NEGATIVE MG/DL
HGB UR QL STRIP.AUTO: NEGATIVE
KETONES UR STRIP-MCNC: NEGATIVE MG/DL
LEUKOCYTE ESTERASE UR QL STRIP: NEGATIVE
MUCOUS THREADS UR QL AUTO: ABNORMAL
NITRITE UR QL STRIP: NEGATIVE
NON-SQ EPI CELLS URNS QL MICRO: ABNORMAL /HPF
PH UR STRIP.AUTO: 6 [PH]
PROT UR STRIP-MCNC: ABNORMAL MG/DL
RBC #/AREA URNS AUTO: ABNORMAL /HPF
SP GR UR STRIP.AUTO: >=1.03 (ref 1–1.03)
UROBILINOGEN UR STRIP-ACNC: <2 MG/DL
WBC #/AREA URNS AUTO: ABNORMAL /HPF

## 2023-08-08 PROCEDURE — 74018 RADEX ABDOMEN 1 VIEW: CPT

## 2023-08-08 PROCEDURE — 84153 ASSAY OF PSA TOTAL: CPT

## 2023-08-08 PROCEDURE — 36415 COLL VENOUS BLD VENIPUNCTURE: CPT

## 2023-08-08 PROCEDURE — 81001 URINALYSIS AUTO W/SCOPE: CPT

## 2023-08-09 LAB — PSA SERPL-MCNC: 2.44 NG/ML (ref 0–4)

## 2023-08-15 ENCOUNTER — OFFICE VISIT (OUTPATIENT)
Dept: UROLOGY | Facility: MEDICAL CENTER | Age: 63
End: 2023-08-15
Payer: COMMERCIAL

## 2023-08-15 VITALS
HEIGHT: 68 IN | OXYGEN SATURATION: 98 % | SYSTOLIC BLOOD PRESSURE: 120 MMHG | BODY MASS INDEX: 33.95 KG/M2 | HEART RATE: 65 BPM | DIASTOLIC BLOOD PRESSURE: 80 MMHG | WEIGHT: 224 LBS

## 2023-08-15 DIAGNOSIS — N40.1 BENIGN PROSTATIC HYPERPLASIA WITH URINARY OBSTRUCTION: Primary | ICD-10-CM

## 2023-08-15 DIAGNOSIS — N13.8 BENIGN PROSTATIC HYPERPLASIA WITH URINARY OBSTRUCTION: Primary | ICD-10-CM

## 2023-08-15 DIAGNOSIS — Z87.442 HISTORY OF KIDNEY STONES: ICD-10-CM

## 2023-08-15 PROCEDURE — 99214 OFFICE O/P EST MOD 30 MIN: CPT | Performed by: UROLOGY

## 2023-08-15 NOTE — PROGRESS NOTES
Assessment/Plan:    Benign prostatic hyperplasia with urinary obstruction  AUA symptom score is 7. He is presently satisfied with his voiding pattern. Urinalysis is negative. PSA is stable at 2.44 (August 8, 2023). Options were discussed. We will continue to follow with watchful waiting. He will return in 1 year. We will plan to recheck PSA at that time. History of kidney stones  He remains asymptomatic. ET last year did not reveal any kidney stones and KUB this year does not reveal any specialist calcifications. Urinalysis is negative. We will continue to follow. Diagnoses and all orders for this visit:    Benign prostatic hyperplasia with urinary obstruction  -     PSA Total, Diagnostic; Future  -     Urinalysis with microscopic; Future    History of kidney stones  -     Urinalysis with microscopic; Future          Subjective:      Patient ID: Constantino Benson is a 58 y.o. male. Benign Prostatic Hypertrophy  This is a chronic problem. The current episode started more than 1 year ago. The problem is unchanged. Irritative symptoms do not include frequency, nocturia or urgency. Obstructive symptoms include dribbling and a slower stream. Obstructive symptoms do not include incomplete emptying, an intermittent stream, straining or a weak stream. Associated symptoms include hesitancy. Pertinent negatives include no chills, dysuria, genital pain, hematuria, nausea or vomiting. AUA score is 0-7. His sexual activity is non-contributory to the current illness. Nothing aggravates the symptoms. Past treatments include nothing. The treatment provided no relief. Kidney stones  Patient has a long history of kidney stones. He has had several procedures. He has also passed stones on his own. There has not been any recent stone activity. He is presently asymptomatic.     The following portions of the patient's history were reviewed and updated as appropriate: allergies, current medications, past family history, past medical history, past social history, past surgical history and problem list.    Review of Systems   Constitutional: Negative for chills, diaphoresis, fatigue and fever. HENT: Negative. Eyes: Negative. Respiratory: Negative. Cardiovascular: Negative. Gastrointestinal: Negative for nausea and vomiting. For  [facility] [address] surgery in August [employer] [employer])   Endocrine: Negative. Genitourinary: Positive for hesitancy. Negative for dysuria, frequency, hematuria, incomplete emptying, nocturia and urgency. See HPI   Musculoskeletal: Negative. Skin: Negative. Allergic/Immunologic: Negative. Neurological: Negative. Hematological: Negative. Psychiatric/Behavioral: Negative. AUA SYMPTOM SCORE    Flowsheet Row Most Recent Value   AUA SYMPTOM SCORE    How often have you had a sensation of not emptying your bladder completely after you finished urinating? 1   How often have you had to urinate again less than two hours after you finished urinating? 1   How often have you found you stopped and started again several times when you urinate? 1   How often have you found it difficult to postpone urination? 1   How often have you had a weak urinary stream? 1   How often have you had to push or strain to begin urination? 1   How many times did you most typically get up to urinate from the time you went to bed at night until the time you got up in the morning? 1   Quality of Life: If you were to spend the rest of your life with your urinary condition just the way it is now, how would you feel about that? 2   AUA SYMPTOM SCORE 7        Objective:      /80 (BP Location: Left arm, Patient Position: Sitting, Cuff Size: Large)   Pulse 65   Ht 5' 8" (1.727 m)   Wt 102 kg (224 lb)   SpO2 98%   BMI 34.06 kg/m²          Physical Exam  Vitals reviewed. Constitutional:       General: He is not in acute distress. Appearance: Normal appearance. He is well-developed and normal weight. He is not ill-appearing, toxic-appearing or diaphoretic. HENT:      Head: Normocephalic and atraumatic. Eyes:      General: No scleral icterus. Conjunctiva/sclera: Conjunctivae normal.   Cardiovascular:      Rate and Rhythm: Normal rate. Pulmonary:      Effort: Pulmonary effort is normal.   Abdominal:      General: Bowel sounds are normal. There is no distension. Palpations: Abdomen is soft. There is no mass. Tenderness: There is no abdominal tenderness. There is no right CVA tenderness, left CVA tenderness, guarding or rebound. Hernia: No hernia is present. Genitourinary:     Penis: Normal. No phimosis or hypospadias. Testes: Normal.         Right: Mass not present. Left: Mass not present. Rectum: Normal.      Comments: Prostate mildly enlarged and palpably benign. No nodule. Musculoskeletal:         General: Normal range of motion. Cervical back: Neck supple. Skin:     General: Skin is warm and dry. Neurological:      General: No focal deficit present. Mental Status: He is alert and oriented to person, place, and time. Psychiatric:         Mood and Affect: Mood normal.         Behavior: Behavior normal.         Thought Content:  Thought content normal.         Judgment: Judgment normal.

## 2023-08-15 NOTE — ASSESSMENT & PLAN NOTE
AUA symptom score is 7. He is presently satisfied with his voiding pattern. Urinalysis is negative. PSA is stable at 2.44 (August 8, 2023). Options were discussed. We will continue to follow with watchful waiting. He will return in 1 year. We will plan to recheck PSA at that time.

## 2023-08-15 NOTE — ASSESSMENT & PLAN NOTE
He remains asymptomatic. ET last year did not reveal any kidney stones and KUB this year does not reveal any specialist calcifications. Urinalysis is negative. We will continue to follow.

## 2023-08-17 DIAGNOSIS — M62.830 MUSCLE SPASM OF BACK: ICD-10-CM

## 2023-08-17 RX ORDER — METHOCARBAMOL 750 MG/1
TABLET, FILM COATED ORAL
Qty: 30 TABLET | Refills: 0 | Status: SHIPPED | OUTPATIENT
Start: 2023-08-17

## 2023-10-04 DIAGNOSIS — I10 ESSENTIAL HYPERTENSION: ICD-10-CM

## 2023-10-04 RX ORDER — HYDROCHLOROTHIAZIDE 25 MG/1
25 TABLET ORAL DAILY
Qty: 30 TABLET | Refills: 2 | Status: SHIPPED | OUTPATIENT
Start: 2023-10-04

## 2023-10-26 DIAGNOSIS — M54.50 CHRONIC LOW BACK PAIN, UNSPECIFIED BACK PAIN LATERALITY, UNSPECIFIED WHETHER SCIATICA PRESENT: ICD-10-CM

## 2023-10-26 DIAGNOSIS — G89.29 CHRONIC LOW BACK PAIN, UNSPECIFIED BACK PAIN LATERALITY, UNSPECIFIED WHETHER SCIATICA PRESENT: ICD-10-CM

## 2023-10-26 RX ORDER — TRAMADOL HYDROCHLORIDE 50 MG/1
50 TABLET ORAL DAILY PRN
Qty: 14 TABLET | Refills: 0 | Status: SHIPPED | OUTPATIENT
Start: 2023-10-26

## 2023-10-27 ENCOUNTER — TELEPHONE (OUTPATIENT)
Dept: FAMILY MEDICINE CLINIC | Facility: CLINIC | Age: 63
End: 2023-10-27

## 2023-11-13 DIAGNOSIS — M62.830 MUSCLE SPASM OF BACK: ICD-10-CM

## 2023-11-13 RX ORDER — METHOCARBAMOL 750 MG/1
TABLET, FILM COATED ORAL
Qty: 30 TABLET | Refills: 0 | Status: SHIPPED | OUTPATIENT
Start: 2023-11-13

## 2023-11-20 ENCOUNTER — OFFICE VISIT (OUTPATIENT)
Dept: FAMILY MEDICINE CLINIC | Facility: CLINIC | Age: 63
End: 2023-11-20
Payer: COMMERCIAL

## 2023-11-20 VITALS
OXYGEN SATURATION: 96 % | DIASTOLIC BLOOD PRESSURE: 80 MMHG | HEIGHT: 68 IN | WEIGHT: 227 LBS | TEMPERATURE: 97.3 F | HEART RATE: 81 BPM | RESPIRATION RATE: 16 BRPM | SYSTOLIC BLOOD PRESSURE: 130 MMHG | BODY MASS INDEX: 34.4 KG/M2

## 2023-11-20 DIAGNOSIS — J02.9 SORE THROAT: ICD-10-CM

## 2023-11-20 DIAGNOSIS — R06.7 SNEEZING: ICD-10-CM

## 2023-11-20 DIAGNOSIS — J32.9 SINUSITIS, UNSPECIFIED CHRONICITY, UNSPECIFIED LOCATION: Primary | ICD-10-CM

## 2023-11-20 DIAGNOSIS — R05.9 COUGH, UNSPECIFIED TYPE: ICD-10-CM

## 2023-11-20 PROCEDURE — 99213 OFFICE O/P EST LOW 20 MIN: CPT | Performed by: FAMILY MEDICINE

## 2023-11-20 RX ORDER — AZITHROMYCIN 250 MG/1
TABLET, FILM COATED ORAL
Qty: 6 TABLET | Refills: 0 | Status: SHIPPED | OUTPATIENT
Start: 2023-11-20 | End: 2023-11-25

## 2023-11-20 RX ORDER — PREDNISONE 10 MG/1
TABLET ORAL
Qty: 21 TABLET | Refills: 0 | Status: SHIPPED | OUTPATIENT
Start: 2023-11-20

## 2023-11-20 NOTE — PROGRESS NOTES
Chief Complaint   Patient presents with    Cold Like Symptoms     Pt is here w/ sinus pressure, sneezing, sore throat, not much cough times a couple weeks. Pt took Home Covid test which was negative      Patient Instructions   Rest and fluids and start abx and prednisone, and dimetapp DM cold and cough prn and Flonase prn sinusitis. Assessment/Plan:    No problem-specific Assessment & Plan notes found for this encounter. Diagnoses and all orders for this visit:    Sinusitis, unspecified chronicity, unspecified location  -     azithromycin (Zithromax) 250 mg tablet; Take 2 tablets (500 mg total) by mouth daily for 1 day, THEN 1 tablet (250 mg total) daily for 4 days. -     predniSONE 10 mg tablet; Take 60 mg po day#1, 50 mg po day#2, 40 mg po day#3, 30 mg po day#4, 20 mg po day#5m and 10 mg po day#6    Cough, unspecified type  -     azithromycin (Zithromax) 250 mg tablet; Take 2 tablets (500 mg total) by mouth daily for 1 day, THEN 1 tablet (250 mg total) daily for 4 days. -     predniSONE 10 mg tablet; Take 60 mg po day#1, 50 mg po day#2, 40 mg po day#3, 30 mg po day#4, 20 mg po day#5m and 10 mg po day#6    Sneezing  -     predniSONE 10 mg tablet; Take 60 mg po day#1, 50 mg po day#2, 40 mg po day#3, 30 mg po day#4, 20 mg po day#5m and 10 mg po day#6    Sore throat  -     azithromycin (Zithromax) 250 mg tablet; Take 2 tablets (500 mg total) by mouth daily for 1 day, THEN 1 tablet (250 mg total) daily for 4 days. Subjective:      Patient ID: Samanta Jensen is a 58 y.o. male. Cold Like Symptoms (Pt is here w/ sinus pressure, sneezing, sore throat, not much cough times a couple weeks.  Pt took Home Covid test which was negative )        The following portions of the patient's history were reviewed and updated as appropriate: allergies, current medications, past family history, past medical history, past social history, past surgical history, and problem list.    Review of Systems   Constitutional: Negative. HENT:  Positive for congestion, sinus pressure, sinus pain, sneezing and sore throat. Eyes: Negative. Respiratory:  Positive for cough. Cardiovascular: Negative. Gastrointestinal: Negative. Endocrine: Negative. Genitourinary: Negative. Musculoskeletal: Negative. Skin: Negative. Allergic/Immunologic: Negative. Neurological: Negative. Hematological: Negative. Psychiatric/Behavioral: Negative. Objective:      /80   Pulse 81   Temp (!) 97.3 °F (36.3 °C) (Temporal)   Resp 16   Ht 5' 8" (1.727 m)   Wt 103 kg (227 lb)   SpO2 96%   BMI 34.52 kg/m²          Physical Exam  Constitutional:       Appearance: He is well-developed. HENT:      Head: Normocephalic and atraumatic. Right Ear: External ear normal.      Left Ear: External ear normal.      Nose: Nose normal.   Eyes:      Conjunctiva/sclera: Conjunctivae normal.      Pupils: Pupils are equal, round, and reactive to light. Cardiovascular:      Rate and Rhythm: Normal rate and regular rhythm. Heart sounds: Normal heart sounds. Pulmonary:      Effort: Pulmonary effort is normal.      Breath sounds: Normal breath sounds. Musculoskeletal:         General: Normal range of motion. Cervical back: Normal range of motion and neck supple. Skin:     General: Skin is warm and dry. Capillary Refill: Capillary refill takes less than 2 seconds. Neurological:      General: No focal deficit present. Mental Status: He is alert and oriented to person, place, and time. Mental status is at baseline. Deep Tendon Reflexes: Reflexes are normal and symmetric. Psychiatric:         Mood and Affect: Mood normal.         Behavior: Behavior normal.         Thought Content:  Thought content normal.         Judgment: Judgment normal.

## 2023-11-20 NOTE — PATIENT INSTRUCTIONS
Rest and fluids and start abx and prednisone, and dimetapp DM cold and cough prn and Flonase prn sinusitis.

## 2023-12-18 DIAGNOSIS — F41.9 ANXIETY: ICD-10-CM

## 2023-12-18 RX ORDER — ALPRAZOLAM 0.25 MG/1
0.25 TABLET ORAL
Qty: 20 TABLET | Refills: 1 | Status: SHIPPED | OUTPATIENT
Start: 2023-12-18

## 2024-01-02 DIAGNOSIS — I10 ESSENTIAL HYPERTENSION: ICD-10-CM

## 2024-01-02 RX ORDER — HYDROCHLOROTHIAZIDE 25 MG/1
25 TABLET ORAL DAILY
Qty: 30 TABLET | Refills: 0 | Status: SHIPPED | OUTPATIENT
Start: 2024-01-02

## 2024-02-06 DIAGNOSIS — I10 ESSENTIAL HYPERTENSION: ICD-10-CM

## 2024-02-06 RX ORDER — HYDROCHLOROTHIAZIDE 25 MG/1
25 TABLET ORAL DAILY
Qty: 30 TABLET | Refills: 5 | Status: SHIPPED | OUTPATIENT
Start: 2024-02-06

## 2024-02-08 ENCOUNTER — TELEPHONE (OUTPATIENT)
Age: 64
End: 2024-02-08

## 2024-02-08 NOTE — TELEPHONE ENCOUNTER
Patient was calling in refill for hydrochlorothiazide 25 mg. Chart shows prescription called in  Receipt confirmed by pharmacy (2/6/2024 10:24 AM EST)  to  [facility]. Patient will call pharmacy for refill. 30 tablets with 5 refills

## 2024-02-11 DIAGNOSIS — M62.830 MUSCLE SPASM OF BACK: ICD-10-CM

## 2024-02-12 RX ORDER — METHOCARBAMOL 750 MG/1
TABLET, FILM COATED ORAL
Qty: 30 TABLET | Refills: 0 | Status: SHIPPED | OUTPATIENT
Start: 2024-02-12

## 2024-03-22 NOTE — TELEPHONE ENCOUNTER
Phoned patient, try to inform patient of Dr Jacquline Cabot review on his CxR and recommendations  Patient is cunfused about his results "stated he had COVID-19 and his antibiotic was prescribed for a Sinus Infection"  Patient request to speak directly with Dr Regla Moyer  Routed note to Dr Regla Moyer to call the patient regarding results and recommendations  Render In Strict Bullet Format?: No Detail Level: Zone Plan: Sent refills today. Also recommended she get an acne facial with our . Follow up 3 months Continue Regimen: SulfaCleanse 8-4 8 %-4 % topical suspension \\nQuantity: 473.0 ml  Days Supply: 30\\nSig: Wash face morning and night for 2 minutes then rinse followed by moisturizer and sunscreen\\n\\nOnexton 1.2 % (1 % base)-3.75 % topical gel with pump \\nQuantity: 50.0 g  Days Supply: 30\\nSig: Apply to face in the AM.\\n\\ntretinoin 0.05 % topical cream \\nQuantity: 45.0 g  Days Supply: 30\\nSig: Apply a pea sized amount to face QHS Initiate Treatment: Ximino 90 mg capsule, extended release \\nQuantity: 30.0 Capsule\\nSig: Take 1 pill po

## 2024-06-05 ENCOUNTER — HOSPITAL ENCOUNTER (EMERGENCY)
Facility: HOSPITAL | Age: 64
Discharge: HOME | End: 2024-06-05
Attending: EMERGENCY MEDICINE
Payer: COMMERCIAL

## 2024-06-05 ENCOUNTER — APPOINTMENT (EMERGENCY)
Dept: CT IMAGING | Facility: HOSPITAL | Age: 64
End: 2024-06-05
Payer: COMMERCIAL

## 2024-06-05 ENCOUNTER — APPOINTMENT (EMERGENCY)
Dept: RADIOLOGY | Facility: HOSPITAL | Age: 64
End: 2024-06-05
Payer: COMMERCIAL

## 2024-06-05 VITALS
WEIGHT: 231.48 LBS | TEMPERATURE: 98.6 F | SYSTOLIC BLOOD PRESSURE: 167 MMHG | OXYGEN SATURATION: 97 % | DIASTOLIC BLOOD PRESSURE: 72 MMHG | HEART RATE: 64 BPM | BODY MASS INDEX: 35.2 KG/M2 | RESPIRATION RATE: 22 BRPM

## 2024-06-05 DIAGNOSIS — M79.602 LEFT ARM PAIN: ICD-10-CM

## 2024-06-05 DIAGNOSIS — M50.30 DDD (DEGENERATIVE DISC DISEASE), CERVICAL: ICD-10-CM

## 2024-06-05 DIAGNOSIS — I10 HTN (HYPERTENSION): ICD-10-CM

## 2024-06-05 DIAGNOSIS — M54.2 NECK PAIN ON LEFT SIDE: Primary | ICD-10-CM

## 2024-06-05 DIAGNOSIS — R07.9 CHEST PAIN: ICD-10-CM

## 2024-06-05 LAB
2HR DELTA HS TROPONIN: 1 NG/L
ALBUMIN SERPL BCP-MCNC: 3.9 G/DL (ref 3.5–5)
ALP SERPL-CCNC: 37 U/L (ref 34–104)
ALT SERPL W P-5'-P-CCNC: 15 U/L (ref 7–52)
ANION GAP SERPL CALCULATED.3IONS-SCNC: 7 MMOL/L (ref 4–13)
APTT PPP: 25 SECONDS (ref 23–37)
AST SERPL W P-5'-P-CCNC: 21 U/L (ref 13–39)
ATRIAL RATE: 63 BPM
ATRIAL RATE: 71 BPM
BASOPHILS # BLD AUTO: 0.05 THOUSANDS/ÂΜL (ref 0–0.1)
BASOPHILS NFR BLD AUTO: 1 % (ref 0–1)
BILIRUB SERPL-MCNC: 0.58 MG/DL (ref 0.2–1)
BNP SERPL-MCNC: 39 PG/ML (ref 0–100)
BUN SERPL-MCNC: 22 MG/DL (ref 5–25)
CALCIUM SERPL-MCNC: 9.1 MG/DL (ref 8.4–10.2)
CARDIAC TROPONIN I PNL SERPL HS: 3 NG/L
CARDIAC TROPONIN I PNL SERPL HS: 4 NG/L
CHLORIDE SERPL-SCNC: 104 MMOL/L (ref 96–108)
CO2 SERPL-SCNC: 29 MMOL/L (ref 21–32)
CREAT SERPL-MCNC: 1.03 MG/DL (ref 0.6–1.3)
D DIMER PPP FEU-MCNC: 2.21 UG/ML FEU
EOSINOPHIL # BLD AUTO: 0.28 THOUSAND/ÂΜL (ref 0–0.61)
EOSINOPHIL NFR BLD AUTO: 3 % (ref 0–6)
ERYTHROCYTE [DISTWIDTH] IN BLOOD BY AUTOMATED COUNT: 13.3 % (ref 11.6–15.1)
GFR SERPL CREATININE-BSD FRML MDRD: 75 ML/MIN/1.73SQ M
GLUCOSE SERPL-MCNC: 96 MG/DL (ref 65–140)
HCT VFR BLD AUTO: 42 % (ref 36.5–49.3)
HGB BLD-MCNC: 14.3 G/DL (ref 12–17)
IMM GRANULOCYTES # BLD AUTO: 0.07 THOUSAND/UL (ref 0–0.2)
IMM GRANULOCYTES NFR BLD AUTO: 1 % (ref 0–2)
INR PPP: 1.08 (ref 0.84–1.19)
LYMPHOCYTES # BLD AUTO: 2.33 THOUSANDS/ÂΜL (ref 0.6–4.47)
LYMPHOCYTES NFR BLD AUTO: 22 % (ref 14–44)
MAGNESIUM SERPL-MCNC: 2.2 MG/DL (ref 1.9–2.7)
MCH RBC QN AUTO: 31 PG (ref 26.8–34.3)
MCHC RBC AUTO-ENTMCNC: 34 G/DL (ref 31.4–37.4)
MCV RBC AUTO: 91 FL (ref 82–98)
MONOCYTES # BLD AUTO: 1.15 THOUSAND/ÂΜL (ref 0.17–1.22)
MONOCYTES NFR BLD AUTO: 11 % (ref 4–12)
NEUTROPHILS # BLD AUTO: 6.72 THOUSANDS/ÂΜL (ref 1.85–7.62)
NEUTS SEG NFR BLD AUTO: 62 % (ref 43–75)
NRBC BLD AUTO-RTO: 0 /100 WBCS
P AXIS: 41 DEGREES
P AXIS: 46 DEGREES
PLATELET # BLD AUTO: 251 THOUSANDS/UL (ref 149–390)
PMV BLD AUTO: 9 FL (ref 8.9–12.7)
POTASSIUM SERPL-SCNC: 3.5 MMOL/L (ref 3.5–5.3)
PR INTERVAL: 160 MS
PR INTERVAL: 162 MS
PROT SERPL-MCNC: 6.8 G/DL (ref 6.4–8.4)
PROTHROMBIN TIME: 14.2 SECONDS (ref 11.6–14.5)
QRS AXIS: -19 DEGREES
QRS AXIS: -8 DEGREES
QRSD INTERVAL: 100 MS
QRSD INTERVAL: 98 MS
QT INTERVAL: 398 MS
QT INTERVAL: 450 MS
QTC INTERVAL: 432 MS
QTC INTERVAL: 460 MS
RBC # BLD AUTO: 4.62 MILLION/UL (ref 3.88–5.62)
SODIUM SERPL-SCNC: 140 MMOL/L (ref 135–147)
T WAVE AXIS: 16 DEGREES
T WAVE AXIS: 6 DEGREES
VENTRICULAR RATE: 63 BPM
VENTRICULAR RATE: 71 BPM
WBC # BLD AUTO: 10.6 THOUSAND/UL (ref 4.31–10.16)

## 2024-06-05 PROCEDURE — 96361 HYDRATE IV INFUSION ADD-ON: CPT

## 2024-06-05 PROCEDURE — 85730 THROMBOPLASTIN TIME PARTIAL: CPT | Performed by: EMERGENCY MEDICINE

## 2024-06-05 PROCEDURE — 85379 FIBRIN DEGRADATION QUANT: CPT | Performed by: EMERGENCY MEDICINE

## 2024-06-05 PROCEDURE — 83735 ASSAY OF MAGNESIUM: CPT | Performed by: EMERGENCY MEDICINE

## 2024-06-05 PROCEDURE — 99284 EMERGENCY DEPT VISIT MOD MDM: CPT

## 2024-06-05 PROCEDURE — 85025 COMPLETE CBC W/AUTO DIFF WBC: CPT | Performed by: EMERGENCY MEDICINE

## 2024-06-05 PROCEDURE — 71275 CT ANGIOGRAPHY CHEST: CPT

## 2024-06-05 PROCEDURE — 99285 EMERGENCY DEPT VISIT HI MDM: CPT | Performed by: EMERGENCY MEDICINE

## 2024-06-05 PROCEDURE — 36415 COLL VENOUS BLD VENIPUNCTURE: CPT | Performed by: EMERGENCY MEDICINE

## 2024-06-05 PROCEDURE — 80053 COMPREHEN METABOLIC PANEL: CPT | Performed by: EMERGENCY MEDICINE

## 2024-06-05 PROCEDURE — 83880 ASSAY OF NATRIURETIC PEPTIDE: CPT | Performed by: EMERGENCY MEDICINE

## 2024-06-05 PROCEDURE — 84484 ASSAY OF TROPONIN QUANT: CPT | Performed by: EMERGENCY MEDICINE

## 2024-06-05 PROCEDURE — 85610 PROTHROMBIN TIME: CPT | Performed by: EMERGENCY MEDICINE

## 2024-06-05 PROCEDURE — 71045 X-RAY EXAM CHEST 1 VIEW: CPT

## 2024-06-05 PROCEDURE — 93010 ELECTROCARDIOGRAM REPORT: CPT | Performed by: INTERNAL MEDICINE

## 2024-06-05 PROCEDURE — 93005 ELECTROCARDIOGRAM TRACING: CPT

## 2024-06-05 PROCEDURE — 96374 THER/PROPH/DIAG INJ IV PUSH: CPT

## 2024-06-05 PROCEDURE — 70491 CT SOFT TISSUE NECK W/DYE: CPT

## 2024-06-05 RX ORDER — LIDOCAINE 50 MG/G
1 PATCH TOPICAL ONCE
Status: DISCONTINUED | OUTPATIENT
Start: 2024-06-05 | End: 2024-06-06 | Stop reason: HOSPADM

## 2024-06-05 RX ORDER — METHOCARBAMOL 500 MG/1
500 TABLET, FILM COATED ORAL ONCE
Status: COMPLETED | OUTPATIENT
Start: 2024-06-05 | End: 2024-06-05

## 2024-06-05 RX ORDER — LIDOCAINE 50 MG/G
1 PATCH TOPICAL DAILY
Qty: 4 PATCH | Refills: 0 | Status: SHIPPED | OUTPATIENT
Start: 2024-06-05 | End: 2024-06-09

## 2024-06-05 RX ORDER — SODIUM CHLORIDE 9 MG/ML
150 INJECTION, SOLUTION INTRAVENOUS CONTINUOUS
Status: DISCONTINUED | OUTPATIENT
Start: 2024-06-05 | End: 2024-06-06 | Stop reason: HOSPADM

## 2024-06-05 RX ORDER — METHOCARBAMOL 500 MG/1
500 TABLET, FILM COATED ORAL 3 TIMES DAILY PRN
Qty: 12 TABLET | Refills: 0 | Status: SHIPPED | OUTPATIENT
Start: 2024-06-05 | End: 2024-06-09

## 2024-06-05 RX ORDER — ACETAMINOPHEN 325 MG/1
650 TABLET ORAL ONCE
Status: COMPLETED | OUTPATIENT
Start: 2024-06-05 | End: 2024-06-05

## 2024-06-05 RX ORDER — KETOROLAC TROMETHAMINE 30 MG/ML
15 INJECTION, SOLUTION INTRAMUSCULAR; INTRAVENOUS ONCE
Status: COMPLETED | OUTPATIENT
Start: 2024-06-05 | End: 2024-06-05

## 2024-06-05 RX ADMIN — METHOCARBAMOL 500 MG: 500 TABLET ORAL at 20:31

## 2024-06-05 RX ADMIN — ACETAMINOPHEN 325MG 650 MG: 325 TABLET ORAL at 20:31

## 2024-06-05 RX ADMIN — KETOROLAC TROMETHAMINE 15 MG: 30 INJECTION, SOLUTION INTRAMUSCULAR; INTRAVENOUS at 23:28

## 2024-06-05 RX ADMIN — LIDOCAINE 1 PATCH: 50 PATCH TOPICAL at 20:32

## 2024-06-05 RX ADMIN — SODIUM CHLORIDE 150 ML/HR: 0.9 INJECTION, SOLUTION INTRAVENOUS at 20:42

## 2024-06-05 RX ADMIN — IOHEXOL 100 ML: 350 INJECTION, SOLUTION INTRAVENOUS at 22:15

## 2024-06-06 ENCOUNTER — TELEPHONE (OUTPATIENT)
Dept: PHYSICAL THERAPY | Facility: OTHER | Age: 64
End: 2024-06-06

## 2024-06-06 NOTE — TELEPHONE ENCOUNTER
"Nurse reached out to discuss recent referral entered for  [facility] program.  RN provided the patient with a brief overview of the program.  Patient declined to participate in the program at this time.   Patient stated he has a Pain Management \"doc\" and will discuss any issues with him.  Nurse agreed and respected his decision.  Nurse confirmed patient has [facility] contact information and encouraged to call program back if needed in the future. Patient agreed and very appreciative of call and discussion.    Nurse wished him well and referral closed per protocol.   "

## 2024-06-06 NOTE — ED PROVIDER NOTES
History  Chief Complaint   Patient presents with    Arm Pain     Patient reports left arm/neck pain. Left lateral chest/rib pain. Patient reports pain with inspiration. Patient reports he treated it as muscular earlier today, reports took tylenol and motrin, reports he drank an energy drink this morning and he doesn't usually drink those.     Patient is a 64-year-old male here with wife coming in today for onset of left sided neck pain that radiates into his arm as well as left lateral rib.  Patient states that he was in normal state of health today and felt well yesterday.  No falls no injuries and no recent surgeries.  He states that it has this pain on the left side of the neck that went down and pain with inspiration.  He thought it was musculoskeletal so he was taken Motrin ibuprofen.  He reports that it did not improve throughout the day.  He did drink a energy drink this morning because he did not have a good night sleeping.  He states he was very tired.  He had the symptoms started about an hour or 2 after that.  He had no chest pain, palpitations or syncope.  No lower extremity edema.  No nausea, vomiting or diarrhea.  No visual changes, amaurosis fugax or tinnitus.  He has no pain on the right side.  He has no central or substernal chest pain.    Chart review shows patient has a history of hypertension, kidney stones, hyperlipidemia, as well as an AVM status post surgery in 2016      History provided by:  Relative, medical records and patient  Neck Pain  Pain location:  L side  Quality:  Aching, cramping, stabbing and stiffness  Stiffness is present:  All day  Pain radiates to:  L shoulder (chest)  Pain severity:  Moderate  Onset quality:  Gradual  Timing:  Constant  Progression:  Unchanged  Chronicity:  New  Context: not fall, not jumping from heights, not lifting a heavy object, not MCA, not MVC, not pedestrian accident and not recent injury    Relieved by:  Nothing  Worsened by:  Coughing  Ineffective  treatments:  NSAIDs, heat and analgesics  Associated symptoms: chest pain    Associated symptoms: no bladder incontinence, no bowel incontinence, no fever, no headaches, no leg pain, no numbness, no paresis, no photophobia, no syncope, no tingling, no visual change, no weakness and no weight loss    Risk factors: hx of head and neck radiation    Risk factors: no hx of osteoporosis, no hx of spinal trauma, no recent epidural, no recent head injury and no recurrent falls        Prior to Admission Medications   Prescriptions Last Dose Informant Patient Reported? Taking?   ALPRAZolam (XANAX) 0.25 mg tablet   No No   Sig: Take 1 tablet (0.25 mg total) by mouth daily at bedtime as needed for anxiety   NON FORMULARY   Yes No   Sig: daily as needed Medical marijuana   Naproxen Sodium 220 MG CAPS   Yes No   Sig: Take by mouth   acetaminophen (TYLENOL) 500 mg tablet   Yes No   Sig: Take 500-1,000 mg by mouth every 6 (six) hours as needed for mild pain   celecoxib (CeleBREX) 200 mg capsule   Yes No   Sig: Take 200 mg by mouth 2 (two) times a day   famotidine (PEPCID) 10 mg tablet  Self Yes No   Sig: Take 10 mg by mouth Taken as needed   famotidine (PEPCID) 20 mg tablet   Yes No   Sig:   Start: 10/26/22 8:49:00 EDT   Patient not taking: Reported on 8/15/2023   hydroCHLOROthiazide 25 mg tablet   No No   Sig: TAKE 1 TABLET (25 MG TOTAL) BY MOUTH DAILY.   ibuprofen (MOTRIN) 200 mg tablet   Yes No   Sig: Take 200-800 mg by mouth every 6 (six) hours as needed for mild pain   methocarbamol (ROBAXIN) 750 mg tablet   No No   Si TABLET AT BEDTIME AS NEEDED FOR MUSCLE SPASM   predniSONE 10 mg tablet   No No   Sig: Take 60 mg po day#1, 50 mg po day#2, 40 mg po day#3, 30 mg po day#4, 20 mg po day#5m and 10 mg po day#6   tadalafil (CIALIS) 20 MG tablet  Self No No   Sig: Take 1 tablet (20 mg total) by mouth as needed for erectile dysfunction   traMADol (ULTRAM) 50 mg tablet   No No   Sig: Take 1 tablet (50 mg total) by mouth daily as  needed for moderate pain      Facility-Administered Medications: None       Past Medical History:   Diagnosis Date    AVM (arteriovenous malformation)     BPH without urinary obstruction     last assessed 01/16/18    Chronic pain disorder     low back and neck    COVID     Jan 2021 and 5/14/22    Headache     occ    Hemorrhoids     Hydronephrosis     Hypertension     Kidney disease     Left inguinal hernia     Lumbar herniated disc     PONV (postoperative nausea and vomiting)     Renal calculi     Renal colic     TMJ (dislocation of temporomandibular joint)     Ureter colic        Past Surgical History:   Procedure Laterality Date    BRAIN SURGERY      COLONOSCOPY      CYSTOSCOPY      w/removal of object, last assessed 01/16/18    CYSTOSCOPY      w/removal of ureteral calculus last assessed 01/16/18    CYSTOSCOPY KIDNEY W/ URETERAL GUIDE WIRE      last assessed 01/16/18    CYSTOSCOPY W/ URETERAL STENT PLACEMENT      last assessed 01/16/18    FL RETROGRADE PYELOGRAM  7/9/2020    HERNIA REPAIR  [address] [place] [address]    HERNIA REPAIR      KIDNEY SURGERY      removal of kidney stone in 2013 at [facility]    IL CYSTO/URETERO W/LITHOTRIPSY &INDWELL STENT INSRT Right 12/13/2018    Procedure: CYSTOSCOPY URETEROSCOPY  RETROGRADE PYELOGRAM AND INSERTION RIGHT STENT URETERAL;  Surgeon: Jose Daniel Everett MD;  [facility];  Service: Urology    IL CYSTO/URETERO W/LITHOTRIPSY &INDWELL STENT INSRT Left 7/9/2020    Procedure: CYSTOSCOPY URETEROSCOPY WITH LITHOTRIPSY HOLMIUM LASER, RETROGRADE PYELOGRAM AND INSERTION STENT URETERAL;  Surgeon: Jose Daniel Everett MD;  [facility];  Service: Urology    IL LAPAROSCOPY SURG RPR INITIAL INGUINAL HERNIA Left 8/4/2022    Procedure: REPAIR HERNIA INGUINAL LAPAROSCOPIC W/ ROBOTICS;  Surgeon: Willie Carlson MD;  [facility];  Service: General    TOOTH EXTRACTION         Family History   Problem Relation Age of Onset    Stroke Mother     Cancer Father     Diabetes Father      Prostate cancer Father     Heart disease Father     Hypertension Father      I have reviewed and agree with the history as documented.    E-Cigarette/Vaping    E-Cigarette Use Current Some Day User      E-Cigarette/Vaping Substances    Nicotine No     THC Yes     CBD No     Flavoring No     Other No     Unknown No      Social History     Tobacco Use    Smoking status: Never    Smokeless tobacco: Never   Vaping Use    Vaping status: Some Days    Substances: THC   Substance Use Topics    Alcohol use: Yes     Comment: rare    Drug use: Yes     Types: Marijuana     Comment: ocassionaly use       Review of Systems   Constitutional: Negative.  Negative for chills, fever and weight loss.   HENT: Negative.  Negative for ear pain and sore throat.    Eyes: Negative.  Negative for photophobia, pain and visual disturbance.   Respiratory: Negative.  Negative for cough and shortness of breath.    Cardiovascular:  Positive for chest pain. Negative for palpitations and syncope.   Gastrointestinal: Negative.  Negative for abdominal pain, bowel incontinence and vomiting.   Genitourinary: Negative.  Negative for bladder incontinence, dysuria and hematuria.   Musculoskeletal:  Positive for neck pain. Negative for arthralgias and back pain.   Skin: Negative.  Negative for color change and rash.   Neurological: Negative.  Negative for tingling, seizures, syncope, weakness, numbness and headaches.   Hematological: Negative.    Psychiatric/Behavioral: Negative.     All other systems reviewed and are negative.      Physical Exam  Physical Exam  Vitals and nursing note reviewed.   Constitutional:       General: He is not in acute distress.     Appearance: Normal appearance. He is well-developed. He is obese.   HENT:      Head: Normocephalic and atraumatic.      Comments: Patient maintaining airway and secretions. No stridor . No brawniness under tongue.          Right Ear: External ear normal.      Left Ear: External ear normal.       Nose: Nose normal.      Mouth/Throat:      Mouth: Mucous membranes are moist.   Eyes:      General: Lids are normal. Vision grossly intact.      Extraocular Movements: Extraocular movements intact.      Conjunctiva/sclera: Conjunctivae normal.      Pupils: Pupils are equal, round, and reactive to light.   Neck:      Thyroid: No thyroid mass.      Trachea: Trachea and phonation normal.     Cardiovascular:      Rate and Rhythm: Normal rate and regular rhythm.      Pulses:           Radial pulses are 2+ on the right side and 2+ on the left side.        Dorsalis pedis pulses are 2+ on the right side and 2+ on the left side.      Heart sounds: Normal heart sounds, S1 normal and S2 normal. No murmur heard.  Pulmonary:      Effort: Pulmonary effort is normal. No respiratory distress.      Breath sounds: Normal breath sounds.   Abdominal:      General: Bowel sounds are normal.      Palpations: Abdomen is soft.      Tenderness: There is no abdominal tenderness.   Musculoskeletal:         General: No swelling.      Cervical back: Normal range of motion and neck supple. No rigidity.      Right lower leg: No edema.      Left lower leg: No edema.   Lymphadenopathy:      Cervical: No cervical adenopathy.   Skin:     General: Skin is warm and dry.      Capillary Refill: Capillary refill takes less than 2 seconds.   Neurological:      General: No focal deficit present.      Mental Status: He is alert and oriented to person, place, and time.      GCS: GCS eye subscore is 4. GCS verbal subscore is 5. GCS motor subscore is 6.      Cranial Nerves: Cranial nerves 2-12 are intact.      Sensory: Sensation is intact.      Motor: Motor function is intact.      Coordination: Coordination is intact.      Comments: Cranial nerves 2-12 grossly intact.  EOMI.  PERRLA.  No slurred speech.  No facial asymmetry.  No tongue deviation.  Negative pronator drift.  No nystagmus.    Patient can move bilateral upper extremities and lower extremities  independently of each other pain-free with full active range of motion throughout the bilateral shoulders, elbows, wrists, hips, knees and ankles.         Psychiatric:         Mood and Affect: Mood normal.         Behavior: Behavior normal.         Thought Content: Thought content normal.         Judgment: Judgment normal.         Vital Signs  ED Triage Vitals [06/05/24 1921]   Temperature Pulse Respirations Blood Pressure SpO2   98.6 °F (37 °C) 74 16 170/79 97 %      Temp Source Heart Rate Source Patient Position - Orthostatic VS BP Location FiO2 (%)   Oral Monitor Sitting Right arm --      Pain Score       7           Vitals:    06/05/24 2100 06/05/24 2200 06/05/24 2230 06/05/24 2330   BP: 124/60 140/65 148/72 167/72   Pulse: 65 64 67 64   Patient Position - Orthostatic VS: Lying Lying Lying Sitting         Visual Acuity      ED Medications  Medications   lidocaine (LIDODERM) 5 % patch 1 patch (1 patch Topical Medication Applied 6/5/24 2032)   sodium chloride 0.9 % infusion (0 mL/hr Intravenous Stopped 6/5/24 2347)   acetaminophen (TYLENOL) tablet 650 mg (650 mg Oral Given 6/5/24 2031)   methocarbamol (ROBAXIN) tablet 500 mg (500 mg Oral Given 6/5/24 2031)   iohexol (OMNIPAQUE) 350 MG/ML injection (MULTI-DOSE) 100 mL (100 mL Intravenous Given 6/5/24 2215)   ketorolac (TORADOL) injection 15 mg (15 mg Intravenous Given 6/5/24 2328)       Diagnostic Studies  Results Reviewed       Procedure Component Value Units Date/Time    HS Troponin I 2hr [[id number]]  (Normal) Collected: 06/05/24 2303    Lab Status: Final result Specimen: Blood from Line, Venous Updated: 06/05/24 2329     hs TnI 2hr 4 ng/L      Delta 2hr hsTnI 1 ng/L     D-Dimer [[id number]]  (Abnormal) Collected: 06/05/24 2043    Lab Status: Final result Specimen: Blood from Line, Venous Updated: 06/05/24 2123     D-Dimer, Quant 2.21 ug/ml FEU     Narrative:      In the evaluation for possible pulmonary embolism, in the appropriate (Well's Score of 4 or less)  patient, the age adjusted d-dimer cutoff for this patient can be calculated as:    Age x 0.01 (in ug/mL) for Age-adjusted D-dimer exclusion threshold for a patient over 50 years.    HS Troponin 0hr (reflex protocol) [[id number]]  (Normal) Collected: 06/05/24 2043    Lab Status: Final result Specimen: Blood from Line, Venous Updated: 06/05/24 2122     hs TnI 0hr 3 ng/L     B-Type Natriuretic Peptide(BNP) [[id number]]  (Normal) Collected: 06/05/24 2043    Lab Status: Final result Specimen: Blood from Line, Venous Updated: 06/05/24 2122     BNP 39 pg/mL     Protime-INR [[id number]]  (Normal) Collected: 06/05/24 2043    Lab Status: Final result Specimen: Blood from Line, Venous Updated: 06/05/24 2113     Protime 14.2 seconds      INR 1.08    APTT [[id number]]  (Normal) Collected: 06/05/24 2043    Lab Status: Final result Specimen: Blood from Line, Venous Updated: 06/05/24 2113     PTT 25 seconds     Comprehensive metabolic panel [[id number]] Collected: 06/05/24 2043    Lab Status: Final result Specimen: Blood from Line, Venous Updated: 06/05/24 2111     Sodium 140 mmol/L      Potassium 3.5 mmol/L      Chloride 104 mmol/L      CO2 29 mmol/L      ANION GAP 7 mmol/L      BUN 22 mg/dL      Creatinine 1.03 mg/dL      Glucose 96 mg/dL      Calcium 9.1 mg/dL      AST 21 U/L      ALT 15 U/L      Alkaline Phosphatase 37 U/L      Total Protein 6.8 g/dL      Albumin 3.9 g/dL      Total Bilirubin 0.58 mg/dL      eGFR 75 ml/min/1.73sq m     Narrative:      National Kidney Disease Foundation guidelines for Chronic Kidney Disease (CKD):     Stage 1 with normal or high GFR (GFR > 90 mL/min/1.73 square meters)    Stage 2 Mild CKD (GFR = 60-89 mL/min/1.73 square meters)    Stage 3A Moderate CKD (GFR = 45-59 mL/min/1.73 square meters)    Stage 3B Moderate CKD (GFR = 30-44 mL/min/1.73 square meters)    Stage 4 Severe CKD (GFR = 15-29 mL/min/1.73 square meters)    Stage 5 End Stage CKD (GFR <15 mL/min/1.73 square meters)  Note: GFR calculation  is accurate only with a steady state creatinine    Magnesium [[id number]]  (Normal) Collected: 06/05/24 2043    Lab Status: Final result Specimen: Blood from Line, Venous Updated: 06/05/24 2111     Magnesium 2.2 mg/dL     CBC and differential [[id number]]  (Abnormal) Collected: 06/05/24 2043    Lab Status: Final result Specimen: Blood from Line, Venous Updated: 06/05/24 2052     WBC 10.60 Thousand/uL      RBC 4.62 Million/uL      Hemoglobin 14.3 g/dL      Hematocrit 42.0 %      MCV 91 fL      MCH 31.0 pg      MCHC 34.0 g/dL      RDW 13.3 %      MPV 9.0 fL      Platelets 251 Thousands/uL      nRBC 0 /100 WBCs      Segmented % 62 %      Immature Grans % 1 %      Lymphocytes % 22 %      Monocytes % 11 %      Eosinophils Relative 3 %      Basophils Relative 1 %      Absolute Neutrophils 6.72 Thousands/µL      Absolute Immature Grans 0.07 Thousand/uL      Absolute Lymphocytes 2.33 Thousands/µL      Absolute Monocytes 1.15 Thousand/µL      Eosinophils Absolute 0.28 Thousand/µL      Basophils Absolute 0.05 Thousands/µL                    CTA ED chest PE Study   Final Result by Osmin Lynn MD (06/05 2318)      No evidence of acute pulmonary embolus or thoracic aortic aneurysm. No acute cardiopulmonary process.                  Workstation performed: [id number]         CT soft tissue neck with contrast   Final Result by Osmin Lynn MD (06/05 2314)      No evidence of mass, inflammation or lymphadenopathy. Unremarkable exam of the neck.            Workstation performed: [id number]         XR chest 1 view portable   ED Interpretation by Kim Calderon DO (06/05 2104)   No acute findings                 Procedures  ECG 12 Lead Documentation Only    Date/Time: 6/5/2024 8:24 PM    Performed by: Kim Calderon DO  Authorized by: Kim Calderon DO    ECG reviewed by me, the ED Provider: yes    Patient location:  ED  Previous ECG:     Previous ECG:  Compared to current    Comparison ECG info:  6/23     "Similarity:  No change  Interpretation:     Interpretation: normal    Quality:     Tracing quality:  Limited by artifact  Rate:     ECG rate:  70    ECG rate assessment: normal    Rhythm:     Rhythm: sinus rhythm    Ectopy:     Ectopy: none    QRS:     QRS axis:  Normal    QRS intervals:  Normal  Conduction:     Conduction: normal    ST segments:     ST segments:  Non-specific  T waves:     T waves: non-specific    Comments:        Normal axis   QTC measured at 432 ms           ED Course          ED Course as of 06/05/24 2350   Wed Jun 05, 2024 2011 Patient is a 64-year-old male coming in today coming in with onset of left neck left shoulder and left-sided chest discomfort today.  On exam he is well-appearing in no acute distress and nonreproducible symptoms.  Will start cardiac workup adding on D-dimer for positive low Wells with complaints of inspiratory pain      Disclosure: Voice to text software was used in the preparation of this document and could have resulted in translational errors.      Occasional wrong word or \"sound a like\" substitutions may have occurred due to the inherent limitations of voice recognition software.  Read the chart carefully and recognize, using context, where substitutions have occurred.       I have independently reviewed external records are available to me to the level of detail possible within the time constraints of my patient care responsibilities in the ED.       2120 Labs reviewed and without actionable derangement    Pending D Dimer, Trop and BNP     2133 D-Dimer(!)  Elevated - will progress to CT     2147 Patient updated on labs and plan for CT. Remains resting in bed in no distress. Agreeable with plan and feels better    Remains stable in no distress.      2321 CT soft tissue neck with contrast as well as CTA of chest shows no acute findings.  Will reassess patient.  Patient's creatinine is 1.0 with GFR 75 will give small dose of Toradol     2327 Long discussion with " patient regarding workup.  He states he feels marked improvement.  We have given him Lidoderm Robaxin Tylenol as well as IV fluids.    He is aware that we are pending troponin at this time.  Will give him a 50 mg of Toradol.  He is agreeable plan.  Will also DC home with Robaxin and instructions to alternate Tylenol Motrin with referral to comprehensive spine.  Answered questions at bedside and patient agreeable    Patient remains neuro intact with no focal deficits.  Hemodynamically stable in no acute distress     2330 Delta troponin of 1. Will continue with plans for dc home. Patient updated on this     Counseling: I had a detailed discussion with the patient and/or guardian regarding: the historical points, exam findings, and any diagnostic results supporting the discharge diagnosis, lab results, radiology results, discharge instructions reviewed with patient and/or family/caregiver and understanding was verbalized. Instructions given to return to the emergency department if symptoms worsen or persist, or if there are any questions or concerns that arise at home.     All imaging and/or lab testing discussed with patient, strict return to ED precautions discussed. Patient recommended to follow up promptly with appropriate outpatient provider. Patient and/or family members verbalizes understanding and agrees with plan. Patient and/or family members were given opportunity to ask questions, all questions were answered at this time. Patient is stable for discharge                 PERC Rule for PE      Flowsheet Row Most Recent Value   PERC Rule for PE    Age >=50 1 Filed at: 06/05/2024 2019   HR >=100 0 Filed at: 06/05/2024 2019   O2 Sat on room air < 95% 0 Filed at: 06/05/2024 2019   History of PE or DVT 0 Filed at: 06/05/2024 2019   Recent trauma or surgery 0 Filed at: 06/05/2024 2019   Hemoptysis 0 Filed at: 06/05/2024 2019   Exogenous estrogen 0 Filed at: 06/05/2024 2019   Unilateral leg swelling 0 Filed at:  06/05/2024 2019   PERC Rule for PE Results 1 Filed at: 06/05/2024 2019                    Wells' Criteria for PE      Flowsheet Row Most Recent Value   Wells' Criteria for PE    Clinical signs and symptoms of DVT 0 Filed at: 06/05/2024 2019   PE is primary diagnosis or equally likely 0 Filed at: 06/05/2024 2019   HR >100 0 Filed at: 06/05/2024 2019   Immobilization at least 3 days or Surgery in the previous 4 weeks 0 Filed at: 06/05/2024 2019   Previous, objectively diagnosed PE or DVT 0 Filed at: 06/05/2024 2019   Hemoptysis 0 Filed at: 06/05/2024 2019   Malignancy with treatment within 6 months or palliative 0 Filed at: 06/05/2024 2019   Wells' Criteria Total 0 Filed at: 06/05/2024 2019                  Medical Decision Making  Different diagnosis : ACS, NSTEMI, pleurisry, pneumothorax, costochondritis, pneumonia, GERD, anxiety, hepatitis, pancreatitis, aortic dissection, pericarditis, myocarditis, pericardial effusion, asthma exacerbation, COPD exacerbation, herpes zoster, peritoneal rupture, esophageal spasm.    Based on patient's history and physical presentation concern for anginal equivalent versus dissection versus aneurysm versus PE versus musculoskeletal    Problems Addressed:  Chest pain: acute illness or injury  HTN (hypertension): chronic illness or injury  Left arm pain: acute illness or injury  Neck pain on left side: acute illness or injury    Amount and/or Complexity of Data Reviewed  Independent Historian: spouse     Details: Wife at bedside  External Data Reviewed: notes.     Details: Systolic function was normal. Ejection fraction was estimated to be 60 %.  Although no diagnostic regional wall motion abnormality was identified, this possibility cannot be completely excluded on the basis of this study.  Left ventricular diastolic function parameters were normal.        Last MRI of brain in December 2020 at [facility] showed no change with stable appearance of identified small diffusion  hyperintense subcortical lesion in the left occipital lobe    Prior x-ray of cervical spine shows degenerative changes in the past    Labs: ordered. Decision-making details documented in ED Course.     Details: Mild leukocytosis without any bands.  No anemia or thrombocytopenia  No acute kidney injury or electrolyte dysfunction   coags within normal range  D-dimer elevated  Troponin 3 with a heart score of 4 .  Delta troponin of 1  Radiology: ordered and independent interpretation performed. Decision-making details documented in ED Course.     Details: Chest x-ray interpreted by myself as no acute findings    CTA PE interpreted by radiologist as no evidence of acute PE or thoracic aortic aneurysm    CT C-spine with IV contrast shows no evidence of acute findings      ECG/medicine tests: ordered. Decision-making details documented in ED Course.     Details: No ischemia or arrhythmia    Risk  OTC drugs.  Prescription drug management.             Disposition  Final diagnoses:   Neck pain on left side   Left arm pain   Chest pain   HTN (hypertension)   DDD (degenerative disc disease), cervical     Time reflects when diagnosis was documented in both MDM as applicable and the Disposition within this note       Time User Action Codes Description Comment    6/5/2024  9:59 PM Bendock, Kim L Add [M54.2] Neck pain on left side     6/5/2024 10:00 PM Bendock, Kim L Add [M79.602] Left arm pain     6/5/2024 10:00 PM Bendock, Kim L Add [R07.9] Chest pain     6/5/2024 11:28 PM Bendock, Kim L Add [I10] HTN (hypertension)     6/5/2024 11:31 PM Bendock, Kim L Add [M50.30] DDD (degenerative disc disease), cervical           ED Disposition       ED Disposition   Discharge    Condition   Stable    Date/Time   Wed Jun 5, 2024 3476    Comment   Vlad Cowan discharge to home/self care.                   Follow-up Information       Follow up With Specialties Details Why Contact Info Additional Information    Kalyani  DO [facility] Schedule an appointment as soon as possible for a visit in 1 week  [address]  [address]  [facility] [place] [address]  [phone]       [facility] Physical Therapy Schedule an appointment as soon as possible for a visit in 1 week  [phone]            Patient's Medications   Discharge Prescriptions    LIDOCAINE (LIDODERM) 5 %    Apply 1 patch topically over 12 hours daily for 4 days Remove & Discard patch within 12 hours or as directed by MD       Start Date: 6/5/2024  End Date: 6/9/2024       Order Dose: 1 patch       Quantity: 4 patch    Refills: 0    METHOCARBAMOL (ROBAXIN) 500 MG TABLET    Take 1 tablet (500 mg total) by mouth 3 (three) times a day as needed for muscle spasms for up to 4 days       Start Date: 6/5/2024  End Date: 6/9/2024       Order Dose: 500 mg       Quantity: 12 tablet    Refills: 0           PDMP Review         Value Time User    PDMP Reviewed  Yes 12/18/2023 11:45 AM Kalyani Singh DO            ED Provider  Electronically Signed by             Kim Calderon DO  06/05/24 5124

## 2024-06-06 NOTE — DISCHARGE INSTRUCTIONS
You can alternate Tylenol 500 mg every 6-8 hours in combination with Motrin 400 mg   Prescription for Lidoderm patches and Robaxin which is a muscle relaxer sent into your pharmacy  If you have any worsening symptoms, development of chest pain, shortness of breath, slurred speech, weakness in the arms or any concerning symptoms please return to the ER or call 911

## 2024-06-30 ENCOUNTER — APPOINTMENT (EMERGENCY)
Dept: CT IMAGING | Facility: HOSPITAL | Age: 64
End: 2024-06-30
Payer: COMMERCIAL

## 2024-06-30 ENCOUNTER — HOSPITAL ENCOUNTER (EMERGENCY)
Facility: HOSPITAL | Age: 64
Discharge: HOME | End: 2024-06-30
Attending: EMERGENCY MEDICINE
Payer: COMMERCIAL

## 2024-06-30 VITALS
OXYGEN SATURATION: 98 % | HEART RATE: 60 BPM | SYSTOLIC BLOOD PRESSURE: 146 MMHG | DIASTOLIC BLOOD PRESSURE: 77 MMHG | BODY MASS INDEX: 33.72 KG/M2 | TEMPERATURE: 97.8 F | WEIGHT: 221.78 LBS | RESPIRATION RATE: 16 BRPM

## 2024-06-30 DIAGNOSIS — E86.0 DEHYDRATION: ICD-10-CM

## 2024-06-30 DIAGNOSIS — R10.9 ACUTE ABDOMINAL PAIN: Primary | ICD-10-CM

## 2024-06-30 DIAGNOSIS — R19.7 DIARRHEA: ICD-10-CM

## 2024-06-30 LAB
ALBUMIN SERPL BCG-MCNC: 4.2 G/DL (ref 3.5–5)
ALP SERPL-CCNC: 42 U/L (ref 34–104)
ALT SERPL W P-5'-P-CCNC: 15 U/L (ref 7–52)
ANION GAP SERPL CALCULATED.3IONS-SCNC: 7 MMOL/L (ref 4–13)
AST SERPL W P-5'-P-CCNC: 19 U/L (ref 13–39)
BACTERIA UR QL AUTO: NORMAL /HPF
BASOPHILS # BLD AUTO: 0.05 THOUSANDS/ÂΜL (ref 0–0.1)
BASOPHILS NFR BLD AUTO: 1 % (ref 0–1)
BILIRUB SERPL-MCNC: 0.89 MG/DL (ref 0.2–1)
BILIRUB UR QL STRIP: NEGATIVE
BUN SERPL-MCNC: 16 MG/DL (ref 5–25)
CALCIUM SERPL-MCNC: 9.4 MG/DL (ref 8.4–10.2)
CHLORIDE SERPL-SCNC: 104 MMOL/L (ref 96–108)
CLARITY UR: CLEAR
CO2 SERPL-SCNC: 29 MMOL/L (ref 21–32)
COLOR UR: YELLOW
CREAT SERPL-MCNC: 0.93 MG/DL (ref 0.6–1.3)
EOSINOPHIL # BLD AUTO: 0.15 THOUSAND/ÂΜL (ref 0–0.61)
EOSINOPHIL NFR BLD AUTO: 2 % (ref 0–6)
ERYTHROCYTE [DISTWIDTH] IN BLOOD BY AUTOMATED COUNT: 13.2 % (ref 11.6–15.1)
GFR SERPL CREATININE-BSD FRML MDRD: 85 ML/MIN/1.73SQ M
GLUCOSE SERPL-MCNC: 96 MG/DL (ref 65–140)
GLUCOSE UR STRIP-MCNC: NEGATIVE MG/DL
HCT VFR BLD AUTO: 45.1 % (ref 36.5–49.3)
HGB BLD-MCNC: 15.2 G/DL (ref 12–17)
HGB UR QL STRIP.AUTO: ABNORMAL
IMM GRANULOCYTES # BLD AUTO: 0.04 THOUSAND/UL (ref 0–0.2)
IMM GRANULOCYTES NFR BLD AUTO: 0 % (ref 0–2)
KETONES UR STRIP-MCNC: NEGATIVE MG/DL
LEUKOCYTE ESTERASE UR QL STRIP: NEGATIVE
LIPASE SERPL-CCNC: 31 U/L (ref 11–82)
LYMPHOCYTES # BLD AUTO: 2.04 THOUSANDS/ÂΜL (ref 0.6–4.47)
LYMPHOCYTES NFR BLD AUTO: 21 % (ref 14–44)
MCH RBC QN AUTO: 30.3 PG (ref 26.8–34.3)
MCHC RBC AUTO-ENTMCNC: 33.7 G/DL (ref 31.4–37.4)
MCV RBC AUTO: 90 FL (ref 82–98)
MONOCYTES # BLD AUTO: 0.94 THOUSAND/ÂΜL (ref 0.17–1.22)
MONOCYTES NFR BLD AUTO: 10 % (ref 4–12)
NEUTROPHILS # BLD AUTO: 6.38 THOUSANDS/ÂΜL (ref 1.85–7.62)
NEUTS SEG NFR BLD AUTO: 66 % (ref 43–75)
NITRITE UR QL STRIP: NEGATIVE
NON-SQ EPI CELLS URNS QL MICRO: NORMAL /HPF
NRBC BLD AUTO-RTO: 0 /100 WBCS
PH UR STRIP.AUTO: 7 [PH] (ref 4.5–8)
PLATELET # BLD AUTO: 237 THOUSANDS/UL (ref 149–390)
PMV BLD AUTO: 8.7 FL (ref 8.9–12.7)
POTASSIUM SERPL-SCNC: 3.7 MMOL/L (ref 3.5–5.3)
PROT SERPL-MCNC: 7.3 G/DL (ref 6.4–8.4)
PROT UR STRIP-MCNC: NEGATIVE MG/DL
RBC # BLD AUTO: 5.02 MILLION/UL (ref 3.88–5.62)
RBC #/AREA URNS AUTO: NORMAL /HPF
SODIUM SERPL-SCNC: 140 MMOL/L (ref 135–147)
SP GR UR STRIP.AUTO: 1.01 (ref 1–1.03)
UROBILINOGEN UR QL STRIP.AUTO: 0.2 E.U./DL
WBC # BLD AUTO: 9.6 THOUSAND/UL (ref 4.31–10.16)
WBC #/AREA URNS AUTO: NORMAL /HPF

## 2024-06-30 PROCEDURE — 96374 THER/PROPH/DIAG INJ IV PUSH: CPT

## 2024-06-30 PROCEDURE — 96361 HYDRATE IV INFUSION ADD-ON: CPT

## 2024-06-30 PROCEDURE — 99285 EMERGENCY DEPT VISIT HI MDM: CPT | Performed by: EMERGENCY MEDICINE

## 2024-06-30 PROCEDURE — 81001 URINALYSIS AUTO W/SCOPE: CPT

## 2024-06-30 PROCEDURE — 36415 COLL VENOUS BLD VENIPUNCTURE: CPT | Performed by: EMERGENCY MEDICINE

## 2024-06-30 PROCEDURE — 85025 COMPLETE CBC W/AUTO DIFF WBC: CPT | Performed by: EMERGENCY MEDICINE

## 2024-06-30 PROCEDURE — 74177 CT ABD & PELVIS W/CONTRAST: CPT

## 2024-06-30 PROCEDURE — 99284 EMERGENCY DEPT VISIT MOD MDM: CPT

## 2024-06-30 PROCEDURE — 83690 ASSAY OF LIPASE: CPT | Performed by: EMERGENCY MEDICINE

## 2024-06-30 PROCEDURE — 80053 COMPREHEN METABOLIC PANEL: CPT | Performed by: EMERGENCY MEDICINE

## 2024-06-30 RX ORDER — FENTANYL CITRATE 50 UG/ML
50 INJECTION, SOLUTION INTRAMUSCULAR; INTRAVENOUS ONCE
Status: DISCONTINUED | OUTPATIENT
Start: 2024-06-30 | End: 2024-06-30

## 2024-06-30 RX ORDER — ONDANSETRON 2 MG/ML
4 INJECTION INTRAMUSCULAR; INTRAVENOUS ONCE
Status: DISCONTINUED | OUTPATIENT
Start: 2024-06-30 | End: 2024-06-30 | Stop reason: HOSPADM

## 2024-06-30 RX ORDER — KETOROLAC TROMETHAMINE 30 MG/ML
15 INJECTION, SOLUTION INTRAMUSCULAR; INTRAVENOUS ONCE
Status: COMPLETED | OUTPATIENT
Start: 2024-06-30 | End: 2024-06-30

## 2024-06-30 RX ORDER — DICYCLOMINE HCL 20 MG
20 TABLET ORAL 2 TIMES DAILY
Qty: 14 TABLET | Refills: 0 | Status: SHIPPED | OUTPATIENT
Start: 2024-06-30 | End: 2024-07-07

## 2024-06-30 RX ADMIN — KETOROLAC TROMETHAMINE 15 MG: 30 INJECTION, SOLUTION INTRAMUSCULAR; INTRAVENOUS at 11:37

## 2024-06-30 RX ADMIN — SODIUM CHLORIDE 1000 ML: 0.9 INJECTION, SOLUTION INTRAVENOUS at 09:55

## 2024-06-30 RX ADMIN — IOHEXOL 100 ML: 350 INJECTION, SOLUTION INTRAVENOUS at 10:59

## 2024-06-30 NOTE — ED PROVIDER NOTES
History  Chief Complaint   Patient presents with    Abdominal Pain     Pt reports lower abdominal pain with diarrhea x3-4 days. Denies urinary symptoms       History provided by:  Patient  Abdominal Pain  Pain location:  LLQ, RLQ and suprapubic  Pain quality: cramping    Pain radiates to:  Does not radiate  Pain severity:  Moderate  Onset quality:  Gradual  Duration: several days.  Timing:  Constant  Progression:  Waxing and waning  Chronicity:  New  Relieved by:  None tried  Worsened by:  Nothing  Associated symptoms: diarrhea    Associated symptoms: no chest pain, no constipation, no dysuria, no fatigue, no fever, no hematemesis, no hematochezia, no hematuria, no melena, no nausea, no shortness of breath, no sore throat and no vomiting    Diarrhea:     Quality:  Watery    Diarrhea duration: several days.  Risk factors comment:  No recent travel or antibiotics      Prior to Admission Medications   Prescriptions Last Dose Informant Patient Reported? Taking?   ALPRAZolam (XANAX) 0.25 mg tablet   No No   Sig: Take 1 tablet (0.25 mg total) by mouth daily at bedtime as needed for anxiety   NON FORMULARY   Yes No   Sig: daily as needed Medical marijuana   Naproxen Sodium 220 MG CAPS   Yes No   Sig: Take by mouth   acetaminophen (TYLENOL) 500 mg tablet   Yes No   Sig: Take 500-1,000 mg by mouth every 6 (six) hours as needed for mild pain   celecoxib (CeleBREX) 200 mg capsule   Yes No   Sig: Take 200 mg by mouth 2 (two) times a day   famotidine (PEPCID) 10 mg tablet  Self Yes No   Sig: Take 10 mg by mouth Taken as needed   famotidine (PEPCID) 20 mg tablet   Yes No   Sig:   Start: 10/26/22 8:49:00 EDT   Patient not taking: Reported on 8/15/2023   hydroCHLOROthiazide 25 mg tablet   No No   Sig: TAKE 1 TABLET (25 MG TOTAL) BY MOUTH DAILY.   ibuprofen (MOTRIN) 200 mg tablet   Yes No   Sig: Take 200-800 mg by mouth every 6 (six) hours as needed for mild pain   lidocaine (LIDODERM) 5 %   No No   Sig: Apply 1 patch topically over  12 hours daily for 4 days Remove & Discard patch within 12 hours or as directed by MD   methocarbamol (ROBAXIN) 500 mg tablet   No No   Sig: Take 1 tablet (500 mg total) by mouth 3 (three) times a day as needed for muscle spasms for up to 4 days   methocarbamol (ROBAXIN) 750 mg tablet   No No   Si TABLET AT BEDTIME AS NEEDED FOR MUSCLE SPASM   predniSONE 10 mg tablet   No No   Sig: Take 60 mg po day#1, 50 mg po day#2, 40 mg po day#3, 30 mg po day#4, 20 mg po day#5m and 10 mg po day#6   tadalafil (CIALIS) 20 MG tablet  Self No No   Sig: Take 1 tablet (20 mg total) by mouth as needed for erectile dysfunction   traMADol (ULTRAM) 50 mg tablet   No No   Sig: Take 1 tablet (50 mg total) by mouth daily as needed for moderate pain      Facility-Administered Medications: None       Past Medical History:   Diagnosis Date    AVM (arteriovenous malformation)     BPH without urinary obstruction     last assessed 18    Chronic pain disorder     low back and neck    COVID     2021 and 22    Headache     occ    Hemorrhoids     Hydronephrosis     Hypertension     Kidney disease     Left inguinal hernia     Lumbar herniated disc     PONV (postoperative nausea and vomiting)     Renal calculi     Renal colic     TMJ (dislocation of temporomandibular joint)     Ureter colic        Past Surgical History:   Procedure Laterality Date    BRAIN SURGERY      COLONOSCOPY      CYSTOSCOPY      w/removal of object, last assessed 18    CYSTOSCOPY      w/removal of ureteral calculus last assessed 18    CYSTOSCOPY KIDNEY W/ URETERAL GUIDE WIRE      last assessed 18    CYSTOSCOPY W/ URETERAL STENT PLACEMENT      last assessed 18    FL RETROGRADE PYELOGRAM  2020    HERNIA REPAIR  1965    [facility]    HERNIA REPAIR      KIDNEY SURGERY      removal of kidney stone in 2013 at [facility]    IA CYSTO/URETERO W/LITHOTRIPSY &INDWELL STENT INSRT Right 2018    Procedure: CYSTOSCOPY URETEROSCOPY  RETROGRADE  PYELOGRAM AND INSERTION RIGHT STENT URETERAL;  Surgeon: Jose Daniel Everett MD;  Location: [facility];  Service: Urology    MA CYSTO/URETERO W/LITHOTRIPSY &INDWELL STENT INSRT Left 7/9/2020    Procedure: CYSTOSCOPY URETEROSCOPY WITH LITHOTRIPSY HOLMIUM LASER, RETROGRADE PYELOGRAM AND INSERTION STENT URETERAL;  Surgeon: Jose Daniel Everett MD;  [facility];  Service: Urology    MA LAPAROSCOPY SURG RPR INITIAL INGUINAL HERNIA Left 8/4/2022    Procedure: REPAIR HERNIA INGUINAL LAPAROSCOPIC W/ ROBOTICS;  Surgeon: Willie Carlson MD;  Location: [facility];  Service: General    TOOTH EXTRACTION         Family History   Problem Relation Age of Onset    Stroke Mother     Cancer Father     Diabetes Father     Prostate cancer Father     Heart disease Father     Hypertension Father      I have reviewed and agree with the history as documented.    E-Cigarette/Vaping    E-Cigarette Use Current Some Day User      E-Cigarette/Vaping Substances    Nicotine No     THC Yes     CBD No     Flavoring No     Other No     Unknown No      Social History     Tobacco Use    Smoking status: Never    Smokeless tobacco: Never   Vaping Use    Vaping status: Some Days    Substances: THC   Substance Use Topics    Alcohol use: Yes     Comment: rare    Drug use: Yes     Types: Marijuana     Comment: ocassionaly use       Review of Systems   Constitutional:  Negative for activity change, appetite change, fatigue and fever.   HENT:  Negative for congestion, dental problem, ear pain, rhinorrhea and sore throat.    Eyes:  Negative for pain and redness.   Respiratory:  Negative for chest tightness, shortness of breath and wheezing.    Cardiovascular:  Negative for chest pain and palpitations.   Gastrointestinal:  Positive for abdominal pain and diarrhea. Negative for blood in stool, constipation, hematemesis, hematochezia, melena, nausea and vomiting.   Endocrine: Negative for cold intolerance and heat intolerance.   Genitourinary:  Negative for  dysuria, frequency and hematuria.   Musculoskeletal:  Negative for arthralgias and myalgias.   Skin:  Negative for color change, pallor and rash.   Neurological:  Negative for weakness and numbness.   Hematological:  Does not bruise/bleed easily.   Psychiatric/Behavioral:  Negative for agitation, hallucinations and suicidal ideas.        Physical Exam  Physical Exam  Vitals and nursing note reviewed.   Constitutional:       Appearance: He is well-developed.   Cardiovascular:      Rate and Rhythm: Normal rate and regular rhythm.   Abdominal:      General: Abdomen is flat.      Tenderness: There is abdominal tenderness in the right lower quadrant, suprapubic area and left lower quadrant.      Hernia: No hernia is present.   Skin:     Coloration: Skin is not cyanotic, jaundiced, mottled or pale.   Neurological:      Mental Status: He is alert.         Vital Signs  ED Triage Vitals   Temperature Pulse Respirations Blood Pressure SpO2   06/30/24 0937 06/30/24 0937 06/30/24 0937 06/30/24 0937 06/30/24 0937   97.8 °F (36.6 °C) 72 16 140/80 97 %      Temp src Heart Rate Source Patient Position - Orthostatic VS BP Location FiO2 (%)   -- 06/30/24 0937 -- -- --    Monitor         Pain Score       06/30/24 1137       4           Vitals:    06/30/24 0937 06/30/24 0945 06/30/24 1030   BP: 140/80 129/81 156/66   Pulse: 72 68 58         Visual Acuity      ED Medications  Medications   ondansetron (ZOFRAN) injection 4 mg (4 mg Intravenous Not Given 6/30/24 1044)   sodium chloride 0.9 % bolus 1,000 mL (0 mL Intravenous Stopped 6/30/24 1137)   iohexol (OMNIPAQUE) 350 MG/ML injection (MULTI-DOSE) 100 mL (100 mL Intravenous Given 6/30/24 1059)   ketorolac (TORADOL) injection 15 mg (15 mg Intravenous Given 6/30/24 1137)       Diagnostic Studies  Results Reviewed       Procedure Component Value Units Date/Time    Urine Microscopic [[id number]]  (Normal) Collected: 06/30/24 1127    Lab Status: Final result Specimen: Urine, Clean Catch  Updated: 06/30/24 1154     RBC, UA 1-2 /hpf      WBC, UA None Seen /hpf      Epithelial Cells None Seen /hpf      Bacteria, UA None Seen /hpf     Urine Macroscopic, POC [[id number]]  (Abnormal) Collected: 06/30/24 1127    Lab Status: Final result Specimen: Urine Updated: 06/30/24 1129     Color, UA Yellow     Clarity, UA Clear     pH, UA 7.0     Leukocytes, UA Negative     Nitrite, UA Negative     Protein, UA Negative mg/dl      Glucose, UA Negative mg/dl      Ketones, UA Negative mg/dl      Urobilinogen, UA 0.2 E.U./dl      Bilirubin, UA Negative     Occult Blood, UA Trace     Specific Gravity, UA 1.010    Narrative:      CLINITEK RESULT    Comprehensive metabolic panel [[id number]] Collected: 06/30/24 0955    Lab Status: Final result Specimen: Blood from Arm, Right Updated: 06/30/24 1024     Sodium 140 mmol/L      Potassium 3.7 mmol/L      Chloride 104 mmol/L      CO2 29 mmol/L      ANION GAP 7 mmol/L      BUN 16 mg/dL      Creatinine 0.93 mg/dL      Glucose 96 mg/dL      Calcium 9.4 mg/dL      AST 19 U/L      ALT 15 U/L      Alkaline Phosphatase 42 U/L      Total Protein 7.3 g/dL      Albumin 4.2 g/dL      Total Bilirubin 0.89 mg/dL      eGFR 85 ml/min/1.73sq m     Narrative:      National Kidney Disease Foundation guidelines for Chronic Kidney Disease (CKD):     Stage 1 with normal or high GFR (GFR > 90 mL/min/1.73 square meters)    Stage 2 Mild CKD (GFR = 60-89 mL/min/1.73 square meters)    Stage 3A Moderate CKD (GFR = 45-59 mL/min/1.73 square meters)    Stage 3B Moderate CKD (GFR = 30-44 mL/min/1.73 square meters)    Stage 4 Severe CKD (GFR = 15-29 mL/min/1.73 square meters)    Stage 5 End Stage CKD (GFR <15 mL/min/1.73 square meters)  Note: GFR calculation is accurate only with a steady state creatinine    Lipase [[id number]]  (Normal) Collected: 06/30/24 0955    Lab Status: Final result Specimen: Blood from Arm, Right Updated: 06/30/24 1024     Lipase 31 u/L     CBC and differential [[id number]]  (Abnormal)  Collected: 06/30/24 0955    Lab Status: Final result Specimen: Blood from Arm, Right Updated: 06/30/24 1003     WBC 9.60 Thousand/uL      RBC 5.02 Million/uL      Hemoglobin 15.2 g/dL      Hematocrit 45.1 %      MCV 90 fL      MCH 30.3 pg      MCHC 33.7 g/dL      RDW 13.2 %      MPV 8.7 fL      Platelets 237 Thousands/uL      nRBC 0 /100 WBCs      Segmented % 66 %      Immature Grans % 0 %      Lymphocytes % 21 %      Monocytes % 10 %      Eosinophils Relative 2 %      Basophils Relative 1 %      Absolute Neutrophils 6.38 Thousands/µL      Absolute Immature Grans 0.04 Thousand/uL      Absolute Lymphocytes 2.04 Thousands/µL      Absolute Monocytes 0.94 Thousand/µL      Eosinophils Absolute 0.15 Thousand/µL      Basophils Absolute 0.05 Thousands/µL                    CT abdomen pelvis with contrast   Final Result by Alexandre Wood MD (06/30 1142)      No acute findings in the abdomen or pelvis. Colonic diverticulosis without findings of acute diverticulitis.         Workstation performed: [id number]                    Procedures  Procedures         ED Course  ED Course as of 06/30/24 1157   Sun Jun 30, 2024   1155 Medical work up reviewed and benign, will reassure, , dc to home with pcp f/u and symptomatic tx.  Pt aware of diverticulosis                               SBIRT 20yo+      Flowsheet Row Most Recent Value   Initial Alcohol Screen: US AUDIT-C     1. How often do you have a drink containing alcohol? 0 Filed at: 06/30/2024 0943   2. How many drinks containing alcohol do you have on a typical day you are drinking?  0 Filed at: 06/30/2024 0943   3a. Male UNDER 65: How often do you have five or more drinks on one occasion? 0 Filed at: 06/30/2024 0943   3b. FEMALE Any Age, or MALE 65+: How often do you have 4 or more drinks on one occassion? 0 Filed at: 06/30/2024 0943   Audit-C Score 0 Filed at: 06/30/2024 0943   BOB: How many times in the past year have you...    Used an illegal drug or used a  prescription medication for non-medical reasons? Never Filed at: 06/30/2024 0943                      Medical Decision Making  Acute lower abdominal pain and diarrhea-will do abdominal labs for hepatitis pancreatitis, acute kidney injury, lecture abnormality, CT abdomen pelvis with acute surgical pathology such as but limited to diverticulitis, perforated viscus, IV fluids, pain medications and reassess    Amount and/or Complexity of Data Reviewed  Labs: ordered.  Radiology: ordered.    Risk  Prescription drug management.             Disposition  Final diagnoses:   Acute abdominal pain   Diarrhea   Dehydration     Time reflects when diagnosis was documented in both MDM as applicable and the Disposition within this note       Time User Action Codes Description Comment    6/30/2024 11:56 AM Vance Lee [R10.9] Acute abdominal pain     6/30/2024 11:56 AM Vance Lee [R19.7] Diarrhea     6/30/2024 11:56 AM Vance Lee [E86.0] Dehydration           ED Disposition       ED Disposition   Discharge    Condition   Stable    Date/Time   Sun Jun 30, 2024 1156    Comment   Vlad Cowan discharge to home/self care.                   Follow-up Information       Follow up With Specialties Details Why Contact Info    Kalyani Singh DO Family Medicine In 2 days  [address]  [address]  [facility] [address]  [phone]              Patient's Medications   Discharge Prescriptions    DICYCLOMINE (BENTYL) 20 MG TABLET    Take 1 tablet (20 mg total) by mouth 2 (two) times a day for 7 days       Start Date: 6/30/2024 End Date: 7/7/2024       Order Dose: 20 mg       Quantity: 14 tablet    Refills: 0       No discharge procedures on file.    PDMP Review         Value Time User    PDMP Reviewed  Yes 12/18/2023 11:45 AM Kalyani Singh DO            ED Provider  Electronically Signed by             Vance Lee MD  06/30/24 2571

## 2024-07-02 ENCOUNTER — OFFICE VISIT (OUTPATIENT)
Dept: FAMILY MEDICINE CLINIC | Facility: CLINIC | Age: 64
End: 2024-07-02
Payer: COMMERCIAL

## 2024-07-02 VITALS
OXYGEN SATURATION: 97 % | WEIGHT: 223 LBS | HEIGHT: 68 IN | HEART RATE: 61 BPM | BODY MASS INDEX: 33.8 KG/M2 | TEMPERATURE: 97.7 F | SYSTOLIC BLOOD PRESSURE: 148 MMHG | DIASTOLIC BLOOD PRESSURE: 82 MMHG

## 2024-07-02 DIAGNOSIS — K57.90 DIVERTICULOSIS: ICD-10-CM

## 2024-07-02 DIAGNOSIS — R10.30 LOWER ABDOMINAL PAIN: ICD-10-CM

## 2024-07-02 DIAGNOSIS — R19.7 DIARRHEA, UNSPECIFIED TYPE: ICD-10-CM

## 2024-07-02 DIAGNOSIS — I10 ESSENTIAL HYPERTENSION: Primary | ICD-10-CM

## 2024-07-02 DIAGNOSIS — K21.9 GASTROESOPHAGEAL REFLUX DISEASE, UNSPECIFIED WHETHER ESOPHAGITIS PRESENT: ICD-10-CM

## 2024-07-02 PROCEDURE — 99214 OFFICE O/P EST MOD 30 MIN: CPT | Performed by: FAMILY MEDICINE

## 2024-07-02 RX ORDER — CIPROFLOXACIN 500 MG/1
500 TABLET, FILM COATED ORAL EVERY 12 HOURS SCHEDULED
Qty: 10 TABLET | Refills: 0 | Status: SHIPPED | OUTPATIENT
Start: 2024-07-02 | End: 2024-07-07

## 2024-07-02 NOTE — PROGRESS NOTES
Ambulatory Visit  Name: Vlad Cowan      : 1960      MRN: [id number]  Encounter Provider: Kalyani Singh DO  Encounter Date: 2024   Encounter department: [facility] PRIMARY CARE  Chief Complaint   Patient presents with    Follow-up     ER f/u     Patient Instructions   Here for er f-up and labs and ct scan unremarkable and will rec abx to cover for diarrhea and rec pepto Bismol prn and stay well hydrated. Rec calling if worse. Stress management and has much stress. This may affect BM's and rec gerd med as directed. Recent colon screening showed diverticulosis in 2024. Get stool culture and ova and parasites test and c. Diff toxin test.    Assessment & Plan   1. Essential hypertension  Comments:  take bp med as directed  2. Diarrhea, unspecified type  Comments:  start abx as directed and pepto bismol prn  Orders:  -     ciprofloxacin (CIPRO) 500 mg tablet; Take 1 tablet (500 mg total) by mouth every 12 (twelve) hours for 5 days  -     Stool culture; Future  -     Clostridium difficile toxin by PCR with EIA; Future  -     Ova and parasite examination; Future  3. Gastroesophageal reflux disease, unspecified whether esophagitis present  Comments:  take med as directed  4. Lower abdominal pain  Comments:  rec starting abx to cover for diarrhea.  5. Diverticulosis  Comments:  no diverticulitis and rec BRAT diet, increase fluids    [unfilled]  History of Present Illness     Follow-up (ER f/u). Patient has had some diarrhea this am and was in ER and Er records reviewed.      Discharge summary reviewed and work-up negative. Anxiety.         Review of Systems   Constitutional: Negative.    HENT: Negative.     Eyes: Negative.    Respiratory: Negative.     Cardiovascular: Negative.    Gastrointestinal: Negative.    Endocrine: Negative.    Genitourinary: Negative.    Musculoskeletal: Negative.    Skin: Negative.    Allergic/Immunologic: Negative.    Neurological: Negative.     Hematological: Negative.    Psychiatric/Behavioral: Negative.       Current Outpatient Medications on File Prior to Visit   Medication Sig Dispense Refill    acetaminophen (TYLENOL) 500 mg tablet Take 500-1,000 mg by mouth every 6 (six) hours as needed for mild pain      ALPRAZolam (XANAX) 0.25 mg tablet Take 1 tablet (0.25 mg total) by mouth daily at bedtime as needed for anxiety 20 tablet 1    celecoxib (CeleBREX) 200 mg capsule Take 200 mg by mouth 2 (two) times a day      dicyclomine (BENTYL) 20 mg tablet Take 1 tablet (20 mg total) by mouth 2 (two) times a day for 7 days 14 tablet 0    famotidine (PEPCID) 10 mg tablet Take 10 mg by mouth Taken as needed      hydroCHLOROthiazide 25 mg tablet TAKE 1 TABLET (25 MG TOTAL) BY MOUTH DAILY. 30 tablet 5    ibuprofen (MOTRIN) 200 mg tablet Take 200-800 mg by mouth every 6 (six) hours as needed for mild pain      methocarbamol (ROBAXIN) 750 mg tablet 1 TABLET AT BEDTIME AS NEEDED FOR MUSCLE SPASM 30 tablet 0    Naproxen Sodium 220 MG CAPS Take by mouth      NON FORMULARY daily as needed Medical marijuana      predniSONE 10 mg tablet Take 60 mg po day#1, 50 mg po day#2, 40 mg po day#3, 30 mg po day#4, 20 mg po day#5m and 10 mg po day#6 21 tablet 0    tadalafil (CIALIS) 20 MG tablet Take 1 tablet (20 mg total) by mouth as needed for erectile dysfunction 10 tablet 3    traMADol (ULTRAM) 50 mg tablet Take 1 tablet (50 mg total) by mouth daily as needed for moderate pain 14 tablet 0    lidocaine (LIDODERM) 5 % Apply 1 patch topically over 12 hours daily for 4 days Remove & Discard patch within 12 hours or as directed by MD 4 patch 0    methocarbamol (ROBAXIN) 500 mg tablet Take 1 tablet (500 mg total) by mouth 3 (three) times a day as needed for muscle spasms for up to 4 days 12 tablet 0    [DISCONTINUED] famotidine (PEPCID) 20 mg tablet   Start: 10/26/22 8:49:00 EDT (Patient not taking: Reported on 7/2/2024)       No current facility-administered medications on file prior  "to visit.      Objective     /82   Pulse 61   Temp 97.7 °F (36.5 °C) (Temporal)   Ht 5' 8\" (1.727 m)   Wt 101 kg (223 lb)   SpO2 97%   BMI 33.91 kg/m²     Physical Exam  Constitutional:       Appearance: He is well-developed. He is obese.   HENT:      Head: Normocephalic and atraumatic.      Right Ear: External ear normal.      Left Ear: External ear normal.      Nose: Nose normal.      Mouth/Throat:      Mouth: Mucous membranes are moist.   Eyes:      Conjunctiva/sclera: Conjunctivae normal.      Pupils: Pupils are equal, round, and reactive to light.   Cardiovascular:      Rate and Rhythm: Normal rate and regular rhythm.      Pulses: Normal pulses.      Heart sounds: Normal heart sounds.   Pulmonary:      Effort: Pulmonary effort is normal.      Breath sounds: Normal breath sounds.   Abdominal:      General: Abdomen is flat. Bowel sounds are normal.      Palpations: Abdomen is soft.   Musculoskeletal:         General: Normal range of motion.      Cervical back: Normal range of motion and neck supple.   Skin:     General: Skin is warm and dry.      Capillary Refill: Capillary refill takes less than 2 seconds.   Neurological:      General: No focal deficit present.      Mental Status: He is alert and oriented to person, place, and time. Mental status is at baseline.      Deep Tendon Reflexes: Reflexes are normal and symmetric.   Psychiatric:         Mood and Affect: Mood normal.         Behavior: Behavior normal.         Thought Content: Thought content normal.         Judgment: Judgment normal.       Administrative Statements   I have spent a total time of 30 minutes in caring for this patient on the day of the visit/encounter including Diagnostic results, Prognosis, Risks and benefits of tx options, Instructions for management, Patient and family education, Importance of tx compliance, Risk factor reductions, Impressions, Counseling / Coordination of care, Documenting in the medical record, Reviewing / " ordering tests, medicine, procedures  , and Obtaining or reviewing history  .

## 2024-07-02 NOTE — PATIENT INSTRUCTIONS
Here for er f-up and labs and ct scan unremarkable and will rec abx to cover for diarrhea and rec pepto Bismol prn and stay well hydrated. Rec calling if worse. Stress management and has much stress. This may affect BM's and rec gerd med as directed. Recent colon screening showed diverticulosis in April 2024. Get stool culture and ova and parasites test and c. Diff toxin test.

## 2024-07-03 ENCOUNTER — APPOINTMENT (OUTPATIENT)
Dept: LAB | Facility: MEDICAL CENTER | Age: 64
End: 2024-07-03

## 2024-07-05 ENCOUNTER — TELEPHONE (OUTPATIENT)
Age: 64
End: 2024-07-05

## 2024-07-05 NOTE — TELEPHONE ENCOUNTER
Pt called, requesting a return call today. Since he started the antibiotic (for diarrhea) he has not had a BM since Tuesday 7/2 - he would like to know what he should do.  Please advise.

## 2024-07-05 NOTE — TELEPHONE ENCOUNTER
Patient is aware of rec, he has  been drinking a lot of fluids. He stated that Dr. Singh gave him the antibiotics cause possible might have an infection but now he is thinking it might be IBS. Please advise.

## 2024-07-08 ENCOUNTER — TELEPHONE (OUTPATIENT)
Age: 64
End: 2024-07-08

## 2024-07-08 DIAGNOSIS — K59.00 CONSTIPATION, UNSPECIFIED CONSTIPATION TYPE: Primary | ICD-10-CM

## 2024-07-08 DIAGNOSIS — R19.7 DIARRHEA, UNSPECIFIED TYPE: ICD-10-CM

## 2024-07-08 DIAGNOSIS — R19.8 CHANGE IN BOWEL MOVEMENT: ICD-10-CM

## 2024-07-08 NOTE — TELEPHONE ENCOUNTER
Patient called, advised to provide update on stomach issue if condition hadn't improved. Patient states he is done with prescribed antibiotics, and now experiencing constipation. Patient request a refer real to a GI or other suggestions. Please advise Patient at [phone], if any further questions.

## 2024-07-10 ENCOUNTER — CONSULT (OUTPATIENT)
Dept: GASTROENTEROLOGY | Facility: CLINIC | Age: 64
End: 2024-07-10
Payer: COMMERCIAL

## 2024-07-10 VITALS
DIASTOLIC BLOOD PRESSURE: 82 MMHG | BODY MASS INDEX: 33.95 KG/M2 | SYSTOLIC BLOOD PRESSURE: 140 MMHG | WEIGHT: 224 LBS | HEIGHT: 68 IN

## 2024-07-10 DIAGNOSIS — R19.7 DIARRHEA, UNSPECIFIED TYPE: ICD-10-CM

## 2024-07-10 DIAGNOSIS — R19.8 CHANGE IN BOWEL MOVEMENT: ICD-10-CM

## 2024-07-10 DIAGNOSIS — K59.00 CONSTIPATION, UNSPECIFIED CONSTIPATION TYPE: ICD-10-CM

## 2024-07-10 DIAGNOSIS — Z86.010 HISTORY OF COLON POLYPS: Primary | ICD-10-CM

## 2024-07-10 PROCEDURE — 99204 OFFICE O/P NEW MOD 45 MIN: CPT | Performed by: NURSE PRACTITIONER

## 2024-07-10 NOTE — PROGRESS NOTES
[facility] - Outpatient Consultation  Vlad Cowan 64 y.o. male MRN: [id number]  Encounter: [id number]    ASSESSMENT AND PLAN:      1. Constipation, unspecified constipation type  2. Diarrhea, unspecified type  3. Change in bowel movement  Patient states he does have a history of both diarrhea and constipation.  Discussed with patient adequate fiber and adequate fluids.   Patient had been prescribed dicyclomine in the past however patient did not want to take due to side effects.  Explained that if abdominal discomfort returns to send [employer] message and can replace with Levsin.  Patient's last bowel movement on 7/8 was normal.  CT abdomen and pelvis and recent colonoscopy both note chronic diverticulosis.  Consider IBS, functional diarrhea vs constipation    -Patient education for fiber intake attached to patient discharge summary.  -Discussed FODMAP diet, copy to patient.  -20 to 25 g of fiber per day, adequate fluids  -Discussed with patient he can add additional supplement fiber if not getting adequate fiber intake in the form of Metamucil, Benefiber, Citrucel.  -MiraLAX 1/2-1 capful if no bowel movement by third day.    - Ambulatory Referral to Gastroenterology    4. History of colon polyps  Colonoscopy 4/16/2024; 5-year recall      Followup Appointment: 3 months  ______________________________________________________________________    Chief Complaint   Patient presents with    Diarrhea     Back in June started, had cramps, broke out in sweat, had diarrhea, went on for a few days, had CT, all was fine, ER doctor said stomach bug, went on Cipro and got constipated       HPI:   64-year-old male with significant past medical history including GERD, IBS, constipation and obesity presents with complaint of change in bowel habits.  Patient has been having off-and-on constipation/diarrhea.  Patient was seen in the emergency room at [facility] on 6/30 with  abdominal pain and having diarrhea x 3 to 4 days.  States his diarrhea have resolved therefore he did not submit the stool studies that had originally been ordered.  Lab work completed during ER visit essentially normal.  Patient did have colonoscopy on 4/16/2024 which noted diverticulosis and 5-year recall.  CT abdomen and pelvis completed while in the emergency room noted no acute findings in the abdomen or pelvis.  Colonic diverticulosis without findings of acute diverticulitis was noted.  On exam, patient denies nausea, vomiting or abdominal pain.  Patient's weight is stable.      Historical Information   Past Medical History:   Diagnosis Date    AVM (arteriovenous malformation)     BPH without urinary obstruction     last assessed 01/16/18    Chronic pain disorder     low back and neck    COVID     Jan 2021 and 5/14/22    Headache     occ    Hemorrhoids     Hydronephrosis     Hypertension     Kidney disease     Left inguinal hernia     Lumbar herniated disc     PONV (postoperative nausea and vomiting)     Renal calculi     Renal colic     TMJ (dislocation of temporomandibular joint)     Ureter colic      Past Surgical History:   Procedure Laterality Date    BRAIN SURGERY      COLONOSCOPY      CYSTOSCOPY      w/removal of object, last assessed 01/16/18    CYSTOSCOPY      w/removal of ureteral calculus last assessed 01/16/18    CYSTOSCOPY KIDNEY W/ URETERAL GUIDE WIRE      last assessed 01/16/18    CYSTOSCOPY W/ URETERAL STENT PLACEMENT      last assessed 01/16/18    FL RETROGRADE PYELOGRAM  7/9/2020    HERNIA REPAIR  1966    [facility]    HERNIA REPAIR      KIDNEY SURGERY      removal of kidney stone in 2013 at [facility]    WI CYSTO/URETERO W/LITHOTRIPSY &INDWELL STENT INSRT Right 12/13/2018    Procedure: CYSTOSCOPY URETEROSCOPY  RETROGRADE PYELOGRAM AND INSERTION RIGHT STENT URETERAL;  Surgeon: Jose Daniel Everett MD;  [facility];  Service: Urology    WI CYSTO/URETERO W/LITHOTRIPSY &INDWELL STENT INSRT  "Left 7/9/2020    Procedure: CYSTOSCOPY URETEROSCOPY WITH LITHOTRIPSY HOLMIUM LASER, RETROGRADE PYELOGRAM AND INSERTION STENT URETERAL;  Surgeon: Jose Daniel Everett MD;  [facility];  Service: Urology    CA LAPAROSCOPY SURG RPR INITIAL INGUINAL HERNIA Left 8/4/2022    Procedure: REPAIR HERNIA INGUINAL LAPAROSCOPIC W/ ROBOTICS;  Surgeon: Willie Carlson MD;  [facility];  Service: General    TOOTH EXTRACTION       Social History     Substance and Sexual Activity   Alcohol Use Yes    Comment: rare     Social History     Substance and Sexual Activity   Drug Use Yes    Types: Marijuana    Comment: ocassionaly use     Social History     Tobacco Use   Smoking Status Never   Smokeless Tobacco Never     Family History   Problem Relation Age of Onset    Stroke Mother     Cancer Father     Diabetes Father     Prostate cancer Father     Heart disease Father     Hypertension Father        Meds/Allergies     Current Outpatient Medications:     acetaminophen (TYLENOL) 500 mg tablet    ALPRAZolam (XANAX) 0.25 mg tablet    celecoxib (CeleBREX) 200 mg capsule    famotidine (PEPCID) 10 mg tablet    hydroCHLOROthiazide 25 mg tablet    ibuprofen (MOTRIN) 200 mg tablet    methocarbamol (ROBAXIN) 750 mg tablet    Naproxen Sodium 220 MG CAPS    NON FORMULARY    predniSONE 10 mg tablet    tadalafil (CIALIS) 20 MG tablet    traMADol (ULTRAM) 50 mg tablet    dicyclomine (BENTYL) 20 mg tablet    lidocaine (LIDODERM) 5 %    methocarbamol (ROBAXIN) 500 mg tablet    Allergies   Allergen Reactions    Morphine Other (See Comments)    Other      Pt states that any narcotic makes him feel sick    Oxycodone-Acetaminophen Nausea Only, Vomiting and GI Intolerance    Pollen Extract Allergic Rhinitis     patient states he has seasonal environmental allergies, not sure exactly what triggers reaction       PHYSICAL EXAM:    Blood pressure 140/82, height 5' 8\" (1.727 m), weight 102 kg (224 lb). Body mass index is 34.06 kg/m².    Normal " "exam    General Appearance: NAD, cooperative, alert  Eyes: Anicteric, PERRLA, EOMI  ENT:  Normocephalic, atraumatic, normal mucosa.    Neck:  Supple, symmetrical, trachea midline,   Resp:  Clear to auscultation bilaterally; no rales, rhonchi or wheezing; respirations unlabored   CV:  S1 S2, Regular rate and rhythm; no murmur, rub, or gallop.  GI:  Soft, non-tender, non-distended; normal bowel sounds; no masses, no organomegaly   Rectal: Deferred  Musculoskeletal: No cyanosis, clubbing or edema. Normal ROM.  Skin:  No jaundice, rashes, or lesions   Heme/Lymph: No palpable cervical lymphadenopathy  Psych: Normal affect, good eye contact  Neuro: No gross deficits, AAOx3    Lab Results:   Lab Results   Component Value Date    WBC 9.60 06/30/2024    HGB 15.2 06/30/2024    HCT 45.1 06/30/2024    MCV 90 06/30/2024     06/30/2024     Lab Results   Component Value Date    K 3.7 06/30/2024     06/30/2024    CO2 29 06/30/2024    BUN 16 06/30/2024    CREATININE 0.93 06/30/2024    GLUF 91 03/16/2023    CALCIUM 9.4 06/30/2024    CORRECTEDCA 9.7 01/16/2021    AST 19 06/30/2024    ALT 15 06/30/2024    ALKPHOS 42 06/30/2024    EGFR 85 06/30/2024     No results found for: \"IRON\", \"TIBC\", \"FERRITIN\"  Lab Results   Component Value Date    LIPASE 31 06/30/2024       Radiology Results:   CT abdomen pelvis with contrast    Result Date: 6/30/2024  Narrative: CT ABDOMEN AND PELVIS WITH IV CONTRAST INDICATION: acute abdominal pain r/o diverticulitis. COMPARISON: CT abdomen pelvis 8/7/2022 TECHNIQUE: CT examination of the abdomen and pelvis was performed. Multiplanar 2D reformatted images were created from the source data. This examination, like all CT scans performed in the [facility], was performed utilizing techniques to minimize radiation dose exposure, including the use of iterative reconstruction and automated exposure control. Radiation dose length product (DLP) for this visit: 778 mGy-cm IV Contrast: 100 " mL of iohexol (OMNIPAQUE) Enteric Contrast: Not administered. FINDINGS: ABDOMEN LOWER CHEST: No clinically significant abnormality in the visualized lower chest. LIVER/BILIARY TREE: Subcentimeter hypoattenuating lesion(s), too small to characterize but statistically likely benign, which do not require follow-up (ACR White Paper 2017). No suspicious mass. Normal hepatic contours. No biliary dilation. GALLBLADDER: No calcified gallstones. No pericholecystic inflammatory change. SPLEEN: Unremarkable. PANCREAS: Unremarkable. ADRENAL GLANDS: Unremarkable. KIDNEYS/URETERS: Couple of nonobstructing 2 mm calculi in the right lower pole. Simple left renal cyst. No hydronephrosis. STOMACH AND BOWEL: Colonic diverticulosis without findings of acute diverticulitis. APPENDIX: Normal. ABDOMINOPELVIC CAVITY: No ascites. No pneumoperitoneum. No lymphadenopathy. VESSELS: Atherosclerosis without abdominal aortic aneurysm. PELVIS REPRODUCTIVE ORGANS: Enlarged prostate. URINARY BLADDER: Unremarkable. ABDOMINAL WALL/INGUINAL REGIONS: There is a history of prior left inguinal hernia repair however there appears to be a small persistent fat-containing left inguinal hernia. BONES: No acute fracture or suspicious osseous lesion. Spinal degenerative changes.     Impression: No acute findings in the abdomen or pelvis. Colonic diverticulosis without findings of acute diverticulitis. Workstation performed: [id number]         REVIEW OF SYSTEMS:    CONSTITUTIONAL: Denies any fever, chills, rigors, and weight loss.  HEENT: No earache or tinnitus. Denies hearing loss or visual disturbances.  CARDIOVASCULAR: No chest pain or palpitations.   RESPIRATORY: Denies any cough, hemoptysis, shortness of breath or dyspnea on exertion.  GASTROINTESTINAL: As noted in the History of Present Illness.   GENITOURINARY: No problems with urination. Denies any hematuria or dysuria.  NEUROLOGIC: No dizziness or vertigo, denies headaches.   MUSCULOSKELETAL: Denies any  muscle or joint pain.   SKIN: Denies skin rashes or itching.   ENDOCRINE: Denies excessive thirst. Denies intolerance to heat or cold.  PSYCHOSOCIAL: Denies depression or anxiety. Denies any recent memory loss.

## 2024-07-10 NOTE — PATIENT INSTRUCTIONS
Metamucil, Benefiber, Citrucel extra fiber    Miralax 1/2 to 1 capful if no bowel movement by third day.      20-25 gms fiber / day

## 2024-08-07 ENCOUNTER — TELEPHONE (OUTPATIENT)
Age: 64
End: 2024-08-07

## 2024-08-07 DIAGNOSIS — F41.9 ANXIETY: ICD-10-CM

## 2024-08-07 RX ORDER — ALPRAZOLAM 0.25 MG/1
0.25 TABLET ORAL DAILY PRN
Qty: 20 TABLET | Refills: 0 | Status: SHIPPED | OUTPATIENT
Start: 2024-08-07

## 2024-08-07 NOTE — TELEPHONE ENCOUNTER
Pt called and stated he will be having an MRI for his back on 08/12 and asked if Dr. Singh can please send in some xanax for him to take because he is claustrophobic. Please advise [phone] thank you.

## 2024-08-18 DIAGNOSIS — I10 ESSENTIAL HYPERTENSION: ICD-10-CM

## 2024-08-18 RX ORDER — HYDROCHLOROTHIAZIDE 25 MG/1
25 TABLET ORAL DAILY
Qty: 90 TABLET | Refills: 1 | Status: SHIPPED | OUTPATIENT
Start: 2024-08-18

## 2024-08-22 ENCOUNTER — APPOINTMENT (OUTPATIENT)
Dept: LAB | Facility: MEDICAL CENTER | Age: 64
End: 2024-08-22
Payer: COMMERCIAL

## 2024-08-22 DIAGNOSIS — N13.8 BENIGN PROSTATIC HYPERPLASIA WITH URINARY OBSTRUCTION: ICD-10-CM

## 2024-08-22 DIAGNOSIS — Z87.442 HISTORY OF KIDNEY STONES: ICD-10-CM

## 2024-08-22 DIAGNOSIS — R19.7 DIARRHEA, UNSPECIFIED TYPE: ICD-10-CM

## 2024-08-22 DIAGNOSIS — N40.1 BENIGN PROSTATIC HYPERPLASIA WITH URINARY OBSTRUCTION: ICD-10-CM

## 2024-08-22 LAB
BACTERIA UR QL AUTO: ABNORMAL /HPF
BILIRUB UR QL STRIP: NEGATIVE
CLARITY UR: CLEAR
COLOR UR: ABNORMAL
GLUCOSE UR STRIP-MCNC: NEGATIVE MG/DL
HGB UR QL STRIP.AUTO: NEGATIVE
KETONES UR STRIP-MCNC: NEGATIVE MG/DL
LEUKOCYTE ESTERASE UR QL STRIP: NEGATIVE
MUCOUS THREADS UR QL AUTO: ABNORMAL
NITRITE UR QL STRIP: NEGATIVE
NON-SQ EPI CELLS URNS QL MICRO: ABNORMAL /HPF
PH UR STRIP.AUTO: 6.5 [PH]
PROT UR STRIP-MCNC: NEGATIVE MG/DL
PSA SERPL-MCNC: 2.99 NG/ML (ref 0–4)
RBC #/AREA URNS AUTO: ABNORMAL /HPF
SP GR UR STRIP.AUTO: 1.02 (ref 1–1.03)
UROBILINOGEN UR STRIP-ACNC: <2 MG/DL
WBC #/AREA URNS AUTO: ABNORMAL /HPF

## 2024-08-22 PROCEDURE — 81001 URINALYSIS AUTO W/SCOPE: CPT

## 2024-08-22 PROCEDURE — 36415 COLL VENOUS BLD VENIPUNCTURE: CPT

## 2024-08-22 PROCEDURE — 84153 ASSAY OF PSA TOTAL: CPT

## 2024-10-30 ENCOUNTER — OFFICE VISIT (OUTPATIENT)
Dept: URGENT CARE | Facility: MEDICAL CENTER | Age: 64
End: 2024-10-30
Payer: COMMERCIAL

## 2024-10-30 VITALS
RESPIRATION RATE: 20 BRPM | OXYGEN SATURATION: 96 % | SYSTOLIC BLOOD PRESSURE: 136 MMHG | TEMPERATURE: 96.6 F | DIASTOLIC BLOOD PRESSURE: 94 MMHG | HEART RATE: 91 BPM

## 2024-10-30 DIAGNOSIS — U07.1 COVID-19: Primary | ICD-10-CM

## 2024-10-30 PROCEDURE — 99213 OFFICE O/P EST LOW 20 MIN: CPT | Performed by: NURSE PRACTITIONER

## 2024-10-30 RX ORDER — NIRMATRELVIR AND RITONAVIR 300-100 MG
3 KIT ORAL 2 TIMES DAILY
Qty: 30 TABLET | Refills: 0 | Status: SHIPPED | OUTPATIENT
Start: 2024-10-30 | End: 2024-11-04

## 2024-10-30 NOTE — PROGRESS NOTES
[facility]        NAME: Vlad Cowan is a 64 y.o. male  : 1960    MRN: [id number]  DATE: 2024  TIME: 1:32 PM    Assessment and Plan   COVID-19 [U07.1]  1. COVID-19  nirmatrelvir & ritonavir (Paxlovid, 300/100,) tablet therapy pack        Patient in NAD and VSS upon exam. Discussed with patient supportive treatment for COVID symptoms. Patient requesting Paxlovid, will order. Discussed supportive care and return precautions. Patient/Parent agreeable to plan of care and all questions answered. Note for work/school given as needed.      Patient Instructions       Follow up with PCP in 3-5 days.  Proceed to  ER if symptoms worsen.    If tests have been performed at [facility], our office will contact you with results if changes need to be made to the care plan discussed with you at the visit.  [facility]    Chief Complaint     Chief Complaint   Patient presents with    COVID-19     Symptoms started yesterday, positive test at home         History of Present Illness       Started: yesterday  Positive: cough, sneezing, congestion, chills, body aches, mild ST  Negative: fatigue, HA  Denies CP, SOB, trouble breathing  Treatment: sudafed, mucinex, tylenol  Positive COVID at home        Review of Systems   Review of Systems   Constitutional:  Positive for chills.   HENT:  Positive for congestion, sneezing and sore throat.    Respiratory:  Positive for cough.    Musculoskeletal:  Positive for myalgias.   All other systems reviewed and are negative.        Current Medications       Current Outpatient Medications:     ALPRAZolam (XANAX) 0.25 mg tablet, Take 1 tablet (0.25 mg total) by mouth daily as needed for anxiety, Disp: 20 tablet, Rfl: 0    hydroCHLOROthiazide 25 mg tablet, TAKE 1 TABLET (25 MG TOTAL) BY MOUTH DAILY., Disp: 90 tablet, Rfl: 1    methocarbamol (ROBAXIN) 750 mg tablet, 1 TABLET AT BEDTIME AS NEEDED FOR MUSCLE SPASM, Disp: 30 tablet,  Rfl: 0    nirmatrelvir & ritonavir (Paxlovid, 300/100,) tablet therapy pack, Take 3 tablets by mouth 2 (two) times a day for 5 days Take 2 nirmatrelvir tablets + 1 ritonavir tablet together per dose, Disp: 30 tablet, Rfl: 0    tadalafil (CIALIS) 20 MG tablet, Take 1 tablet (20 mg total) by mouth as needed for erectile dysfunction, Disp: 10 tablet, Rfl: 3    traMADol (ULTRAM) 50 mg tablet, Take 1 tablet (50 mg total) by mouth daily as needed for moderate pain, Disp: 14 tablet, Rfl: 0    acetaminophen (TYLENOL) 500 mg tablet, Take 500-1,000 mg by mouth every 6 (six) hours as needed for mild pain, Disp: , Rfl:     celecoxib (CeleBREX) 200 mg capsule, Take 200 mg by mouth 2 (two) times a day (Patient not taking: Reported on 10/30/2024), Disp: , Rfl:     dicyclomine (BENTYL) 20 mg tablet, Take 1 tablet (20 mg total) by mouth 2 (two) times a day for 7 days, Disp: 14 tablet, Rfl: 0    famotidine (PEPCID) 10 mg tablet, Take 10 mg by mouth Taken as needed, Disp: , Rfl:     ibuprofen (MOTRIN) 200 mg tablet, Take 200-800 mg by mouth every 6 (six) hours as needed for mild pain, Disp: , Rfl:     lidocaine (LIDODERM) 5 %, Apply 1 patch topically over 12 hours daily for 4 days Remove & Discard patch within 12 hours or as directed by MD, Disp: 4 patch, Rfl: 0    methocarbamol (ROBAXIN) 500 mg tablet, Take 1 tablet (500 mg total) by mouth 3 (three) times a day as needed for muscle spasms for up to 4 days, Disp: 12 tablet, Rfl: 0    Naproxen Sodium 220 MG CAPS, Take by mouth, Disp: , Rfl:     NON FORMULARY, daily as needed Medical marijuana, Disp: , Rfl:     predniSONE 10 mg tablet, Take 60 mg po day#1, 50 mg po day#2, 40 mg po day#3, 30 mg po day#4, 20 mg po day#5m and 10 mg po day#6, Disp: 21 tablet, Rfl: 0    Current Allergies     Allergies as of 10/30/2024 - Reviewed 10/30/2024   Allergen Reaction Noted    Morphine Other (See Comments) 02/26/2018    Other  12/22/2018    Oxycodone-acetaminophen Nausea Only, Vomiting, and GI  Intolerance 01/08/2018    Pollen extract Allergic Rhinitis 08/04/2022            The following portions of the patient's history were reviewed and updated as appropriate: allergies, current medications, past family history, past medical history, past social history, past surgical history and problem list.     Past Medical History:   Diagnosis Date    AVM (arteriovenous malformation)     BPH without urinary obstruction     last assessed 01/16/18    Chronic pain disorder     low back and neck    COVID     Jan 2021 and 5/14/22    Headache     occ    Hemorrhoids     Hydronephrosis     Hypertension     Kidney disease     Left inguinal hernia     Lumbar herniated disc     PONV (postoperative nausea and vomiting)     Renal calculi     Renal colic     TMJ (dislocation of temporomandibular joint)     Ureter colic        Past Surgical History:   Procedure Laterality Date    BRAIN SURGERY      COLONOSCOPY      CYSTOSCOPY      w/removal of object, last assessed 01/16/18    CYSTOSCOPY      w/removal of ureteral calculus last assessed 01/16/18    CYSTOSCOPY KIDNEY W/ URETERAL GUIDE WIRE      last assessed 01/16/18    CYSTOSCOPY W/ URETERAL STENT PLACEMENT      last assessed 01/16/18    FL RETROGRADE PYELOGRAM  7/9/2020    HERNIA REPAIR  [address] [place] [address]    HERNIA REPAIR      KIDNEY SURGERY      removal of kidney stone in 2013 at [facility]    WI CYSTO/URETERO W/LITHOTRIPSY &INDWELL STENT INSRT Right 12/13/2018    Procedure: CYSTOSCOPY URETEROSCOPY  RETROGRADE PYELOGRAM AND INSERTION RIGHT STENT URETERAL;  Surgeon: Jose Daniel Everett MD;  [facility];  Service: Urology    WI CYSTO/URETERO W/LITHOTRIPSY &INDWELL STENT INSRT Left 7/9/2020    Procedure: CYSTOSCOPY URETEROSCOPY WITH LITHOTRIPSY HOLMIUM LASER, RETROGRADE PYELOGRAM AND INSERTION STENT URETERAL;  Surgeon: Jose Daniel Everett MD;  [facility];  Service: Urology    WI LAPAROSCOPY SURG RPR INITIAL INGUINAL HERNIA Left 8/4/2022    Procedure: REPAIR HERNIA  INGUINAL LAPAROSCOPIC W/ ROBOTICS;  Surgeon: Willie Carlson MD;  Location: [facility];  Service: General    TOOTH EXTRACTION         Family History   Problem Relation Age of Onset    Stroke Mother     Cancer Father     Diabetes Father     Prostate cancer Father     Heart disease Father     Hypertension Father          Medications have been verified.        Objective   /94   Pulse 91   Temp (!) 96.6 °F (35.9 °C)   Resp 20   SpO2 96%   No LMP for male patient.       Physical Exam     Physical Exam  Constitutional:       General: He is not in acute distress.     Appearance: Normal appearance. He is not ill-appearing.   HENT:      Head: Normocephalic and atraumatic.      Right Ear: Hearing, tympanic membrane, ear canal and external ear normal.      Left Ear: Hearing, tympanic membrane, ear canal and external ear normal.      Nose: Congestion (mild) present.      Right Sinus: No maxillary sinus tenderness or frontal sinus tenderness.      Left Sinus: No maxillary sinus tenderness or frontal sinus tenderness.      Mouth/Throat:      Lips: Pink.      Mouth: Mucous membranes are moist.      Pharynx: Oropharynx is clear.   Cardiovascular:      Rate and Rhythm: Normal rate and regular rhythm.   Pulmonary:      Effort: Pulmonary effort is normal.      Breath sounds: Normal breath sounds.      Comments: Mild, dry cough on exam  Musculoskeletal:         General: Normal range of motion.   Lymphadenopathy:      Cervical: No cervical adenopathy.   Skin:     General: Skin is warm and dry.   Neurological:      Mental Status: He is alert and oriented to person, place, and time.

## 2024-10-30 NOTE — PATIENT INSTRUCTIONS
Paxlovid as directed  This may give you a metallic taste in your mouth  Your symptoms could return after you finish the medication  Continue supportive treatment  Mucinex for congestion  Saline nasal spray  Chestal OTC medicine for cough  Coricidin HBP cold medicine safe to take with high blood pressure  Hydrate  Rest as needed  Follow up with PCP if symptoms are not improving  If worsening symptoms such as difficulty breathing or chest pain, please go to the Emergency Room

## 2024-11-01 ENCOUNTER — DOCUMENTATION (OUTPATIENT)
Dept: ADMINISTRATIVE | Facility: OTHER | Age: 64
End: 2024-11-01

## 2024-11-01 NOTE — PROGRESS NOTES
11/01/24 1:29 PM    Patient was called after the Urgent Care visit . Patient does not have a home BP machine to re-take BP., Patient declined to schedule appointment.    Thank you.  Jodee Anderson MA  [facility]        Blood pressure elevated  Appointment department: [facility]  Appointment provider: ION Loredo  Blood pressure   10/30/24 1332 136/94   10/30/24 1020 136/94

## 2024-12-31 ENCOUNTER — HOSPITAL ENCOUNTER (EMERGENCY)
Facility: HOSPITAL | Age: 64
Discharge: HOME | End: 2024-12-31
Payer: COMMERCIAL

## 2024-12-31 VITALS
BODY MASS INDEX: 33.99 KG/M2 | SYSTOLIC BLOOD PRESSURE: 167 MMHG | DIASTOLIC BLOOD PRESSURE: 88 MMHG | OXYGEN SATURATION: 99 % | HEART RATE: 67 BPM | TEMPERATURE: 98 F | WEIGHT: 223.55 LBS | RESPIRATION RATE: 17 BRPM

## 2024-12-31 DIAGNOSIS — R51.9 HEADACHE: Primary | ICD-10-CM

## 2024-12-31 PROCEDURE — 96374 THER/PROPH/DIAG INJ IV PUSH: CPT

## 2024-12-31 PROCEDURE — 96375 TX/PRO/DX INJ NEW DRUG ADDON: CPT

## 2024-12-31 PROCEDURE — 99284 EMERGENCY DEPT VISIT MOD MDM: CPT

## 2024-12-31 PROCEDURE — 99283 EMERGENCY DEPT VISIT LOW MDM: CPT

## 2024-12-31 PROCEDURE — 96361 HYDRATE IV INFUSION ADD-ON: CPT

## 2024-12-31 RX ORDER — METOCLOPRAMIDE 10 MG/1
10 TABLET ORAL EVERY 6 HOURS
Qty: 14 TABLET | Refills: 0 | Status: SHIPPED | OUTPATIENT
Start: 2024-12-31

## 2024-12-31 RX ORDER — METOCLOPRAMIDE HYDROCHLORIDE 5 MG/ML
10 INJECTION INTRAMUSCULAR; INTRAVENOUS ONCE
Status: COMPLETED | OUTPATIENT
Start: 2024-12-31 | End: 2024-12-31

## 2024-12-31 RX ORDER — NAPROXEN 500 MG/1
500 TABLET ORAL 2 TIMES DAILY WITH MEALS
Qty: 14 TABLET | Refills: 0 | Status: SHIPPED | OUTPATIENT
Start: 2024-12-31 | End: 2025-01-07

## 2024-12-31 RX ORDER — KETOROLAC TROMETHAMINE 30 MG/ML
15 INJECTION, SOLUTION INTRAMUSCULAR; INTRAVENOUS ONCE
Status: COMPLETED | OUTPATIENT
Start: 2024-12-31 | End: 2024-12-31

## 2024-12-31 RX ADMIN — KETOROLAC TROMETHAMINE 15 MG: 30 INJECTION, SOLUTION INTRAMUSCULAR; INTRAVENOUS at 16:05

## 2024-12-31 RX ADMIN — SODIUM CHLORIDE 1000 ML: 0.9 INJECTION, SOLUTION INTRAVENOUS at 16:04

## 2024-12-31 RX ADMIN — METOCLOPRAMIDE 10 MG: 5 INJECTION, SOLUTION INTRAMUSCULAR; INTRAVENOUS at 16:06

## 2024-12-31 NOTE — ED PROVIDER NOTES
Time reflects when diagnosis was documented in both MDM as applicable and the Disposition within this note       Time User Action Codes Description Comment    12/31/2024  5:05 PM David Ramos Add [R51.9] Headache           ED Disposition       ED Disposition   Discharge    Condition   Stable    Date/Time   Tue Dec 31, 2024  5:05 PM    Comment   Vlad Leeboni discharge to home/self care.                   Assessment & Plan       Medical Decision Making  Patient with history as below presented with a headache. History obtained from patient.    Differential diagnosis includes: tension headache, migraine headache    Plan: Migraine cocktail, as discussed in ED course I offered patient imaging which he declines    Patient was treated with below with improvement in symptoms. Reassessed the patient and they continue to be well appearing with a normal neurological exam.  Although I suspect that patient presentation more likely secondary to tension headache, I did offer him numerous times to perform imaging of his head given both his age as well as previous surgery of his brain.  He continues to decline.  He understands the risks of possible missed emergent pathology without this imaging and has capacity to make this decision.  Will have him return if symptoms persist or worsen.  Stable for outpatient management.    Disposition: Discharged with instructions to obtain outpatient follow up of patient's symptoms and findings, with strict return precautions if patient develops new or worsening symptoms. Patient understands this plan and is agreeable. All questions answered. Patient discharged home with return precautions.    Risk  Prescription drug management.        ED Course as of 12/31/24 2301   Tue Dec 31, 2024   1606 I had a shared decision-making conversation with the patient regarding their management here in the emergency department.  I offered and recommended the patient CT imaging of the head given risk factors  including both their age as well as their history of brain surgery.  Patient declining imaging at this time.  They are adamant that this is a mild headache compared to previous and they are not interested in getting imaging.  Patient does have capacity to make this decision and understands risks, benefits, and alternatives.  They do have a normal neurological exam at this point in time and he is otherwise well-appearing.  Will hold off on imaging and reassess after pain management.   1702 Patient reports improvement in his symptoms following medications given here.  I did have further discussion/shared decision making with him again recommending imaging of his head, however he continues to decline this.  He continues to have capacity to make this decision.  He will further manage his symptoms as an outpatient follow-up if he has any worsening symptoms.       Medications   sodium chloride 0.9 % bolus 1,000 mL (0 mL Intravenous Stopped 12/31/24 1711)   ketorolac (TORADOL) injection 15 mg (15 mg Intravenous Given 12/31/24 1605)   metoclopramide (REGLAN) injection 10 mg (10 mg Intravenous Given 12/31/24 1606)       ED Risk Strat Scores                          SBIRT 22yo+      Flowsheet Row Most Recent Value   Initial Alcohol Screen: US AUDIT-C     1. How often do you have a drink containing alcohol? 0 Filed at: 12/31/2024 1519   2. How many drinks containing alcohol do you have on a typical day you are drinking?  0 Filed at: 12/31/2024 1519   3a. Male UNDER 65: How often do you have five or more drinks on one occasion? 0 Filed at: 12/31/2024 1519   3b. FEMALE Any Age, or MALE 65+: How often do you have 4 or more drinks on one occassion? 0 Filed at: 12/31/2024 1519   Audit-C Score 0 Filed at: 12/31/2024 1519   BOB: How many times in the past year have you...    Used an illegal drug or used a prescription medication for non-medical reasons? Never Filed at: 12/31/2024 1519                            History of Present  Illness       Chief Complaint   Patient presents with    Headache     Headache for 3 days, some dizziness, states lots of stress lately and not sleeping great        Past Medical History:   Diagnosis Date    AVM (arteriovenous malformation)     BPH without urinary obstruction     last assessed 01/16/18    Chronic pain disorder     low back and neck    COVID     Jan 2021 and 5/14/22    Headache     occ    Hemorrhoids     Hydronephrosis     Hypertension     Kidney disease     Left inguinal hernia     Lumbar herniated disc     PONV (postoperative nausea and vomiting)     Renal calculi     Renal colic     TMJ (dislocation of temporomandibular joint)     Ureter colic       Past Surgical History:   Procedure Laterality Date    BRAIN SURGERY      COLONOSCOPY      CYSTOSCOPY      w/removal of object, last assessed 01/16/18    CYSTOSCOPY      w/removal of ureteral calculus last assessed 01/16/18    CYSTOSCOPY KIDNEY W/ URETERAL GUIDE WIRE      last assessed 01/16/18    CYSTOSCOPY W/ URETERAL STENT PLACEMENT      last assessed 01/16/18    FL RETROGRADE PYELOGRAM  7/9/2020    HERNIA REPAIR  [address] [place] [address]    HERNIA REPAIR      KIDNEY SURGERY      removal of kidney stone in 2013 at [facility]    [place] CYSTO/URETERO W/LITHOTRIPSY &INDWELL STENT INSRT Right 12/13/2018    Procedure: CYSTOSCOPY URETEROSCOPY  RETROGRADE PYELOGRAM AND INSERTION RIGHT STENT URETERAL;  Surgeon: Jose Daniel Everett MD;  Location: [facility];  Service: Urology    [place] CYSTO/URETERO W/LITHOTRIPSY &INDWELL STENT INSRT Left 7/9/2020    Procedure: CYSTOSCOPY URETEROSCOPY WITH LITHOTRIPSY HOLMIUM LASER, RETROGRADE PYELOGRAM AND INSERTION STENT URETERAL;  Surgeon: Jose Daniel Everett MD;  [facility];  Service: Urology    WV LAPAROSCOPY SURG RPR INITIAL INGUINAL HERNIA Left 8/4/2022    Procedure: REPAIR HERNIA INGUINAL LAPAROSCOPIC W/ ROBOTICS;  Surgeon: Willie Carlson MD;  Location: [facility];  Service: General    TOOTH EXTRACTION        Family History  "  Problem Relation Age of Onset    Stroke Mother     Cancer Father     Diabetes Father     Prostate cancer Father     Heart disease Father     Hypertension Father       Social History     Tobacco Use    Smoking status: Never    Smokeless tobacco: Never   Vaping Use    Vaping status: Some Days    Substances: THC   Substance Use Topics    Alcohol use: Yes     Comment: rare    Drug use: Yes     Types: Marijuana     Comment: ocassionaly use      E-Cigarette/Vaping    E-Cigarette Use Current Some Day User       E-Cigarette/Vaping Substances    Nicotine No     THC Yes     CBD No     Flavoring No     Other No     Unknown No       I have reviewed and agree with the history as documented.     Patient is a 64-year-old male with a significant past medical history of hypertension, recurrent headaches, presenting for evaluation of a headache.  Patient reports that over the last 3 days he has been dealing with some increase stress at home.  He reports that he has been developing some headaches, predominantly on his left side.  He describes this as a throbbing sensation.  It seems to come and go without any specific provoking or relieving factors.  He says that this is similar to previous headaches, possibly not quite as bad.  He states that it is due to not sleeping as much.  He denies any visual changes he denies any neurological complaint.  He says that he for some medications to \"break the cycle\" as this has worked for him in the past.  He is otherwise without complaint.        Review of Systems   Constitutional:  Negative for fever.   Eyes:  Negative for visual disturbance.   Gastrointestinal:  Negative for vomiting.   Musculoskeletal:  Negative for neck pain and neck stiffness.   Neurological:  Positive for headaches. Negative for weakness and numbness.           Objective       ED Triage Vitals   Temperature Pulse Blood Pressure Respirations SpO2 Patient Position - Orthostatic VS   12/31/24 1519 12/31/24 1519 12/31/24 1519 " 12/31/24 1519 12/31/24 1519 12/31/24 1519   98 °F (36.7 °C) 67 160/97 17 99 % Sitting      Temp Source Heart Rate Source BP Location FiO2 (%) Pain Score    12/31/24 1519 12/31/24 1519 12/31/24 1519 -- 12/31/24 1605    Oral Monitor Right arm  5      Vitals      Date and Time Temp Pulse SpO2 Resp BP Pain Score FACES Pain Rating User   12/31/24 1711 -- -- -- -- 167/88 -- -- RM   12/31/24 1605 -- -- -- -- -- 5 -- RM   12/31/24 1519 98 °F (36.7 °C) 67 99 % 17 160/97 -- -- NILTON            Physical Exam  Vitals and nursing note reviewed.   Constitutional:       General: He is not in acute distress.     Appearance: Normal appearance. He is obese. He is not ill-appearing.   HENT:      Head: Normocephalic and atraumatic.      Right Ear: External ear normal.      Left Ear: External ear normal.      Nose: Nose normal.      Mouth/Throat:      Mouth: Mucous membranes are moist.   Eyes:      General: No visual field deficit or scleral icterus.        Right eye: No discharge.         Left eye: No discharge.      Extraocular Movements: Extraocular movements intact.      Conjunctiva/sclera: Conjunctivae normal.      Pupils: Pupils are equal, round, and reactive to light.   Cardiovascular:      Rate and Rhythm: Normal rate and regular rhythm.      Pulses: Normal pulses.      Heart sounds: Normal heart sounds. No murmur heard.     No friction rub. No gallop.   Pulmonary:      Effort: Pulmonary effort is normal. No respiratory distress.      Breath sounds: Normal breath sounds. No wheezing, rhonchi or rales.   Abdominal:      General: Abdomen is flat. There is no distension.      Palpations: Abdomen is soft. There is no mass.      Tenderness: There is no abdominal tenderness.   Genitourinary:     Comments: Deferred  Musculoskeletal:      Right lower leg: No edema.      Left lower leg: No edema.   Skin:     General: Skin is warm and dry.   Neurological:      General: No focal deficit present.      Mental Status: He is alert and oriented to  person, place, and time.      GCS: GCS eye subscore is 4. GCS verbal subscore is 5. GCS motor subscore is 6.      Cranial Nerves: No cranial nerve deficit, dysarthria or facial asymmetry.      Sensory: Sensation is intact. No sensory deficit.      Motor: Motor function is intact. No pronator drift.      Coordination: Coordination is intact. Finger-Nose-Finger Test normal.      Gait: Gait is intact.   Psychiatric:         Mood and Affect: Mood normal.         Results Reviewed       None            No orders to display       Procedures    ED Medication and Procedure Management   Prior to Admission Medications   Prescriptions Last Dose Informant Patient Reported? Taking?   ALPRAZolam (XANAX) 0.25 mg tablet   No No   Sig: Take 1 tablet (0.25 mg total) by mouth daily as needed for anxiety   NON FORMULARY  Self Yes No   Sig: daily as needed Medical marijuana   Naproxen Sodium 220 MG CAPS  Self Yes No   Sig: Take by mouth   acetaminophen (TYLENOL) 500 mg tablet  Self Yes No   Sig: Take 500-1,000 mg by mouth every 6 (six) hours as needed for mild pain   celecoxib (CeleBREX) 200 mg capsule  Self Yes No   Sig: Take 200 mg by mouth 2 (two) times a day   Patient not taking: Reported on 10/30/2024   dicyclomine (BENTYL) 20 mg tablet   No No   Sig: Take 1 tablet (20 mg total) by mouth 2 (two) times a day for 7 days   famotidine (PEPCID) 10 mg tablet  Self Yes No   Sig: Take 10 mg by mouth Taken as needed   hydroCHLOROthiazide 25 mg tablet   No No   Sig: TAKE 1 TABLET (25 MG TOTAL) BY MOUTH DAILY.   ibuprofen (MOTRIN) 200 mg tablet  Self Yes No   Sig: Take 200-800 mg by mouth every 6 (six) hours as needed for mild pain   lidocaine (LIDODERM) 5 %   No No   Sig: Apply 1 patch topically over 12 hours daily for 4 days Remove & Discard patch within 12 hours or as directed by MD   methocarbamol (ROBAXIN) 500 mg tablet   No No   Sig: Take 1 tablet (500 mg total) by mouth 3 (three) times a day as needed for muscle spasms for up to 4 days    methocarbamol (ROBAXIN) 750 mg tablet  Self No No   Si TABLET AT BEDTIME AS NEEDED FOR MUSCLE SPASM   predniSONE 10 mg tablet  Self No No   Sig: Take 60 mg po day#1, 50 mg po day#2, 40 mg po day#3, 30 mg po day#4, 20 mg po day#5m and 10 mg po day#6   tadalafil (CIALIS) 20 MG tablet  Self No No   Sig: Take 1 tablet (20 mg total) by mouth as needed for erectile dysfunction   traMADol (ULTRAM) 50 mg tablet  Self No No   Sig: Take 1 tablet (50 mg total) by mouth daily as needed for moderate pain      Facility-Administered Medications: None     Discharge Medication List as of 2024  5:06 PM        START taking these medications    Details   metoclopramide (Reglan) 10 mg tablet Take 1 tablet (10 mg total) by mouth every 6 (six) hours, Starting 2024, Normal      naproxen (Naprosyn) 500 mg tablet Take 1 tablet (500 mg total) by mouth 2 (two) times a day with meals for 7 days, Starting 2024, Until 2025, Normal           CONTINUE these medications which have NOT CHANGED    Details   acetaminophen (TYLENOL) 500 mg tablet Take 500-1,000 mg by mouth every 6 (six) hours as needed for mild pain, Historical Med      ALPRAZolam (XANAX) 0.25 mg tablet Take 1 tablet (0.25 mg total) by mouth daily as needed for anxiety, Starting 2024, Normal      celecoxib (CeleBREX) 200 mg capsule Take 200 mg by mouth 2 (two) times a day, Starting 2023, Historical Med      dicyclomine (BENTYL) 20 mg tablet Take 1 tablet (20 mg total) by mouth 2 (two) times a day for 7 days, Starting Sun 2024, Until Sun 2024, Normal      famotidine (PEPCID) 10 mg tablet Take 10 mg by mouth Taken as needed, Historical Med      hydroCHLOROthiazide 25 mg tablet TAKE 1 TABLET (25 MG TOTAL) BY MOUTH DAILY., Starting Sun 2024, Normal      ibuprofen (MOTRIN) 200 mg tablet Take 200-800 mg by mouth every 6 (six) hours as needed for mild pain, Historical Med      lidocaine (LIDODERM) 5 % Apply 1 patch  topically over 12 hours daily for 4 days Remove & Discard patch within 12 hours or as directed by MD, Starting Wed 6/5/2024, Until Sun 6/9/2024, Normal      methocarbamol (ROBAXIN) 750 mg tablet 1 TABLET AT BEDTIME AS NEEDED FOR MUSCLE SPASM, Normal      Naproxen Sodium 220 MG CAPS Take by mouth, Historical Med      NON FORMULARY daily as needed Medical marijuana, Historical Med      predniSONE 10 mg tablet Take 60 mg po day#1, 50 mg po day#2, 40 mg po day#3, 30 mg po day#4, 20 mg po day#5m and 10 mg po day#6, Normal      tadalafil (CIALIS) 20 MG tablet Take 1 tablet (20 mg total) by mouth as needed for erectile dysfunction, Starting Mon 6/15/2020, Print      traMADol (ULTRAM) 50 mg tablet Take 1 tablet (50 mg total) by mouth daily as needed for moderate pain, Starting Thu 10/26/2023, Normal           No discharge procedures on file.  ED SEPSIS DOCUMENTATION   Time reflects when diagnosis was documented in both MDM as applicable and the Disposition within this note       Time User Action Codes Description Comment    12/31/2024  5:05 PM David Ramos Add [R51.9] Headache                  David Ramos, DO  12/31/24 9606

## 2025-01-03 RX ORDER — MELOXICAM 7.5 MG/1
1 TABLET ORAL DAILY
COMMUNITY
Start: 2024-11-26

## 2025-01-03 RX ORDER — FLUOROURACIL 50 MG/G
1 CREAM TOPICAL DAILY
COMMUNITY
Start: 2024-12-04

## 2025-01-03 RX ORDER — MELOXICAM 15 MG/1
15 TABLET ORAL DAILY
COMMUNITY
Start: 2024-11-20

## 2025-01-07 ENCOUNTER — OFFICE VISIT (OUTPATIENT)
Dept: UROLOGY | Facility: MEDICAL CENTER | Age: 65
End: 2025-01-07
Payer: COMMERCIAL

## 2025-01-07 ENCOUNTER — OFFICE VISIT (OUTPATIENT)
Dept: FAMILY MEDICINE CLINIC | Facility: CLINIC | Age: 65
End: 2025-01-07
Payer: COMMERCIAL

## 2025-01-07 VITALS
DIASTOLIC BLOOD PRESSURE: 81 MMHG | BODY MASS INDEX: 33.63 KG/M2 | HEART RATE: 60 BPM | OXYGEN SATURATION: 98 % | TEMPERATURE: 98.1 F | WEIGHT: 221.2 LBS | SYSTOLIC BLOOD PRESSURE: 119 MMHG

## 2025-01-07 VITALS
HEART RATE: 71 BPM | SYSTOLIC BLOOD PRESSURE: 120 MMHG | OXYGEN SATURATION: 98 % | DIASTOLIC BLOOD PRESSURE: 80 MMHG | WEIGHT: 221 LBS | BODY MASS INDEX: 36.82 KG/M2 | HEIGHT: 65 IN

## 2025-01-07 DIAGNOSIS — I10 ESSENTIAL HYPERTENSION: ICD-10-CM

## 2025-01-07 DIAGNOSIS — E66.811 OBESITY, CLASS I, BMI 30-34.9: ICD-10-CM

## 2025-01-07 DIAGNOSIS — N40.1 BENIGN PROSTATIC HYPERPLASIA WITH URINARY OBSTRUCTION: Primary | ICD-10-CM

## 2025-01-07 DIAGNOSIS — N20.0 NEPHROLITHIASIS: ICD-10-CM

## 2025-01-07 DIAGNOSIS — N13.8 BENIGN PROSTATIC HYPERPLASIA WITH URINARY OBSTRUCTION: Primary | ICD-10-CM

## 2025-01-07 DIAGNOSIS — F41.9 ANXIETY: ICD-10-CM

## 2025-01-07 DIAGNOSIS — R07.9 LEFT-SIDED CHEST PAIN: Primary | ICD-10-CM

## 2025-01-07 DIAGNOSIS — R51.9 NONINTRACTABLE HEADACHE, UNSPECIFIED CHRONICITY PATTERN, UNSPECIFIED HEADACHE TYPE: ICD-10-CM

## 2025-01-07 PROCEDURE — 99214 OFFICE O/P EST MOD 30 MIN: CPT | Performed by: UROLOGY

## 2025-01-07 PROCEDURE — 99214 OFFICE O/P EST MOD 30 MIN: CPT | Performed by: FAMILY MEDICINE

## 2025-01-07 NOTE — PATIENT INSTRUCTIONS
Here for er f-up and will rec cardiology consult for hx of left sided cp and also HTN and obesity. Rec calling if worse. Overall HA is better after ER evaluation for headache. Rec stress management.     ER records reviewed:    Patient with history as below presented with a headache. History obtained from patient.     Differential diagnosis includes: tension headache, migraine headache     Plan: Migraine cocktail, as discussed in ED course I offered patient imaging which he declines     Patient was treated with below with improvement in symptoms. Reassessed the patient and they continue to be well appearing with a normal neurological exam.  Although I suspect that patient presentation more likely secondary to tension headache, I did offer him numerous times to perform imaging of his head given both his age as well as previous surgery of his brain.  He continues to decline.  He understands the risks of possible missed emergent pathology without this imaging and has capacity to make this decision.  Will have him return if symptoms persist or worsen.  Stable for outpatient management.     Disposition: Discharged with instructions to obtain outpatient follow up of patient's symptoms and findings, with strict return precautions if patient develops new or worsening symptoms. Patient understands this plan and is agreeable. All questions answered. Patient discharged home with return precautions.

## 2025-01-07 NOTE — ASSESSMENT & PLAN NOTE
AUA symptom score is 9.  At this point he is satisfied with his voiding pattern.  Urinalysis was negative in August.  A CAT scan in June 2024 reveals the prostate to measure approximately 62 g in size.  PSA was normal at 2.991 at that time.  Options were discussed and we will continue to follow with watchful waiting.  We will plan to recheck urinalysis and PSA in August 2025.  Orders:    PSA Total, Diagnostic; Future    Urinalysis with microscopic; Future

## 2025-01-07 NOTE — PROGRESS NOTES
Name: Vlad Cowan      : 1960      MRN: [id number]  Encounter Provider: Kalyani Singh DO  Encounter Date: 2025   Encounter department: [facility] PRIMARY CARE  :  Chief Complaint   Patient presents with   • Follow-up     Follow up from ER visit     Patient Instructions   Here for er f-up and will rec cardiology consult for hx of left sided cp and also HTN and obesity. Rec calling if worse. Overall HA is better after ER evaluation for headache. Rec stress management.     ER records reviewed:    Patient with history as below presented with a headache. History obtained from patient.     Differential diagnosis includes: tension headache, migraine headache     Plan: Migraine cocktail, as discussed in ED course I offered patient imaging which he declines     Patient was treated with below with improvement in symptoms. Reassessed the patient and they continue to be well appearing with a normal neurological exam.  Although I suspect that patient presentation more likely secondary to tension headache, I did offer him numerous times to perform imaging of his head given both his age as well as previous surgery of his brain.  He continues to decline.  He understands the risks of possible missed emergent pathology without this imaging and has capacity to make this decision.  Will have him return if symptoms persist or worsen.  Stable for outpatient management.     Disposition: Discharged with instructions to obtain outpatient follow up of patient's symptoms and findings, with strict return precautions if patient develops new or worsening symptoms. Patient understands this plan and is agreeable. All questions answered. Patient discharged home with return precautions.       Assessment & Plan  Left-sided chest pain  Consult Cardiology  Orders:  •  Ambulatory Referral to Cardiology; Future    Essential hypertension  Stable on med  Orders:  •  Ambulatory Referral to Cardiology; Future    Obesity,  "Class I, BMI 30-34.9  Lose weight a directed    Orders:  •  Ambulatory Referral to Cardiology; Future    Anxiety  Stress management and rec counseling if needed       Nonintractable headache, unspecified chronicity pattern, unspecified headache type  Resolving and improved              History of Present Illness     Follow-up (Follow up from ER visit), recent stress family issues and was seen at ER with headaches. Here for follow up. Has stress personal issues and step daughter from [place] with drug issues and step grandchildren can't be taken care of by her. Patient will be taking care of step grandson age 10. Hx of back pain and has gotten injections and sees PT as well which helped.   Has had some costochondritis issues as well and left chest pain. Patient is interested in seeing cardiology.       ER records reviewed:    Patient is a 64-year-old male with a significant past medical history of hypertension, recurrent headaches, presenting for evaluation of a headache.  Patient reports that over the last 3 days he has been dealing with some increase stress at home.  He reports that he has been developing some headaches, predominantly on his left side.  He describes this as a throbbing sensation.  It seems to come and go without any specific provoking or relieving factors.  He says that this is similar to previous headaches, possibly not quite as bad.  He states that it is due to not sleeping as much.  He denies any visual changes he denies any neurological complaint.  He says that he for some medications to \"break the cycle\" as this has worked for him in the past.  He is otherwise without complaint.      Review of Systems   Constitutional: Negative.    HENT: Negative.     Eyes: Negative.    Respiratory: Negative.     Cardiovascular: Negative.    Gastrointestinal: Negative.    Endocrine: Negative.    Genitourinary: Negative.    Musculoskeletal: Negative.    Skin: Negative.    Allergic/Immunologic: Negative.  "   Neurological: Negative.    Hematological: Negative.    Psychiatric/Behavioral: Negative.         Objective   /81 (BP Location: Right arm, Patient Position: Sitting, Cuff Size: Large)   Pulse 60   Temp 98.1 °F (36.7 °C)   Wt 100 kg (221 lb 3.2 oz)   SpO2 98%   BMI 33.63 kg/m²      Physical Exam  Constitutional:       Appearance: He is well-developed. He is obese.   HENT:      Head: Normocephalic and atraumatic.      Right Ear: External ear normal.      Left Ear: External ear normal.      Nose: Nose normal.   Eyes:      Conjunctiva/sclera: Conjunctivae normal.      Pupils: Pupils are equal, round, and reactive to light.   Cardiovascular:      Rate and Rhythm: Normal rate and regular rhythm.      Pulses: Normal pulses.      Heart sounds: Normal heart sounds.   Pulmonary:      Effort: Pulmonary effort is normal.      Breath sounds: Normal breath sounds.   Musculoskeletal:         General: Normal range of motion.      Cervical back: Normal range of motion and neck supple.   Skin:     General: Skin is warm and dry.      Capillary Refill: Capillary refill takes less than 2 seconds.   Neurological:      General: No focal deficit present.      Mental Status: He is alert and oriented to person, place, and time. Mental status is at baseline.      Deep Tendon Reflexes: Reflexes are normal and symmetric.   Psychiatric:         Mood and Affect: Mood normal.         Behavior: Behavior normal.         Thought Content: Thought content normal.         Judgment: Judgment normal.       Administrative Statements   I have spent a total time of 32 minutes in caring for this patient on the day of the visit/encounter including Diagnostic results, Prognosis, Risks and benefits of tx options, Instructions for management, Patient and family education, Importance of tx compliance, Risk factor reductions, Impressions, Counseling / Coordination of care, Documenting in the medical record, Reviewing / ordering tests, medicine,  procedures  , and Obtaining or reviewing history  .

## 2025-01-07 NOTE — ASSESSMENT & PLAN NOTE
2 nonobstructing 2 mm calculi in the right kidney in the lower pole on CT June 2024.  He remains asymptomatic.  We will continue to follow.  Orders:    Urinalysis with microscopic; Future

## 2025-01-07 NOTE — PROGRESS NOTES
Name: Vlad Cowan      : 1960      MRN: [id number]  Encounter Provider: Iker Lew MD  Encounter Date: 2025   Encounter department: [facility]OLOG[facility]  :  Assessment & Plan  Benign prostatic hyperplasia with urinary obstruction  AUA symptom score is 9.  At this point he is satisfied with his voiding pattern.  Urinalysis was negative in August.  A CAT scan in 2024 reveals the prostate to measure approximately 62 g in size.  PSA was normal at 2.991 at that time.  Options were discussed and we will continue to follow with watchful waiting.  We will plan to recheck urinalysis and PSA in 2025.  Orders:    PSA Total, Diagnostic; Future    Urinalysis with microscopic; Future    Nephrolithiasis  2 nonobstructing 2 mm calculi in the right kidney in the lower pole on CT 2024.  He remains asymptomatic.  We will continue to follow.  Orders:    Urinalysis with microscopic; Future        History of Present Illness   Vlad Cowan is a 64 y.o. male who presents for follow-up.  He notes he is generally voiding well.  At times his stream is slow.  He has no urgency or incontinence.  He gets up once a night to urinate.  There is no gross hematuria, dysuria or history of urinary infections.  He is satisfied his voiding pattern.  He has no flank pain to suggest recurrent surgically active kidney stones.  AUA SYMPTOM SCORE      Flowsheet Row Most Recent Value   AUA SYMPTOM SCORE    How often have you had a sensation of not emptying your bladder completely after you finished urinating? 0   How often have you had to urinate again less than two hours after you finished urinating? 1   How often have you found you stopped and started again several times when you urinate? 2   How often have you found it difficult to postpone urination? 1   How often have you had a weak urinary stream? 2   How often have you had to push or strain to begin urination? 2   How many times did you most  "typically get up to urinate from the time you went to bed at night until the time you got up in the morning? 1   Quality of Life: If you were to spend the rest of your life with your urinary condition just the way it is now, how would you feel about that? 1   AUA SYMPTOM SCORE 9          Review of Systems   Constitutional:  Negative for chills, diaphoresis, fatigue and fever.   HENT: Negative.     Eyes: Negative.    Respiratory: Negative.     Cardiovascular: Negative.    Gastrointestinal: Negative.    Endocrine: Negative.    Genitourinary:         See HPI   Musculoskeletal:  Positive for back pain.   Skin: Negative.    Allergic/Immunologic: Negative.    Neurological: Negative.    Hematological: Negative.    Psychiatric/Behavioral: Negative.            Objective   /80 (BP Location: Left arm, Patient Position: Sitting, Cuff Size: Standard)   Pulse 71   Ht 5' 5\" (1.651 m)   Wt 100 kg (221 lb)   SpO2 98%   BMI 36.78 kg/m²     Physical Exam  Vitals reviewed.   Constitutional:       General: He is not in acute distress.     Appearance: Normal appearance. He is well-developed. He is obese. He is not ill-appearing, toxic-appearing or diaphoretic.   HENT:      Head: Normocephalic and atraumatic.   Eyes:      General: No scleral icterus.     Conjunctiva/sclera: Conjunctivae normal.   Cardiovascular:      Rate and Rhythm: Normal rate.   Pulmonary:      Effort: Pulmonary effort is normal.   Abdominal:      General: Bowel sounds are normal. There is no distension.      Palpations: Abdomen is soft. There is no mass.      Tenderness: There is no abdominal tenderness. There is no right CVA tenderness, left CVA tenderness, guarding or rebound.      Hernia: No hernia is present.   Genitourinary:     Penis: Normal. No phimosis or hypospadias.       Testes: Normal.         Right: Mass not present.         Left: Mass not present.      Rectum: Normal.      Comments: Prostate 2.5X enlarged and palpably benign.  Musculoskeletal: "         General: Normal range of motion.      Cervical back: Neck supple.   Skin:     General: Skin is warm and dry.   Neurological:      General: No focal deficit present.      Mental Status: He is alert and oriented to person, place, and time.   Psychiatric:         Mood and Affect: Mood normal.         Behavior: Behavior normal.         Thought Content: Thought content normal.         Judgment: Judgment normal.          Results  Lab Results   Component Value Date    PSA 2.991 08/22/2024    PSA 2.44 08/08/2023    PSA 2.2 07/11/2022     Lab Results   Component Value Date    CALCIUM 9.4 06/30/2024    K 3.7 06/30/2024    CO2 29 06/30/2024     06/30/2024    BUN 16 06/30/2024    CREATININE 0.93 06/30/2024     Lab Results   Component Value Date    WBC 9.60 06/30/2024    HGB 15.2 06/30/2024    HCT 45.1 06/30/2024    MCV 90 06/30/2024     06/30/2024       Office Urine Dip  No results found for this or any previous visit (from the past hour).]

## 2025-01-08 ENCOUNTER — TELEPHONE (OUTPATIENT)
Age: 65
End: 2025-01-08

## 2025-01-08 NOTE — TELEPHONE ENCOUNTER
"..Hello,    The following message was sent via e-mail to the leadership team:     Please advise if you can help facilitate the following overbook request:    Patient Name: Vlad Cowan     Patient MRN: [id number]     Call back #: [phone]     Insurance: [facility]     Department:Cardiology    Speciality: General Cardiology    Reason for overbook request: STAT REFERRAL    Comments (Write \"N/a\" if no comments): Patient was offered appointments within ASAP timeframe, which he declined.    Requested doctor and location: [place] or [place].    Date of current appointment: 1/29/2025      Thank you.      "

## 2025-01-15 ENCOUNTER — OFFICE VISIT (OUTPATIENT)
Dept: CARDIOLOGY CLINIC | Facility: CLINIC | Age: 65
End: 2025-01-15
Payer: COMMERCIAL

## 2025-01-15 VITALS
DIASTOLIC BLOOD PRESSURE: 70 MMHG | HEIGHT: 65 IN | BODY MASS INDEX: 37.32 KG/M2 | HEART RATE: 74 BPM | OXYGEN SATURATION: 97 % | SYSTOLIC BLOOD PRESSURE: 122 MMHG | WEIGHT: 224 LBS

## 2025-01-15 DIAGNOSIS — R94.31 ABNORMAL EKG: ICD-10-CM

## 2025-01-15 DIAGNOSIS — R07.2 PRECORDIAL PAIN: Primary | ICD-10-CM

## 2025-01-15 DIAGNOSIS — R07.9 LEFT-SIDED CHEST PAIN: ICD-10-CM

## 2025-01-15 DIAGNOSIS — E78.5 HYPERLIPIDEMIA, UNSPECIFIED HYPERLIPIDEMIA TYPE: ICD-10-CM

## 2025-01-15 DIAGNOSIS — I10 ESSENTIAL HYPERTENSION: ICD-10-CM

## 2025-01-15 DIAGNOSIS — E66.811 OBESITY, CLASS I, BMI 30-34.9: ICD-10-CM

## 2025-01-15 PROCEDURE — 93000 ELECTROCARDIOGRAM COMPLETE: CPT | Performed by: INTERNAL MEDICINE

## 2025-01-15 PROCEDURE — 99204 OFFICE O/P NEW MOD 45 MIN: CPT | Performed by: INTERNAL MEDICINE

## 2025-01-15 RX ORDER — METOPROLOL TARTRATE 50 MG
50 TABLET ORAL EVERY 12 HOURS SCHEDULED
Qty: 2 TABLET | Refills: 0 | Status: SHIPPED | OUTPATIENT
Start: 2025-01-15 | End: 2025-01-16

## 2025-01-15 NOTE — PATIENT INSTRUCTIONS
You were seen today in the Cardiology office for evaluation of chest pain.     Below is a summary of the recommendations based upon today's visit:    1. Dietary modifications - Follow a Mediterranean diet - one that is low in saturated fats (avoid red meat, dairy, cheeses), emphasize chicken, fish, turkey, tofu, vegetables, fruit (moderate quantities); also avoid simple carbs/starch/sugars, use whole wheat/grain/bran/oatmeal, snack on small quantities of nuts and fruits.     2. Exercise is very important. Ideally, AT LEAST four to five times weekly for 30 to 60 minutes per session - both cardiovascular and resistance work is OK. Listen to your body and rest when needed.    3. Continue all present medications.    4. Testing prior to your next visit includes: cardiac CT scan, blood work, and ultrasound of the heart    5. Remember the ABCDEs to cardiovascular health for patients:  A: Awareness of cardiovascular symptoms  B: Blood pressure control  C: Cholesterol control  C: Cigarette avoidance  D: Diabetes control  D: Dietary choices  E: Exercise    6. Return in 8 weeks    Any testing ordered in office today will be available for patient review via [employer], and reviewed with me at the follow-up office visit as scheduled.    Thank you for choosing [facility].    Please call our office or use [employer] with any questions.

## 2025-01-15 NOTE — PROGRESS NOTES
[facility]    Name:Vlad Cowan   DOS: 1/15/2025     Chief Complaint:   Chief Complaint   Patient presents with    New Patient Visit     Per pcp for chest pain.     Chest Pain     Off and on pain and tightness, but is having more stress as of late.     Dizziness     Off and on       HISTORY OF PRESENT ILLNESS:    HPI:  Vlad Cowan is a 64 y.o. male. He  has a past medical history of AVM (arteriovenous malformation), BPH without urinary obstruction, Chronic pain disorder, COVID, Headache, Hemorrhoids, Hydronephrosis, Hypertension, Kidney disease, Left inguinal hernia, Lumbar herniated disc, PONV (postoperative nausea and vomiting), Renal calculi, Renal colic, TMJ (dislocation of temporomandibular joint), and Ureter colic.     He presents to establish care with a Cardiologist for evaluation of chest pain. He is a new patient to our practice. He has a past medical history significant for hypertension, hyperlipidemia.    The patient reports that he has been experiencing intermittent episodes of pressure-like chest discomfort that occur predominantly at rest, gradually worsening in severity and frequency over the past few weeks to months.  He tells me that he has very significant amounts of stress in his life with family, and severe symptom episodes seem to correlate with being stressed about those issues.  There is no clear relation to exertion, and he notes no worsening of his symptoms with exertion when they do occur.  He reports occasional intermittent dizziness sometimes associated with the chest pain, although dizziness is not reliably always occur.  He otherwise has no complaints, and specifically denies shortness of breath, diaphoresis, palpitations, orthopnea, edema.  He has had no syncope.  The patient is medically managed for hypertension with hydrochlorothiazide only.  He has a history of elevated cholesterol noted as recently as 2023, is not on lipid-lowering therapy.  His ASCVD  risk score is calculated at 12.5%.    There is no known family history of early MI, valvular heart disease, arrhythmias.  Patient is a non-smoker.       ROS    ROS: Pertinent positives and negatives as described in History of Present Illness. Remainder of a 14 point review of systems was negative.     Allergies   Allergen Reactions    Bee Pollen Other (See Comments)    Morphine Other (See Comments)    Other      Pt states that any narcotic makes him feel sick    Oxycodone-Acetaminophen Nausea Only, Vomiting and GI Intolerance    Pollen Extract Allergic Rhinitis     patient states he has seasonal environmental allergies, not sure exactly what triggers reaction        Current Outpatient Medications on File Prior to Visit   Medication Sig Dispense Refill    acetaminophen (TYLENOL) 500 mg tablet Take 500-1,000 mg by mouth every 6 (six) hours as needed for mild pain      ALPRAZolam (XANAX) 0.25 mg tablet Take 1 tablet (0.25 mg total) by mouth daily as needed for anxiety 20 tablet 0    famotidine (PEPCID) 10 mg tablet Take 10 mg by mouth Taken as needed      fluorouracil (EFUDEX) 5 % cream Apply 1 Application topically daily      hydroCHLOROthiazide 25 mg tablet TAKE 1 TABLET (25 MG TOTAL) BY MOUTH DAILY. 90 tablet 1    ibuprofen (MOTRIN) 200 mg tablet Take 200-800 mg by mouth every 6 (six) hours as needed for mild pain      meloxicam (MOBIC) 15 mg tablet Take 15 mg by mouth daily      meloxicam (MOBIC) 7.5 mg tablet Take 1 tablet by mouth in the morning      methocarbamol (ROBAXIN) 750 mg tablet 1 TABLET AT BEDTIME AS NEEDED FOR MUSCLE SPASM 30 tablet 0    metoclopramide (Reglan) 10 mg tablet Take 1 tablet (10 mg total) by mouth every 6 (six) hours 14 tablet 0    Naproxen Sodium 220 MG CAPS Take by mouth      NON FORMULARY daily as needed Medical marijuana      predniSONE 10 mg tablet Take 60 mg po day#1, 50 mg po day#2, 40 mg po day#3, 30 mg po day#4, 20 mg po day#5m and 10 mg po day#6 21 tablet 0    tadalafil (CIALIS)  20 MG tablet Take 1 tablet (20 mg total) by mouth as needed for erectile dysfunction 10 tablet 3    traMADol (ULTRAM) 50 mg tablet Take 1 tablet (50 mg total) by mouth daily as needed for moderate pain 14 tablet 0    dicyclomine (BENTYL) 20 mg tablet Take 1 tablet (20 mg total) by mouth 2 (two) times a day for 7 days 14 tablet 0    lidocaine (LIDODERM) 5 % Apply 1 patch topically over 12 hours daily for 4 days Remove & Discard patch within 12 hours or as directed by MD 4 patch 0    methocarbamol (ROBAXIN) 500 mg tablet Take 1 tablet (500 mg total) by mouth 3 (three) times a day as needed for muscle spasms for up to 4 days 12 tablet 0    naproxen (Naprosyn) 500 mg tablet Take 1 tablet (500 mg total) by mouth 2 (two) times a day with meals for 7 days 14 tablet 0     No current facility-administered medications on file prior to visit.       Past Medical History:   Diagnosis Date    AVM (arteriovenous malformation)     BPH without urinary obstruction     last assessed 01/16/18    Chronic pain disorder     low back and neck    COVID     Jan 2021 and 5/14/22    Headache     occ    Hemorrhoids     Hydronephrosis     Hypertension     Kidney disease     Left inguinal hernia     Lumbar herniated disc     PONV (postoperative nausea and vomiting)     Renal calculi     Renal colic     TMJ (dislocation of temporomandibular joint)     Ureter colic        Past Surgical History:   Procedure Laterality Date    BRAIN SURGERY      COLONOSCOPY      CYSTOSCOPY      w/removal of object, last assessed 01/16/18    CYSTOSCOPY      w/removal of ureteral calculus last assessed 01/16/18    CYSTOSCOPY KIDNEY W/ URETERAL GUIDE WIRE      last assessed 01/16/18    CYSTOSCOPY W/ URETERAL STENT PLACEMENT      last assessed 01/16/18    FL RETROGRADE PYELOGRAM  7/9/2020    HERNIA REPAIR  1965    [facility]    HERNIA REPAIR      KIDNEY SURGERY      removal of kidney stone in 2013 at [facility]    ME CYSTO/URETERO W/LITHOTRIPSY &INDWELL STENT INSRT  "Right 12/13/2018    Procedure: CYSTOSCOPY URETEROSCOPY  RETROGRADE PYELOGRAM AND INSERTION RIGHT STENT URETERAL;  Surgeon: Jose Daniel Everett MD;  Location: [facility];  Service: Urology    NH CYSTO/URETERO W/LITHOTRIPSY &INDWELL STENT INSRT Left 7/9/2020    Procedure: CYSTOSCOPY URETEROSCOPY WITH LITHOTRIPSY HOLMIUM LASER, RETROGRADE PYELOGRAM AND INSERTION STENT URETERAL;  Surgeon: Jose Daniel Everett MD;  [facility];  Service: Urology    NH LAPAROSCOPY SURG RPR INITIAL INGUINAL HERNIA Left 8/4/2022    Procedure: REPAIR HERNIA INGUINAL LAPAROSCOPIC W/ ROBOTICS;  Surgeon: Willie Carlson MD;  Location: [facility];  Service: General    TOOTH EXTRACTION         Family History   Problem Relation Age of Onset    Stroke Mother     Cancer Father     Diabetes Father     Prostate cancer Father     Heart disease Father     Hypertension Father        Social History     Socioeconomic History    Marital status: /Civil Union     Spouse name: Not on file    Number of children: 1    Years of education: completed college    Highest education level: Not on file   Occupational History    Occupation: Antiques   Tobacco Use    Smoking status: Never    Smokeless tobacco: Never   Vaping Use    Vaping status: Some Days    Substances: THC   Substance and Sexual Activity    Alcohol use: Yes     Comment: rare    Drug use: Yes     Types: Marijuana     Comment: ocassionaly use    Sexual activity: Yes   Other Topics Concern    Not on file   Social History Narrative    Lives with spouse    No caffeine use     Social Drivers of Health     Financial Resource Strain: Not on file   Food Insecurity: Not on file   Transportation Needs: Not on file   Physical Activity: Not on file   Stress: Not on file   Social Connections: Not on file   Intimate Partner Violence: Not on file   Housing Stability: Not on file       OBJECTIVE:    /70 (BP Location: Left arm, Patient Position: Sitting, Cuff Size: Standard)   Pulse 74   Ht 5' 5\" (1.651 " m)   Wt 102 kg (224 lb)   SpO2 97%   BMI 37.28 kg/m²      BP Readings from Last 3 Encounters:   01/15/25 122/70   25 120/80   25 119/81       Wt Readings from Last 3 Encounters:   01/15/25 102 kg (224 lb)   25 100 kg (221 lb)   25 100 kg (221 lb 3.2 oz)         Physical Exam  Vitals reviewed.   Constitutional:       General: He is not in acute distress.     Appearance: Normal appearance. He is not diaphoretic.   HENT:      Head: Normocephalic and atraumatic.   Eyes:      Conjunctiva/sclera: Conjunctivae normal.   Neck:      Vascular: No JVD.   Cardiovascular:      Rate and Rhythm: Normal rate and regular rhythm.      Pulses: Normal pulses.      Heart sounds: Normal heart sounds. No murmur heard.     No friction rub. No gallop.   Pulmonary:      Effort: Pulmonary effort is normal.      Breath sounds: Normal breath sounds. No wheezing, rhonchi or rales.   Abdominal:      General: Abdomen is flat. Bowel sounds are normal.   Musculoskeletal:      Right lower leg: No edema.      Left lower leg: No edema.   Skin:     General: Skin is warm and dry.   Neurological:      General: No focal deficit present.      Mental Status: He is alert and oriented to person, place, and time.   Psychiatric:         Mood and Affect: Mood normal.         Behavior: Behavior normal.                                                       Cardiac testing:   EKG reviewed personally: Normal sinus rhythm, voltage criteria for [facility] which may be a normal variant.    Results for orders placed during the hospital encounter of 19    Echo complete with contrast if indicated    Narrative  [address] [address] [phone]    Transthoracic Echocardiogram  2D, M-mode, Doppler, and Color Doppler    Study date:  2019    Patient: CHERELLE BURGOS  MR number: [id number]  Account number: [id number]  : 1960  Age: 59 years  Gender: Male  Status:  Outpatient  Location: [address] [place] [address]  Height: 68 in  Weight: 231.4 lb  BP: 130/ 76 mmHg    Indications: pre operative exam    Diagnoses: Z01.818 - Encounter for other preprocedural examination    Sonographer:  Wendy Valdovinos RDCS  Primary Physician:  Kalyani Singh DO  Referring Physician:  ION Amin  Group:  [facility]  Interpreting Physician:  NESTOR Gonzalez MD    SUMMARY    LEFT VENTRICLE:  Systolic function was normal. Ejection fraction was estimated to be 60 %.  Although no diagnostic regional wall motion abnormality was identified, this possibility cannot be completely excluded on the basis of this study.  Left ventricular diastolic function parameters were normal.    AORTIC VALVE:  The valve was trileaflet. Leaflets exhibited normal thickness and normal cuspal separation.    TRICUSPID VALVE:  There was no significant regurgitation.    HISTORY: PRIOR HISTORY: obesity, hyperlipidemia    PROCEDURE: The study was performed in the [address] [place] [address]. This was a routine study. The transthoracic approach was used. The study included complete 2D imaging, M-mode, complete spectral Doppler, and color Doppler. The  heart rate was 85 bpm, at the start of the study. Images were obtained from the parasternal, apical, subcostal, and suprasternal notch acoustic windows. Image quality was adequate.    LEFT VENTRICLE: Size was normal. Systolic function was normal. Ejection fraction was estimated to be 60 %. Although no diagnostic regional wall motion abnormality was identified, this possibility cannot be completely excluded on the basis  of this study. Wall thickness was normal. DOPPLER: Left ventricular diastolic function parameters were normal.    RIGHT VENTRICLE: The size was normal. Systolic function was normal. Wall thickness was normal.    LEFT ATRIUM: Size was normal.    RIGHT ATRIUM: Size was normal.    MITRAL VALVE: Valve structure was normal.  There was normal leaflet separation. DOPPLER: The transmitral velocity was within the normal range. There was no evidence for stenosis. There was no significant regurgitation.    AORTIC VALVE: The valve was trileaflet. Leaflets exhibited normal thickness and normal cuspal separation. DOPPLER: Transaortic velocity was within the normal range. There was no evidence for stenosis. There was no significant  regurgitation.    TRICUSPID VALVE: The valve structure was normal. There was normal leaflet separation. DOPPLER: The transtricuspid velocity was within the normal range. There was no evidence for stenosis. There was no significant regurgitation.    PULMONIC VALVE: Leaflets exhibited normal thickness, no calcification, and normal cuspal separation. DOPPLER: The transpulmonic velocity was within the normal range. There was no significant regurgitation.    PERICARDIUM: There was no pericardial effusion. The pericardium was normal in appearance.    AORTA: The root exhibited normal size.    SYSTEMIC VEINS: IVC: The inferior vena cava was normal in size.    SYSTEM MEASUREMENT TABLES    2D  %FS: 26.07 %  Ao Diam: 3.18 cm  EDV(Teich): 92.56 ml  EF(Cube): 59.59 %  EF(Teich): 51.31 %  ESV(Cube): 36.88 ml  ESV(Teich): 45.07 ml  IVSd: 0.96 cm  LA Area: 16.81 cm2  LA Diam: 3.96 cm  LVEDV MOD A4C: 113.86 ml  LVEF MOD A4C: 71.31 %  LVESV MOD A4C: 32.67 ml  LVIDd: 4.5 cm  LVIDs: 3.33 cm  LVLd A4C: 8.39 cm  LVLs A4C: 7.2 cm  LVPWd: 0.98 cm  RA Area: 15.31 cm2  RV Diam.: 2.85 cm  SI(Cube): 24.95 ml/m2  SI(Teich): 21.78 ml/m2  SV MOD A4C: 81.19 ml  SV(Cube): 54.38 ml  SV(Teich): 47.49 ml    CW  TR Vmax: 2.47 m/s  TR maxP.31 mmHg    MM  TAPSE: 2.55 cm    PW  AVC: 303.75 ms  E': 0.08 m/s  E/E': 8.9  MV A [place]: 0.81 m/s  MV Dec [place]: 3.58 m/s2  MV DecT: 197.7 ms  MV E [place]: 0.71 m/s  MV E/A Ratio: 0.87    [facility] [place] [facility] Accredited Echocardiography [facility]    Prepared and electronically signed by    NESTOR Gonzalez,  MD  Signed 27-Jun-2019 16:15:56      LABS:  Lab Results   Component Value Date    BUN 16 06/30/2024    CREATININE 0.93 06/30/2024    CALCIUM 9.4 06/30/2024    K 3.7 06/30/2024    CO2 29 06/30/2024     06/30/2024    ALKPHOS 42 06/30/2024    AST 19 06/30/2024    ALT 15 06/30/2024        Lab Results   Component Value Date    WBC 9.60 06/30/2024    HGB 15.2 06/30/2024    HCT 45.1 06/30/2024    MCV 90 06/30/2024     06/30/2024       Lab Results   Component Value Date    HDL 62 03/16/2023    LDLCALC 133 (H) 03/16/2023    TRIG 122 03/16/2023       Lab Results   Component Value Date    TSH 2.15 05/03/2023           ASSESSMENT/PLAN:  Diagnoses and all orders for this visit:    Precordial pain  -     POCT ECG  -     CTA Cardiac w FFR if needed; Future  -     metoprolol tartrate (LOPRESSOR) 50 mg tablet; Take 1 tablet (50 mg total) by mouth every 12 (twelve) hours for 2 doses Start the night before your CT scan.  -     Basic metabolic panel; Future    Essential hypertension  -     Ambulatory Referral to Cardiology    Hyperlipidemia, unspecified hyperlipidemia type  -     Lipid Panel With Direct LDL; Future    Abnormal EKG  -     Echo complete w/ contrast if indicated; Future    Left-sided chest pain  -     Ambulatory Referral to Cardiology    Essential hypertension  Comments:  stable, take BP med as directed  Orders:  -     Ambulatory Referral to Cardiology    Obesity, Class I, BMI 30-34.9  Comments:  lose weight as directed  Orders:  -     Ambulatory Referral to Cardiology    This is a 64-year-old male presenting for evaluation of left-sided chest discomfort.  His symptoms overall are somewhat atypical and that they predominantly occur at rest and are related to emotional stress rather than physical stress/exertion.  He does however carry an intermediate pretest risk for coronary disease given age, gender, comorbidities including hypertension and hyperlipidemia.  In this setting, I have referred the patient for  "additional cardiovascular testing to include a cardiac CTA to evaluate underlying coronary anatomy and exclude flow-limiting coronary disease.  I have also referred him for a resting transthoracic echocardiogram due to abnormal EKG demonstrating voltage criteria for LVH.    I have prescribed him Lopressor 50 mg to be taken twice daily starting the night before his CT scan for a goal heart rate of 50s-60 bpm prior to cardiac CTA.  I have also given him a prescription for a BMP and repeat lipid panel.    He will follow-up in the office to review the results of testing.  I recommended that he seek immediate ER evaluation for any symptoms that are persisting greater than a few minutes.          Mulu Deluna MD [facility]      Portions of the record may have been created with voice recognition software. Occasional wrong word or \"sound alike\" substitutions may have occurred due to the inherent limitations of voice recognition software. Please review the chart carefully and recognize, using context, where substitutions/typographical errors may have occurred.    "

## 2025-01-17 ENCOUNTER — APPOINTMENT (OUTPATIENT)
Dept: LAB | Facility: MEDICAL CENTER | Age: 65
End: 2025-01-17
Payer: COMMERCIAL

## 2025-01-17 DIAGNOSIS — R07.2 PRECORDIAL PAIN: ICD-10-CM

## 2025-01-17 DIAGNOSIS — E78.5 HYPERLIPIDEMIA, UNSPECIFIED HYPERLIPIDEMIA TYPE: ICD-10-CM

## 2025-01-17 LAB
ANION GAP SERPL CALCULATED.3IONS-SCNC: 9 MMOL/L (ref 4–13)
BUN SERPL-MCNC: 25 MG/DL (ref 5–25)
CALCIUM SERPL-MCNC: 9.4 MG/DL (ref 8.4–10.2)
CHLORIDE SERPL-SCNC: 101 MMOL/L (ref 96–108)
CHOLEST SERPL-MCNC: 199 MG/DL (ref ?–200)
CO2 SERPL-SCNC: 31 MMOL/L (ref 21–32)
CREAT SERPL-MCNC: 1 MG/DL (ref 0.6–1.3)
GFR SERPL CREATININE-BSD FRML MDRD: 78 ML/MIN/1.73SQ M
GLUCOSE P FAST SERPL-MCNC: 92 MG/DL (ref 65–99)
HDLC SERPL-MCNC: 65 MG/DL
LDLC SERPL CALC-MCNC: 117 MG/DL (ref 0–100)
POTASSIUM SERPL-SCNC: 3.8 MMOL/L (ref 3.5–5.3)
SODIUM SERPL-SCNC: 141 MMOL/L (ref 135–147)
TRIGL SERPL-MCNC: 87 MG/DL (ref ?–150)

## 2025-01-17 PROCEDURE — 36415 COLL VENOUS BLD VENIPUNCTURE: CPT

## 2025-01-17 PROCEDURE — 80061 LIPID PANEL: CPT

## 2025-01-17 PROCEDURE — 80048 BASIC METABOLIC PNL TOTAL CA: CPT

## 2025-01-29 NOTE — NURSING NOTE
Unable to reach patient, sent instructions and directions in e-mail that is linked to this account and via epic my chart. See message below.  Upcoming Radiology appointment at [facility]  Good afternoon Mr. Cowan,     You are scheduled on  2/6/25 @ 8 am  for a CTA Cardiac in the Radiology department of the [facility].    [facility] is located at [address] [place] [address], building [address] 1st floor and present to Radiology 15 minutes prior to your appointment time.     It is recommended that you come to your appointment with a  for your safety.    Please avoid all caffeine products for 12 hours prior to the exam this includes coffee, decaf, tea, soda, or chocolate. This will be in effect from 2/5/25 at 8 pm till your appointment time.    You are also to stop solid food 3 hours prior to the exam at 5 am but you can continue to drink clear fluids (excluding coffee, decaf, tea, soda, or chocolate) prior to the exam.     If not on fluid restriction, please increase fluids starting the day prior. Also please drink 40 oz of water (5 cups) of water during the morning of your scan. You will be allowed to go to the restroom prior to the study.     You can take all your regular prescribed medication as usual unless otherwise directed by your physician.     Please do not use any inhaler medication prior to the exam, if used you may need to reschedule your test.     If you should use male enhancing medication (Viagra, Cialis, Levitra) this medication needs to be held 3 days prior to the test and can be resumed 24 hours after the test.     Your ordering provider has instructed for Metoprolol 50 mg to be taken on 2/5/25 at 6:30 pm and  please take Metoprolol 50 mg 1 ½ hour prior to the scan at 6:30 am.     If anti-anxiety medication is to be taken, please take 1 hour prior to the scan. You will also need have a  to and from the study if such medication is taken.    During the  study a certain medication may be given such as a beta blocker if needed to  heart rate to under 65 beats per minute for optimal exam results.  Two sublingual tablets of nitroglycerin will be given during scan for vasodilation, prior to contrast administration and final scan.    If the heart rate is not able to achieve target (55-60 beats per minute) study most likely will be cancelled after discussing with Radiologist. At a higher heart rate study may not be useful or viable.     For your comfort we ask to please   -Dress in clothing that will allow easy access to your mid-arm for your IV insertion and your chest area.    -Your skin will be cleaned with a special cleanser that will feel cool to touch and may feel slightly abrasive for better quality heart rhythm.   Men - Please be aware that your chest may be shaved if you have excessive hair for the lead placement.     The study can last approximately 30-45 minutes.     If you have any questions or concerns regarding the above information, please feel free to send me a response via a call, email or . [place]  chart.      If we have not spoken yet and understand the above information and have no further questions or concerns, can you please send me a response via a call, email or [facility] [place] my chart that you have received and understood the information.     May you have a good day,   Milagro Banks RN  [facility] Radiology RN  [address] [place] [id number]  [phone] (Office)  [phone] (Fax)  [email or web address]    Patient returned call and confirmed that he received the email with instructions. Consult was completed. Cardiac CTA Questionairre      Cardiac history    Calcium Score: None     Reason for exam: Precordial pain      Have you had a CABG, angioplasty, cardiac cath, stent placement? no     Do you have a pacemaker or defibrillator? no     Have you ever been diagnosed with an irregular heart beat?no       Do you have a history of having COPD or asthma?  no                          Do you have high blood pressure? yes     Do you have diabetes? no     Did you ever smoke? Medical marijuana    Do you currently smoke?  yes                               Quit      Do you take any male enhancement medications such as Viagra, Levitra, or Cialis (PDE5 inhibitors)        YES, hasn't taken In a while but aware to hold  Due to nitrate given during test, this needs to be held for 3 days prior to testing and may be      resumed 24 hrs.                  Do you have allergies? NKDA   Allergy to intravenous contrast or dye?  NO     Do you have kidney disease?  no  Fluid restrictions: no                              Blood work done:  25           GFR: 78       Call placed to patient to discuss upcoming appointment at [facility] radiology department and complete consultation and Questionnaire with patient via phone. Patient is having a Cardiac CTA. Reviewed patient's allergies, also reviewed medications.  Beta blocker ordered by Cardiologist and patient aware to take the evening prior and 1 1/2  hour prior to scan time at 0630. Test instructions given, patient aware to hold all caffeine products for 12 hours prior (2000)to the exam this includes coffee, decaf, tea, soda and chocolate.  Also made aware to avoid solid food for 3 hours prior (0500) to exam and to increase fluids one day prior to exam unless contraindicated and to drink 5 cups (40 oz of water) prior to scan.  Patient was instructed that they may take all medications as usual unless otherwise directed by physician. Patient instructed to not use inhalers prior to exam, if uses may need to reschedule the study to another day. Discussed the pre procedure expectations, post procedure expectations, time entailed to perform the study and also the medications that may be used in exam (beta blocker if needed to  heart rate to under 65 beats per minute for optimal exam  results and NTG given during scan for vasodilation). Reminded patient of location and time expected for procedure, instructed to bring photo ID, insurance card and script. Recommendation for  to and from study given to patient.  Informed the patient that the study will last approximately 30-45 minutes. Pt verbalizes understanding of education and instructions.

## 2025-02-05 ENCOUNTER — TELEPHONE (OUTPATIENT)
Dept: FAMILY MEDICINE CLINIC | Facility: CLINIC | Age: 65
End: 2025-02-05

## 2025-02-05 ENCOUNTER — OFFICE VISIT (OUTPATIENT)
Dept: FAMILY MEDICINE CLINIC | Facility: CLINIC | Age: 65
End: 2025-02-05
Payer: COMMERCIAL

## 2025-02-05 VITALS
HEART RATE: 76 BPM | HEIGHT: 65 IN | TEMPERATURE: 98.8 F | RESPIRATION RATE: 16 BRPM | OXYGEN SATURATION: 97 % | WEIGHT: 228 LBS | DIASTOLIC BLOOD PRESSURE: 80 MMHG | BODY MASS INDEX: 37.99 KG/M2 | SYSTOLIC BLOOD PRESSURE: 142 MMHG

## 2025-02-05 DIAGNOSIS — R09.82 POST-NASAL DRIP: ICD-10-CM

## 2025-02-05 DIAGNOSIS — J32.9 SINUSITIS, UNSPECIFIED CHRONICITY, UNSPECIFIED LOCATION: Primary | ICD-10-CM

## 2025-02-05 DIAGNOSIS — E66.811 OBESITY, CLASS I, BMI 30-34.9: ICD-10-CM

## 2025-02-05 DIAGNOSIS — R06.7 SNEEZING: ICD-10-CM

## 2025-02-05 DIAGNOSIS — I10 ESSENTIAL HYPERTENSION: ICD-10-CM

## 2025-02-05 PROCEDURE — 99214 OFFICE O/P EST MOD 30 MIN: CPT | Performed by: FAMILY MEDICINE

## 2025-02-05 RX ORDER — AZITHROMYCIN 250 MG/1
TABLET, FILM COATED ORAL
Qty: 6 TABLET | Refills: 0 | Status: SHIPPED | OUTPATIENT
Start: 2025-02-05 | End: 2025-02-10

## 2025-02-05 RX ORDER — PREDNISONE 10 MG/1
TABLET ORAL
Qty: 21 TABLET | Refills: 0 | Status: SHIPPED | OUTPATIENT
Start: 2025-02-05

## 2025-02-05 NOTE — PROGRESS NOTES
"Name: Vlad Cowan      : 1960      MRN: [id number]  Encounter Provider: Kalyani Singh DO  Encounter Date: 2025   Encounter department: [facility] PRIMARY CARE  :  Chief Complaint   Patient presents with    Cold Like Symptoms     Pt is here for sneezing runny nose and sore throat and headache      Patient Instructions   Rest and fluids and start abx and Flonase and prednisone and stay well hydrated. Blow nose and rec also nasal saline prn. Call if not better.     Assessment & Plan  Sinusitis, unspecified chronicity, unspecified location    Orders:    azithromycin (Zithromax) 250 mg tablet; Take 2 tablets (500 mg total) by mouth daily for 1 day, THEN 1 tablet (250 mg total) daily for 4 days.    predniSONE 10 mg tablet; Take 60 mg po day#1, 50 mg po day#2, 40 mg po day#3, 30 mg po day#4, 20 mg po day#5m and 10 mg po day#6    Obesity, Class I, BMI 30-34.9  Lose weight as directed to get BMI lower than 25         Essential hypertension  Stable on med       Sneezing  Use nasal saline prn sinusitis       Post-nasal drip  Flonase prn              History of Present Illness   Here for sinusitis for 3 days and is yellowish in color with discharge.       Review of Systems   Constitutional: Negative.    HENT:  Positive for postnasal drip, rhinorrhea, sinus pressure, sinus pain and sneezing.    Eyes: Negative.    Respiratory: Negative.     Cardiovascular: Negative.    Gastrointestinal: Negative.    Endocrine: Negative.    Genitourinary: Negative.    Musculoskeletal: Negative.    Skin: Negative.    Allergic/Immunologic: Negative.    Neurological: Negative.    Hematological: Negative.    Psychiatric/Behavioral: Negative.         Objective   /80   Pulse 76   Temp 98.8 °F (37.1 °C) (Temporal)   Resp 16   Ht 5' 5\" (1.651 m)   Wt 103 kg (228 lb)   SpO2 97%   BMI 37.94 kg/m²      Physical Exam  Constitutional:       Appearance: He is well-developed. He is obese.   HENT:      Head: " Normocephalic and atraumatic.      Right Ear: External ear normal.      Left Ear: External ear normal.      Nose: Nose normal.      Mouth/Throat:      Mouth: Mucous membranes are moist.   Eyes:      Conjunctiva/sclera: Conjunctivae normal.      Pupils: Pupils are equal, round, and reactive to light.   Cardiovascular:      Rate and Rhythm: Normal rate and regular rhythm.      Pulses: Normal pulses.      Heart sounds: Normal heart sounds.   Pulmonary:      Effort: Pulmonary effort is normal.      Breath sounds: Normal breath sounds.   Musculoskeletal:         General: Normal range of motion.      Cervical back: Normal range of motion and neck supple.   Skin:     General: Skin is warm and dry.      Capillary Refill: Capillary refill takes less than 2 seconds.   Neurological:      General: No focal deficit present.      Mental Status: He is alert and oriented to person, place, and time. Mental status is at baseline.      Deep Tendon Reflexes: Reflexes are normal and symmetric.   Psychiatric:         Mood and Affect: Mood normal.         Behavior: Behavior normal.         Thought Content: Thought content normal.         Judgment: Judgment normal.       Administrative Statements   I have spent a total time of 30 minutes in caring for this patient on the day of the visit/encounter including Diagnostic results, Prognosis, Risks and benefits of tx options, Instructions for management, Patient and family education, Importance of tx compliance, Risk factor reductions, Impressions, Counseling / Coordination of care, Documenting in the medical record, Reviewing / ordering tests, medicine, procedures  , and Obtaining or reviewing history  .

## 2025-02-05 NOTE — PATIENT INSTRUCTIONS
Rest and fluids and start abx and Flonase and prednisone and stay well hydrated. Blow nose and rec also nasal saline prn. Call if not better.

## 2025-02-06 ENCOUNTER — HOSPITAL ENCOUNTER (OUTPATIENT)
Dept: RADIOLOGY | Facility: HOSPITAL | Age: 65
Discharge: HOME | End: 2025-02-06
Attending: INTERNAL MEDICINE

## 2025-02-10 NOTE — NURSING NOTE
Patient rescheduled cardiac CTA, sent instructions and directions in e-mail that is linked to this account and via epic my chart. See message below.  Upcoming Radiology appointment at [facility]  Good afternoon Mr. Cowan,      You are scheduled on  2/19/25 @ 9 am  for a CTA Cardiac in the Radiology department of the [facility].    [facility] is located at [address] [place] [address], [address] 1st floor and present to Radiology 15 minutes prior to your appointment time.      It is recommended that you come to your appointment with a  for your safety.    Please avoid all caffeine products for 12 hours prior to the exam this includes coffee, decaf, tea, soda, or chocolate. This will be in effect from 2/18/25 at 9 pm till your appointment time.    You are also to stop solid food 3 hours prior to the exam at 6 am but you can continue to drink clear fluids (excluding coffee, decaf, tea, soda, or chocolate) prior to the exam.      If not on fluid restriction, please increase fluids starting the day prior. Also please drink 40 oz of water (5 cups) of water during the morning of your scan. You will be allowed to go to the restroom prior to the study.      You can take all your regular prescribed medication as usual unless otherwise directed by your physician.      Please do not use any inhaler medication prior to the exam, if used you may need to reschedule your test.      If you should use male enhancing medication (Viagra, Cialis, Levitra) this medication needs to be held 3 days prior to the test and can be resumed 24 hours after the test.      Your ordering provider has instructed for Metoprolol 50 mg to be taken on 2/18/25 at 7:30 pm and  please take Metoprolol 50 mg 1 ½ hour prior to the scan at 7:30 am.      If anti-anxiety medication is to be taken, please take 1 hour prior to the scan. You will also need have a  to and from the study if such medication is  taken.    During the study a certain medication may be given such as a beta blocker if needed to  heart rate to under 65 beats per minute for optimal exam results.  Two sublingual tablets of nitroglycerin will be given during scan for vasodilation, prior to contrast administration and final scan.    If the heart rate is not able to achieve target (55-60 beats per minute) study most likely will be cancelled after discussing with Radiologist. At a higher heart rate study may not be useful or viable.      For your comfort we ask to please   -Dress in clothing that will allow easy access to your mid-arm for your IV insertion and your chest area.    -Your skin will be cleaned with a special cleanser that will feel cool to touch and may feel slightly abrasive for better quality heart rhythm.   Men - Please be aware that your chest may be shaved if you have excessive hair for the lead placement.      The study can last approximately 30-45 minutes.      If you have any questions or concerns regarding the above information, please feel free to send me a response via a call, email or . [place] my chart.       If we have not spoken yet and understand the above information and have no further questions or concerns, can you please send me a response via a call, email or [facility] [place] my chart that you have received and understood the information.      May you have a good day,   Milagro Banks RN  [facility] Radiology RN  [address] [place] [address]  [phone] (Office)  [phone] (Fax)  [email or web address]

## 2025-02-13 ENCOUNTER — TELEPHONE (OUTPATIENT)
Age: 65
End: 2025-02-13

## 2025-02-13 NOTE — TELEPHONE ENCOUNTER
Patient contacted the office this afternoon, following up from office visit on 02/05/25. States that he was being treated for sinusitis, completed prescribed medication. Was using the Flonase, saline spray, taking hot showers and has not seen much improvement and seems to have gone into ears now as well. Asking if there is anything else pcp could recommend taking or doing. If something would be prescribed please send to [facility].

## 2025-02-14 ENCOUNTER — OFFICE VISIT (OUTPATIENT)
Dept: URGENT CARE | Facility: MEDICAL CENTER | Age: 65
End: 2025-02-14
Payer: COMMERCIAL

## 2025-02-14 VITALS
TEMPERATURE: 98.5 F | DIASTOLIC BLOOD PRESSURE: 84 MMHG | RESPIRATION RATE: 20 BRPM | OXYGEN SATURATION: 98 % | HEART RATE: 88 BPM | SYSTOLIC BLOOD PRESSURE: 134 MMHG

## 2025-02-14 DIAGNOSIS — J01.91 ACUTE RECURRENT SINUSITIS, UNSPECIFIED LOCATION: Primary | ICD-10-CM

## 2025-02-14 DIAGNOSIS — J32.9 SINUSITIS, UNSPECIFIED CHRONICITY, UNSPECIFIED LOCATION: Primary | ICD-10-CM

## 2025-02-14 PROCEDURE — 99214 OFFICE O/P EST MOD 30 MIN: CPT

## 2025-02-14 RX ORDER — AMOXICILLIN 500 MG/1
500 CAPSULE ORAL EVERY 12 HOURS SCHEDULED
Qty: 14 CAPSULE | Refills: 0 | Status: SHIPPED | OUTPATIENT
Start: 2025-02-14 | End: 2025-02-21

## 2025-02-14 NOTE — PROGRESS NOTES
[facility]        NAME: Vlad Cowan is a 65 y.o. male  : 1960    MRN: [id number]  DATE: 2025  TIME: 12:37 PM    Assessment and Plan   Acute recurrent sinusitis, unspecified location [J01.91]  1. Acute recurrent sinusitis, unspecified location  amoxicillin (AMOXIL) 500 mg capsule        PCP note from 25 visit reviewed - prescribed azithromycin, prednisone for sinusitis.  Advised Flonase, nasal saline use.  PCP phone call notes from 25-2/14/15 reviewed - PCP recommended ENT follow up for chronic sinusitis.    Discussed with patient that symptoms may have been viral in nature, which is why the antibiotic may not have helped.  Due to ongoing symptoms for about 2 weeks now, and potential treatment failure with azithromycin, will trial antibiotic with different coverage.  Prescribed course of amoxicillin to cover for both sinus and ear infection.  Advised symptomatic treatment.  Advised ENT follow up (referral placed by PCP).    Patient Instructions     Prescribed course of amoxicillin, take as directed.  Symptomatic treatment as below.  Fever/Pain: Over the counter Tylenol and/or Motrin as directed on packaging.  Cough: Mucinex can help to thin mucus, making it easier to cough up. Delsym or Robitussin can help to suppress the cough. Utilizing a vaporizer/humidifier may help, as well as increasing fluids.  Sore Throat: Salt water gargles with 1 teaspoon of salt dissolved in 6-8 oz of water, honey, drinking plenty of liquids, eating soft foods. If severe, can utilize OTC chloraseptic spray.  Nasal Congestion: Over the counter allergy medication like Claritin, Allegra or Zyrtec can help with nasal congestion and post nasal drip.  Over the counter saline or steroid nasal sprays like Flonase can help with nasal congestion and post nasal drip as well. Over the counter decongestants such as Sudafed may also help.  Upset Stomach: Drink plenty of fluids. May utilize Gatorade,  Pedialyte, or other electrolyte solutions to supplement. Eat bland foods (ex: BRAT - bananas, rice, applesauce, toast) and slowly advance to other foods as tolerated.  Please note, if you have heart issues or high blood pressure, the cough/cold medication of choice is Coricidin. Talk to your doctor before trying any new medications.    Follow up with PCP in 3-5 days.  Proceed to  ER if symptoms worsen.    If tests are performed, our office will contact you with results only if changes need to made to the care plan discussed with you at the visit. You can review your full results on [facility].    Chief Complaint     Chief Complaint   Patient presents with    Nasal Congestion     Patient has continued nasal congestion after being on abx and steroids 2 weeks ago. Finished his course of abx and steroids a couple days ago but has continued congestion and right ear pain          History of Present Illness       Patient presents for evaluation of of sinus symptoms.  Notes symptoms have been present for about 2 weeks.  Was seen by his PCP on 2/5/25 and was prescribed azithromycin and prednisone, which he took as directed.  Notes minimal symptom improvement with the prescribed medications, but near the end of the medication course he started with right ear pain.  He is still having significant sinus pressure.    Sinusitis  This is a new problem. The current episode started 1 to 4 weeks ago. The problem has been gradually worsening (recently started with right ear pain) since onset. There has been no fever. His pain is at a severity of 7/10. Associated symptoms include congestion, ear pain (right), sinus pressure, sneezing and a sore throat (since resolved). Pertinent negatives include no chills, coughing, headaches or shortness of breath. Treatments tried: azithromcyin, prednisone, Flonase, saline spray, fluids. The treatment provided mild relief.       Review of Systems   Review of Systems   Constitutional:   Negative for appetite change, chills and fever.   HENT:  Positive for congestion, ear pain (right), postnasal drip, rhinorrhea, sinus pressure, sneezing and sore throat (since resolved). Negative for ear discharge and sinus pain.    Eyes:  Negative for pain, discharge, redness and itching.   Respiratory:  Negative for cough, chest tightness, shortness of breath, wheezing and stridor.    Gastrointestinal:  Negative for abdominal pain, diarrhea, nausea and vomiting.   Musculoskeletal:  Negative for myalgias.   Skin:  Negative for rash.   Neurological:  Negative for headaches.         Current Medications       Current Outpatient Medications:     amoxicillin (AMOXIL) 500 mg capsule, Take 1 capsule (500 mg total) by mouth every 12 (twelve) hours for 7 days, Disp: 14 capsule, Rfl: 0    acetaminophen (TYLENOL) 500 mg tablet, Take 500-1,000 mg by mouth every 6 (six) hours as needed for mild pain, Disp: , Rfl:     ALPRAZolam (XANAX) 0.25 mg tablet, Take 1 tablet (0.25 mg total) by mouth daily as needed for anxiety, Disp: 20 tablet, Rfl: 0    dicyclomine (BENTYL) 20 mg tablet, Take 1 tablet (20 mg total) by mouth 2 (two) times a day for 7 days, Disp: 14 tablet, Rfl: 0    famotidine (PEPCID) 10 mg tablet, Take 10 mg by mouth Taken as needed, Disp: , Rfl:     fluorouracil (EFUDEX) 5 % cream, Apply 1 Application topically daily, Disp: , Rfl:     hydroCHLOROthiazide 25 mg tablet, TAKE 1 TABLET (25 MG TOTAL) BY MOUTH DAILY., Disp: 90 tablet, Rfl: 1    ibuprofen (MOTRIN) 200 mg tablet, Take 200-800 mg by mouth every 6 (six) hours as needed for mild pain, Disp: , Rfl:     lidocaine (LIDODERM) 5 %, Apply 1 patch topically over 12 hours daily for 4 days Remove & Discard patch within 12 hours or as directed by MD, Disp: 4 patch, Rfl: 0    methocarbamol (ROBAXIN) 500 mg tablet, Take 1 tablet (500 mg total) by mouth 3 (three) times a day as needed for muscle spasms for up to 4 days, Disp: 12 tablet, Rfl: 0    metoprolol tartrate  (LOPRESSOR) 50 mg tablet, Take 1 tablet (50 mg total) by mouth every 12 (twelve) hours for 2 doses Start the night before your CT scan., Disp: 2 tablet, Rfl: 0    naproxen (Naprosyn) 500 mg tablet, Take 1 tablet (500 mg total) by mouth 2 (two) times a day with meals for 7 days, Disp: 14 tablet, Rfl: 0    Naproxen Sodium 220 MG CAPS, Take by mouth, Disp: , Rfl:     NON FORMULARY, daily as needed Medical marijuana, Disp: , Rfl:     predniSONE 10 mg tablet, Take 60 mg po day#1, 50 mg po day#2, 40 mg po day#3, 30 mg po day#4, 20 mg po day#5m and 10 mg po day#6 (Patient not taking: Reported on 2/5/2025), Disp: 21 tablet, Rfl: 0    predniSONE 10 mg tablet, Take 60 mg po day#1, 50 mg po day#2, 40 mg po day#3, 30 mg po day#4, 20 mg po day#5m and 10 mg po day#6, Disp: 21 tablet, Rfl: 0    tadalafil (CIALIS) 20 MG tablet, Take 1 tablet (20 mg total) by mouth as needed for erectile dysfunction, Disp: 10 tablet, Rfl: 3    traMADol (ULTRAM) 50 mg tablet, Take 1 tablet (50 mg total) by mouth daily as needed for moderate pain, Disp: 14 tablet, Rfl: 0    Current Allergies     Allergies as of 02/14/2025 - Reviewed 02/05/2025   Allergen Reaction Noted    Bee pollen Other (See Comments) 01/03/2025    Morphine Other (See Comments) 02/26/2018    Other  12/22/2018    Oxycodone-acetaminophen Nausea Only, Vomiting, and GI Intolerance 01/08/2018    Pollen extract Allergic Rhinitis 08/04/2022            The following portions of the patient's history were reviewed and updated as appropriate: allergies, current medications, past family history, past medical history, past social history, past surgical history and problem list.     Past Medical History:   Diagnosis Date    AVM (arteriovenous malformation)     BPH without urinary obstruction     last assessed 01/16/18    Chronic pain disorder     low back and neck    COVID     Jan 2021 and 5/14/22    Headache     occ    Hemorrhoids     Hydronephrosis     Hypertension     Kidney disease     Left  inguinal hernia     Lumbar herniated disc     PONV (postoperative nausea and vomiting)     Renal calculi     Renal colic     TMJ (dislocation of temporomandibular joint)     Ureter colic        Past Surgical History:   Procedure Laterality Date    BRAIN SURGERY      COLONOSCOPY      CYSTOSCOPY      w/removal of object, last assessed 01/16/18    CYSTOSCOPY      w/removal of ureteral calculus last assessed 01/16/18    CYSTOSCOPY KIDNEY W/ URETERAL GUIDE WIRE      last assessed 01/16/18    CYSTOSCOPY W/ URETERAL STENT PLACEMENT      last assessed 01/16/18    FL RETROGRADE PYELOGRAM  7/9/2020    HERNIA REPAIR  1965    [facility]    HERNIA REPAIR      KIDNEY SURGERY      removal of kidney stone in 2013 at [facility]    SD CYSTO/URETERO W/LITHOTRIPSY &INDWELL STENT INSRT Right 12/13/2018    Procedure: CYSTOSCOPY URETEROSCOPY  RETROGRADE PYELOGRAM AND INSERTION RIGHT STENT URETERAL;  Surgeon: Jose Daniel Everett MD;  Location: [facility];  Service: Urology    SD CYSTO/URETERO W/LITHOTRIPSY &INDWELL STENT INSRT Left 7/9/2020    Procedure: CYSTOSCOPY URETEROSCOPY WITH LITHOTRIPSY HOLMIUM LASER, RETROGRADE PYELOGRAM AND INSERTION STENT URETERAL;  Surgeon: Jose Daniel Everett MD;  [facility];  Service: Urology    SD LAPAROSCOPY SURG RPR INITIAL INGUINAL HERNIA Left 8/4/2022    Procedure: REPAIR HERNIA INGUINAL LAPAROSCOPIC W/ ROBOTICS;  Surgeon: Willie Carlson MD;  Location: [facility];  Service: General    TOOTH EXTRACTION         Family History   Problem Relation Age of Onset    Stroke Mother     Cancer Father     Diabetes Father     Prostate cancer Father     Heart disease Father     Hypertension Father          Medications have been verified.        Objective   /84   Pulse 88   Temp 98.5 °F (36.9 °C)   Resp 20   SpO2 98%        Physical Exam     Physical Exam  Vitals and nursing note reviewed.   Constitutional:       General: He is not in acute distress.     Appearance: Normal appearance. He is not  ill-appearing.   HENT:      Right Ear: Ear canal and external ear normal. No drainage, swelling or tenderness. A middle ear effusion is present. Tympanic membrane is erythematous (minimal). Tympanic membrane is not bulging.      Left Ear: Tympanic membrane, ear canal and external ear normal.      Nose: Congestion and rhinorrhea present.      Right Turbinates: Swollen.      Left Turbinates: Swollen.      Mouth/Throat:      Mouth: Mucous membranes are moist.      Pharynx: No oropharyngeal exudate or posterior oropharyngeal erythema.   Eyes:      General:         Right eye: No discharge.         Left eye: No discharge.      Extraocular Movements: Extraocular movements intact.   Cardiovascular:      Rate and Rhythm: Normal rate and regular rhythm.      Pulses: Normal pulses.      Heart sounds: Normal heart sounds.   Pulmonary:      Effort: Pulmonary effort is normal. No respiratory distress.      Breath sounds: Normal breath sounds. No wheezing, rhonchi or rales.   Abdominal:      General: Abdomen is flat. Bowel sounds are normal. There is no distension.      Palpations: Abdomen is soft.      Tenderness: There is no abdominal tenderness. There is no guarding.   Musculoskeletal:      Cervical back: Neck supple.   Lymphadenopathy:      Cervical: No cervical adenopathy.   Skin:     General: Skin is warm and dry.   Neurological:      Mental Status: He is alert.

## 2025-02-14 NOTE — TELEPHONE ENCOUNTER
Patient called again regarding his ongoing sinusitis.  Relayed message from Dr Singh and provider patient with contact information for Dr. Mejia.  Can provider please enter ENT referral for patient.

## 2025-02-19 ENCOUNTER — HOSPITAL ENCOUNTER (OUTPATIENT)
Dept: RADIOLOGY | Facility: HOSPITAL | Age: 65
Discharge: HOME | End: 2025-02-19
Attending: INTERNAL MEDICINE

## 2025-02-27 NOTE — NURSING NOTE
Patient rescheduled , updated information was sent regarding instructions and directions in e-mail that is linked to this account and via epic my chart. See message below.  Upcoming Radiology appointment at [facility]  Good afternoon Mr. Cowan,      You are scheduled on  3/5/25 @ 9 am  for a CTA Cardiac in the Radiology department of the [facility].    [facility] is located at [address] [place] [address], [address] 1st floor and present to Radiology 15 minutes prior to your appointment time.      It is recommended that you come to your appointment with a  for your safety.     Please avoid all caffeine products for 12 hours prior to the exam this includes coffee, decaf, tea, soda, or chocolate. This will be in effect from 3/4/25 at 9 pm till your appointment time.     You are also to stop solid food 3 hours prior to the exam at 6 am but you can continue to drink clear fluids (excluding coffee, decaf, tea, soda, or chocolate) prior to the exam.      If not on fluid restriction, please increase fluids starting the day prior. Also please drink 40 oz of water (5 cups) of water during the morning of your scan. You will be allowed to go to the restroom prior to the study.      You can take all your regular prescribed medication as usual unless otherwise directed by your physician.      Please do not use any inhaler medication prior to the exam, if used you may need to reschedule your test.      If you should use male enhancing medication (Viagra, Cialis, Levitra) this medication needs to be held 3 days prior to the test and can be resumed 24 hours after the test.      Your ordering provider has instructed for Metoprolol 50 mg to be taken on 3/4/25 at 7:30 pm and  please take Metoprolol 50 mg 1 ½ hour prior to the scan at 7:30 am on 3/5/25.      If anti-anxiety medication is to be taken, please take 1 hour prior to the scan. You will also need have a  to and from the  study if such medication is taken.     During the study a certain medication may be given such as a beta blocker if needed to  heart rate to under 65 beats per minute for optimal exam results.  Two sublingual tablets of nitroglycerin will be given during scan for vasodilation, prior to contrast administration and final scan.     If the heart rate is not able to achieve target (55-60 beats per minute) study most likely will be cancelled after discussing with Radiologist. At a higher heart rate study may not be useful or viable.      For your comfort we ask to please   -Dress in clothing that will allow easy access to your mid-arm for your IV insertion and your chest area.    -Your skin will be cleaned with a special cleanser that will feel cool to touch and may feel slightly abrasive for better quality heart rhythm.   Men - Please be aware that your chest may be shaved if you have excessive hair for the lead placement.      The study can last approximately 30-45 minutes.      If you have any questions or concerns regarding the above information, please feel free to send me a response via a call, email or [facility]'s my chart.       If we have not spoken yet and understand the above information and have no further questions or concerns, can you please send me a response via a call, email or [facility] my chart that you have received and understood the information.      May you have a good day,   Milagro Banks RN  [facility] Radiology RN  [address] [place] [address]  [phone] (Office)  [phone] (Fax)  [email or web address]

## 2025-03-03 DIAGNOSIS — I10 ESSENTIAL HYPERTENSION: ICD-10-CM

## 2025-03-03 RX ORDER — HYDROCHLOROTHIAZIDE 25 MG/1
25 TABLET ORAL DAILY
Qty: 90 TABLET | Refills: 1 | Status: SHIPPED | OUTPATIENT
Start: 2025-03-03

## 2025-03-03 NOTE — TELEPHONE ENCOUNTER
Reason for call: Only one tablet left   [x] Refill   [] Prior Auth  [] Other:     Office:   [x] PCP/Provider -   [] Specialty/Provider -     Medication: Hydrochlorothiazide     Dose/Frequency: 25 MG DAILY     Quantity: 90    Pharmacy: [facility] on file     Does the patient have enough for 3 days?   [] Yes   [x] No - Send as HP to POD

## 2025-03-05 ENCOUNTER — HOSPITAL ENCOUNTER (OUTPATIENT)
Dept: RADIOLOGY | Facility: HOSPITAL | Age: 65
Discharge: HOME | End: 2025-03-05
Attending: INTERNAL MEDICINE

## 2025-03-11 NOTE — NURSING NOTE
Unable to reach patient, sent instructions and directions in e-mail that is linked to this account and via epic my chart. See message below.  Upcoming Radiology appointment at [facility]  Good morning Mr. Cowan,      You are scheduled on  3/18/25 @ 8 am  for a CTA Cardiac in the Radiology department of the [facility].    [facility] is located at [address] [place] [address], [address] 1st floor and present to Radiology 15 minutes prior to your appointment time.      It is recommended that you come to your appointment with a  for your safety.    Please avoid all caffeine products for 12 hours prior to the exam this includes coffee, decaf, tea, soda, or chocolate. This will be in effect from 3/17/25 at 8 pm till your appointment time.    You are also to stop solid food 3 hours prior to the exam at 5 am but you can continue to drink clear fluids (excluding coffee, decaf, tea, soda, or chocolate) prior to the exam.      If not on fluid restriction, please increase fluids starting the day prior. Also please drink 40 oz of water (5 cups) of water during the morning of your scan. You will be allowed to go to the restroom prior to the study.      You can take all your regular prescribed medication as usual unless otherwise directed by your physician.      Please do not use any inhaler medication prior to the exam, if used you may need to reschedule your test.      If you should use male enhancing medication (Viagra, Cialis, Levitra) this medication needs to be held 3 days prior to the test and can be resumed 24 hours after the test.      Your ordering provider has instructed for Metoprolol 50 mg to be taken on 3/17/25 at 6:30 pm and  please take Metoprolol 50 mg 1 ½ hour prior to the scan at 6:30 am on 3/18/25.      If anti-anxiety medication is to be taken, please take 1 hour prior to the scan. You will also need have a  to and from the study if such medication is  taken.    During the study a certain medication may be given such as a beta blocker if needed to  heart rate to under 65 beats per minute for optimal exam results.  Two sublingual tablets of nitroglycerin will be given during scan for vasodilation, prior to contrast administration and final scan.    If the heart rate is not able to achieve target (55-60 beats per minute) study most likely will be cancelled after discussing with Radiologist. At a higher heart rate study may not be useful or viable.      For your comfort we ask to please   -Dress in clothing that will allow easy access to your mid-arm for your IV insertion and your chest area.    -Your skin will be cleaned with a special cleanser that will feel cool to touch and may feel slightly abrasive for better quality heart rhythm.   Men - Please be aware that your chest may be shaved if you have excessive hair for the lead placement.      The study can last approximately 30-45 minutes.     Results of the study can take from 3 to 5 business days, you will see the results also on my chart should you have the application.     If you have any questions or concerns regarding the above information, please feel free to send me a response via a call, email or . [facility] my chart.       If we have not spoken yet and understand the above information and have no further questions or concerns, can you please send me a response via a call, email or [facility] my chart that you have received and understood the information.      May you have a good day,   Milagro Banks RN  [facility] Radiology RN  [address] [place] [id number]  [phone] (Office)  [phone] (Fax)  [email or web address]

## 2025-03-17 ENCOUNTER — TELEPHONE (OUTPATIENT)
Age: 65
End: 2025-03-17

## 2025-03-17 NOTE — TELEPHONE ENCOUNTER
Pt called back regarding earlier messages.  Pt states at this time he is too uncomfortable with the potential side effects of metoprolol and will cancel his cardiac CTA.  Pt will have the echo done tomorrow as scheduled and will follow up with Dr Monreal on 3/20 to discuss future testing.

## 2025-03-17 NOTE — TELEPHONE ENCOUNTER
Pt calls regarding cardiac CTA that he has scheduled for tomorrow.  He is concerned with taking the metoprolol tartrate that was prescribed to take prior to CTA.  He read possible side effects of it, and has concerns with dizziness and nausea.  He states he tends to experience those side effects of medications.     Please advise.

## 2025-03-17 NOTE — TELEPHONE ENCOUNTER
Received call from patient following up on the status of his previous call from this morning. His CTA is scheduled for tomorrow morning. Please advise.

## 2025-03-18 ENCOUNTER — HOSPITAL ENCOUNTER (OUTPATIENT)
Dept: RADIOLOGY | Facility: HOSPITAL | Age: 65
Discharge: HOME | End: 2025-03-18
Attending: INTERNAL MEDICINE
Payer: COMMERCIAL

## 2025-03-18 ENCOUNTER — HOSPITAL ENCOUNTER (OUTPATIENT)
Dept: NON INVASIVE DIAGNOSTICS | Facility: HOSPITAL | Age: 65
Discharge: HOME | End: 2025-03-18
Attending: INTERNAL MEDICINE
Payer: COMMERCIAL

## 2025-03-18 VITALS
HEART RATE: 88 BPM | HEIGHT: 65 IN | DIASTOLIC BLOOD PRESSURE: 84 MMHG | WEIGHT: 227.07 LBS | BODY MASS INDEX: 37.83 KG/M2 | SYSTOLIC BLOOD PRESSURE: 134 MMHG

## 2025-03-18 DIAGNOSIS — R94.31 ABNORMAL EKG: ICD-10-CM

## 2025-03-18 LAB
AORTIC ROOT: 3.4 CM
ASCENDING AORTA: 3.6 CM
BSA FOR ECHO PROCEDURE: 2.09 M2
E WAVE DECELERATION TIME: 171 MS
E/A RATIO: 0.65
FRACTIONAL SHORTENING: 30 (ref 28–44)
INTERVENTRICULAR SEPTUM IN DIASTOLE (PARASTERNAL SHORT AXIS VIEW): 1.5 CM
INTERVENTRICULAR SEPTUM: 1.5 CM (ref 0.6–1.1)
LAAS-AP2: 19.2 CM2
LAAS-AP4: 19.3 CM2
LEFT ATRIUM SIZE: 3.9 CM
LEFT ATRIUM VOLUME (MOD BIPLANE): 50 ML
LEFT ATRIUM VOLUME INDEX (MOD BIPLANE): 23.9 ML/M2
LEFT INTERNAL DIMENSION IN SYSTOLE: 3 CM (ref 2.1–4)
LEFT VENTRICULAR INTERNAL DIMENSION IN DIASTOLE: 4.3 CM (ref 3.5–6)
LEFT VENTRICULAR POSTERIOR WALL IN END DIASTOLE: 1.4 CM
LEFT VENTRICULAR STROKE VOLUME: 50 ML
LV EF US.2D.A4C+ESTIMATED: 59 %
LVSV (TEICH): 50 ML
MV E'TISSUE VEL-LAT: 9 CM/S
MV E'TISSUE VEL-SEP: 7 CM/S
MV PEAK A VEL: 0.84 M/S
MV PEAK E VEL: 55 CM/S
MV STENOSIS PRESSURE HALF TIME: 50 MS
MV VALVE AREA P 1/2 METHOD: 4.4
RA PRESSURE ESTIMATED: 3 MMHG
RIGHT ATRIUM AREA SYSTOLE A4C: 18.3 CM2
RIGHT VENTRICLE ID DIMENSION: 3.8 CM
RV PSP: 22 MMHG
SL CV LEFT ATRIUM LENGTH A2C: 6.1 CM
SL CV LV EF: 60
SL CV PED ECHO LEFT VENTRICLE DIASTOLIC VOLUME (MOD BIPLANE) 2D: 84 ML
SL CV PED ECHO LEFT VENTRICLE SYSTOLIC VOLUME (MOD BIPLANE) 2D: 34 ML
TR MAX PG: 19 MMHG
TR PEAK VELOCITY: 2.2 M/S
TRICUSPID ANNULAR PLANE SYSTOLIC EXCURSION: 1.9 CM
TRICUSPID VALVE PEAK REGURGITATION VELOCITY: 2.18 M/S

## 2025-03-18 PROCEDURE — 93306 TTE W/DOPPLER COMPLETE: CPT

## 2025-03-18 PROCEDURE — 93306 TTE W/DOPPLER COMPLETE: CPT | Performed by: INTERNAL MEDICINE

## 2025-03-20 ENCOUNTER — OFFICE VISIT (OUTPATIENT)
Dept: CARDIOLOGY CLINIC | Facility: CLINIC | Age: 65
End: 2025-03-20
Payer: COMMERCIAL

## 2025-03-20 VITALS
OXYGEN SATURATION: 98 % | HEART RATE: 75 BPM | SYSTOLIC BLOOD PRESSURE: 124 MMHG | DIASTOLIC BLOOD PRESSURE: 82 MMHG | WEIGHT: 231 LBS | HEIGHT: 65 IN | BODY MASS INDEX: 38.49 KG/M2

## 2025-03-20 DIAGNOSIS — I10 ESSENTIAL HYPERTENSION: Primary | ICD-10-CM

## 2025-03-20 DIAGNOSIS — E78.5 DYSLIPIDEMIA: ICD-10-CM

## 2025-03-20 PROCEDURE — 99214 OFFICE O/P EST MOD 30 MIN: CPT | Performed by: INTERNAL MEDICINE

## 2025-03-20 RX ORDER — ROSUVASTATIN CALCIUM 10 MG/1
10 TABLET, COATED ORAL DAILY
Qty: 90 TABLET | Refills: 3 | Status: SHIPPED | OUTPATIENT
Start: 2025-03-20

## 2025-03-20 NOTE — PROGRESS NOTES
Cardiology             Vlad Cowan  1960  [id number]              Assessment/Plan:    Atypical chest pain, resolved  Dyslipidemia  Hypertension  Obesity      This patient was sent for a coronary CT angiogram after his initial consultation on 1/15/2025 by Dr. Deluna but he change his mind, not wishing to get the procedure done.  I have offered him an ischemic evaluation but at this time wants to hold off as his chest discomfort has resolved and has not felt this over the past several months.  He denies any exertional chest discomfort.  He gets mildly short of breath with walking uphill but does not exercise.  I have encouraged him to call me if he changes mind about an ischemic evaluation.  Echocardiogram was unremarkable.  He is agreeable to starting rosuvastatin for elevated ASCVD risk of about 15%.  Indications, risks, and benefits were reviewed with him.  Will defer routine lipid panel to PCP with other health maintenance blood work.      Follow-up in 6 months        Interval History:     This is a 65-year-old male seen by Dr. Deluna on 1/15/2025 for initial consultation.  He has a past medical history of AVM, BPH, hemorrhoids, hypertension, CKD and renal calculi.  He presented for evaluation of chest pain.  It seemed to be nonexertional, related more to stressful situations.  A coronary CT angiogram was ordered for him although not completed, as he changed his mind and did not want to get the study done.    He has a history of having chest pain around 2008 at which time he presented to [facility].  He was told his troponins were minimally elevated and underwent cardiac catheterization which was unremarkable.    Echocardiogram 3/18/2025 demonstrated left ejection fraction 60% with grade 1 diastolic dysfunction.    He presents today to establish care with me.  He states he has not had chest discomfort over the past several months.  He reiterates that he was getting discomfort with  "stressful situations and not with exertional activities.  He states he was concerned about taking metoprolol prior to his coronary CT angiogram and this is why he canceled the study.             Vitals:  Vitals:    03/20/25 1048   BP: 124/82   Pulse: 75   SpO2: 98%   Weight: 105 kg (231 lb)   Height: 5' 5\" (1.651 m)         Past Medical History:   Diagnosis Date   • AVM (arteriovenous malformation)    • BPH without urinary obstruction     last assessed 01/16/18   • Chronic pain disorder     low back and neck   • COVID     Jan 2021 and 5/14/22   • Headache     occ   • Hemorrhoids    • Hydronephrosis    • Hypertension    • Kidney disease    • Left inguinal hernia    • Lumbar herniated disc    • PONV (postoperative nausea and vomiting)    • Renal calculi    • Renal colic    • TMJ (dislocation of temporomandibular joint)    • Ureter colic      Social History     Socioeconomic History   • Marital status: /Civil Union     Spouse name: Not on file   • Number of children: 1   • Years of education: completed college   • Highest education level: Not on file   Occupational History   • Occupation: Antiques   Tobacco Use   • Smoking status: Never   • Smokeless tobacco: Never   Vaping Use   • Vaping status: Some Days   • Substances: THC   Substance and Sexual Activity   • Alcohol use: Yes     Comment: rare   • Drug use: Yes     Types: Marijuana     Comment: ocassionaly use   • Sexual activity: Yes   Other Topics Concern   • Not on file   Social History Narrative    Lives with spouse    No caffeine use     Social Drivers of Health     Financial Resource Strain: Not on file   Food Insecurity: Not on file   Transportation Needs: Not on file   Physical Activity: Not on file   Stress: Not on file   Social Connections: Not on file   Intimate Partner Violence: Not on file   Housing Stability: Not on file      Family History   Problem Relation Age of Onset   • Stroke Mother    • Cancer Father    • Diabetes Father    • Prostate " cancer Father    • Heart disease Father    • Hypertension Father      Past Surgical History:   Procedure Laterality Date   • BRAIN SURGERY     • COLONOSCOPY     • CYSTOSCOPY      w/removal of object, last assessed 01/16/18   • CYSTOSCOPY      w/removal of ureteral calculus last assessed 01/16/18   • CYSTOSCOPY KIDNEY W/ URETERAL GUIDE WIRE      last assessed 01/16/18   • CYSTOSCOPY W/ URETERAL STENT PLACEMENT      last assessed 01/16/18   • FL RETROGRADE PYELOGRAM  7/9/2020   • HERNIA REPAIR  [address] [place] [address]   • HERNIA REPAIR     • KIDNEY SURGERY      removal of kidney stone in 2013 at [facility]   • RI CYSTO/URETERO W/LITHOTRIPSY &INDWELL STENT INSRT Right 12/13/2018    Procedure: CYSTOSCOPY URETEROSCOPY  RETROGRADE PYELOGRAM AND INSERTION RIGHT STENT URETERAL;  Surgeon: Jose Daniel Everett MD;  [facility];  Service: Urology   • RI CYSTO/URETERO W/LITHOTRIPSY &INDWELL STENT INSRT Left 7/9/2020    Procedure: CYSTOSCOPY URETEROSCOPY WITH LITHOTRIPSY HOLMIUM LASER, RETROGRADE PYELOGRAM AND INSERTION STENT URETERAL;  Surgeon: Jose Daniel Everett MD;  [facility];  Service: Urology   • RI LAPAROSCOPY SURG RPR INITIAL INGUINAL HERNIA Left 8/4/2022    Procedure: REPAIR HERNIA INGUINAL LAPAROSCOPIC W/ ROBOTICS;  Surgeon: Willie Carlson MD;  [facility];  Service: General   • TOOTH EXTRACTION         Current Outpatient Medications:   •  acetaminophen (TYLENOL) 500 mg tablet, Take 500-1,000 mg by mouth every 6 (six) hours as needed for mild pain, Disp: , Rfl:   •  ALPRAZolam (XANAX) 0.25 mg tablet, Take 1 tablet (0.25 mg total) by mouth daily as needed for anxiety, Disp: 20 tablet, Rfl: 0  •  famotidine (PEPCID) 10 mg tablet, Take 10 mg by mouth Taken as needed, Disp: , Rfl:   •  fluorouracil (EFUDEX) 5 % cream, Apply 1 Application topically daily, Disp: , Rfl:   •  hydroCHLOROthiazide 25 mg tablet, Take 1 tablet (25 mg total) by mouth daily, Disp: 90 tablet, Rfl: 1  •  ibuprofen (MOTRIN) 200 mg  tablet, Take 200-800 mg by mouth every 6 (six) hours as needed for mild pain, Disp: , Rfl:   •  metoprolol tartrate (LOPRESSOR) 50 mg tablet, Take 1 tablet (50 mg total) by mouth every 12 (twelve) hours for 2 doses Start the night before your CT scan., Disp: 2 tablet, Rfl: 0  •  Naproxen Sodium 220 MG CAPS, Take by mouth, Disp: , Rfl:   •  NON FORMULARY, daily as needed Medical marijuana, Disp: , Rfl:   •  rosuvastatin (CRESTOR) 10 MG tablet, Take 1 tablet (10 mg total) by mouth daily, Disp: 90 tablet, Rfl: 3  •  tadalafil (CIALIS) 20 MG tablet, Take 1 tablet (20 mg total) by mouth as needed for erectile dysfunction, Disp: 10 tablet, Rfl: 3  •  traMADol (ULTRAM) 50 mg tablet, Take 1 tablet (50 mg total) by mouth daily as needed for moderate pain, Disp: 14 tablet, Rfl: 0  •  dicyclomine (BENTYL) 20 mg tablet, Take 1 tablet (20 mg total) by mouth 2 (two) times a day for 7 days, Disp: 14 tablet, Rfl: 0  •  lidocaine (LIDODERM) 5 %, Apply 1 patch topically over 12 hours daily for 4 days Remove & Discard patch within 12 hours or as directed by MD, Disp: 4 patch, Rfl: 0  •  methocarbamol (ROBAXIN) 500 mg tablet, Take 1 tablet (500 mg total) by mouth 3 (three) times a day as needed for muscle spasms for up to 4 days, Disp: 12 tablet, Rfl: 0  •  naproxen (Naprosyn) 500 mg tablet, Take 1 tablet (500 mg total) by mouth 2 (two) times a day with meals for 7 days (Patient not taking: Reported on 3/20/2025), Disp: 14 tablet, Rfl: 0  •  predniSONE 10 mg tablet, Take 60 mg po day#1, 50 mg po day#2, 40 mg po day#3, 30 mg po day#4, 20 mg po day#5m and 10 mg po day#6 (Patient not taking: Reported on 2/5/2025), Disp: 21 tablet, Rfl: 0  •  predniSONE 10 mg tablet, Take 60 mg po day#1, 50 mg po day#2, 40 mg po day#3, 30 mg po day#4, 20 mg po day#5m and 10 mg po day#6 (Patient not taking: Reported on 3/20/2025), Disp: 21 tablet, Rfl: 0        Review of Systems:  Review of Systems   Respiratory: Negative.     Cardiovascular: Negative.     Musculoskeletal:  Positive for back pain.   All other systems reviewed and are negative.        Physical Exam:  Physical Exam  Constitutional:       General: He is not in acute distress.     Appearance: He is well-developed. He is obese. He is not diaphoretic.   HENT:      Head: Normocephalic and atraumatic.   Eyes:      General: No scleral icterus.        Right eye: No discharge.      Pupils: Pupils are equal, round, and reactive to light.   Neck:      Thyroid: No thyromegaly.   Cardiovascular:      Rate and Rhythm: Normal rate and regular rhythm.      Heart sounds: Normal heart sounds. No murmur heard.     No friction rub. No gallop.   Pulmonary:      Effort: Pulmonary effort is normal.      Breath sounds: Normal breath sounds.   Abdominal:      General: There is no distension.      Tenderness: There is no abdominal tenderness. There is no guarding or rebound.   Musculoskeletal:         General: Normal range of motion.      Cervical back: Normal range of motion and neck supple.   Skin:     General: Skin is warm and dry.      Coloration: Skin is not pale.      Findings: No erythema or rash.   Neurological:      Mental Status: He is alert and oriented to person, place, and time.      Coordination: Coordination normal.   Psychiatric:         Behavior: Behavior normal.         Thought Content: Thought content normal.         Judgment: Judgment normal.         This note was completed in part utilizing M-Modal Fluency Direct Software.  Grammatical errors, random word insertions, spelling mistakes, and incomplete sentences can be an occasional consequence of this system secondary to software limitations, ambient noise, and hardware issues.  If you have any questions or concerns about the content, text, or information contained within the body of this dictation, please contact the provider for clarification.

## 2025-04-10 ENCOUNTER — OFFICE VISIT (OUTPATIENT)
Dept: URGENT CARE | Facility: MEDICAL CENTER | Age: 65
End: 2025-04-10
Payer: COMMERCIAL

## 2025-04-10 ENCOUNTER — APPOINTMENT (OUTPATIENT)
Dept: LAB | Facility: MEDICAL CENTER | Age: 65
End: 2025-04-10
Payer: COMMERCIAL

## 2025-04-10 VITALS
TEMPERATURE: 98.9 F | DIASTOLIC BLOOD PRESSURE: 95 MMHG | HEART RATE: 70 BPM | OXYGEN SATURATION: 99 % | SYSTOLIC BLOOD PRESSURE: 150 MMHG | RESPIRATION RATE: 18 BRPM
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
## 2025-04-10 DIAGNOSIS — S30.861A TICK BITE OF ABDOMINAL WALL, INITIAL ENCOUNTER: Primary | ICD-10-CM

## 2025-04-10 DIAGNOSIS — S30.861A TICK BITE OF ABDOMINAL WALL, INITIAL ENCOUNTER: ICD-10-CM
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
## 2025-04-10 PROCEDURE — 36415 COLL VENOUS BLD VENIPUNCTURE: CPT

## 2025-04-10 PROCEDURE — 99214 OFFICE O/P EST MOD 30 MIN: CPT | Performed by: PHYSICIAN ASSISTANT

## 2025-04-10 PROCEDURE — 86618 LYME DISEASE ANTIBODY: CPT

## 2025-04-10 RX ORDER — DOXYCYCLINE 100 MG/1
100 TABLET ORAL 2 TIMES DAILY
Qty: 20 TABLET | Refills: 0 | Status: SHIPPED | OUTPATIENT
Start: 2025-04-10 | End: 2025-04-20

## 2025-04-10 NOTE — PROGRESS NOTES
[facility]  Name: Vlad Cowan      : 1960      MRN: [id number]  Encounter Provider: Obed Cee PA-C  Encounter Date: 4/10/2025   Encounter department: [facility] [place]  :  Assessment & Plan  Tick bite of abdominal wall, initial encounter    Orders:    Lyme Total AB W Reflex to IGM/IGG; Future    doxycycline (ADOXA) 100 MG tablet; Take 1 tablet (100 mg total) by mouth 2 (two) times a day for 10 days        Patient Instructions    Take doxycycline as directed until completed.  If lab work comes back negative for Lyme disease infection may discontinue antibiotics sooner.  Follow up with PCP in 3-5 days.  Proceed to  ER if symptoms worsen.    If tests are performed, our office will contact you with results only if changes need to made to the care plan discussed with you at the visit. [facility]    Chief Complaint:   Chief Complaint   Patient presents with    Tick Bite     Patient c/o a Tick Bite x 1 week. He noted that he removed the tick when he found it but now he has a red spot.     History of Present Illness   65-year-old male presents with a tick bite.  Patient reports about a week ago he pulled off engorged tick that was on his right flank area with he was showering.  Since then noticed a red spot to the area.  Denies any fevers or chills or bodyaches.  No chest pain shortness of breath.    Tick Bite  This is a new problem. The current episode started in the past 7 days. The problem occurs constantly. The problem has been waxing and waning. Pertinent negatives include no arthralgias, coughing, fever, myalgias or sore throat. Nothing aggravates the symptoms. He has tried nothing for the symptoms. The treatment provided no relief.         Review of Systems   Constitutional: Negative.  Negative for fever.   HENT: Negative.  Negative for sore throat.    Eyes: Negative.    Respiratory: Negative.  Negative for cough.    Cardiovascular:  Negative.    Gastrointestinal: Negative.    Musculoskeletal: Negative.  Negative for arthralgias and myalgias.   Skin: Negative.    Neurological: Negative.      Past Medical History   Past Medical History:   Diagnosis Date    AVM (arteriovenous malformation)     BPH without urinary obstruction     last assessed 01/16/18    Chronic pain disorder     low back and neck    COVID     Jan 2021 and 5/14/22    Headache     occ    Hemorrhoids     Hydronephrosis     Hypertension     Kidney disease     Left inguinal hernia     Lumbar herniated disc     PONV (postoperative nausea and vomiting)     Renal calculi     Renal colic     TMJ (dislocation of temporomandibular joint)     Ureter colic      Past Surgical History:   Procedure Laterality Date    BRAIN SURGERY      COLONOSCOPY      CYSTOSCOPY      w/removal of object, last assessed 01/16/18    CYSTOSCOPY      w/removal of ureteral calculus last assessed 01/16/18    CYSTOSCOPY KIDNEY W/ URETERAL GUIDE WIRE      last assessed 01/16/18    CYSTOSCOPY W/ URETERAL STENT PLACEMENT      last assessed 01/16/18    FL RETROGRADE PYELOGRAM  7/9/2020    HERNIA REPAIR  [address] [place] [address]    HERNIA REPAIR      KIDNEY SURGERY      removal of kidney stone in 2013 at [facility]    SC CYSTO/URETERO W/LITHOTRIPSY &INDWELL STENT INSRT Right 12/13/2018    Procedure: CYSTOSCOPY URETEROSCOPY  RETROGRADE PYELOGRAM AND INSERTION RIGHT STENT URETERAL;  Surgeon: Jose Daniel Everett MD;  [facility];  Service: Urology    SC CYSTO/URETERO W/LITHOTRIPSY &INDWELL STENT INSRT Left 7/9/2020    Procedure: CYSTOSCOPY URETEROSCOPY WITH LITHOTRIPSY HOLMIUM LASER, RETROGRADE PYELOGRAM AND INSERTION STENT URETERAL;  Surgeon: Jose Daniel Everett MD;  [facility];  Service: Urology    SC LAPAROSCOPY SURG RPR INITIAL INGUINAL HERNIA Left 8/4/2022    Procedure: REPAIR HERNIA INGUINAL LAPAROSCOPIC W/ ROBOTICS;  Surgeon: Willie Carlson MD;  [facility];  Service: General    TOOTH EXTRACTION        Family History   Problem Relation Age of Onset    Stroke Mother     Cancer Father     Diabetes Father     Prostate cancer Father     Heart disease Father     Hypertension Father      he reports that he has never smoked. He has never used smokeless tobacco. He reports current alcohol use. He reports current drug use. Drug: Marijuana.  Current Outpatient Medications   Medication Instructions    acetaminophen (TYLENOL) 500-1,000 mg, Every 6 hours PRN    ALPRAZolam (XANAX) 0.25 mg, Oral, Daily PRN    dicyclomine (BENTYL) 20 mg, Oral, 2 times daily    doxycycline (ADOXA) 100 mg, Oral, 2 times daily    famotidine (PEPCID) 10 mg    fluorouracil (EFUDEX) 5 % cream 1 Application, Daily    hydroCHLOROthiazide 25 mg, Oral, Daily    ibuprofen (MOTRIN) 200-800 mg, Every 6 hours PRN    lidocaine (LIDODERM) 5 % 1 patch, Topical, Daily, Remove & Discard patch within 12 hours or as directed by MD    methocarbamol (ROBAXIN) 500 mg, Oral, 3 times daily PRN    metoprolol tartrate (LOPRESSOR) 50 mg, Oral, Every 12 hours scheduled, Start the night before your CT scan.    naproxen (NAPROSYN) 500 mg, Oral, 2 times daily with meals    Naproxen Sodium 220 MG CAPS Take by mouth    NON FORMULARY Daily PRN    predniSONE 10 mg tablet Take 60 mg po day#1, 50 mg po day#2, 40 mg po day#3, 30 mg po day#4, 20 mg po day#5m and 10 mg po day#6    predniSONE 10 mg tablet Take 60 mg po day#1, 50 mg po day#2, 40 mg po day#3, 30 mg po day#4, 20 mg po day#5m and 10 mg po day#6    rosuvastatin (CRESTOR) 10 mg, Oral, Daily    tadalafil (CIALIS) 20 mg, Oral, As needed    traMADol (ULTRAM) 50 mg, Oral, Daily PRN     Allergies   Allergen Reactions    Bee Pollen Other (See Comments)    Morphine Other (See Comments)    Other      Pt states that any narcotic makes him feel sick    Oxycodone-Acetaminophen Nausea Only, Vomiting and GI Intolerance    Pollen Extract Allergic Rhinitis     patient states he has seasonal environmental allergies, not sure exactly what  "triggers reaction        Objective   /95   Pulse 70   Temp 98.9 °F (37.2 °C)   Resp 18   SpO2 99%      Physical Exam  Vitals and nursing note reviewed.   Constitutional:       General: He is not in acute distress.     Appearance: Normal appearance. He is well-developed.   HENT:      Head: Normocephalic and atraumatic.      Right Ear: External ear normal.      Left Ear: External ear normal.      Nose: Nose normal.   Eyes:      General:         Right eye: No discharge.         Left eye: No discharge.      Conjunctiva/sclera: Conjunctivae normal.   Cardiovascular:      Rate and Rhythm: Normal rate and regular rhythm.   Pulmonary:      Effort: Pulmonary effort is normal. No respiratory distress.   Musculoskeletal:         General: Normal range of motion.      Cervical back: Normal range of motion and neck supple.   Skin:     General: Skin is warm and dry.      Findings: Erythema present.          Neurological:      Mental Status: He is alert and oriented to person, place, and time.   Psychiatric:         Mood and Affect: Mood normal.         Behavior: Behavior normal.         MDM: Patient had picture of the tick that he removed from his right flank area.  Tick did appear to be engorged and most likely was there on patient greater than 72 hours.  Patient will be started on doxycycline at this time and labs were placed to check for Lyme disease.  Instructed patient if lying disease stuff comes back negative he can discontinue the antibiotic sooner.  We did blood pressure and still remains elevated will need to follow-up with PCP.  Reviewed past medical charts.    Portions of the record may have been created with voice recognition software.  Occasional wrong word or \"sound a like\" substitutions may have occurred due to the inherent limitations of voice recognition software.  Read the chart carefully and recognize, using context, where substitutions have occurred.  "

## 2025-04-10 NOTE — PATIENT INSTRUCTIONS
"Patient Instructions    Take doxycycline as directed until completed.  If lab work comes back negative for Lyme disease infection may discontinue antibiotics sooner.  Follow up with PCP in 3-5 days.  Proceed to  ER if symptoms worsen.    If tests are performed, our office will contact you with results only if changes need to made to the care plan discussed with you at the visit. You can review your full results on [facility] MyChart.    Patient Education     Insect bites and stings   The Basics   Written by the doctors and editors at [facility]   How are insect bites and stings different? -- When an insect bites you, it uses its mouth parts. When an insect stings you, it uses a special \"stinger\" on the back of its body.   Biting insects, like mosquitoes and ticks, can transfer blood from other people and animals that they've bitten on to you. Some diseases and infections can be spread this way.   Stinging insects, like bees, wasps, and fire ants, do not usually carry disease. But stinging insects can inject you with \"venom.\" This can irritate your skin. Plus, a sting can be deadly to people who are severely allergic to the insect venom.  What is a normal reaction to an insect sting? -- Insect stings can cause the area around the sting to swell, turn red, hurt, and feel hot (picture 1).  To treat the pain and swelling around the area of the sting, you can:   Wash the area with soap and cool water.   Keep the area clean, and try not to scratch it.   Put a cold, damp washcloth on the area.   Take or apply anti-itch medicine.   Take a non-prescription pain medicine for the pain.  The reaction usually gets better in an hour or 2. But for some people, swelling around the sting can last for days. This is called a \"large local reaction.\" It is not dangerous, and it is not the same as an allergic reaction.  Some people do have a severe allergic reaction to insect stings. This is called \"anaphylaxis.\" Signs include hives, " swelling, and trouble breathing.  What should I know about tick bites? -- Ticks are found in the grass and on shrubs, and can attach to people walking by. One type of tick can spread Lyme disease. But a tick has to stay attached for a while before it can give you the infection.  If you are bitten by a tick, gently remove the tick from your skin using tweezers. Save the tick by sealing it in a piece of clear tape. If you can't save it, try to remember its color and size. This can help your doctor or nurse figure out if it might be the type of tick that carries Lyme disease.  Call your doctor or nurse if:   You cannot remove a tick from yourself or your child.   You get a fever or rash within the next few weeks.   You think that you have had a tick attached for at least 36 hours (a day and a half).  Your doctor or nurse can then decide if you need to take a dose of an antibiotic to help prevent Lyme. Doctors only recommend antibiotics to prevent Lyme disease in some situations. It depends on your age, where you live, what kind of tick bit you, and how long it was attached.  What can I do to lower my chances of getting bitten or stung? -- You can:   Wear shoes, long-sleeved shirts, and long pants when you go outside. If you are worried about ticks, tuck your pants into your socks and wear light colors so you can spot any ticks that get on you.   Wear bug spray.   Stay inside at hilary and dusk, when mosquitoes are most active.   Keep your windows closed. If you do open them, make sure that they have screens.   Drain areas of standing water near your home, such as wading pools and buckets. Mosquitoes breed in standing water.   Keep foods and drinks covered when you are outside.   If you see a stinging insect, stay calm and slowly back away.   If you live in an area that has fire ants, avoid stepping on ant mounds.   If you find an insect nest in or near your house, call a pest control service to get rid of the nest  safely.   Treat your pets and home for fleas, if needed. Ask your pet's  for advice on how to do this.  What should I do if I am stung by a bee, wasp, or fire ant? -- If you are stung by a bee or wasp, quickly remove the stinger from your skin if it is still there. If you are stung by a fire ant, kill the ant with a slap as soon as you feel the sting.  Call for an ambulance (in the [place] and [place], call 9-1-1) if you:   Have trouble breathing, become hoarse, or start wheezing (hearing a whistling sound when you breathe)   Start to swell, especially around the face, eyelids, ears, mouth, hands, or feet   Have belly cramps, nausea, vomiting, or diarrhea   Feel dizzy or pass out  All topics are updated as new evidence becomes available and our peer review process is complete.  This topic retrieved from [employer] on: Feb 26, 2024.  Topic 08201 Version 12.0  Release: 32.2.4 - C32.56  © 2024 UpToDate, Inc. and/or its affiliates. All rights reserved.  picture 1: Insect sting     This is a normal reaction to a bee or wasp sting. There can be redness and mild, painful swelling around the spot where the stinger went in. These symptoms usually go away within a few hours.  Graphic 40079 Version 4.0  Consumer Information Use and Disclaimer   Disclaimer: This generalized information is a limited summary of diagnosis, treatment, and/or medication information. It is not meant to be comprehensive and should be used as a tool to help the user understand and/or assess potential diagnostic and treatment options. It does NOT include all information about conditions, treatments, medications, side effects, or risks that may apply to a specific patient. It is not intended to be medical advice or a substitute for the medical advice, diagnosis, or treatment of a health care provider based on the health care provider's examination and assessment of a patient's specific and unique circumstances. Patients must speak with a health care  "provider for complete information about their health, medical questions, and treatment options, including any risks or benefits regarding use of medications. This information does not endorse any treatments or medications as safe, effective, or approved for treating a specific patient. UpToDate, Inc. and its affiliates disclaim any warranty or liability relating to this information or the use thereof.The use of this information is governed by the Terms of Use, available at https://www.wolters[employer]ologyuwer.com/en/know/clinical-effectiveness-terms. 2024© UpToDate, Inc. and its affiliates and/or licensors. All rights reserved.  Copyright   © 2024 UpToDate, Inc. and/or its affiliates. All rights reserved.      Patient Education     Lyme disease   The Basics   Written by the doctors and editors at [employer]   What is Lyme disease? -- Lyme disease is an illness that can make you feel like you have the flu. It can also cause a rash or fever, as well as nerve, joint, or heart problems.  People can get Lyme disease after being bitten by a tiny insect called a tick. When a certain type of tick bites you, it can pass the germ that causes Lyme disease from its body to yours. But a tick can infect you only if it stays attached for at least a day and a half.  The ticks that carry Lyme disease feed on deer and mice. They are only about the size of a poppy seed when they are young, which is when they most often spread Lyme disease. They grow to about the size of a sesame seed as adults (figure 1). Ticks are found in tall grass and on shrubs, and can attach to animals and people walking by. Ticks cannot fly or jump.  What are the symptoms of Lyme disease? -- Symptoms can start days or weeks after a tick bite. They include:   A rash where you were bitten - The rash often appears within a month of getting bitten. It is red, but its center can be the color of your skin. It might get bigger over a few days. To some, it looks like a \"bull's eye\" " "(picture 1).   Fever   Body aches and pains   Heart problems such as a slowed heart rate   Headache and stiff neck   Feelings of pain, weakness, or numbness  If a person is not treated, further symptoms can occur months to years after a tick bite. This is sometimes called \"late\" Lyme disease. Some people develop late Lyme disease without having any earlier symptoms. The most common symptom of late Lyme disease is pain and swelling of the joints, usually 1 or both knees. Some people can have other symptoms, such as numbness, tingling, or pain in the legs. They can also have skin problems, such as skin swelling or thinning, but this happens mostly in [place].  Should I see a doctor or nurse? -- Yes. If you have symptoms of Lyme disease, see a doctor or nurse. Some people don't know that they were bitten by a tick. Or they might not remember having a rash or other early symptoms.  Is there a test for Lyme disease? -- Yes. Blood tests can show if you are infected with the germ that causes Lyme disease. But it takes time for the blood tests to turn positive. This means the tests won't work if you get them right after being bitten or when you have the early rash that goes with Lyme disease. Because of this, if you have a recent tick bite or the rash, you do not need a blood test for Lyme disease. If you have been ill from Lyme disease for more than a month, the tests work well.  If your doctor or nurse suspects you have Lyme disease, they will do an exam and ask you questions. The doctor or nurse will use this information (and your blood test result, if needed) to decide your treatment.  How is Lyme disease treated? -- Lyme disease is usually treated with antibiotics. There are a few different types. Treatment with antibiotics should help your symptoms go away. Sometimes, symptoms improve quickly. Other times, it can take weeks or months for symptoms to go away.  What if I am pregnant? -- If you are pregnant, talk to your " "doctor. Some medicines for Lyme disease are safe to take if you are pregnant, but others might not be.  Can Lyme disease be prevented? -- The best way to prevent Lyme disease is to avoid getting bitten by a tick. But if you were already bitten, your doctor might give you an antibiotic. In some situations, this can reduce your chances of getting Lyme disease.  To try to avoid getting bitten by a tick, you can:   Wear shoes, long-sleeved shirts, and long pants when you go outside. Keep ticks away from your skin by tucking your pants into your socks.   Wear light colors so you can spot any ticks that get on your clothes   Wear bug repellent that protects against ticks, such as a spray or cream containing DEET. But you should talk to your doctor or nurse about using DEET on children. For example, DEET should not be used on babies younger than 2 months, and experts suggest not using products with more than 30 percent DEET on other young children.  On your clothes and gear, you can use bug repellents that have a chemical called \"permethrin.\"   Shower within 2 hours of being outdoors if you think you have been in an area where there are ticks   Put dry clothes briefly (for about 4 minutes) in a dryer after being outdoors   Check your clothes and body for ticks after being outdoors. Be sure to check your scalp, waist, armpits, groin, and backs of your knees. If you have children, check them, too.   If you live in a place that has deer or mice nearby, take steps to keep those animals away. Deer and mice carry ticks.  If you find a tick on your body or on your child, use tweezers to grab it. Then pull it out slowly and gently. After that, wash the area with soap and water.  You do not need to keep the tick. But knowing what it looked like can help your doctor decide about your treatment. See if you can tell:   Its color and size   If it was attached to your skin or just resting on your skin   If it was big, round, and full of " "blood  You should see your doctor or nurse if you have a tick and you cannot get it off.  You should also call your doctor or nurse if you think you have had a tick attached for at least 36 hours (a day and a half). Then they can decide if you need to take a dose of an antibiotic to help prevent Lyme disease. Doctors only recommend antibiotics to prevent Lyme disease in some situations. It depends on your age, where you live, what kind of tick bit you, and how long it was attached.  If you or your child was bitten by a deer tick, you should watch the area around the bite for a month to see if a rash appears.  All topics are updated as new evidence becomes available and our peer review process is complete.  This topic retrieved from [employer] on: Feb 26, 2024.  Topic 23271 Version 12.0  Release: 32.2.4 - C32.56  © 2024 UpToDate, Inc. and/or its affiliates. All rights reserved.  figure 1: Different types of ticks     This drawing shows 3 different types of ticks. The top row are the type of ticks that can carry Lyme disease (\"blacklegged ticks\" or \"deer ticks\"). The middle and bottom rows are different types of ticks that do not carry Lyme disease.  Graphic 332978 Version 3.0  picture 1: Lyme disease rash     Lyme disease can cause a circular rash that might look like a \"bull's eye.\"  Graphic 34256 Version 4.0  Consumer Information Use and Disclaimer   Disclaimer: This generalized information is a limited summary of diagnosis, treatment, and/or medication information. It is not meant to be comprehensive and should be used as a tool to help the user understand and/or assess potential diagnostic and treatment options. It does NOT include all information about conditions, treatments, medications, side effects, or risks that may apply to a specific patient. It is not intended to be medical advice or a substitute for the medical advice, diagnosis, or treatment of a health care provider based on the health care provider's " examination and assessment of a patient's specific and unique circumstances. Patients must speak with a health care provider for complete information about their health, medical questions, and treatment options, including any risks or benefits regarding use of medications. This information does not endorse any treatments or medications as safe, effective, or approved for treating a specific patient. UpToDate, Inc. and its affiliates disclaim any warranty or liability relating to this information or the use thereof.The use of this information is governed by the Terms of Use, available at https://www.woltersN[employer]Optouwer.com/en/know/clinical-effectiveness-terms. 2024© UpToDate, Inc. and its affiliates and/or licensors. All rights reserved.  Copyright   © 2024 UpToDate, Inc. and/or its affiliates. All rights reserved.

## 2025-04-11 ENCOUNTER — DOCUMENTATION (OUTPATIENT)
Dept: ADMINISTRATIVE | Facility: OTHER | Age: 65
End: 2025-04-11

## 2025-04-11 LAB — B BURGDOR IGG+IGM SER QL IA: NEGATIVE

## 2025-04-11 NOTE — PROGRESS NOTES
Blood pressure elevated  Appointment department: [facility]  Appointment provider: Obed Cee PA-C  Blood pressure   04/10/25 1019 150/95   04/10/25 1009 169/83     [facility] pt pc    04/11/25 10:35 AM    Patient was called after the Urgent Care visit Patient declined to schedule appointment.    Thank you.  Carole Jay  [facility]

## 2025-04-17 ENCOUNTER — TELEPHONE (OUTPATIENT)
Dept: FAMILY MEDICINE CLINIC | Facility: CLINIC | Age: 65
End: 2025-04-17

## 2025-04-17 NOTE — TELEPHONE ENCOUNTER
I called and spoke with the patient and caldwell him aware he is due for his annual exam. He will give us a call back he was driving.

## 2025-04-24 ENCOUNTER — TELEPHONE (OUTPATIENT)
Age: 65
End: 2025-04-24

## 2025-04-24 NOTE — TELEPHONE ENCOUNTER
Patient called to schedule annual physical exam and mentioned that he also has some concerns WITH higher than normal BLOOD PRESSURE.  3/20/2025 169/83  4/10/2025  150/95    Scheduled 1st available annual physical appointment with Primary Care Provider (CE),  Thursday, 5/22 at 10:45 am.   (Declined to schedule sooner (OVS), patient  wants to address annual physical and high blood pressure at same appointment.)    If Primary Care Provider (CE) feels patient should be seen sooner please call patient with sooner available annual physical appointment (late morning preferred)  Patient is currently on the wait list.  Patients call back# [phone]

## 2025-04-24 NOTE — TELEPHONE ENCOUNTER
Called and spoke with patient to advise recommendations per Dr. Singh. Patient declines to schedule sooner with another provider as patient states he doesn't wish to schedule sooner and will wait until appointment with Dr. Singh for physical exam.

## 2025-05-22 ENCOUNTER — OFFICE VISIT (OUTPATIENT)
Dept: FAMILY MEDICINE CLINIC | Facility: CLINIC | Age: 65
End: 2025-05-22
Payer: COMMERCIAL

## 2025-05-22 VITALS
BODY MASS INDEX: 36.21 KG/M2 | OXYGEN SATURATION: 97 % | SYSTOLIC BLOOD PRESSURE: 139 MMHG | HEART RATE: 60 BPM | DIASTOLIC BLOOD PRESSURE: 89 MMHG | WEIGHT: 217.6 LBS

## 2025-05-22 DIAGNOSIS — E78.5 HYPERLIPIDEMIA, UNSPECIFIED HYPERLIPIDEMIA TYPE: ICD-10-CM

## 2025-05-22 DIAGNOSIS — Z87.442 HISTORY OF KIDNEY STONES: ICD-10-CM

## 2025-05-22 DIAGNOSIS — N40.0 BENIGN PROSTATIC HYPERPLASIA, UNSPECIFIED WHETHER LOWER URINARY TRACT SYMPTOMS PRESENT: ICD-10-CM

## 2025-05-22 DIAGNOSIS — M51.369 DEGENERATION OF INTERVERTEBRAL DISC OF LUMBAR REGION, UNSPECIFIED WHETHER PAIN PRESENT: ICD-10-CM

## 2025-05-22 DIAGNOSIS — F41.9 ANXIETY: ICD-10-CM

## 2025-05-22 DIAGNOSIS — K21.9 GASTROESOPHAGEAL REFLUX DISEASE, UNSPECIFIED WHETHER ESOPHAGITIS PRESENT: ICD-10-CM

## 2025-05-22 DIAGNOSIS — E66.9 OBESITY (BMI 30-39.9): ICD-10-CM

## 2025-05-22 DIAGNOSIS — M54.16 RADICULOPATHY, LUMBAR REGION: ICD-10-CM

## 2025-05-22 DIAGNOSIS — Z00.00 HEALTH CARE MAINTENANCE: Primary | ICD-10-CM

## 2025-05-22 DIAGNOSIS — I10 ESSENTIAL HYPERTENSION: ICD-10-CM

## 2025-05-22 PROBLEM — E66.811 OBESITY, CLASS I, BMI 30-34.9: Status: RESOLVED | Noted: 2019-04-29 | Resolved: 2025-05-22

## 2025-05-22 PROCEDURE — 99397 PER PM REEVAL EST PAT 65+ YR: CPT | Performed by: FAMILY MEDICINE

## 2025-05-22 RX ORDER — AMLODIPINE BESYLATE 2.5 MG/1
2.5 TABLET ORAL DAILY
Qty: 30 TABLET | Refills: 7 | Status: SHIPPED | OUTPATIENT
Start: 2025-05-22

## 2025-05-22 NOTE — PROGRESS NOTES
Adult Annual Physical  Name: Vlad Cowan      : 1960      MRN: [id number]  Encounter Provider: Kalyani Singh DO  Encounter Date: 2025   Encounter department: [facility] PRIMARY CARE  Chief Complaint   Patient presents with    Annual Exam     Patient Instructions   BP borderline elevated and rec adding amlodipine low dose 2.5 mg daily and recheck in 4 weeks and 6 months with labs. Gerd stable and rec statin use as directed by Cardiology. Sees specialist for low back pain and rec stress management for reducing anxiety and lose weight to get BMI lower than 25. See urology as directed for urological issues and PSA. Colon screening is UTD.     :  Assessment & Plan  Health care maintenance  Lose weight as directed via diet and exercise       Obesity (BMI 30-39.9)  Lose weight to get BMI lower than 25         Essential hypertension  Stable BP  Orders:    amLODIPine (NORVASC) 2.5 mg tablet; Take 1 tablet (2.5 mg total) by mouth daily    Gastroesophageal reflux disease, unspecified whether esophagitis present  stable       Hyperlipidemia, unspecified hyperlipidemia type  Low cholesterol diet encouraged.  Orders:    Comprehensive metabolic panel; Future    Lipid Panel with Direct LDL reflex; Future    Degeneration of intervertebral disc of lumbar region, unspecified whether pain present  stable       Radiculopathy, lumbar region  stable       Anxiety  Stable on med prn anxiety  Orders:    Comprehensive metabolic panel; Future    CBC and differential; Future    Benign prostatic hyperplasia, unspecified whether lower urinary tract symptoms present  Sees urology and has hx of kidney stones as well.        History of kidney stones  Stay well hydrated and see Urology as directed.            Preventive Screenings:    - Prostate cancer screening: screening up-to-date     Immunizations:  - Immunizations due: Prevnar 20, Tdap and Zoster (Shingrix)         History of Present Illness     Adult Annual  Physical:  Patient presents for annual physical. Here for general PE and is  and has 1 biological child and 2 step children. 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 Tries to eat healthy. No exercise. Non smoker and does not drink. .     Diet and Physical Activity:  - Diet/Nutrition: portion control and well balanced diet.  - Exercise: no formal exercise.    General Health:  - Sleep: 4-6 hours of sleep on average.  - Hearing: normal hearing right ear and normal hearing left ear.  - Vision: wears glasses and most recent eye exam > 1 year ago.  - Dental: regular dental visits.     Health:  - History of STDs: no.   - Urinary symptoms: none.     Review of Systems   Constitutional: Negative.    HENT: Negative.     Eyes: Negative.    Respiratory: Negative.     Cardiovascular: Negative.    Gastrointestinal: Negative.    Endocrine: Negative.    Genitourinary: Negative.    Musculoskeletal: Negative.    Skin: Negative.    Allergic/Immunologic: Negative.    Neurological: Negative.    Hematological: Negative.    Psychiatric/Behavioral: Negative.       Medications Ordered Prior to Encounter[1]     Objective   /89 (BP Location: Left arm, Patient Position: Sitting, Cuff Size: Large)   Pulse 60   Wt 98.7 kg (217 lb 9.6 oz)   SpO2 97%   BMI 36.21 kg/m²     Physical Exam  Constitutional:       Appearance: He is well-developed. He is obese.   HENT:      Head: Normocephalic and atraumatic.      Right Ear: External ear normal.      Left Ear: External ear normal.      Nose: Nose normal.      Mouth/Throat:      Mouth: Mucous membranes are moist.     Eyes:      Conjunctiva/sclera: Conjunctivae normal.      Pupils: Pupils are equal, round, and reactive to light.       Cardiovascular:      Rate and Rhythm: Normal rate and regular rhythm.      Pulses: Normal pulses.      Heart sounds: Normal heart sounds.   Pulmonary:      Effort: Pulmonary effort is normal.      Breath sounds: Normal  breath sounds.     Musculoskeletal:         General: Normal range of motion.      Cervical back: Normal range of motion and neck supple.     Skin:     General: Skin is warm and dry.      Capillary Refill: Capillary refill takes less than 2 seconds.     Neurological:      General: No focal deficit present.      Mental Status: He is alert and oriented to person, place, and time. Mental status is at baseline.      Deep Tendon Reflexes: Reflexes are normal and symmetric.     Psychiatric:         Mood and Affect: Mood normal.         Behavior: Behavior normal.         Thought Content: Thought content normal.         Judgment: Judgment normal.       Administrative Statements   I have spent a total time of 40 minutes in caring for this patient on the day of the visit/encounter including Diagnostic results, Prognosis, Risks and benefits of tx options, Instructions for management, Patient and family education, Importance of tx compliance, Risk factor reductions, Impressions, Counseling / Coordination of care, Documenting in the medical record, Reviewing/placing orders in the medical record (including tests, medications, and/or procedures), and Obtaining or reviewing history  .       [1]   Current Outpatient Medications on File Prior to Visit   Medication Sig Dispense Refill    acetaminophen (TYLENOL) 500 mg tablet Take 500-1,000 mg by mouth every 6 (six) hours as needed for mild pain      ALPRAZolam (XANAX) 0.25 mg tablet Take 1 tablet (0.25 mg total) by mouth daily as needed for anxiety 20 tablet 0    famotidine (PEPCID) 10 mg tablet Take 10 mg by mouth Taken as needed      fluorouracil (EFUDEX) 5 % cream Apply 1 Application topically in the morning.      hydroCHLOROthiazide 25 mg tablet Take 1 tablet (25 mg total) by mouth daily 90 tablet 1    ibuprofen (MOTRIN) 200 mg tablet Take 200-800 mg by mouth every 6 (six) hours as needed for mild pain      Naproxen Sodium 220 MG CAPS Take by mouth      NON FORMULARY daily as  needed Medical marijuana      rosuvastatin (CRESTOR) 10 MG tablet Take 1 tablet (10 mg total) by mouth daily 90 tablet 3    tadalafil (CIALIS) 20 MG tablet Take 1 tablet (20 mg total) by mouth as needed for erectile dysfunction 10 tablet 3    traMADol (ULTRAM) 50 mg tablet Take 1 tablet (50 mg total) by mouth daily as needed for moderate pain 14 tablet 0    dicyclomine (BENTYL) 20 mg tablet Take 1 tablet (20 mg total) by mouth 2 (two) times a day for 7 days 14 tablet 0    lidocaine (LIDODERM) 5 % Apply 1 patch topically over 12 hours daily for 4 days Remove & Discard patch within 12 hours or as directed by MD 4 patch 0    [DISCONTINUED] methocarbamol (ROBAXIN) 500 mg tablet Take 1 tablet (500 mg total) by mouth 3 (three) times a day as needed for muscle spasms for up to 4 days 12 tablet 0    [DISCONTINUED] metoprolol tartrate (LOPRESSOR) 50 mg tablet Take 1 tablet (50 mg total) by mouth every 12 (twelve) hours for 2 doses Start the night before your CT scan. 2 tablet 0    [DISCONTINUED] naproxen (Naprosyn) 500 mg tablet Take 1 tablet (500 mg total) by mouth 2 (two) times a day with meals for 7 days (Patient not taking: Reported on 3/20/2025) 14 tablet 0    [DISCONTINUED] predniSONE 10 mg tablet Take 60 mg po day#1, 50 mg po day#2, 40 mg po day#3, 30 mg po day#4, 20 mg po day#5m and 10 mg po day#6 (Patient not taking: Reported on 2/5/2025) 21 tablet 0    [DISCONTINUED] predniSONE 10 mg tablet Take 60 mg po day#1, 50 mg po day#2, 40 mg po day#3, 30 mg po day#4, 20 mg po day#5m and 10 mg po day#6 (Patient not taking: Reported on 5/22/2025) 21 tablet 0     No current facility-administered medications on file prior to visit.

## 2025-05-22 NOTE — ASSESSMENT & PLAN NOTE
Low cholesterol diet encouraged.  Orders:    Comprehensive metabolic panel; Future    Lipid Panel with Direct LDL reflex; Future

## 2025-05-22 NOTE — ASSESSMENT & PLAN NOTE
Stable BP  Orders:    amLODIPine (NORVASC) 2.5 mg tablet; Take 1 tablet (2.5 mg total) by mouth daily

## 2025-05-22 NOTE — ASSESSMENT & PLAN NOTE
Stable on med prn anxiety  Orders:    Comprehensive metabolic panel; Future    CBC and differential; Future

## 2025-05-22 NOTE — PATIENT INSTRUCTIONS
BP borderline elevated and rec adding amlodipine low dose 2.5 mg daily and recheck in 4 weeks and 6 months with labs. Gerd stable and rec statin use as directed by Cardiology. Sees specialist for low back pain and rec stress management for reducing anxiety and lose weight to get BMI lower than 25. See urology as directed for urological issues and PSA. Colon screening is UTD.

## 2025-05-30 ENCOUNTER — TELEPHONE (OUTPATIENT)
Dept: CARDIOLOGY CLINIC | Facility: CLINIC | Age: 65
End: 2025-05-30

## 2025-06-07 ENCOUNTER — HOSPITAL ENCOUNTER (EMERGENCY)
Facility: HOSPITAL | Age: 65
Discharge: HOME | End: 2025-06-07
Attending: EMERGENCY MEDICINE | Admitting: EMERGENCY MEDICINE
Payer: COMMERCIAL

## 2025-06-07 ENCOUNTER — APPOINTMENT (EMERGENCY)
Dept: CT IMAGING | Facility: HOSPITAL | Age: 65
End: 2025-06-07
Payer: COMMERCIAL

## 2025-06-07 VITALS
DIASTOLIC BLOOD PRESSURE: 69 MMHG | SYSTOLIC BLOOD PRESSURE: 148 MMHG | WEIGHT: 221.12 LBS | RESPIRATION RATE: 16 BRPM | HEART RATE: 72 BPM | TEMPERATURE: 97.6 F | OXYGEN SATURATION: 96 % | BODY MASS INDEX: 36.8 KG/M2

## 2025-06-07 DIAGNOSIS — N20.1 RIGHT URETERAL STONE: Primary | ICD-10-CM

## 2025-06-07 DIAGNOSIS — K57.90 DIVERTICULOSIS: ICD-10-CM

## 2025-06-07 DIAGNOSIS — K44.9 HIATAL HERNIA: ICD-10-CM

## 2025-06-07 DIAGNOSIS — E87.6 HYPOKALEMIA: ICD-10-CM

## 2025-06-07 DIAGNOSIS — N20.0 NEPHROLITHIASIS: ICD-10-CM

## 2025-06-07 LAB
ANION GAP SERPL CALCULATED.3IONS-SCNC: 5 MMOL/L (ref 4–13)
BACTERIA UR QL AUTO: ABNORMAL /HPF
BASOPHILS # BLD AUTO: 0.05 THOUSANDS/ÂΜL (ref 0–0.1)
BASOPHILS NFR BLD AUTO: 1 % (ref 0–1)
BILIRUB UR QL STRIP: NEGATIVE
BUN SERPL-MCNC: 23 MG/DL (ref 5–25)
CALCIUM SERPL-MCNC: 9.1 MG/DL (ref 8.4–10.2)
CHLORIDE SERPL-SCNC: 104 MMOL/L (ref 96–108)
CLARITY UR: CLEAR
CO2 SERPL-SCNC: 31 MMOL/L (ref 21–32)
COLOR UR: YELLOW
CREAT SERPL-MCNC: 0.95 MG/DL (ref 0.6–1.3)
EOSINOPHIL # BLD AUTO: 0.14 THOUSAND/ÂΜL (ref 0–0.61)
EOSINOPHIL NFR BLD AUTO: 2 % (ref 0–6)
ERYTHROCYTE [DISTWIDTH] IN BLOOD BY AUTOMATED COUNT: 13 % (ref 11.6–15.1)
GFR SERPL CREATININE-BSD FRML MDRD: 83 ML/MIN/1.73SQ M
GLUCOSE SERPL-MCNC: 115 MG/DL (ref 65–140)
GLUCOSE UR STRIP-MCNC: NEGATIVE MG/DL
HCT VFR BLD AUTO: 40.4 % (ref 36.5–49.3)
HGB BLD-MCNC: 13.5 G/DL (ref 12–17)
HGB UR QL STRIP.AUTO: ABNORMAL
IMM GRANULOCYTES # BLD AUTO: 0.08 THOUSAND/UL (ref 0–0.2)
IMM GRANULOCYTES NFR BLD AUTO: 1 % (ref 0–2)
KETONES UR STRIP-MCNC: NEGATIVE MG/DL
LEUKOCYTE ESTERASE UR QL STRIP: NEGATIVE
LYMPHOCYTES # BLD AUTO: 1.87 THOUSANDS/ÂΜL (ref 0.6–4.47)
LYMPHOCYTES NFR BLD AUTO: 21 % (ref 14–44)
MCH RBC QN AUTO: 31.1 PG (ref 26.8–34.3)
MCHC RBC AUTO-ENTMCNC: 33.4 G/DL (ref 31.4–37.4)
MCV RBC AUTO: 93 FL (ref 82–98)
MONOCYTES # BLD AUTO: 0.98 THOUSAND/ÂΜL (ref 0.17–1.22)
MONOCYTES NFR BLD AUTO: 11 % (ref 4–12)
MUCOUS THREADS UR QL AUTO: ABNORMAL
NEUTROPHILS # BLD AUTO: 5.84 THOUSANDS/ÂΜL (ref 1.85–7.62)
NEUTS SEG NFR BLD AUTO: 64 % (ref 43–75)
NITRITE UR QL STRIP: NEGATIVE
NON-SQ EPI CELLS URNS QL MICRO: ABNORMAL /HPF
NRBC BLD AUTO-RTO: 0 /100 WBCS
PH UR STRIP.AUTO: 6.5 [PH] (ref 4.5–8)
PLATELET # BLD AUTO: 243 THOUSANDS/UL (ref 149–390)
PMV BLD AUTO: 9.1 FL (ref 8.9–12.7)
POTASSIUM SERPL-SCNC: 3.3 MMOL/L (ref 3.5–5.3)
PROT UR STRIP-MCNC: NEGATIVE MG/DL
RBC # BLD AUTO: 4.34 MILLION/UL (ref 3.88–5.62)
RBC #/AREA URNS AUTO: ABNORMAL /HPF
SODIUM SERPL-SCNC: 140 MMOL/L (ref 135–147)
SP GR UR STRIP.AUTO: 1.02 (ref 1–1.03)
UROBILINOGEN UR QL STRIP.AUTO: 0.2 E.U./DL
WBC # BLD AUTO: 8.96 THOUSAND/UL (ref 4.31–10.16)
WBC #/AREA URNS AUTO: ABNORMAL /HPF

## 2025-06-07 PROCEDURE — 81001 URINALYSIS AUTO W/SCOPE: CPT

## 2025-06-07 PROCEDURE — 96374 THER/PROPH/DIAG INJ IV PUSH: CPT

## 2025-06-07 PROCEDURE — 74176 CT ABD & PELVIS W/O CONTRAST: CPT

## 2025-06-07 PROCEDURE — 85025 COMPLETE CBC W/AUTO DIFF WBC: CPT | Performed by: PHYSICIAN ASSISTANT

## 2025-06-07 PROCEDURE — 96361 HYDRATE IV INFUSION ADD-ON: CPT

## 2025-06-07 PROCEDURE — 36415 COLL VENOUS BLD VENIPUNCTURE: CPT | Performed by: PHYSICIAN ASSISTANT

## 2025-06-07 PROCEDURE — 99284 EMERGENCY DEPT VISIT MOD MDM: CPT

## 2025-06-07 PROCEDURE — 80048 BASIC METABOLIC PNL TOTAL CA: CPT | Performed by: PHYSICIAN ASSISTANT

## 2025-06-07 PROCEDURE — 99284 EMERGENCY DEPT VISIT MOD MDM: CPT | Performed by: PHYSICIAN ASSISTANT

## 2025-06-07 RX ORDER — TAMSULOSIN HYDROCHLORIDE 0.4 MG/1
0.4 CAPSULE ORAL ONCE
Status: COMPLETED | OUTPATIENT
Start: 2025-06-07 | End: 2025-06-07

## 2025-06-07 RX ORDER — TAMSULOSIN HYDROCHLORIDE 0.4 MG/1
0.4 CAPSULE ORAL
Qty: 14 CAPSULE | Refills: 0 | Status: SHIPPED | OUTPATIENT
Start: 2025-06-07

## 2025-06-07 RX ORDER — IBUPROFEN 800 MG/1
800 TABLET, FILM COATED ORAL 3 TIMES DAILY
Qty: 21 TABLET | Refills: 0 | Status: SHIPPED | OUTPATIENT
Start: 2025-06-07

## 2025-06-07 RX ORDER — KETOROLAC TROMETHAMINE 30 MG/ML
15 INJECTION, SOLUTION INTRAMUSCULAR; INTRAVENOUS ONCE
Status: COMPLETED | OUTPATIENT
Start: 2025-06-07 | End: 2025-06-07

## 2025-06-07 RX ORDER — POTASSIUM CHLORIDE 1500 MG/1
20 TABLET, EXTENDED RELEASE ORAL ONCE
Status: COMPLETED | OUTPATIENT
Start: 2025-06-07 | End: 2025-06-07

## 2025-06-07 RX ADMIN — POTASSIUM CHLORIDE 20 MEQ: 1500 TABLET, EXTENDED RELEASE ORAL at 18:02

## 2025-06-07 RX ADMIN — KETOROLAC TROMETHAMINE 15 MG: 30 INJECTION, SOLUTION INTRAMUSCULAR; INTRAVENOUS at 15:58

## 2025-06-07 RX ADMIN — TAMSULOSIN HYDROCHLORIDE 0.4 MG: 0.4 CAPSULE ORAL at 18:02

## 2025-06-07 RX ADMIN — SODIUM CHLORIDE 1000 ML: 0.9 INJECTION, SOLUTION INTRAVENOUS at 15:58

## 2025-06-07 NOTE — ED PROVIDER NOTES
Time reflects when diagnosis was documented in both MDM as applicable and the Disposition within this note       Time User Action Codes Description Comment    6/7/2025  5:24 PM RomeroMary lyman A Add [N20.1] Right ureteral stone     6/7/2025  5:24 PM Romero, Mary A Add [E87.6] Hypokalemia     6/7/2025  5:25 PM Romero, Mary A Add [K44.9] Hiatal hernia     6/7/2025  5:25 PM Romero, Mary A Add [K57.90] Diverticulosis     6/7/2025  5:29 PM Romero, Mary A Add [N20.0] Nephrolithiasis           ED Disposition       ED Disposition   Discharge    Condition   Stable    Date/Time   Sat Jun 7, 2025  5:24 PM    Comment   Vlad Cowan discharge to home/self care.                   Assessment & Plan       Medical Decision Making  65y.o male presents to the ER for right flank pain for 1 day. Vitals are stable. Patient is in no acute distress. On exam, breathing is non-labored. No tachypnea or accessory muscle use. Lungs are clear. Heart is regular rate and rhythm. Abdomen is soft but tender in the RLQ. No guarding, rigidity, distention or pulsatile masses palpated. No CVAT. DDX consists of but not limited to: kidney stone, appendicitis, cholecystitis, UTI. Will check labs and imaging.    1638 Leukocytes, UA: Negative    1638 Blood, UA(!): Moderate    1638 WBC: 8.96    1638 Hemoglobin: 13.5    1638 Platelet Count: 243    1638 Potassium(!): 3.3    1724 CT renal stone study abdomen pelvis without contrast  1.  2 x 3 mm calculus at the right ureterovesical junction, near the right ureteral orifice, causing mild right hydronephrosis and hydroureter.     2.  Two small nonobstructing right renal calculi.    1739     Informed patient of lab and imaging findings. Will discharge with medication and outpatient follow up.    The management plan was discussed in detail with the patient at bedside and all questions were answered.  Prior to discharge, we provided both verbal and written instructions.  We discussed with the patient  the signs and symptoms for which to return to the emergency department.  All questions were answered and patient was comfortable with the plan of care and discharged to home.  Instructed the patient to follow up with the primary care provider and/or specialist provided and their written instructions.  The patient verbalized understanding of our discussion and plan of care, and agrees to return to the Emergency Department for concerns and progression of illness.    At discharge, I instructed the patient to:  -follow up with pcp  -take Motrin and Flomax as prescribed  -rest and drink plenty of fluids  -follow up with Urology  -return to the ER if symptoms worsened or new symptoms arose  Patient agreed to this plan and was stable at time of discharge.     Problems Addressed:  Diverticulosis: chronic illness or injury  Hiatal hernia: chronic illness or injury  Hypokalemia: acute illness or injury  Nephrolithiasis: acute illness or injury  Right ureteral stone: acute illness or injury    Amount and/or Complexity of Data Reviewed  Independent Historian:      Details: Patient is historian  Labs: ordered. Decision-making details documented in ED Course.  Radiology: ordered. Decision-making details documented in ED Course.    Risk  Prescription drug management.        ED Course as of 06/07/25 1744   Sat Jun 07, 2025   1638 Leukocytes, UA: Negative   1638 Blood, UA(!): Moderate   1638 WBC: 8.96   1638 Hemoglobin: 13.5   1638 Platelet Count: 243   1638 Potassium(!): 3.3   1724 CT renal stone study abdomen pelvis without contrast  1.  2 x 3 mm calculus at the right ureterovesical junction, near the right ureteral orifice, causing mild right hydronephrosis and hydroureter.     2.  Two small nonobstructing right renal calculi.         Medications   potassium chloride (Klor-Con M20) CR tablet 20 mEq (has no administration in time range)   tamsulosin (FLOMAX) capsule 0.4 mg (has no administration in time range)   sodium chloride  0.9 % bolus 1,000 mL (1,000 mL Intravenous New Bag 6/7/25 4473)   ketorolac (TORADOL) injection 15 mg (15 mg Intravenous Given 6/7/25 9298)       ED Risk Strat Scores                    No data recorded                            History of Present Illness       Chief Complaint   Patient presents with    Flank Pain     Pt reports right sided flank pain hx of kidney stones       Past Medical History[1]   Past Surgical History[2]   Family History[3]   Social History[4]   E-Cigarette/Vaping    E-Cigarette Use Current Some Day User       E-Cigarette/Vaping Substances    Nicotine No     THC Yes     CBD No     Flavoring No     Other No     Unknown No       I have reviewed and agree with the history as documented.     65y.o male with PMH of AVM, BPH, chronic back and neck pain, hemorrhoids, hydronephrosis, HTN, kidney disease, left inguinal hernia, lumbar herniated disc, kidney stones and TMJ presents to the ER for right flank pain for 1 day. Patient denies taking any medication for symptoms. He describes his pain as sharp. Pain began in his right groin and then began radiating to his flank. Pain comes and goes. He denies fever, chills, URI symptoms, chest pain, dyspnea, N/V/D, urinary symptoms, weakness or paresthesias. Patient has a history of kidney stones. He has seen Urology in the past for stones and has had surgery for his stones before. He states this feels similar.      History provided by:  Patient   used: No        Review of Systems   Constitutional:  Negative for activity change, appetite change, chills and fever.   HENT:  Negative for congestion, drooling, ear discharge, ear pain, facial swelling, rhinorrhea and sore throat.    Eyes:  Negative for redness.   Respiratory:  Negative for cough and shortness of breath.    Cardiovascular:  Negative for chest pain.   Gastrointestinal:  Positive for abdominal pain. Negative for diarrhea, nausea and vomiting.   Genitourinary:  Positive for flank  pain. Negative for dysuria, frequency, hematuria and urgency.   Musculoskeletal:  Negative for neck stiffness.   Skin:  Negative for rash.   Allergic/Immunologic: Negative for food allergies.   Neurological:  Negative for weakness and numbness.           Objective       ED Triage Vitals   Temperature Pulse Blood Pressure Respirations SpO2 Patient Position - Orthostatic VS   06/07/25 1527 06/07/25 1527 06/07/25 1527 06/07/25 1527 06/07/25 1527 --   97.6 °F (36.4 °C) 63 162/81 16 99 %       Temp src Heart Rate Source BP Location FiO2 (%) Pain Score    -- -- -- -- 06/07/25 1558        6      Vitals      Date and Time Temp Pulse SpO2 Resp BP Pain Score FACES Pain Rating User   06/07/25 1558 -- -- -- -- -- 6 -- CO   06/07/25 1527 97.6 °F (36.4 °C) 63 99 % 16 162/81 -- -- PRAMOD            Physical Exam  Vitals and nursing note reviewed.   Constitutional:       General: He is not in acute distress.     Appearance: He is not toxic-appearing.   HENT:      Head: Normocephalic and atraumatic.      Mouth/Throat:      Lips: Pink. No lesions.      Mouth: Mucous membranes are moist.     Eyes:      Conjunctiva/sclera: Conjunctivae normal.     Neck:      Trachea: No tracheal deviation.     Cardiovascular:      Rate and Rhythm: Normal rate and regular rhythm.      Heart sounds: Normal heart sounds, S1 normal and S2 normal. No murmur heard.     No friction rub. No gallop.   Pulmonary:      Effort: Pulmonary effort is normal. No respiratory distress.      Breath sounds: Normal breath sounds. No decreased breath sounds, wheezing, rhonchi or rales.   Chest:      Chest wall: No tenderness.   Abdominal:      General: Bowel sounds are normal. There is no distension.      Palpations: Abdomen is soft.      Tenderness: There is abdominal tenderness in the right lower quadrant. There is no right CVA tenderness, left CVA tenderness, guarding or rebound.     Musculoskeletal:      Cervical back: Normal range of motion and neck supple.     Skin:      General: Skin is warm and dry.      Findings: No rash.     Neurological:      Mental Status: He is alert.      GCS: GCS eye subscore is 4. GCS verbal subscore is 5. GCS motor subscore is 6.     Psychiatric:         Mood and Affect: Mood normal.         Results Reviewed       Procedure Component Value Units Date/Time    Urine Microscopic [[id number]]  (Abnormal) Collected: 06/07/25 1551    Lab Status: Final result Specimen: Urine, Clean Catch Updated: 06/07/25 1625     RBC, UA 30-50 /hpf      WBC, UA None Seen /hpf      Epithelial Cells None Seen /hpf      Bacteria, UA Occasional /hpf      MUCUS THREADS Occasional    Basic metabolic panel [[id number]]  (Abnormal) Collected: 06/07/25 1558    Lab Status: Final result Specimen: Blood from Arm, Right Updated: 06/07/25 1620     Sodium 140 mmol/L      Potassium 3.3 mmol/L      Chloride 104 mmol/L      CO2 31 mmol/L      ANION GAP 5 mmol/L      BUN 23 mg/dL      Creatinine 0.95 mg/dL      Glucose 115 mg/dL      Calcium 9.1 mg/dL      eGFR 83 ml/min/1.73sq m     Narrative:      National Kidney Disease Foundation guidelines for Chronic Kidney Disease (CKD):     Stage 1 with normal or high GFR (GFR > 90 mL/min/1.73 square meters)    Stage 2 Mild CKD (GFR = 60-89 mL/min/1.73 square meters)    Stage 3A Moderate CKD (GFR = 45-59 mL/min/1.73 square meters)    Stage 3B Moderate CKD (GFR = 30-44 mL/min/1.73 square meters)    Stage 4 Severe CKD (GFR = 15-29 mL/min/1.73 square meters)    Stage 5 End Stage CKD (GFR <15 mL/min/1.73 square meters)  Note: GFR calculation is accurate only with a steady state creatinine    CBC and differential [[id number]] Collected: 06/07/25 1558    Lab Status: Final result Specimen: Blood from Arm, Right Updated: 06/07/25 1618     WBC 8.96 Thousand/uL      RBC 4.34 Million/uL      Hemoglobin 13.5 g/dL      Hematocrit 40.4 %      MCV 93 fL      MCH 31.1 pg      MCHC 33.4 g/dL      RDW 13.0 %      MPV 9.1 fL      Platelets 243 Thousands/uL      nRBC 0 /100  WBCs      Segmented % 64 %      Immature Grans % 1 %      Lymphocytes % 21 %      Monocytes % 11 %      Eosinophils Relative 2 %      Basophils Relative 1 %      Absolute Neutrophils 5.84 Thousands/µL      Absolute Immature Grans 0.08 Thousand/uL      Absolute Lymphocytes 1.87 Thousands/µL      Absolute Monocytes 0.98 Thousand/µL      Eosinophils Absolute 0.14 Thousand/µL      Basophils Absolute 0.05 Thousands/µL     Urine Macroscopic, POC [[id number]]  (Abnormal) Collected: 06/07/25 1551    Lab Status: Final result Specimen: Urine Updated: 06/07/25 1552     Color, UA Yellow     Clarity, UA Clear     pH, UA 6.5     Leukocytes, UA Negative     Nitrite, UA Negative     Protein, UA Negative mg/dl      Glucose, UA Negative mg/dl      Ketones, UA Negative mg/dl      Urobilinogen, UA 0.2 E.U./dl      Bilirubin, UA Negative     Occult Blood, UA Moderate     Specific Gravity, UA 1.020    Narrative:      CLINITEK RESULT            CT renal stone study abdomen pelvis without contrast   Final Interpretation by Milton Ball MD (06/07 1721)      1.  2 x 3 mm calculus at the right ureterovesical junction, near the right ureteral orifice, causing mild right hydronephrosis and hydroureter.      2.  Two small nonobstructing right renal calculi.               Workstation performed: [id number]             Procedures    ED Medication and Procedure Management   Prior to Admission Medications   Prescriptions Last Dose Informant Patient Reported? Taking?   ALPRAZolam (XANAX) 0.25 mg tablet  Self No No   Sig: Take 1 tablet (0.25 mg total) by mouth daily as needed for anxiety   NON FORMULARY  Self Yes No   Sig: daily as needed Medical marijuana   Naproxen Sodium 220 MG CAPS  Self Yes No   Sig: Take by mouth   acetaminophen (TYLENOL) 500 mg tablet  Self Yes No   Sig: Take 500-1,000 mg by mouth every 6 (six) hours as needed for mild pain   amLODIPine (NORVASC) 2.5 mg tablet   No No   Sig: Take 1 tablet (2.5 mg total) by mouth daily    dicyclomine (BENTYL) 20 mg tablet   No No   Sig: Take 1 tablet (20 mg total) by mouth 2 (two) times a day for 7 days   famotidine (PEPCID) 10 mg tablet  Self Yes No   Sig: Take 10 mg by mouth Taken as needed   fluorouracil (EFUDEX) 5 % cream  Self Yes No   Sig: Apply 1 Application topically in the morning.   hydroCHLOROthiazide 25 mg tablet   No No   Sig: Take 1 tablet (25 mg total) by mouth daily   ibuprofen (MOTRIN) 200 mg tablet  Self Yes No   Sig: Take 200-800 mg by mouth every 6 (six) hours as needed for mild pain   lidocaine (LIDODERM) 5 %   No No   Sig: Apply 1 patch topically over 12 hours daily for 4 days Remove & Discard patch within 12 hours or as directed by MD   rosuvastatin (CRESTOR) 10 MG tablet   No No   Sig: Take 1 tablet (10 mg total) by mouth daily   tadalafil (CIALIS) 20 MG tablet  Self No No   Sig: Take 1 tablet (20 mg total) by mouth as needed for erectile dysfunction   traMADol (ULTRAM) 50 mg tablet  Self No No   Sig: Take 1 tablet (50 mg total) by mouth daily as needed for moderate pain      Facility-Administered Medications: None     Patient's Medications   Discharge Prescriptions    IBUPROFEN (MOTRIN) 800 MG TABLET    Take 1 tablet (800 mg total) by mouth 3 (three) times a day       Start Date: 6/7/2025  End Date: --       Order Dose: 800 mg       Quantity: 21 tablet    Refills: 0    TAMSULOSIN (FLOMAX) 0.4 MG    Take 1 capsule (0.4 mg total) by mouth daily with dinner       Start Date: 6/7/2025  End Date: --       Order Dose: 0.4 mg       Quantity: 14 capsule    Refills: 0     No discharge procedures on file.  ED SEPSIS DOCUMENTATION   Time reflects when diagnosis was documented in both MDM as applicable and the Disposition within this note       Time User Action Codes Description Comment    6/7/2025  5:24 PM Mary Romero Add [N20.1] Right ureteral stone     6/7/2025  5:24 PM Mary Romero Add [E87.6] Hypokalemia     6/7/2025  5:25 PM Mary Romero Add [K44.9] Hiatal hernia      6/7/2025  5:25 PM Mary Romero Add [K57.90] Diverticulosis     6/7/2025  5:29 PM Mary Romero Add [N20.0] Nephrolithiasis                    [1]   Past Medical History:  Diagnosis Date    AVM (arteriovenous malformation)     BPH without urinary obstruction     last assessed 01/16/18    Chronic pain disorder     low back and neck    COVID     Jan 2021 and 5/14/22    Headache     occ    Hemorrhoids     Hydronephrosis     Hypertension     Kidney disease     Left inguinal hernia     Lumbar herniated disc     Obesity, Class I, BMI 30-34.9 04/29/2019    PONV (postoperative nausea and vomiting)     Renal calculi     Renal colic     TMJ (dislocation of temporomandibular joint)     Ureter colic    [2]   Past Surgical History:  Procedure Laterality Date    BRAIN SURGERY      COLONOSCOPY      CYSTOSCOPY      w/removal of object, last assessed 01/16/18    CYSTOSCOPY      w/removal of ureteral calculus last assessed 01/16/18    CYSTOSCOPY KIDNEY W/ URETERAL GUIDE WIRE      last assessed 01/16/18    CYSTOSCOPY W/ URETERAL STENT PLACEMENT      last assessed 01/16/18    FL RETROGRADE PYELOGRAM  7/9/2020    HERNIA REPAIR  [address] [place] [address]    HERNIA REPAIR      KIDNEY SURGERY      removal of kidney stone in 2013 at [facility]    NM CYSTO/URETERO W/LITHOTRIPSY &INDWELL STENT INSRT Right 12/13/2018    Procedure: CYSTOSCOPY URETEROSCOPY  RETROGRADE PYELOGRAM AND INSERTION RIGHT STENT URETERAL;  Surgeon: Jose Daniel Everett MD;  Location: [facility];  Service: Urology    NM CYSTO/URETERO W/LITHOTRIPSY &INDWELL STENT INSRT Left 7/9/2020    Procedure: CYSTOSCOPY URETEROSCOPY WITH LITHOTRIPSY HOLMIUM LASER, RETROGRADE PYELOGRAM AND INSERTION STENT URETERAL;  Surgeon: Jose Daniel Everett MD;  [facility];  Service: Urology    NM LAPAROSCOPY SURG RPR INITIAL INGUINAL HERNIA Left 8/4/2022    Procedure: REPAIR HERNIA INGUINAL LAPAROSCOPIC W/ ROBOTICS;  Surgeon: Willie Carlson MD;  Location: [facility];  Service: General     TOOTH EXTRACTION     [3]   Family History  Problem Relation Name Age of Onset    Stroke Mother      Cancer Father      Diabetes Father      Prostate cancer Father      Heart disease Father      Hypertension Father     [4]   Social History  Tobacco Use    Smoking status: Never    Smokeless tobacco: Never   Vaping Use    Vaping status: Some Days    Substances: THC   Substance Use Topics    Alcohol use: Yes     Comment: rare    Drug use: Yes     Types: Marijuana     Comment: ocassionaly use        Mary Romero PA-C  06/07/25 0359

## 2025-06-07 NOTE — DISCHARGE INSTRUCTIONS
DISCHARGE INSTRUCTIONS:    FOLLOW UP WITH YOUR PRIMARY CARE PROVIDER OR THE [facility]. MAKE AN APPOINTMENT TO BE SEEN.     TAKE MEDICATION AS PRESCRIBED. IF RASH, SHORTNESS OF BREATH OR TROUBLE SWALLOWING, STOP TAKING THE MEDICATION AND BE SEEN.    FOLLOW UP WITH UROLOGY.    REST AND DRINK PLENTY OF FLUIDS.    IF SYMPTOMS WORSEN OR NEW SYMPTOMS ARISE, RETURN TO THE ER TO BE SEEN.

## 2025-06-17 ENCOUNTER — TELEPHONE (OUTPATIENT)
Age: 65
End: 2025-06-17

## 2025-06-17 NOTE — TELEPHONE ENCOUNTER
Patient called stating that he started having pain in his left shoulder earlier today. He's had this same type of pain in the past and was given cortisone shots by a specialist. He was wondering if any provider in the office can give a cortisone shot. He took a Same Day appointment for tomorrow on the basis that Dr Singh can give him a cortisone shot. He would like a call back today if possible because if he is unable to get a cortisone shot, he will cancel the appointment.

## 2025-06-18 NOTE — TELEPHONE ENCOUNTER
Called and spoke with patient to advise that Dr. Singh does not do cortisone injections. Advised patient that we can refer to orthopedics for the pain and they could do injections as they see fit. Patient declined referral and cancelled appointment for today and does not wish to reschedule at this time.

## 2025-06-24 ENCOUNTER — OFFICE VISIT (OUTPATIENT)
Dept: FAMILY MEDICINE CLINIC | Facility: CLINIC | Age: 65
End: 2025-06-24
Payer: COMMERCIAL

## 2025-06-24 VITALS
TEMPERATURE: 97.9 F | HEART RATE: 63 BPM | DIASTOLIC BLOOD PRESSURE: 70 MMHG | SYSTOLIC BLOOD PRESSURE: 122 MMHG | HEIGHT: 68 IN | OXYGEN SATURATION: 96 % | BODY MASS INDEX: 32.58 KG/M2 | WEIGHT: 215 LBS

## 2025-06-24 DIAGNOSIS — R21 FACIAL RASH: ICD-10-CM

## 2025-06-24 DIAGNOSIS — I10 ESSENTIAL HYPERTENSION: ICD-10-CM

## 2025-06-24 DIAGNOSIS — E78.5 HYPERLIPIDEMIA, UNSPECIFIED HYPERLIPIDEMIA TYPE: Primary | ICD-10-CM

## 2025-06-24 DIAGNOSIS — E66.9 OBESITY (BMI 30-39.9): ICD-10-CM

## 2025-06-24 PROCEDURE — 99214 OFFICE O/P EST MOD 30 MIN: CPT | Performed by: FAMILY MEDICINE

## 2025-06-24 NOTE — PATIENT INSTRUCTIONS
Lose weight as directed to help lower BP and cholesterol. Patient to call if any problems and patient states he prefers to not take statin due to fear of med and side effects. May use hydrocortisone cream prn rash. Stay in cool environment and rec calling if worse. Low cholesterol diet encouraged and sheet given today for low cholesterol. Get labs as directed before next office visit.

## 2025-06-24 NOTE — PROGRESS NOTES
Name: Vlad Cowan      : 1960      MRN: [id number]  Encounter Provider: Kalyani Singh DO  Encounter Date: 2025   Encounter department: [facility] PRIMARY CARE  Chief Complaint   Patient presents with    Hypertension     Blood pressure check     Follow-up     Wants to dicuss rash around face     Patient Instructions   Lose weight as directed to help lower BP and cholesterol. Patient to call if any problems and patient states he prefers to not take statin due to fear of med and side effects. May use hydrocortisone cream prn rash. Stay in cool environment and rec calling if worse. Low cholesterol diet encouraged and sheet given today for low cholesterol. Get labs as directed before next office visit.     Assessment & Plan  Hyperlipidemia, unspecified hyperlipidemia type  Patient not taking statin prescribed due to fear of side effects with med.        Essential hypertension  Stable on meds, lose weight as directed       Facial rash  Trial hydrocortisone cream prn       Obesity (BMI 30-39.9)  Lose weight to get BMI lower than 25           Depression Screening and Follow-up Plan: Patient was screened for depression during today's encounter. They screened negative with a PHQ-2 score of 0.      Tobacco Cessation Counseling: Tobacco cessation counseling was not provided. The patient is sincerely urged to quit consumption of tobacco. He is not ready to quit tobacco. Medication options not discussed. Smokes on occasion medical marijuana not tobacco        History of Present Illness     Hypertension (Blood pressure check )  Follow-up (Wants to dicuss rash around face)      Hypertension      Review of Systems   Constitutional: Negative.    HENT: Negative.     Eyes: Negative.    Respiratory: Negative.     Cardiovascular: Negative.    Gastrointestinal: Negative.    Endocrine: Negative.    Genitourinary: Negative.    Musculoskeletal: Negative.    Skin: Negative.    Allergic/Immunologic: Negative.  "   Neurological: Negative.    Hematological: Negative.    Psychiatric/Behavioral: Negative.       Past Medical History[1]  Past Surgical History[2]  Family History[3]  Social History[4]  Medications[5]  Allergies   Allergen Reactions    Bee Pollen Other (See Comments)    Morphine Other (See Comments)    Other      Pt states that any narcotic makes him feel sick    Oxycodone-Acetaminophen Nausea Only, Vomiting and GI Intolerance    Pollen Extract Allergic Rhinitis     patient states he has seasonal environmental allergies, not sure exactly what triggers reaction     Immunization History   Administered Date(s) Administered    COVID-19 PFIZER VACCINE 0.3 ML IM 03/22/2021, 04/14/2021, 12/01/2021    COVID-19 Pfizer Vac BIVALENT Ranjan-sucrose 12 Yr+ IM 11/17/2022    COVID-19 Pfizer mRNA vacc PF ranjan-sucrose 12 yr and older (Comirnaty) 01/04/2024    COVID-19 Pfizer vac (Ranjan-sucrose, gray cap) 12 yr+ IM 06/13/2022    Influenza, seasonal, injectable 12/04/2003     Objective   /70   Pulse 63   Temp 97.9 °F (36.6 °C) (Temporal)   Ht 5' 8.31\" (1.735 m)   Wt 97.5 kg (215 lb)   SpO2 96%   BMI 32.40 kg/m²     Physical Exam  Constitutional:       Appearance: He is well-developed.   HENT:      Head: Normocephalic and atraumatic.      Right Ear: External ear normal.      Left Ear: External ear normal.      Nose: Nose normal.      Mouth/Throat:      Mouth: Mucous membranes are moist.     Eyes:      Conjunctiva/sclera: Conjunctivae normal.      Pupils: Pupils are equal, round, and reactive to light.       Cardiovascular:      Rate and Rhythm: Normal rate and regular rhythm.      Pulses: Normal pulses.      Heart sounds: Normal heart sounds.   Pulmonary:      Effort: Pulmonary effort is normal.      Breath sounds: Normal breath sounds.     Musculoskeletal:         General: Normal range of motion.      Cervical back: Normal range of motion and neck supple.     Skin:     General: Skin is warm and dry.      Capillary Refill: " Capillary refill takes less than 2 seconds.     Neurological:      General: No focal deficit present.      Mental Status: He is alert and oriented to person, place, and time. Mental status is at baseline.      Deep Tendon Reflexes: Reflexes are normal and symmetric.     Psychiatric:         Mood and Affect: Mood normal.         Behavior: Behavior normal.         Thought Content: Thought content normal.         Judgment: Judgment normal.       Administrative Statements   I have spent a total time of 32 minutes in caring for this patient on the day of the visit/encounter including Diagnostic results, Prognosis, Risks and benefits of tx options, Instructions for management, Patient and family education, Importance of tx compliance, Risk factor reductions, Impressions, Counseling / Coordination of care, Documenting in the medical record, Reviewing/placing orders in the medical record (including tests, medications, and/or procedures), and Obtaining or reviewing history  .       [1]   Past Medical History:  Diagnosis Date    AVM (arteriovenous malformation)     BPH without urinary obstruction     last assessed 01/16/18    Chronic pain disorder     low back and neck    COVID     Jan 2021 and 5/14/22    Headache     occ    Hemorrhoids     Hydronephrosis     Hypertension     Kidney disease     Left inguinal hernia     Lumbar herniated disc     Obesity, Class I, BMI 30-34.9 04/29/2019    PONV (postoperative nausea and vomiting)     Renal calculi     Renal colic     TMJ (dislocation of temporomandibular joint)     Ureter colic    [2]   Past Surgical History:  Procedure Laterality Date    BRAIN SURGERY      COLONOSCOPY      CYSTOSCOPY      w/removal of object, last assessed 01/16/18    CYSTOSCOPY      w/removal of ureteral calculus last assessed 01/16/18    CYSTOSCOPY KIDNEY W/ URETERAL GUIDE WIRE      last assessed 01/16/18    CYSTOSCOPY W/ URETERAL STENT PLACEMENT      last assessed 01/16/18    FL RETROGRADE PYELOGRAM   7/9/2020    HERNIA REPAIR  1965    [facility]    HERNIA REPAIR      KIDNEY SURGERY      removal of kidney stone in 2013 at [facility]    OK CYSTO/URETERO W/LITHOTRIPSY &INDWELL STENT INSRT Right 12/13/2018    Procedure: CYSTOSCOPY URETEROSCOPY  RETROGRADE PYELOGRAM AND INSERTION RIGHT STENT URETERAL;  Surgeon: Jose Daniel Everett MD;  Location: [facility];  Service: Urology    OK CYSTO/URETERO W/LITHOTRIPSY &INDWELL STENT INSRT Left 7/9/2020    Procedure: CYSTOSCOPY URETEROSCOPY WITH LITHOTRIPSY HOLMIUM LASER, RETROGRADE PYELOGRAM AND INSERTION STENT URETERAL;  Surgeon: Jose Daniel Everett MD;  Location: [facility];  Service: Urology    OK LAPAROSCOPY SURG RPR INITIAL INGUINAL HERNIA Left 8/4/2022    Procedure: REPAIR HERNIA INGUINAL LAPAROSCOPIC W/ ROBOTICS;  Surgeon: Willie Carlson MD;  Location: [facility];  Service: General    TOOTH EXTRACTION     [3]   Family History  Problem Relation Name Age of Onset    Stroke Mother      Cancer Father      Diabetes Father      Prostate cancer Father      Heart disease Father      Hypertension Father     [4]   Social History  Tobacco Use    Smoking status: Never    Smokeless tobacco: Never   Vaping Use    Vaping status: Some Days    Substances: THC   Substance and Sexual Activity    Alcohol use: Yes     Comment: rare    Drug use: Yes     Types: Marijuana     Comment: ocassionaly use    Sexual activity: Yes   [5]   Current Outpatient Medications on File Prior to Visit   Medication Sig    acetaminophen (TYLENOL) 500 mg tablet Take 500-1,000 mg by mouth every 6 (six) hours as needed for mild pain    ALPRAZolam (XANAX) 0.25 mg tablet Take 1 tablet (0.25 mg total) by mouth daily as needed for anxiety    amLODIPine (NORVASC) 2.5 mg tablet Take 1 tablet (2.5 mg total) by mouth daily    famotidine (PEPCID) 10 mg tablet Take 10 mg by mouth Taken as needed    hydroCHLOROthiazide 25 mg tablet Take 1 tablet (25 mg total) by mouth daily    ibuprofen (MOTRIN) 200 mg tablet Take 200-800 mg by  mouth every 6 (six) hours as needed for mild pain    Naproxen Sodium 220 MG CAPS Take by mouth    NON FORMULARY daily as needed Medical marijuana    tadalafil (CIALIS) 20 MG tablet Take 1 tablet (20 mg total) by mouth as needed for erectile dysfunction    traMADol (ULTRAM) 50 mg tablet Take 1 tablet (50 mg total) by mouth daily as needed for moderate pain    fluorouracil (EFUDEX) 5 % cream Apply 1 Application topically in the morning. (Patient not taking: Reported on 6/24/2025)    rosuvastatin (CRESTOR) 10 MG tablet Take 1 tablet (10 mg total) by mouth daily (Patient not taking: Reported on 6/24/2025)    [DISCONTINUED] dicyclomine (BENTYL) 20 mg tablet Take 1 tablet (20 mg total) by mouth 2 (two) times a day for 7 days (Patient not taking: Reported on 6/24/2025)    [DISCONTINUED] ibuprofen (MOTRIN) 800 mg tablet Take 1 tablet (800 mg total) by mouth 3 (three) times a day (Patient not taking: Reported on 6/24/2025)    [DISCONTINUED] lidocaine (LIDODERM) 5 % Apply 1 patch topically over 12 hours daily for 4 days Remove & Discard patch within 12 hours or as directed by MD (Patient not taking: Reported on 6/24/2025)    [DISCONTINUED] tamsulosin (FLOMAX) 0.4 mg Take 1 capsule (0.4 mg total) by mouth daily with dinner (Patient not taking: Reported on 6/24/2025)

## 2025-08-18 ENCOUNTER — HOSPITAL ENCOUNTER (EMERGENCY)
Facility: HOSPITAL | Age: 65
Discharge: HOME | End: 2025-08-18
Attending: EMERGENCY MEDICINE | Admitting: EMERGENCY MEDICINE
Payer: COMMERCIAL

## 2025-08-18 VITALS
SYSTOLIC BLOOD PRESSURE: 147 MMHG | TEMPERATURE: 98.3 F | BODY MASS INDEX: 32.59 KG/M2 | RESPIRATION RATE: 17 BRPM | OXYGEN SATURATION: 98 % | WEIGHT: 216.27 LBS | HEART RATE: 73 BPM | DIASTOLIC BLOOD PRESSURE: 77 MMHG

## 2025-08-18 DIAGNOSIS — R42 DIZZINESS: Primary | ICD-10-CM

## 2025-08-18 DIAGNOSIS — E87.6 HYPOKALEMIA: ICD-10-CM

## 2025-08-18 DIAGNOSIS — E86.9 VOLUME DEPLETION: ICD-10-CM

## 2025-08-18 LAB
ANION GAP SERPL CALCULATED.3IONS-SCNC: 8 MMOL/L (ref 4–13)
ATRIAL RATE: 66 BPM
BASOPHILS # BLD AUTO: 0.03 THOUSANDS/ÂΜL (ref 0–0.1)
BASOPHILS NFR BLD AUTO: 0 % (ref 0–1)
BILIRUB UR QL STRIP: NEGATIVE
BUN SERPL-MCNC: 18 MG/DL (ref 5–25)
CALCIUM SERPL-MCNC: 9.2 MG/DL (ref 8.4–10.2)
CHLORIDE SERPL-SCNC: 101 MMOL/L (ref 96–108)
CK SERPL-CCNC: 124 U/L (ref 39–308)
CLARITY UR: CLEAR
CO2 SERPL-SCNC: 29 MMOL/L (ref 21–32)
COLOR UR: YELLOW
CREAT SERPL-MCNC: 0.91 MG/DL (ref 0.6–1.3)
EOSINOPHIL # BLD AUTO: 0.05 THOUSAND/ÂΜL (ref 0–0.61)
EOSINOPHIL NFR BLD AUTO: 0 % (ref 0–6)
ERYTHROCYTE [DISTWIDTH] IN BLOOD BY AUTOMATED COUNT: 13.8 % (ref 11.6–15.1)
GFR SERPL CREATININE-BSD FRML MDRD: 87 ML/MIN/1.73SQ M
GLUCOSE SERPL-MCNC: 97 MG/DL (ref 65–140)
GLUCOSE UR STRIP-MCNC: NEGATIVE MG/DL
HCT VFR BLD AUTO: 42.5 % (ref 36.5–49.3)
HGB BLD-MCNC: 14.7 G/DL (ref 12–17)
HGB UR QL STRIP.AUTO: NEGATIVE
IMM GRANULOCYTES # BLD AUTO: 0.13 THOUSAND/UL (ref 0–0.2)
IMM GRANULOCYTES NFR BLD AUTO: 1 % (ref 0–2)
KETONES UR STRIP-MCNC: NEGATIVE MG/DL
LEUKOCYTE ESTERASE UR QL STRIP: NEGATIVE
LYMPHOCYTES # BLD AUTO: 2.42 THOUSANDS/ÂΜL (ref 0.6–4.47)
LYMPHOCYTES NFR BLD AUTO: 19 % (ref 14–44)
MAGNESIUM SERPL-MCNC: 2.2 MG/DL (ref 1.9–2.7)
MCH RBC QN AUTO: 31.7 PG (ref 26.8–34.3)
MCHC RBC AUTO-ENTMCNC: 34.6 G/DL (ref 31.4–37.4)
MCV RBC AUTO: 92 FL (ref 82–98)
MONOCYTES # BLD AUTO: 1.09 THOUSAND/ÂΜL (ref 0.17–1.22)
MONOCYTES NFR BLD AUTO: 9 % (ref 4–12)
NEUTROPHILS # BLD AUTO: 9.01 THOUSANDS/ÂΜL (ref 1.85–7.62)
NEUTS SEG NFR BLD AUTO: 71 % (ref 43–75)
NITRITE UR QL STRIP: NEGATIVE
NRBC BLD AUTO-RTO: 0 /100 WBCS
P AXIS: 57 DEGREES
PH UR STRIP.AUTO: 6.5 [PH] (ref 4.5–8)
PLATELET # BLD AUTO: 244 THOUSANDS/UL (ref 149–390)
PMV BLD AUTO: 8.6 FL (ref 8.9–12.7)
POTASSIUM SERPL-SCNC: 3.2 MMOL/L (ref 3.5–5.3)
PR INTERVAL: 146 MS
PROT UR STRIP-MCNC: NEGATIVE MG/DL
QRS AXIS: -5 DEGREES
QRSD INTERVAL: 100 MS
QT INTERVAL: 436 MS
QTC INTERVAL: 457 MS
RBC # BLD AUTO: 4.64 MILLION/UL (ref 3.88–5.62)
SODIUM SERPL-SCNC: 138 MMOL/L (ref 135–147)
SP GR UR STRIP.AUTO: 1.01 (ref 1–1.03)
T WAVE AXIS: 33 DEGREES
UROBILINOGEN UR QL STRIP.AUTO: 0.2 E.U./DL
VENTRICULAR RATE: 66 BPM
WBC # BLD AUTO: 12.73 THOUSAND/UL (ref 4.31–10.16)

## 2025-08-18 PROCEDURE — 99284 EMERGENCY DEPT VISIT MOD MDM: CPT | Performed by: EMERGENCY MEDICINE

## 2025-08-18 PROCEDURE — 36415 COLL VENOUS BLD VENIPUNCTURE: CPT | Performed by: EMERGENCY MEDICINE

## 2025-08-18 PROCEDURE — 85025 COMPLETE CBC W/AUTO DIFF WBC: CPT | Performed by: EMERGENCY MEDICINE

## 2025-08-18 PROCEDURE — 82550 ASSAY OF CK (CPK): CPT | Performed by: EMERGENCY MEDICINE

## 2025-08-18 PROCEDURE — 96360 HYDRATION IV INFUSION INIT: CPT

## 2025-08-18 PROCEDURE — 80048 BASIC METABOLIC PNL TOTAL CA: CPT | Performed by: EMERGENCY MEDICINE

## 2025-08-18 PROCEDURE — 93005 ELECTROCARDIOGRAM TRACING: CPT

## 2025-08-18 PROCEDURE — 93010 ELECTROCARDIOGRAM REPORT: CPT | Performed by: INTERNAL MEDICINE

## 2025-08-18 PROCEDURE — 81003 URINALYSIS AUTO W/O SCOPE: CPT

## 2025-08-18 PROCEDURE — 99284 EMERGENCY DEPT VISIT MOD MDM: CPT

## 2025-08-18 PROCEDURE — 83735 ASSAY OF MAGNESIUM: CPT | Performed by: EMERGENCY MEDICINE

## 2025-08-18 RX ORDER — MECLIZINE HYDROCHLORIDE 25 MG/1
25 TABLET ORAL 3 TIMES DAILY PRN
Qty: 12 TABLET | Refills: 0 | Status: SHIPPED | OUTPATIENT
Start: 2025-08-18

## 2025-08-18 RX ORDER — POTASSIUM CHLORIDE 1500 MG/1
40 TABLET, EXTENDED RELEASE ORAL ONCE
Status: COMPLETED | OUTPATIENT
Start: 2025-08-18 | End: 2025-08-18

## 2025-08-18 RX ORDER — MECLIZINE HYDROCHLORIDE 25 MG/1
25 TABLET ORAL ONCE
Status: COMPLETED | OUTPATIENT
Start: 2025-08-18 | End: 2025-08-18

## 2025-08-18 RX ORDER — POTASSIUM CHLORIDE 1500 MG/1
20 TABLET, EXTENDED RELEASE ORAL 2 TIMES DAILY
Qty: 10 TABLET | Refills: 0 | Status: SHIPPED | OUTPATIENT
Start: 2025-08-18 | End: 2025-08-26

## 2025-08-18 RX ADMIN — POTASSIUM CHLORIDE 40 MEQ: 1500 TABLET, EXTENDED RELEASE ORAL at 15:09

## 2025-08-18 RX ADMIN — MECLIZINE HYDROCHLORIDE 25 MG: 25 TABLET ORAL at 14:32

## 2025-08-18 RX ADMIN — SODIUM CHLORIDE 1000 ML: 0.9 INJECTION, SOLUTION INTRAVENOUS at 14:33

## (undated) DEVICE — SUT VICRYL 2-0 SH 27 IN UNDYED J417H

## (undated) DEVICE — SUT VICRYL 0 UR-6 27 IN J603H

## (undated) DEVICE — MONOPOLAR CURVED SCISSORS: Brand: ENDOWRIST

## (undated) DEVICE — 3M™ STERI-STRIP™ COMPOUND BENZOIN TINCTURE 40 BAGS/CARTON 4 CARTONS/CASE C1544: Brand: 3M™ STERI-STRIP™

## (undated) DEVICE — ASTOUND STANDARD SURGICAL GOWN, XL: Brand: CONVERTORS

## (undated) DEVICE — NEEDLE HYPO 22G X 1-1/2 IN

## (undated) DEVICE — LUBRICANT SURGILUBE TUBE 4 OZ  FLIP TOP

## (undated) DEVICE — ALLENTOWN LAP CHOLE APP PACK: Brand: CARDINAL HEALTH

## (undated) DEVICE — ENDOSCOPIC VALVE WITH ADAPTER.: Brand: SURSEAL® II

## (undated) DEVICE — GLOVE INDICATOR PI UNDERGLOVE SZ 8 BLUE

## (undated) DEVICE — LASER HOLMIUM FIBER 365 MIC

## (undated) DEVICE — PACK TUR

## (undated) DEVICE — ARM DRAPE

## (undated) DEVICE — TUBING SUCTION 5MM X 12 FT

## (undated) DEVICE — INTENDED FOR TISSUE SEPARATION, AND OTHER PROCEDURES THAT REQUIRE A SHARP SURGICAL BLADE TO PUNCTURE OR CUT.: Brand: BARD-PARKER SAFETY BLADES SIZE 11, STERILE

## (undated) DEVICE — BLADELESS OBTURATOR: Brand: WECK VISTA

## (undated) DEVICE — UROCATCH BAG

## (undated) DEVICE — DRAPE EQUIPMENT RF WAND

## (undated) DEVICE — PROGRASP FORCEPS: Brand: ENDOWRIST

## (undated) DEVICE — PMI DISPOSABLE PUNCTURE CLOSURE DEVICE / SUTURE GRASPER: Brand: PMI

## (undated) DEVICE — TIP COVER ACCESSORY

## (undated) DEVICE — URETERAL DUAL LUMEN CATH

## (undated) DEVICE — 3000CC GUARDIAN II: Brand: GUARDIAN

## (undated) DEVICE — GLOVE SRG BIOGEL ECLIPSE 7.5

## (undated) DEVICE — SCD SEQUENTIAL COMPRESSION COMFORT SLEEVE MEDIUM KNEE LENGTH: Brand: KENDALL SCD

## (undated) DEVICE — INVIEW CLEAR LEGGINGS: Brand: CONVERTORS

## (undated) DEVICE — GLOVE SRG BIOGEL 7.5

## (undated) DEVICE — TRAY FOLEY 16FR URIMETER SURESTEP

## (undated) DEVICE — MAYO STAND COVER: Brand: CONVERTORS

## (undated) DEVICE — CHLORAPREP HI-LITE 26ML ORANGE

## (undated) DEVICE — SHEATH URETERAL ACCESS 12/14FR 35CM PROXIS

## (undated) DEVICE — SYRINGE 20ML LL

## (undated) DEVICE — GUIDEWIRE STRGHT TIP 0.035 IN  SOLO PLUS

## (undated) DEVICE — SUT MONOCRYL 4-0 PS-2 27 IN Y426H

## (undated) DEVICE — GLOVE SRG BIOGEL 6.5

## (undated) DEVICE — CATH URETERAL 5FR X 70 CM FLEX TIP POLYUR BARD

## (undated) DEVICE — SUT VLOC 90 3-0 V-20 9IN VLOCM0644

## (undated) DEVICE — SPECIMEN CONTAINER STERILE PEEL PACK

## (undated) DEVICE — GLOVE INDICATOR PI UNDERGLOVE SZ 6.5 BLUE

## (undated) DEVICE — PENCIL ELECTROSURG E-Z CLEAN -0035H

## (undated) DEVICE — GUIDEWIRE STRGHT TIP 0.038 IN SOLO PLUS

## (undated) DEVICE — ADHESIVE SKIN HIGH VISCOSITY EXOFIN 1ML

## (undated) DEVICE — [HIGH FLOW INSUFFLATOR,  DO NOT USE IF PACKAGE IS DAMAGED,  KEEP DRY,  KEEP AWAY FROM SUNLIGHT,  PROTECT FROM HEAT AND RADIOACTIVE SOURCES.]: Brand: PNEUMOSURE

## (undated) DEVICE — MEGA SUTURECUT ND: Brand: ENDOWRIST

## (undated) DEVICE — CANNULA SEAL

## (undated) DEVICE — LUBRICANT INST ELECTROLUBE ANTISTK WO PAD

## (undated) DEVICE — COLUMN DRAPE

## (undated) DEVICE — DRAPE SHEET X-LG